# Patient Record
Sex: MALE | Race: WHITE | NOT HISPANIC OR LATINO | Employment: OTHER | URBAN - METROPOLITAN AREA
[De-identification: names, ages, dates, MRNs, and addresses within clinical notes are randomized per-mention and may not be internally consistent; named-entity substitution may affect disease eponyms.]

---

## 2017-04-11 ENCOUNTER — TRANSCRIBE ORDERS (OUTPATIENT)
Dept: GENERAL RADIOLOGY | Facility: HOSPITAL | Age: 82
End: 2017-04-11

## 2017-04-11 ENCOUNTER — HOSPITAL ENCOUNTER (OUTPATIENT)
Dept: GENERAL RADIOLOGY | Facility: HOSPITAL | Age: 82
Discharge: HOME | End: 2017-04-11
Admitting: PHYSICIAN ASSISTANT

## 2017-04-11 DIAGNOSIS — J40 BRONCHITIS, NOT SPECIFIED AS ACUTE OR CHRONIC: Primary | ICD-10-CM

## 2017-04-11 PROCEDURE — 71020 HC CHEST PA AND LATERAL: CPT

## 2017-08-14 ENCOUNTER — OFFICE VISIT (OUTPATIENT)
Dept: CARDIOLOGY | Facility: CLINIC | Age: 82
End: 2017-08-14

## 2017-08-14 VITALS
WEIGHT: 184.2 LBS | DIASTOLIC BLOOD PRESSURE: 64 MMHG | HEART RATE: 76 BPM | HEIGHT: 69 IN | SYSTOLIC BLOOD PRESSURE: 120 MMHG | BODY MASS INDEX: 27.28 KG/M2

## 2017-08-14 DIAGNOSIS — I10 ESSENTIAL HYPERTENSION: ICD-10-CM

## 2017-08-14 DIAGNOSIS — I50.43 ACUTE ON CHRONIC COMBINED SYSTOLIC AND DIASTOLIC CONGESTIVE HEART FAILURE (HCC): ICD-10-CM

## 2017-08-14 DIAGNOSIS — I25.118 CORONARY ARTERY DISEASE INVOLVING NATIVE CORONARY ARTERY OF NATIVE HEART WITH OTHER FORM OF ANGINA PECTORIS (HCC): Primary | ICD-10-CM

## 2017-08-14 DIAGNOSIS — E78.5 HYPERLIPIDEMIA, UNSPECIFIED HYPERLIPIDEMIA TYPE: ICD-10-CM

## 2017-08-14 DIAGNOSIS — I35.0 NONRHEUMATIC AORTIC VALVE STENOSIS: ICD-10-CM

## 2017-08-14 PROCEDURE — 99214 OFFICE O/P EST MOD 30 MIN: CPT | Performed by: INTERNAL MEDICINE

## 2017-08-14 RX ORDER — FUROSEMIDE 20 MG/1
20 TABLET ORAL DAILY
COMMUNITY
End: 2017-08-14 | Stop reason: SDUPTHER

## 2017-08-14 RX ORDER — NITROGLYCERIN 0.4 MG/1
0.4 TABLET SUBLINGUAL
Qty: 25 TABLET | Refills: 6 | Status: SHIPPED | OUTPATIENT
Start: 2017-08-14 | End: 2017-08-14 | Stop reason: SDUPTHER

## 2017-08-14 RX ORDER — FUROSEMIDE 20 MG/1
20 TABLET ORAL DAILY
Qty: 90 TABLET | Refills: 4 | Status: SHIPPED | OUTPATIENT
Start: 2017-08-14 | End: 2017-11-20

## 2017-08-14 RX ORDER — POTASSIUM CHLORIDE 20 MEQ/1
20 TABLET, EXTENDED RELEASE ORAL DAILY
COMMUNITY
End: 2017-08-14 | Stop reason: SDUPTHER

## 2017-08-14 RX ORDER — POTASSIUM CHLORIDE 20 MEQ/1
20 TABLET, EXTENDED RELEASE ORAL DAILY
Qty: 90 TABLET | Refills: 4 | Status: SHIPPED | OUTPATIENT
Start: 2017-08-14 | End: 2017-11-20

## 2017-08-14 RX ORDER — NITROGLYCERIN 0.4 MG/1
0.4 TABLET SUBLINGUAL
Qty: 25 TABLET | Refills: 6 | Status: SHIPPED | OUTPATIENT
Start: 2017-08-14 | End: 2019-11-15 | Stop reason: SDUPTHER

## 2017-08-14 RX ORDER — ALPRAZOLAM 0.5 MG/1
0.5 TABLET ORAL NIGHTLY PRN
Refills: 2 | COMMUNITY
Start: 2017-05-12 | End: 2019-03-05 | Stop reason: SDUPTHER

## 2017-08-14 NOTE — PROGRESS NOTES
Subjective:     Encounter Date:08/14/2017      Patient ID: Tad Moon is a 89 y.o. male.    Chief Complaint: Coronary Artery Disease and Hypertension    PROBLEM LIST:   1. Coronary  artery disease:  a. CABG in 1992, [place], [place]. LIMA  to LAD, SVG to PDA, SVG to distal RCA, SVG to OM1.   b. Non STEMI, 2007 with 3.5 x 16 Taxus to SVG to RCA (Dr. Sheikh).   c. Cardiac catheterization, 2012, medical management, incomplete database.   d. On 08/15/2014, functional class III angina/unstable.  Heart catheterization EF 40% with inferior wall akinesis. Severe distal left main and proximal LAD stenosis with a patent LIMA graft to LAD. An 80% proximal left circumflex heavily calcified stenosis tortuous segment with occluded vein graft. Chronically  occluded RCA and saphenous vein graft to RCA with 3+ collaterals.   e. Chronic functional class II-III angina.    2. Aortic stenosis  a. EF 35% peak aortic valve gradient 41.  3. Heart failure 2017  4. Hypertension.   5. Dyslipidemia.   6. History of tobacco use.   7. GERD.   8. Seizure disorder  9. Chronic lymphocytic leukemia.  10. Surgical history:   a.  Left total knee replacement.   b. Remote cholecystectomy.   c. Appendectomy.   d. Abdominal hernia repair x2    History of Present Illness  Tda Moon returns today for a 12 month follow up with a history of coronary artery disease, hypertension and dyslipidemia. Since last visit he has been doing well from a cardiovascular standpoint. He recently reported to the hospital due to difficulty breathing and shortness of breath. His breathing difficulties have resolved since, and he continues to take potassium and lasix. Mr. Moon adds that he has swelling in his RLE during the day. Denies any exertional chest pain, orthopnea, PND, or palpitations.    Allergies   Allergen Reactions   • Codeine    • Other      Beta Blockers, bradycardia       Current Outpatient Prescriptions:   •  ALPRAZolam (XANAX) 0.5 MG  "tablet, At Night As Needed., Disp: , Rfl: 2  •  aspirin 81 MG tablet, Take 1 tablet by mouth daily., Disp: 90 tablet, Rfl: 3  •  atorvastatin (LIPITOR) 80 MG tablet, Take 1 tablet by mouth daily., Disp: 90 tablet, Rfl: 3  •  cholecalciferol (VITAMIN D3) 1000 UNITS tablet, Take 1,000 Units by mouth Daily., Disp: , Rfl:   •  clopidogrel (PLAVIX) 75 MG tablet, Take 1 tablet by mouth daily., Disp: 90 tablet, Rfl: 3  •  furosemide (LASIX) 20 MG tablet, Take 1 tablet by mouth Daily., Disp: 90 tablet, Rfl: 4  •  hydrochlorothiazide (HYDRODIURIL) 25 MG tablet, Take 1 tablet by mouth daily., Disp: 90 tablet, Rfl: 3  •  isosorbide mononitrate (IMDUR) 60 MG 24 hr tablet, Take 1 tablet by mouth daily., Disp: 90 tablet, Rfl: 3  •  nitroglycerin (NITROSTAT) 0.4 MG SL tablet, Place 1 tablet under the tongue Every 5 (Five) Minutes As Needed for Chest Pain., Disp: 25 tablet, Rfl: 6  •  Omega-3 Fatty Acids (OMEGA-3 PLUS PO), Take 1 tablet by mouth As Needed., Disp: , Rfl:   •  potassium chloride (K-DUR,KLOR-CON) 20 MEQ CR tablet, Take 1 tablet by mouth Daily., Disp: 90 tablet, Rfl: 4    The following portions of the patient's history were reviewed and updated as appropriate: allergies, current medications, past family history, past medical history, past social history, past surgical history and problem list.    Review of Systems   Constitution: Negative.   Cardiovascular: Negative.    Respiratory: Negative.    Hematologic/Lymphatic: Negative for bleeding problem. Does not bruise/bleed easily.   Skin: Negative for rash.   Musculoskeletal: Negative for muscle weakness and myalgias.   Gastrointestinal: Negative for heartburn, nausea and vomiting.   Neurological: Negative.           Objective:     Vitals:    08/14/17 1259   BP: 120/64   BP Location: Right arm   Patient Position: Sitting   Pulse: 76   Weight: 184 lb 3.2 oz (83.6 kg)   Height: 69\" (175.3 cm)         Physical Exam   Constitutional: He is oriented to person, place, and time. " He appears well-developed and well-nourished.   HENT:   Mouth/Throat: Oropharynx is clear and moist.   Neck: No JVD present. Carotid bruit is not present. No thyromegaly present.   Cardiovascular: Regular rhythm, S1 normal, S2 normal and intact distal pulses.  Exam reveals no gallop, no S3 and no S4.    Murmur heard.   Systolic murmur is present with a grade of 3/6  at the upper right sternal border  Pulses:       Carotid pulses are 2+ on the right side, and 2+ on the left side.       Radial pulses are 2+ on the right side, and 2+ on the left side.   Pulmonary/Chest: Breath sounds normal.   Abdominal: Soft. Bowel sounds are normal. He exhibits no mass. There is no tenderness.   Musculoskeletal: He exhibits edema (1+ right greater than left edema).   Neurological: He is alert and oriented to person, place, and time.   Skin: Skin is warm and dry. No rash noted.       Lab Review:    Procedures        Assessment:   Tad was seen today for coronary artery disease and hypertension.    Diagnoses and all orders for this visit:    Coronary artery disease involving native coronary artery of native heart with other form of angina pectoris    Essential hypertension    Hyperlipidemia, unspecified hyperlipidemia type    Nonrheumatic aortic valve stenosis    Acute on chronic combined systolic and diastolic congestive heart failure    Other orders  -     furosemide (LASIX) 20 MG tablet; Take 1 tablet by mouth Daily.  -     nitroglycerin (NITROSTAT) 0.4 MG SL tablet; Place 1 tablet under the tongue Every 5 (Five) Minutes As Needed for Chest Pain.  -     potassium chloride (K-DUR,KLOR-CON) 20 MEQ CR tablet; Take 1 tablet by mouth Daily.      Impression:  1. Coronary artery disease is well-controlled.  2. Hypertension is well-controlled.  3. Hyperlipidemia is well-controlled.  4. Recent diagnosis of heart failure, much improved on diuretics  5. Moderate aortic stenosis    Plan:  1. Continue taking potassium and lasix.  2. Continue  current medications.  3. Revisit in 6 MO echocardiogram to evaluate aortic valve, or sooner as needed.    Scribed for Lv Cobian MD by Isela Shepherd. 8/14/2017  1:21 PM    I, Lv Cobian MD, personally performed the services described in this documentation as scribed by the above individual in my presence, and it is both accurate and complete      Please note that portions of this note may have been completed with a voice recognition program. Efforts were made to edit the dictations, but occasionally words are mistranscribed.

## 2017-09-28 ENCOUNTER — TRANSCRIBE ORDERS (OUTPATIENT)
Dept: ADMINISTRATIVE | Facility: HOSPITAL | Age: 82
End: 2017-09-28

## 2017-09-28 ENCOUNTER — HOSPITAL ENCOUNTER (OUTPATIENT)
Dept: GENERAL RADIOLOGY | Facility: HOSPITAL | Age: 82
Discharge: HOME | End: 2017-09-28
Attending: INTERNAL MEDICINE | Admitting: INTERNAL MEDICINE

## 2017-09-28 DIAGNOSIS — J20.9 ACUTE BRONCHITIS, UNSPECIFIED ORGANISM: Primary | ICD-10-CM

## 2017-09-28 PROCEDURE — 71020 HC CHEST PA AND LATERAL: CPT

## 2017-10-09 ENCOUNTER — APPOINTMENT (OUTPATIENT)
Dept: GENERAL RADIOLOGY | Facility: HOSPITAL | Age: 82
End: 2017-10-09

## 2017-10-09 ENCOUNTER — HOSPITAL ENCOUNTER (INPATIENT)
Facility: HOSPITAL | Age: 82
LOS: 1 days | Discharge: HOME | End: 2017-10-11
Attending: EMERGENCY MEDICINE | Admitting: INTERNAL MEDICINE

## 2017-10-09 DIAGNOSIS — C91.10 CLL (CHRONIC LYMPHOCYTIC LEUKEMIA) (HCC): ICD-10-CM

## 2017-10-09 DIAGNOSIS — I21.4 NSTEMI (NON-ST ELEVATED MYOCARDIAL INFARCTION) (HCC): ICD-10-CM

## 2017-10-09 DIAGNOSIS — I20.8 ANGINAL EQUIVALENT (HCC): ICD-10-CM

## 2017-10-09 DIAGNOSIS — I48.0 PAROXYSMAL ATRIAL FIBRILLATION WITH RAPID VENTRICULAR RESPONSE (HCC): ICD-10-CM

## 2017-10-09 DIAGNOSIS — I50.43 ACUTE ON CHRONIC COMBINED SYSTOLIC AND DIASTOLIC CONGESTIVE HEART FAILURE (HCC): ICD-10-CM

## 2017-10-09 DIAGNOSIS — R77.8 ELEVATED TROPONIN: ICD-10-CM

## 2017-10-09 DIAGNOSIS — M79.602 LEFT ARM PAIN: Primary | ICD-10-CM

## 2017-10-09 DIAGNOSIS — I10 ESSENTIAL HYPERTENSION: ICD-10-CM

## 2017-10-09 LAB
ALBUMIN SERPL-MCNC: 4.3 G/DL (ref 3.2–4.8)
ALBUMIN/GLOB SERPL: 1.7 G/DL (ref 1.5–2.5)
ALP SERPL-CCNC: 117 U/L (ref 25–100)
ALT SERPL W P-5'-P-CCNC: 22 U/L (ref 7–40)
ANION GAP SERPL CALCULATED.3IONS-SCNC: 10 MMOL/L (ref 3–11)
ANION GAP SERPL CALCULATED.3IONS-SCNC: 7 MMOL/L (ref 3–11)
APTT PPP: 26.3 SECONDS (ref 24–31)
APTT PPP: 37.5 SECONDS (ref 45–60)
AST SERPL-CCNC: 25 U/L (ref 0–33)
BACTERIA UR QL AUTO: ABNORMAL /HPF
BASOPHILS # BLD AUTO: 0.03 10*3/MM3 (ref 0–0.2)
BASOPHILS NFR BLD AUTO: 0.2 % (ref 0–1)
BILIRUB SERPL-MCNC: 0.9 MG/DL (ref 0.3–1.2)
BILIRUB UR QL STRIP: NEGATIVE
BNP SERPL-MCNC: 709 PG/ML (ref 0–100)
BUN BLD-MCNC: 20 MG/DL (ref 9–23)
BUN BLD-MCNC: 20 MG/DL (ref 9–23)
BUN/CREAT SERPL: 13.3 (ref 7–25)
BUN/CREAT SERPL: 14.3 (ref 7–25)
CALCIUM SPEC-SCNC: 9.1 MG/DL (ref 8.7–10.4)
CALCIUM SPEC-SCNC: 9.7 MG/DL (ref 8.7–10.4)
CHLORIDE SERPL-SCNC: 91 MMOL/L (ref 99–109)
CHLORIDE SERPL-SCNC: 91 MMOL/L (ref 99–109)
CLARITY UR: CLEAR
CO2 SERPL-SCNC: 26 MMOL/L (ref 20–31)
CO2 SERPL-SCNC: 30 MMOL/L (ref 20–31)
COLOR UR: YELLOW
CREAT BLD-MCNC: 1.4 MG/DL (ref 0.6–1.3)
CREAT BLD-MCNC: 1.5 MG/DL (ref 0.6–1.3)
DEPRECATED RDW RBC AUTO: 44.6 FL (ref 37–54)
EOSINOPHIL # BLD AUTO: 0.03 10*3/MM3 (ref 0–0.3)
EOSINOPHIL NFR BLD AUTO: 0.2 % (ref 0–3)
ERYTHROCYTE [DISTWIDTH] IN BLOOD BY AUTOMATED COUNT: 13.4 % (ref 11.3–14.5)
GFR SERPL CREATININE-BSD FRML MDRD: 44 ML/MIN/1.73
GFR SERPL CREATININE-BSD FRML MDRD: 48 ML/MIN/1.73
GLOBULIN UR ELPH-MCNC: 2.6 GM/DL
GLUCOSE BLD-MCNC: 127 MG/DL (ref 70–100)
GLUCOSE BLD-MCNC: 141 MG/DL (ref 70–100)
GLUCOSE UR STRIP-MCNC: NEGATIVE MG/DL
HCT VFR BLD AUTO: 36.5 % (ref 38.9–50.9)
HGB BLD-MCNC: 12.3 G/DL (ref 13.1–17.5)
HGB UR QL STRIP.AUTO: NEGATIVE
HYALINE CASTS UR QL AUTO: ABNORMAL /LPF
IMM GRANULOCYTES # BLD: 0.07 10*3/MM3 (ref 0–0.03)
IMM GRANULOCYTES NFR BLD: 0.4 % (ref 0–0.6)
INR PPP: 1.09
KETONES UR QL STRIP: NEGATIVE
LEUKOCYTE ESTERASE UR QL STRIP.AUTO: ABNORMAL
LYMPHOCYTES # BLD AUTO: 10.44 10*3/MM3 (ref 0.6–4.8)
LYMPHOCYTES NFR BLD AUTO: 55.3 % (ref 24–44)
MCH RBC QN AUTO: 30.9 PG (ref 27–31)
MCHC RBC AUTO-ENTMCNC: 33.7 G/DL (ref 32–36)
MCV RBC AUTO: 91.7 FL (ref 80–99)
MONOCYTES # BLD AUTO: 1 10*3/MM3 (ref 0–1)
MONOCYTES NFR BLD AUTO: 5.3 % (ref 0–12)
NEUTROPHILS # BLD AUTO: 7.32 10*3/MM3 (ref 1.5–8.3)
NEUTROPHILS NFR BLD AUTO: 38.6 % (ref 41–71)
NITRITE UR QL STRIP: NEGATIVE
PH UR STRIP.AUTO: 7 [PH] (ref 5–8)
PLAT MORPH BLD: NORMAL
PLATELET # BLD AUTO: 207 10*3/MM3 (ref 150–450)
PMV BLD AUTO: 11.1 FL (ref 6–12)
POTASSIUM BLD-SCNC: 3.5 MMOL/L (ref 3.5–5.5)
POTASSIUM BLD-SCNC: 3.7 MMOL/L (ref 3.5–5.5)
PROT SERPL-MCNC: 6.9 G/DL (ref 5.7–8.2)
PROT UR QL STRIP: NEGATIVE
PROTHROMBIN TIME: 11.9 SECONDS (ref 9.6–11.5)
RBC # BLD AUTO: 3.98 10*6/MM3 (ref 4.2–5.76)
RBC # UR: ABNORMAL /HPF
RBC MORPH BLD: NORMAL
REF LAB TEST METHOD: ABNORMAL
SMUDGE CELLS BLD QL SMEAR: NORMAL
SODIUM BLD-SCNC: 127 MMOL/L (ref 132–146)
SODIUM BLD-SCNC: 128 MMOL/L (ref 132–146)
SP GR UR STRIP: 1.01 (ref 1–1.03)
SQUAMOUS #/AREA URNS HPF: ABNORMAL /HPF
TROPONIN I SERPL-MCNC: 0.21 NG/ML (ref 0–0.07)
TROPONIN I SERPL-MCNC: 1.06 NG/ML (ref 0–0.07)
TROPONIN I SERPL-MCNC: 8.46 NG/ML
UROBILINOGEN UR QL STRIP: ABNORMAL
WBC NRBC COR # BLD: 18.89 10*3/MM3 (ref 3.5–10.8)
WBC UR QL AUTO: ABNORMAL /HPF

## 2017-10-09 PROCEDURE — 85025 COMPLETE CBC W/AUTO DIFF WBC: CPT | Performed by: EMERGENCY MEDICINE

## 2017-10-09 PROCEDURE — 80053 COMPREHEN METABOLIC PANEL: CPT | Performed by: EMERGENCY MEDICINE

## 2017-10-09 PROCEDURE — 25010000002 HEPARIN (PORCINE) PER 1000 UNITS

## 2017-10-09 PROCEDURE — 71010 HC CHEST PA OR AP: CPT

## 2017-10-09 PROCEDURE — 84484 ASSAY OF TROPONIN QUANT: CPT

## 2017-10-09 PROCEDURE — 84484 ASSAY OF TROPONIN QUANT: CPT | Performed by: INTERNAL MEDICINE

## 2017-10-09 PROCEDURE — 99223 1ST HOSP IP/OBS HIGH 75: CPT | Performed by: INTERNAL MEDICINE

## 2017-10-09 PROCEDURE — 36415 COLL VENOUS BLD VENIPUNCTURE: CPT

## 2017-10-09 PROCEDURE — 99285 EMERGENCY DEPT VISIT HI MDM: CPT

## 2017-10-09 PROCEDURE — 85730 THROMBOPLASTIN TIME PARTIAL: CPT | Performed by: EMERGENCY MEDICINE

## 2017-10-09 PROCEDURE — 25010000002 HEPARIN (PORCINE) PER 1000 UNITS: Performed by: INTERNAL MEDICINE

## 2017-10-09 PROCEDURE — G0378 HOSPITAL OBSERVATION PER HR: HCPCS

## 2017-10-09 PROCEDURE — 81001 URINALYSIS AUTO W/SCOPE: CPT | Performed by: EMERGENCY MEDICINE

## 2017-10-09 PROCEDURE — 63710000001 PREDNISONE PER 5 MG: Performed by: PHYSICIAN ASSISTANT

## 2017-10-09 PROCEDURE — 83880 ASSAY OF NATRIURETIC PEPTIDE: CPT | Performed by: EMERGENCY MEDICINE

## 2017-10-09 PROCEDURE — 85610 PROTHROMBIN TIME: CPT | Performed by: EMERGENCY MEDICINE

## 2017-10-09 PROCEDURE — 85730 THROMBOPLASTIN TIME PARTIAL: CPT

## 2017-10-09 PROCEDURE — 85007 BL SMEAR W/DIFF WBC COUNT: CPT | Performed by: EMERGENCY MEDICINE

## 2017-10-09 PROCEDURE — 93005 ELECTROCARDIOGRAM TRACING: CPT | Performed by: EMERGENCY MEDICINE

## 2017-10-09 RX ORDER — ATORVASTATIN CALCIUM 40 MG/1
80 TABLET, FILM COATED ORAL DAILY
Status: DISCONTINUED | OUTPATIENT
Start: 2017-10-09 | End: 2017-10-09 | Stop reason: SDUPTHER

## 2017-10-09 RX ORDER — ONDANSETRON 4 MG/1
4 TABLET, FILM COATED ORAL EVERY 6 HOURS PRN
Status: DISCONTINUED | OUTPATIENT
Start: 2017-10-09 | End: 2017-10-11 | Stop reason: HOSPADM

## 2017-10-09 RX ORDER — MELATONIN
1000 DAILY
Status: DISCONTINUED | OUTPATIENT
Start: 2017-10-09 | End: 2017-10-11 | Stop reason: HOSPADM

## 2017-10-09 RX ORDER — PREDNISONE 10 MG/1
10-20 TABLET ORAL DAILY
COMMUNITY
Start: 2017-10-06 | End: 2017-10-19

## 2017-10-09 RX ORDER — NITROGLYCERIN 20 MG/100ML
10-50 INJECTION INTRAVENOUS
Status: DISCONTINUED | OUTPATIENT
Start: 2017-10-09 | End: 2017-10-10

## 2017-10-09 RX ORDER — LEVOFLOXACIN 500 MG/1
500 TABLET, FILM COATED ORAL DAILY
COMMUNITY
Start: 2017-10-06 | End: 2017-10-12

## 2017-10-09 RX ORDER — PREDNISONE 1 MG/1
1 TABLET ORAL DAILY
Status: DISCONTINUED | OUTPATIENT
Start: 2017-10-09 | End: 2017-10-11 | Stop reason: HOSPADM

## 2017-10-09 RX ORDER — ATORVASTATIN CALCIUM 40 MG/1
80 TABLET, FILM COATED ORAL NIGHTLY
Status: DISCONTINUED | OUTPATIENT
Start: 2017-10-09 | End: 2017-10-11 | Stop reason: HOSPADM

## 2017-10-09 RX ORDER — ALUMINA, MAGNESIA, AND SIMETHICONE 2400; 2400; 240 MG/30ML; MG/30ML; MG/30ML
15 SUSPENSION ORAL EVERY 6 HOURS PRN
Status: DISCONTINUED | OUTPATIENT
Start: 2017-10-09 | End: 2017-10-11 | Stop reason: HOSPADM

## 2017-10-09 RX ORDER — ONDANSETRON 2 MG/ML
4 INJECTION INTRAMUSCULAR; INTRAVENOUS EVERY 6 HOURS PRN
Status: DISCONTINUED | OUTPATIENT
Start: 2017-10-09 | End: 2017-10-11 | Stop reason: HOSPADM

## 2017-10-09 RX ORDER — FAMOTIDINE 10 MG/ML
20 INJECTION, SOLUTION INTRAVENOUS ONCE
Status: COMPLETED | OUTPATIENT
Start: 2017-10-09 | End: 2017-10-09

## 2017-10-09 RX ORDER — FAMOTIDINE 20 MG/1
40 TABLET, FILM COATED ORAL DAILY
Status: DISCONTINUED | OUTPATIENT
Start: 2017-10-09 | End: 2017-10-11 | Stop reason: HOSPADM

## 2017-10-09 RX ORDER — HEPARIN SODIUM 1000 [USP'U]/ML
2000 INJECTION, SOLUTION INTRAVENOUS; SUBCUTANEOUS ONCE
Status: COMPLETED | OUTPATIENT
Start: 2017-10-09 | End: 2017-10-09

## 2017-10-09 RX ORDER — ACETAMINOPHEN 325 MG/1
650 TABLET ORAL EVERY 4 HOURS PRN
Status: DISCONTINUED | OUTPATIENT
Start: 2017-10-09 | End: 2017-10-11 | Stop reason: HOSPADM

## 2017-10-09 RX ORDER — SODIUM CHLORIDE 0.9 % (FLUSH) 0.9 %
1-10 SYRINGE (ML) INJECTION AS NEEDED
Status: DISCONTINUED | OUTPATIENT
Start: 2017-10-09 | End: 2017-10-11 | Stop reason: HOSPADM

## 2017-10-09 RX ORDER — ASPIRIN 325 MG
325 TABLET ORAL DAILY
Status: DISCONTINUED | OUTPATIENT
Start: 2017-10-10 | End: 2017-10-11 | Stop reason: HOSPADM

## 2017-10-09 RX ORDER — CLOPIDOGREL BISULFATE 75 MG/1
75 TABLET ORAL DAILY
Status: DISCONTINUED | OUTPATIENT
Start: 2017-10-09 | End: 2017-10-11 | Stop reason: HOSPADM

## 2017-10-09 RX ORDER — POTASSIUM CHLORIDE 1.5 G/1.77G
20 POWDER, FOR SOLUTION ORAL DAILY
Status: DISCONTINUED | OUTPATIENT
Start: 2017-10-09 | End: 2017-10-11 | Stop reason: HOSPADM

## 2017-10-09 RX ORDER — NITROGLYCERIN 0.4 MG/1
0.4 TABLET SUBLINGUAL
Status: DISCONTINUED | OUTPATIENT
Start: 2017-10-09 | End: 2017-10-11 | Stop reason: HOSPADM

## 2017-10-09 RX ORDER — HEPARIN SODIUM 1000 [USP'U]/ML
4000 INJECTION, SOLUTION INTRAVENOUS; SUBCUTANEOUS ONCE
Status: COMPLETED | OUTPATIENT
Start: 2017-10-09 | End: 2017-10-09

## 2017-10-09 RX ORDER — SODIUM CHLORIDE 0.9 % (FLUSH) 0.9 %
10 SYRINGE (ML) INJECTION AS NEEDED
Status: DISCONTINUED | OUTPATIENT
Start: 2017-10-09 | End: 2017-10-11 | Stop reason: HOSPADM

## 2017-10-09 RX ORDER — ALPRAZOLAM 0.5 MG/1
0.5 TABLET ORAL ONCE
Status: COMPLETED | OUTPATIENT
Start: 2017-10-09 | End: 2017-10-09

## 2017-10-09 RX ORDER — ASPIRIN 81 MG/1
81 TABLET, CHEWABLE ORAL DAILY
Status: DISCONTINUED | OUTPATIENT
Start: 2017-10-09 | End: 2017-10-09 | Stop reason: SDUPTHER

## 2017-10-09 RX ORDER — LEVETIRACETAM 500 MG/1
250 TABLET ORAL EVERY 12 HOURS SCHEDULED
Status: DISCONTINUED | OUTPATIENT
Start: 2017-10-09 | End: 2017-10-11 | Stop reason: HOSPADM

## 2017-10-09 RX ORDER — LEVETIRACETAM 250 MG/1
250 TABLET ORAL 2 TIMES DAILY
COMMUNITY
End: 2017-10-09 | Stop reason: SDDI

## 2017-10-09 RX ADMIN — FAMOTIDINE 40 MG: 20 TABLET ORAL at 17:38

## 2017-10-09 RX ADMIN — FAMOTIDINE 20 MG: 10 INJECTION, SOLUTION INTRAVENOUS at 11:15

## 2017-10-09 RX ADMIN — PREDNISONE 1 MG: 1 TABLET ORAL at 17:37

## 2017-10-09 RX ADMIN — CLOPIDOGREL BISULFATE 75 MG: 75 TABLET ORAL at 17:38

## 2017-10-09 RX ADMIN — POTASSIUM CHLORIDE 20 MEQ: 1.5 POWDER, FOR SOLUTION ORAL at 17:37

## 2017-10-09 RX ADMIN — HEPARIN SODIUM 2000 UNITS: 1000 INJECTION, SOLUTION INTRAVENOUS; SUBCUTANEOUS at 22:09

## 2017-10-09 RX ADMIN — ALPRAZOLAM 0.5 MG: 0.5 TABLET ORAL at 22:10

## 2017-10-09 RX ADMIN — NITROGLYCERIN 1 INCH: 20 OINTMENT TOPICAL at 11:16

## 2017-10-09 RX ADMIN — ATORVASTATIN CALCIUM 80 MG: 40 TABLET, FILM COATED ORAL at 20:43

## 2017-10-09 RX ADMIN — HEPARIN SODIUM 12 UNITS/KG/HR: 10000 INJECTION, SOLUTION INTRAVENOUS at 15:54

## 2017-10-09 RX ADMIN — HEPARIN SODIUM 4000 UNITS: 1000 INJECTION, SOLUTION INTRAVENOUS; SUBCUTANEOUS at 15:55

## 2017-10-09 RX ADMIN — METOPROLOL TARTRATE 12.5 MG: 25 TABLET, FILM COATED ORAL at 20:43

## 2017-10-09 RX ADMIN — NITROGLYCERIN 10 MCG/MIN: 20 INJECTION INTRAVENOUS at 12:12

## 2017-10-09 NOTE — PLAN OF CARE
Problem: Patient Care Overview (Adult)  Goal: Plan of Care Review  Outcome: Ongoing (interventions implemented as appropriate)    10/09/17 6671   Coping/Psychosocial Response Interventions   Plan Of Care Reviewed With patient   Outcome Evaluation   Outcome Summary/Follow up Plan NSR on monitor. continues on heparin and NTG drip. VSS.no c/p's of chest pain noted. npo after mn for possible cath in am         Problem: Acute Coronary Syndrome (ACS) (Adult)  Goal: Signs and Symptoms of Listed Potential Problems Will be Absent or Manageable (Acute Coronary Syndrome)  Outcome: Ongoing (interventions implemented as appropriate)

## 2017-10-09 NOTE — H&P
[facility] at [facility]        Date of Hospital Visit: 10/09/17      Place of Service: [facility]    Patient Name: Tad Moon  :1926    Referral Provider: No ref. provider found  Primary Care Provider: Pramod Hyde MD  Primary cardiologist: Lv Cobian M.D.      Chief complaint/Reason for Consultation:  chest pain    Problem List:  Problem   Nonrheumatic Aortic Valve Stenosis    1. Echo 16: Moderate to severe aortic valve stenosis is present. Mean gradient 25 mmHg, MARII 0.89 cm squares.     Acute On Chronic Combined Systolic and Diastolic Congestive Heart Failure     1. Echo 16 Left ventricular function is moderately decreased. Estimated EF = 35%.     Coronary Artery Disease Involving Native Coronary Artery of Native Heart    a. CABG in , [place], [place]. LIMA to LAD, SVG to PDA, SVG to distal RCA, SVG to OM1.   b. Non STEMI,  with 3.5 x 16 Taxus to SVG to RCA (Dr. Sheikh).   c. Cardiac catheterization, , medical management, incomplete database.   d. On 08/15/2014, functional class III angina/unstable. Heart catheterization EF 40% with inferior wall akinesis. Severe distal left main and proximal LAD stenosis with a patent LIMA graft to LAD. An 80% proximal left circumflex heavily calcified stenosis tortuous segment with occluded vein graft. Chronically occluded RCA and saphenous vein graft to RCA with 3+ collaterals.   e. Chronic functional class II-III angina.     Essential Hypertension   Hld (Hyperlipidemia)       History of Present Illness:  89 year old male with known coronary disease, aortic stenosis and chronic systolic heart failure.  Presents to the emergency department this morning with a two-week complaint of cough.  His primary care has been treating him for bronchitis with oral steroids and antibiotics.  He had lower extremity edema which has resolved in the past 24-48 hours.  Upon waking this morning he had  left sided chest pain radiating to the left upper extremity with diaphoresis.  This lasted for approximately 2 hours, was only partially relieved with sublingual nitroglycerin ×2.  He denied rojas midsternal chest pain, nausea.  His initial EKG was nondiagnostic.  His BNP is elevated as initial troponin is elevated.  At present he is comfortable and in no apparent distress.    Past Medical History:   Diagnosis Date   • CLL (chronic lymphocytic leukemia)    • Coronary artery disease    • GERD (gastroesophageal reflux disease)    • History of tobacco abuse    • Hyperlipidemia    • Hypertension        Past Surgical History:   Procedure Laterality Date   • ABDOMINAL HERNIA REPAIR      x 2   • APPENDECTOMY     • CARDIAC SURGERY     • CHOLECYSTECTOMY     • CORONARY ARTERY BYPASS GRAFT     • REPLACEMENT TOTAL KNEE Left 2014   • TOTAL KNEE ARTHROPLASTY Left        Allergies   Allergen Reactions   • Codeine    • Other      Beta Blockers, bradycardia       (Not in a hospital admission)    Current Facility-Administered Medications:   •  nitroglycerin 50 mg/250 mL (0.2 mg/mL) infusion, 10-50 mcg/min, Intravenous, Titrated, Olvin Ahumada MD, Last Rate: 3 mL/hr at 10/09/17 1212, 10 mcg/min at 10/09/17 1212  •  Insert peripheral IV, , , Once **AND** sodium chloride 0.9 % flush 10 mL, 10 mL, Intravenous, PRN, Olvin Ahumada MD    Current Outpatient Prescriptions:   •  atorvastatin (LIPITOR) 80 MG tablet, Take 1 tablet by mouth daily., Disp: 90 tablet, Rfl: 3  •  cholecalciferol (VITAMIN D3) 1000 UNITS tablet, Take 1,000 Units by mouth Daily., Disp: , Rfl:   •  clopidogrel (PLAVIX) 75 MG tablet, Take 1 tablet by mouth daily., Disp: 90 tablet, Rfl: 3  •  furosemide (LASIX) 20 MG tablet, Take 1 tablet by mouth Daily., Disp: 90 tablet, Rfl: 4  •  hydrochlorothiazide (HYDRODIURIL) 25 MG tablet, Take 1 tablet by mouth daily., Disp: 90 tablet, Rfl: 3  •  isosorbide mononitrate (IMDUR) 60 MG 24 hr tablet, Take 1 tablet by  mouth daily., Disp: 90 tablet, Rfl: 3  •  levETIRAcetam (KEPPRA) 250 MG tablet, Take 250 mg by mouth 2 (Two) Times a Day., Disp: , Rfl:   •  nitroglycerin (NITROSTAT) 0.4 MG SL tablet, Place 1 tablet under the tongue Every 5 (Five) Minutes As Needed for Chest Pain., Disp: 25 tablet, Rfl: 6  •  Omega-3 Fatty Acids (OMEGA-3 PLUS PO), Take 1 tablet by mouth As Needed., Disp: , Rfl:   •  potassium chloride (K-DUR,KLOR-CON) 20 MEQ CR tablet, Take 1 tablet by mouth Daily., Disp: 90 tablet, Rfl: 4  •  PredniSONE 5 MG tablet therapy pack dosepak, Take 1 each by mouth. Take as directed on package instructions., Disp: , Rfl:   •  ALPRAZolam (XANAX) 0.5 MG tablet, At Night As Needed., Disp: , Rfl: 2  •  aspirin 81 MG tablet, Take 1 tablet by mouth daily., Disp: 90 tablet, Rfl: 3      Social History     Social History   • Marital status:      Spouse name: N/A   • Number of children: N/A   • Years of education: N/A     Occupational History   • Not on file.     Social History Main Topics   • Smoking status: Former Smoker     Types: Cigarettes   • Smokeless tobacco: Never Used      Comment: quit 45 years ago   • Alcohol use 0.6 - 1.2 oz/week     1 - 2 Glasses of wine per week      Comment: occas   • Drug use: No   • Sexual activity: No     Other Topics Concern   • Not on file     Social History Narrative       Family History   Problem Relation Age of Onset   • Stroke Mother    • Heart disease Father        REVIEW OF SYSTEMS:   Review of Systems   Constitution: Negative.   HENT: Negative.    Eyes: Negative.    Cardiovascular: Positive for chest pain, dyspnea on exertion, leg swelling and orthopnea. Negative for palpitations, paroxysmal nocturnal dyspnea and syncope.   Respiratory: Positive for cough and shortness of breath.    Endocrine: Negative.    Hematologic/Lymphatic: Negative.    Skin: Negative.    Musculoskeletal: Negative.    Gastrointestinal: Negative.    Genitourinary: Negative.    Neurological: Negative.   "  Psychiatric/Behavioral: Negative.    Allergic/Immunologic: Negative.    All other systems reviewed and are negative.        Objective:     Vitals:    10/09/17 1048 10/09/17 1116 10/09/17 1140 10/09/17 1240   BP: 147/87 141/85 126/91 133/92   Pulse: 97 87 96 95   Resp: 20      Temp: 98.6 °F (37 °C)      TempSrc: Oral      SpO2: 96%  98% 100%   Weight: 185 lb (83.9 kg)      Height: 69\" (175.3 cm)        Body mass index is 27.32 kg/(m^2).  Flowsheet Rows         First Filed Value    Admission Height  69\" (175.3 cm) Documented at 10/09/2017 1048    Admission Weight  185 lb (83.9 kg) Documented at 10/09/2017 1048        No intake or output data in the 24 hours ending 10/09/17 1249    Physical Exam   General: No acute distress, well-developed and well-nourished.    Skin: Skin is warm and dry. No obvious cyanosis, erythema or pallor.   HEENT: Atraumatic, normocephalic, no conjunctival pallor, no scleral icterus.   Neck: Supple, no JVD. Normal carotid upstrokes, no bruits.    Chest:No respiratory distres No chest wall tenderness. fine crackles in bases bilaterally. No wheezes,    Cardiovascular: Normal S1 and S2, 2/6 murmer, no gallop or rub. PMI is not displaced.    Pulses:Radial and pedal pulses are 2+ and symmetric.    Abdomen: Soft, non tender, normal bowel sounds.   Musculoskeletal/Extremities:  No clubbing, cyanosis or edema. No gross deformity.   Neurological: Alert and oriented to person, place, and time, no gross focal deficits.   Psychiatric: Normal mood and affect.Speech and behavior is normal.    Barbaeu Test:  Left: Normal  (oxymetric Allens) Right: Not Assessed     Lab Review:                  Results from last 7 days  Lab Units 10/09/17  1111   SODIUM mmol/L 128*   POTASSIUM mmol/L 3.5   CHLORIDE mmol/L 91*   CO2 mmol/L 30.0   BUN mg/dL 20   CREATININE mg/dL 1.40*   GLUCOSE mg/dL 127*   CALCIUM mg/dL 9.7           Results from last 7 days  Lab Units 10/09/17  1111   WBC 10*3/mm3 18.89*   HEMOGLOBIN g/dL " 12.3*   HEMATOCRIT % 36.5*   PLATELETS 10*3/mm3 207       Results from last 7 days  Lab Units 10/09/17  1111   INR  1.09   APTT seconds 26.3               Results from last 7 days  Lab Units 10/09/17  1111   BNP pg/mL 709.0*       EKG: CARD EKG FINDINGS: Nonspecific ST segment changes, sinus rhythm              Assessment:   · ACS/non-STEMI.  · CAD with known occlusion of RCA and RCA graft and known severe calcified disease of the circumflex with occlusion of circumflex graft, patent LIMA graft to the LAD based on catheterization study 2014.  · Acute on chronic systolic heart failure.  · Ischemic cardiomyopathy, ejection fraction 35%.  · Severe aortic stenosis  · Hypertension  · Dyslipidemia  · Acute versus chronic kidney disease.  Plan:   · Admit overnight for observation, check serial cardiac markers.  · Continue aspirin/Plavix, add intravenous heparin.  Add low-dose beta blockers.    · Hold hydrochlorothiazide due to significant hyponatremia.  · Continue rest of the home medications.  · Due to known severe non-revascularizable disease we will plan to optimize medical management, if he remains stable with no significant angina we will consider discharging to home tomorrow with adjusted medical therapy.  · Further management to be based on clinical course.    Scribed for Juan M Sood MD by Elver Weinstein PA-C. 10/9/2017  12:49 PM    IJuan M MD, personally performed the services described in this documentation as scribed by the above named individual in my presence, and it is both accurate and complete.  10/9/2017  1:56 PM

## 2017-10-09 NOTE — PLAN OF CARE
Problem: Patient Care Overview (Adult)  Goal: Plan of Care Review  Outcome: Ongoing (interventions implemented as appropriate)    10/09/17 4681   Coping/Psychosocial Response Interventions   Plan Of Care Reviewed With patient   Outcome Evaluation   Outcome Summary/Follow up Plan nsr o n monitor. VSS.. continues on hepaRIN AND NTG DRIP. NO C/P'S OF CHEST PAIN NOTED.   Patient Care Overview   Progress no change

## 2017-10-09 NOTE — ED PROVIDER NOTES
"Subjective   HPI Comments: Tad Moon is a 89 y.o.male with a history of atrial fibrillation. He had a CABG over 20 years ago and stent placement in 2007. He regularly sees Aranza Schneider, cardiology. He presents to the ED today with c/o left arm pain. The patient states that he was feeling anxious and \"on edge\" after waking up this morning. He also reports feeling generally worse than baseline. He came to the emergency department for evaluation after developing a gradually worsening, aching pain in the left upper arm that occasional radiates to the left chest region. His discomfort is not significantly worse with movement or palpation. He recently started Prednisone on Friday and has tried taking two NTG prior to arrival without relief. He does mention feeling somewhat short of breath and diaphoretic but denies any chest pain at this time. He has no other acute complaints.     Patient is a 89 y.o. male presenting with upper extremity pain.   History provided by:  Patient  Upper Extremity Issue   Location:  Arm  Arm location:  L upper arm  Pain details:     Quality:  Aching    Radiates to:  Chest    Severity:  Moderate    Onset quality:  Gradual    Duration:  1 day    Timing:  Constant    Progression:  Worsening  Relieved by:  Nothing  Worsened by:  Nothing  Ineffective treatments: Prednisone, Nitro.  Associated symptoms: no back pain, no fever and no neck pain        Review of Systems   Constitutional: Positive for diaphoresis. Negative for chills and fever.   HENT: Negative for congestion, rhinorrhea, sore throat and trouble swallowing.    Respiratory: Positive for shortness of breath. Negative for cough.    Cardiovascular: Negative for chest pain and leg swelling.   Gastrointestinal: Negative for abdominal pain, diarrhea, nausea and vomiting.   Musculoskeletal: Positive for myalgias (left upper arm, occasional radiation to left chest region). Negative for back pain and neck pain.   Neurological: Negative for " dizziness, weakness, numbness and headaches.   Psychiatric/Behavioral: Negative for confusion.   All other systems reviewed and are negative.      Past Medical History:   Diagnosis Date   • CLL (chronic lymphocytic leukemia)    • Coronary artery disease    • GERD (gastroesophageal reflux disease)    • History of tobacco abuse    • Hyperlipidemia    • Hypertension        Allergies   Allergen Reactions   • Other      Beta Blockers, bradycardia       Past Surgical History:   Procedure Laterality Date   • ABDOMINAL HERNIA REPAIR      x 2   • APPENDECTOMY     • CARDIAC SURGERY     • CHOLECYSTECTOMY     • CORONARY ARTERY BYPASS GRAFT     • REPLACEMENT TOTAL KNEE Left 2014   • TOTAL KNEE ARTHROPLASTY Left        Family History   Problem Relation Age of Onset   • Stroke Mother    • Heart disease Father        Social History     Social History   • Marital status:      Spouse name: N/A   • Number of children: N/A   • Years of education: N/A     Social History Main Topics   • Smoking status: Former Smoker     Types: Cigarettes   • Smokeless tobacco: Never Used      Comment: quit 45 years ago   • Alcohol use 0.6 - 1.2 oz/week     1 - 2 Glasses of wine per week      Comment: occas   • Drug use: No   • Sexual activity: No     Other Topics Concern   • None     Social History Narrative         Objective   Physical Exam   Constitutional: He is oriented to person, place, and time. He appears well-developed and well-nourished. No distress.   HENT:   Head: Normocephalic and atraumatic.   Nose: Nose normal.   Mouth/Throat: Oropharynx is clear and moist.   Eyes: Conjunctivae are normal. No scleral icterus.   Neck: Normal range of motion. Neck supple.   Cardiovascular: Normal rate, regular rhythm and normal heart sounds.  Exam reveals no gallop and no friction rub.    No murmur heard.  Regular rate and rhythm. No murmur.   Pulmonary/Chest: Effort normal and breath sounds normal. No respiratory distress. He has no wheezes. He has no  rales.   Median sternotomy scar. Lungs are clear to auscultation.   Abdominal: Soft. Bowel sounds are normal. He exhibits no distension and no mass. There is no tenderness. There is no rebound and no guarding.   Abdomen is soft and non-tender diffusely. Bowel sounds are normal in all four quadrants. No guarding or rebound.   Musculoskeletal: Normal range of motion. He exhibits tenderness. He exhibits no edema.   Possible mild tenderness of the left shoulder that is difficult to assess. The patient is unable to discern whether palpation reproduces the chief complaint or if this is just where the pain is located.   Neurological: He is alert and oriented to person, place, and time.   Skin: Skin is warm and dry. No erythema.   Psychiatric: He has a normal mood and affect. His behavior is normal.   Nursing note and vitals reviewed.      Procedures         ED Course  ED Course   Value Comment By Time   WBC: (!) 18.89 Patient is noted to be on steroids likely accounting for the patient's leukocytosis. Olvin Ahumada MD 10/09 1130   Troponin I: (!) 0.21 (Reviewed) Olvin Ahumada MD 10/09 1136   BNP: (!) 709.0 (Reviewed) Olvin Ahumada MD 10/09 1153   Creatinine: (!) 1.40 (Reviewed) Olvin Ahumada MD 10/09 1153    Dr. Ahumada discussed the case in detail with the hospitalist who will admit. Anastasia Rutledge 10/09 1154    Case discussed with Hope with [facility] and will arrange consult. Olvin Ahumada MD 10/09 1155    Patient evaluated by cardiology and they will admit the patient to the hospital. Olvin Ahumada MD 10/09 1302     Recent Results (from the past 24 hour(s))   Comprehensive Metabolic Panel    Collection Time: 10/09/17 11:11 AM   Result Value Ref Range    Glucose 127 (H) 70 - 100 mg/dL    BUN 20 9 - 23 mg/dL    Creatinine 1.40 (H) 0.60 - 1.30 mg/dL    Sodium 128 (L) 132 - 146 mmol/L    Potassium 3.5 3.5 - 5.5 mmol/L    Chloride 91 (L) 99 - 109 mmol/L    CO2 30.0 20.0  - 31.0 mmol/L    Calcium 9.7 8.7 - 10.4 mg/dL    Total Protein 6.9 5.7 - 8.2 g/dL    Albumin 4.30 3.20 - 4.80 g/dL    ALT (SGPT) 22 7 - 40 U/L    AST (SGOT) 25 0 - 33 U/L    Alkaline Phosphatase 117 (H) 25 - 100 U/L    Total Bilirubin 0.9 0.3 - 1.2 mg/dL    eGFR Non African Amer 48 (L) >60 mL/min/1.73    Globulin 2.6 gm/dL    A/G Ratio 1.7 1.5 - 2.5 g/dL    BUN/Creatinine Ratio 14.3 7.0 - 25.0    Anion Gap 7.0 3.0 - 11.0 mmol/L   Protime-INR    Collection Time: 10/09/17 11:11 AM   Result Value Ref Range    Protime 11.9 (H) 9.6 - 11.5 Seconds    INR 1.09    aPTT    Collection Time: 10/09/17 11:11 AM   Result Value Ref Range    PTT 26.3 24.0 - 31.0 seconds   BNP    Collection Time: 10/09/17 11:11 AM   Result Value Ref Range    .0 (H) 0.0 - 100.0 pg/mL   CBC Auto Differential    Collection Time: 10/09/17 11:11 AM   Result Value Ref Range    WBC 18.89 (H) 3.50 - 10.80 10*3/mm3    RBC 3.98 (L) 4.20 - 5.76 10*6/mm3    Hemoglobin 12.3 (L) 13.1 - 17.5 g/dL    Hematocrit 36.5 (L) 38.9 - 50.9 %    MCV 91.7 80.0 - 99.0 fL    MCH 30.9 27.0 - 31.0 pg    MCHC 33.7 32.0 - 36.0 g/dL    RDW 13.4 11.3 - 14.5 %    RDW-SD 44.6 37.0 - 54.0 fl    MPV 11.1 6.0 - 12.0 fL    Platelets 207 150 - 450 10*3/mm3    Neutrophil % 38.6 (L) 41.0 - 71.0 %    Lymphocyte % 55.3 (H) 24.0 - 44.0 %    Monocyte % 5.3 0.0 - 12.0 %    Eosinophil % 0.2 0.0 - 3.0 %    Basophil % 0.2 0.0 - 1.0 %    Immature Grans % 0.4 0.0 - 0.6 %    Neutrophils, Absolute 7.32 1.50 - 8.30 10*3/mm3    Lymphocytes, Absolute 10.44 (H) 0.60 - 4.80 10*3/mm3    Monocytes, Absolute 1.00 0.00 - 1.00 10*3/mm3    Eosinophils, Absolute 0.03 0.00 - 0.30 10*3/mm3    Basophils, Absolute 0.03 0.00 - 0.20 10*3/mm3    Immature Grans, Absolute 0.07 (H) 0.00 - 0.03 10*3/mm3   Scan Slide    Collection Time: 10/09/17 11:11 AM   Result Value Ref Range    RBC Morphology Normal Normal    Smudge Cells Slight/1+ None Seen    Platelet Morphology Normal Normal   POC Troponin, Rapid    Collection  Time: 10/09/17 11:12 AM   Result Value Ref Range    Troponin I 0.21 (H) 0.00 - 0.07 ng/mL   Urinalysis With / Culture If Indicated - Urine, Clean Catch    Collection Time: 10/09/17  1:16 PM   Result Value Ref Range    Color, UA Yellow Yellow, Straw    Appearance, UA Clear Clear    pH, UA 7.0 5.0 - 8.0    Specific Gravity, UA 1.010 1.001 - 1.030    Glucose, UA Negative Negative    Ketones, UA Negative Negative    Bilirubin, UA Negative Negative    Blood, UA Negative Negative    Protein, UA Negative Negative    Leuk Esterase, UA Trace (A) Negative    Nitrite, UA Negative Negative    Urobilinogen, UA 0.2 E.U./dL 0.2 - 1.0 E.U./dL   Urinalysis, Microscopic Only - Urine, Clean Catch    Collection Time: 10/09/17  1:16 PM   Result Value Ref Range    RBC, UA 0-2 None Seen, 0-2 /HPF    WBC, UA 3-5 (A) None Seen /HPF    Bacteria, UA None Seen None Seen, Trace /HPF    Squamous Epithelial Cells, UA None Seen None Seen, 0-2 /HPF    Hyaline Casts, UA None Seen 0 - 6 /LPF    Methodology Automated Microscopy    POC Troponin, Rapid    Collection Time: 10/09/17  1:30 PM   Result Value Ref Range    Troponin I 1.06 (C) 0.00 - 0.07 ng/mL   Troponin    Collection Time: 10/09/17  4:34 PM   Result Value Ref Range    Troponin I 8.457 (C) <=0.039 ng/mL   Basic Metabolic Panel    Collection Time: 10/09/17  4:34 PM   Result Value Ref Range    Glucose 141 (H) 70 - 100 mg/dL    BUN 20 9 - 23 mg/dL    Creatinine 1.50 (H) 0.60 - 1.30 mg/dL    Sodium 127 (L) 132 - 146 mmol/L    Potassium 3.7 3.5 - 5.5 mmol/L    Chloride 91 (L) 99 - 109 mmol/L    CO2 26.0 20.0 - 31.0 mmol/L    Calcium 9.1 8.7 - 10.4 mg/dL    eGFR Non African Amer 44 (L) >60 mL/min/1.73    BUN/Creatinine Ratio 13.3 7.0 - 25.0    Anion Gap 10.0 3.0 - 11.0 mmol/L     Note: In addition to lab results from this visit, the labs listed above may include labs taken at another facility or during a different encounter within the last 24 hours. Please correlate lab times with ED admission and  discharge times for further clarification of the services performed during this visit.    XR Chest 1 View   Final Result   Chronic changes seen within the lung fields with no evidence   of acute parenchymal disease.       D:  10/09/2017   E:  10/09/2017       This report was finalized on 10/9/2017 3:02 PM by Dr. Gretchen Matias MD.            Vitals:    10/09/17 1630 10/09/17 1700 10/09/17 1900 10/09/17 1942   BP: 102/59 115/72 126/79 122/75   BP Location:    Right arm   Patient Position:    Lying   Pulse: 91 74 89 81   Resp:    16   Temp:    97.6 °F (36.4 °C)   TempSrc:    Oral   SpO2:  98% 99% 99%   Weight:       Height:         Medications   sodium chloride 0.9 % flush 10 mL (not administered)   nitroglycerin 50 mg/250 mL (0.2 mg/mL) infusion (3 mcg/min Intravenous Currently Infusing 10/9/17 1717)   cholecalciferol (VITAMIN D3) tablet 1,000 Units (not administered)   clopidogrel (PLAVIX) tablet 75 mg (75 mg Oral Given 10/9/17 1738)   levETIRAcetam (KEPPRA) tablet 250 mg (not administered)   nitroglycerin (NITROSTAT) SL tablet 0.4 mg (not administered)   potassium chloride (KLOR-CON) packet 20 mEq (20 mEq Oral Given 10/9/17 1737)   predniSONE (DELTASONE) tablet 1 mg (1 mg Oral Given 10/9/17 1737)   sodium chloride 0.9 % flush 1-10 mL (not administered)   aspirin tablet 325 mg (not administered)   metoprolol tartrate (LOPRESSOR) half tablet 12.5 mg (not administered)   atorvastatin (LIPITOR) tablet 80 mg (not administered)   acetaminophen (TYLENOL) tablet 650 mg (not administered)   magnesium hydroxide (MILK OF MAGNESIA) suspension 2400 mg/10mL 10 mL (not administered)   ondansetron (ZOFRAN) tablet 4 mg (not administered)     Or   ondansetron (ZOFRAN) injection 4 mg (not administered)   aluminum-magnesium hydroxide-simethicone (MAALOX MAX) 400-400-40 MG/5ML suspension 15 mL (not administered)   famotidine (PEPCID) tablet 40 mg (40 mg Oral Given 10/9/17 6550)   heparin 93831 units/250 mL (100 units/mL) in 0.45  % NaCl infusion ( Intravenous Not Given 10/9/17 1717)   Pharmacy to Dose Heparin (not administered)   famotidine (PEPCID) injection 20 mg (20 mg Intravenous Given 10/9/17 1115)   nitroglycerin (NITROSTAT) ointment 1 inch (1 inch Topical Given 10/9/17 1116)   heparin (porcine) injection 4,000 Units (4,000 Units Intravenous Given 10/9/17 1555)     ECG/EMG Results (last 24 hours)     Procedure Component Value Units Date/Time    ECG 12 Lead [[id number]] Collected:  10/09/17 1050     Updated:  10/09/17 1051                       MDM  Number of Diagnoses or Management Options  Diagnosis management comments: ECG/EMG Results (last 24 hours)     Procedure Component Value Units Date/Time    ECG 12 Lead ([id number]) Collected:  10/09/17 1050     Updated:  10/09/17 1214    Narrative:       Test Reason : tecyh. left arm pain  Blood Pressure : **/** mmHG  Vent. Rate : 095 BPM     Atrial Rate : 095 BPM     P-R Int : 080 ms          QRS Dur : 136 ms      QT Int : 366 ms       P-R-T Axes : 045 -62 022 degrees     QTc Int : 459 ms    Sinus rhythm with short OK  Left axis deviation  Right bundle branch block  Inferior infarct (cited on or before 09-OCT-2017)  Abnormal ECG  When compared with ECG of 05-NOV-2016 13:12,  OK interval has decreased  Serial changes of Inferior infarct present  Confirmed by YAHIR RICKS MD ([id number]) on 10/9/2017 12:13:52 PM    Referred By:  MILLICENT           Confirmed By:YAHIR RICKS MD    ECG 12 Lead ([id number]) Collected:  10/09/17 1320     Updated:  10/09/17 1323    Narrative:       Test Reason : 2ND SET  Blood Pressure : **/** mmHG  Vent. Rate : 089 BPM     Atrial Rate : 089 BPM     P-R Int : 090 ms          QRS Dur : 134 ms      QT Int : 386 ms       P-R-T Axes : 042 -53 017 degrees     QTc Int : 469 ms    Sinus rhythm with short OK  Left axis deviation  Nonspecific intraventricular block  Inferior infarct (cited on or before 09-OCT-2017)  Abnormal ECG  When compared with ECG of 09-OCT-2017 10:50,  Serial  changes of Inferior infarct present  Confirmed by YAHIR AHUMADA MD ([id number]) on 10/9/2017 1:23:02 PM    Referred By:  MILLICENT           Confirmed By:YAHIR AHUMADA MD             Amount and/or Complexity of Data Reviewed  Clinical lab tests: reviewed  Tests in the radiology section of CPT®: reviewed  Decide to obtain previous medical records or to obtain history from someone other than the patient: yes  Obtain history from someone other than the patient: yes  Review and summarize past medical records: yes  Discuss the patient with other providers: yes  Independent visualization of images, tracings, or specimens: yes        Final diagnoses:   Left arm pain   NSTEMI (non-ST elevated myocardial infarction)   Acute on chronic combined systolic and diastolic congestive heart failure   Anginal equivalent   Elevated troponin   Paroxysmal atrial fibrillation with rapid ventricular response   Essential hypertension   CLL (chronic lymphocytic leukemia)       Documentation assistance provided by dia Rutledge.  Information recorded by the scribe was done at my direction and has been verified and validated by me.     Anastasia Rutledge  10/09/17 1112       Yahir Ahumada MD  10/09/17 0487

## 2017-10-10 PROBLEM — I21.4 NSTEMI (NON-ST ELEVATED MYOCARDIAL INFARCTION) (HCC): Status: ACTIVE | Noted: 2017-10-09

## 2017-10-10 LAB
ACT BLD: 241 SECONDS (ref 82–152)
ANION GAP SERPL CALCULATED.3IONS-SCNC: 12 MMOL/L (ref 3–11)
APTT PPP: 44.4 SECONDS (ref 45–60)
ARTERIAL PATENCY WRIST A: ABNORMAL
ARTICHOKE IGE QN: 105 MG/DL (ref 0–130)
ATMOSPHERIC PRESS: ABNORMAL MMHG
BASE EXCESS BLDA CALC-SCNC: 0.9 MMOL/L (ref 0–2)
BDY SITE: ABNORMAL
BNP SERPL-MCNC: 1490 PG/ML (ref 0–100)
BUN BLD-MCNC: 22 MG/DL (ref 9–23)
BUN/CREAT SERPL: 16.9 (ref 7–25)
CALCIUM SPEC-SCNC: 8.8 MG/DL (ref 8.7–10.4)
CHLORIDE SERPL-SCNC: 91 MMOL/L (ref 99–109)
CHOLEST SERPL-MCNC: 154 MG/DL (ref 0–200)
CO2 BLDA-SCNC: 24.7 MMOL/L (ref 22–33)
CO2 SERPL-SCNC: 25 MMOL/L (ref 20–31)
COHGB MFR BLD: 1.5 % (ref 0–2)
CREAT BLD-MCNC: 1.3 MG/DL (ref 0.6–1.3)
GFR SERPL CREATININE-BSD FRML MDRD: 52 ML/MIN/1.73
GLUCOSE BLD-MCNC: 94 MG/DL (ref 70–100)
HCO3 BLDA-SCNC: 23.7 MMOL/L (ref 20–26)
HCT VFR BLD CALC: 40 %
HDLC SERPL-MCNC: 39 MG/DL (ref 40–60)
HGB BLDA-MCNC: 13 G/DL (ref 13.5–17.5)
HOROWITZ INDEX BLD+IHG-RTO: 21 %
METHGB BLD QL: 0.7 % (ref 0–1.5)
MODALITY: ABNORMAL
OXYHGB MFR BLDV: 97.5 % (ref 94–99)
PCO2 BLDA: 31.7 MM HG (ref 35–48)
PH BLDA: 7.48 PH UNITS (ref 7.35–7.45)
PO2 BLDA: 131 MM HG (ref 83–108)
POTASSIUM BLD-SCNC: 3.7 MMOL/L (ref 3.5–5.5)
SODIUM BLD-SCNC: 128 MMOL/L (ref 132–146)
TRIGL SERPL-MCNC: 79 MG/DL (ref 0–150)
TROPONIN I SERPL-MCNC: 11.99 NG/ML
TROPONIN I SERPL-MCNC: 17.49 NG/ML

## 2017-10-10 PROCEDURE — B2111ZZ FLUOROSCOPY OF MULTIPLE CORONARY ARTERIES USING LOW OSMOLAR CONTRAST: ICD-10-PCS | Performed by: INTERNAL MEDICINE

## 2017-10-10 PROCEDURE — 80048 BASIC METABOLIC PNL TOTAL CA: CPT | Performed by: PHYSICIAN ASSISTANT

## 2017-10-10 PROCEDURE — 85347 COAGULATION TIME ACTIVATED: CPT

## 2017-10-10 PROCEDURE — 4A023N7 MEASUREMENT OF CARDIAC SAMPLING AND PRESSURE, LEFT HEART, PERCUTANEOUS APPROACH: ICD-10-PCS | Performed by: INTERNAL MEDICINE

## 2017-10-10 PROCEDURE — 63710000001 PREDNISONE PER 5 MG: Performed by: PHYSICIAN ASSISTANT

## 2017-10-10 PROCEDURE — C1887 CATHETER, GUIDING: HCPCS | Performed by: INTERNAL MEDICINE

## 2017-10-10 PROCEDURE — C1769 GUIDE WIRE: HCPCS | Performed by: INTERNAL MEDICINE

## 2017-10-10 PROCEDURE — 83880 ASSAY OF NATRIURETIC PEPTIDE: CPT | Performed by: PHYSICIAN ASSISTANT

## 2017-10-10 PROCEDURE — 80061 LIPID PANEL: CPT | Performed by: PHYSICIAN ASSISTANT

## 2017-10-10 PROCEDURE — 84484 ASSAY OF TROPONIN QUANT: CPT | Performed by: INTERNAL MEDICINE

## 2017-10-10 PROCEDURE — C1725 CATH, TRANSLUMIN NON-LASER: HCPCS | Performed by: INTERNAL MEDICINE

## 2017-10-10 PROCEDURE — 25010000002 FUROSEMIDE PER 20 MG: Performed by: INTERNAL MEDICINE

## 2017-10-10 PROCEDURE — C9600 PERC DRUG-EL COR STENT SING: HCPCS | Performed by: INTERNAL MEDICINE

## 2017-10-10 PROCEDURE — C1874 STENT, COATED/COV W/DEL SYS: HCPCS | Performed by: INTERNAL MEDICINE

## 2017-10-10 PROCEDURE — 0 IOPAMIDOL PER 1 ML: Performed by: INTERNAL MEDICINE

## 2017-10-10 PROCEDURE — 93459 L HRT ART/GRFT ANGIO: CPT | Performed by: INTERNAL MEDICINE

## 2017-10-10 PROCEDURE — 25010000002 HEPARIN (PORCINE) PER 1000 UNITS: Performed by: INTERNAL MEDICINE

## 2017-10-10 PROCEDURE — C1760 CLOSURE DEV, VASC: HCPCS | Performed by: INTERNAL MEDICINE

## 2017-10-10 PROCEDURE — 85730 THROMBOPLASTIN TIME PARTIAL: CPT

## 2017-10-10 PROCEDURE — C1894 INTRO/SHEATH, NON-LASER: HCPCS | Performed by: INTERNAL MEDICINE

## 2017-10-10 PROCEDURE — 82805 BLOOD GASES W/O2 SATURATION: CPT | Performed by: INTERNAL MEDICINE

## 2017-10-10 PROCEDURE — 99232 SBSQ HOSP IP/OBS MODERATE 35: CPT | Performed by: INTERNAL MEDICINE

## 2017-10-10 PROCEDURE — 027135Z DILATION OF CORONARY ARTERY, TWO ARTERIES WITH TWO DRUG-ELUTING INTRALUMINAL DEVICES, PERCUTANEOUS APPROACH: ICD-10-PCS | Performed by: INTERNAL MEDICINE

## 2017-10-10 PROCEDURE — B2151ZZ FLUOROSCOPY OF LEFT HEART USING LOW OSMOLAR CONTRAST: ICD-10-PCS | Performed by: INTERNAL MEDICINE

## 2017-10-10 PROCEDURE — 92928 PRQ TCAT PLMT NTRAC ST 1 LES: CPT | Performed by: INTERNAL MEDICINE

## 2017-10-10 DEVICE — XIENCE ALPINE EVEROLIMUS ELUTING CORONARY STENT SYSTEM 2.50 MM X 38 MM / RAPID-EXCHANGE
Type: IMPLANTABLE DEVICE | Status: FUNCTIONAL
Brand: XIENCE ALPINE

## 2017-10-10 DEVICE — XIENCE ALPINE EVEROLIMUS ELUTING CORONARY STENT SYSTEM 3.00 MM X 23 MM / RAPID-EXCHANGE
Type: IMPLANTABLE DEVICE | Status: FUNCTIONAL
Brand: XIENCE ALPINE

## 2017-10-10 RX ORDER — FUROSEMIDE 10 MG/ML
20 INJECTION INTRAMUSCULAR; INTRAVENOUS ONCE
Status: COMPLETED | OUTPATIENT
Start: 2017-10-10 | End: 2017-10-10

## 2017-10-10 RX ORDER — ALPRAZOLAM 0.5 MG/1
0.5 TABLET ORAL NIGHTLY PRN
Status: DISCONTINUED | OUTPATIENT
Start: 2017-10-10 | End: 2017-10-11 | Stop reason: HOSPADM

## 2017-10-10 RX ORDER — HEPARIN SODIUM 1000 [USP'U]/ML
INJECTION, SOLUTION INTRAVENOUS; SUBCUTANEOUS AS NEEDED
Status: DISCONTINUED | OUTPATIENT
Start: 2017-10-10 | End: 2017-10-10 | Stop reason: HOSPADM

## 2017-10-10 RX ORDER — LIDOCAINE HYDROCHLORIDE 10 MG/ML
INJECTION, SOLUTION INFILTRATION; PERINEURAL AS NEEDED
Status: DISCONTINUED | OUTPATIENT
Start: 2017-10-10 | End: 2017-10-10 | Stop reason: HOSPADM

## 2017-10-10 RX ADMIN — CLOPIDOGREL BISULFATE 75 MG: 75 TABLET ORAL at 08:07

## 2017-10-10 RX ADMIN — FUROSEMIDE 20 MG: 10 INJECTION, SOLUTION INTRAMUSCULAR; INTRAVENOUS at 13:06

## 2017-10-10 RX ADMIN — VITAMIN D, TAB 1000IU (100/BT) 1000 UNITS: 25 TAB at 08:07

## 2017-10-10 RX ADMIN — ATORVASTATIN CALCIUM 80 MG: 40 TABLET, FILM COATED ORAL at 21:18

## 2017-10-10 RX ADMIN — FAMOTIDINE 40 MG: 20 TABLET ORAL at 08:07

## 2017-10-10 RX ADMIN — METOPROLOL TARTRATE 12.5 MG: 25 TABLET, FILM COATED ORAL at 08:07

## 2017-10-10 RX ADMIN — POTASSIUM CHLORIDE 20 MEQ: 1.5 POWDER, FOR SOLUTION ORAL at 08:07

## 2017-10-10 RX ADMIN — PREDNISONE 1 MG: 1 TABLET ORAL at 08:08

## 2017-10-10 RX ADMIN — ALPRAZOLAM 0.5 MG: 0.5 TABLET ORAL at 22:08

## 2017-10-10 RX ADMIN — METOPROLOL TARTRATE 12.5 MG: 25 TABLET, FILM COATED ORAL at 21:17

## 2017-10-10 RX ADMIN — ASPIRIN 325 MG ORAL TABLET 325 MG: 325 PILL ORAL at 08:07

## 2017-10-10 NOTE — PROGRESS NOTES
"[facility]  IP Progress Note   LOS: 0 days   Patient Care Team:  Pramod Hyde MD as PCP - General  Lv Cobian MD as Consulting Physician (Cardiology)    Chief Complaint: Follow-up of non-STEMI/CAD.      Subjective:  Felt well overnight, a little chest congestion, no chest pain or shortness of breath.    Objective     Tele: Sinus Rythym    Vitals:  Blood pressure 114/59, pulse 77, temperature 97.6 °F (36.4 °C), temperature source Oral, resp. rate 16, height 69\" (175.3 cm), weight 185 lb (83.9 kg), SpO2 94 %.     Intake/Output Summary (Last 24 hours) at 10/10/17 0846  Last data filed at 10/10/17 0831   Gross per 24 hour   Intake            134.7 ml   Output              850 ml   Net           -715.3 ml       Physical Exam:  General: No apparent   Neck: no JVD.  Chest:No respiratory distress,  few crackles at bases and scattered rhonchi.  Cardiovascular: Normal S1 and S2, 3/6 murmur..    Extremities: No edema.          Results Review:     I reviewed the patient's new clinical results.      Results from last 7 days  Lab Units 10/09/17  1111   WBC 10*3/mm3 18.89*   HEMOGLOBIN g/dL 12.3*   HEMATOCRIT % 36.5*   PLATELETS 10*3/mm3 207       Results from last 7 days  Lab Units 10/10/17  0401  10/09/17  1111   SODIUM mmol/L 128*  < > 128*   POTASSIUM mmol/L 3.7  < > 3.5   CHLORIDE mmol/L 91*  < > 91*   CO2 mmol/L 25.0  < > 30.0   BUN mg/dL 22  < > 20   CREATININE mg/dL 1.30  < > 1.40*   CALCIUM mg/dL 8.8  < > 9.7   BILIRUBIN mg/dL  --   --  0.9   ALK PHOS U/L  --   --  117*   ALT (SGPT) U/L  --   --  22   AST (SGOT) U/L  --   --  25   GLUCOSE mg/dL 94  < > 127*   < > = values in this interval not displayed.      Component      Latest Ref Rng & Units 10/9/2017 10/9/2017 10/9/2017 10/9/2017          11:12 AM  1:30 PM  4:34 PM 11:42 PM   Troponin I      <=0.039 ng/mL 0.21 (H) 1.06 () 8.457 () 17.487 ()     Component      Latest Ref Rng & Units 10/10/2017           "   Troponin I      <=0.039 ng/mL 11.987 ()         Scheduled Meds:  aspirin 325 mg Oral Daily   atorvastatin 80 mg Oral Nightly   cholecalciferol 1,000 Units Oral Daily   clopidogrel 75 mg Oral Daily   famotidine 40 mg Oral Daily   levETIRAcetam 250 mg Oral Q12H   metoprolol tartrate 12.5 mg Oral Q12H   pharmacy consult - MTM  Does not apply Daily   potassium chloride 20 mEq Oral Daily   predniSONE 1 mg Oral Daily       Continuous Infusions:  heparin (porcine) 15 Units/kg/hr Last Rate: 15 Units/kg/hr (10/10/17 0557)   nitroglycerin 10-50 mcg/min Last Rate: Stopped (10/09/17 3228)   Pharmacy to Dose Heparin         Assessment:   1. ACS/non-STEMI.  2. CAD with known occlusion of RCA and RCA graft and known severe calcified disease of the circumflex with occlusion of circumflex graft, patent LIMA graft to the LAD based on catheterization study 2014.  3. Acute on chronic systolic heart failure.  4. Ischemic cardiomyopathy, ejection fraction 35%.  5. Severe aortic stenosis  6. Hypertension  7. Dyslipidemia  8. Acute versus chronic kidney disease.  Plan:   1. Left heart catheterization and catheter-based intervention today.  2. The procedure was explained to the patient/family extensively. Indications, benefits, risks and alternatives were discussed. The patient understands well, and wishes to proceed.   3. Further recommendations and management to be based on findings of the procedure.  Continue current medications for now.    SOPHY Gallardo  10/10/17  8:46 AM    I have seen and examined the patient, case was discussed with the physician extender, reviewed the above note, necessary changes were made and I agree with the final note.   Juan M Sood MD, FACC, [facility]

## 2017-10-10 NOTE — PROGRESS NOTES
Discharge Planning Assessment  [facility]     Patient Name: Tad Moon  MRN: [id number]  Today's Date: 10/10/2017    Admit Date: 10/9/2017          Discharge Needs Assessment       10/10/17 1248    Living Environment    Living Arrangements condominium      10/10/17 1245    Living Environment    Lives With spouse    Living Arrangements condomini    Home Accessibility no concerns    Stair Railings at Home inside, present on right side    Type of Financial/Environmental Concern none    Transportation Available family or friend will provide    Living Environment    Provides Primary Care For no one    Discharge Needs Assessment    Concerns To Be Addressed denies needs/concerns at this time    Readmission Within The Last 30 Days no previous admission in last 30 days    Anticipated Changes Related to Illness none    Equipment Currently Used at Home grab bar    Equipment Needed After Discharge none    Discharge Disposition still a patient            Discharge Plan       10/10/17 1245    Case Management/Social Work Plan    Plan Home with Wife    Additional Comments Met with Mr. Moon at the bedside, the only DME he reports is grab bars in his shower/tub and he reports he has been to [facility] in the past and when dc from there he had home health but he doesn't remember who it was with.  He is independant with ADL's.  He reports his medications and copays are affordable. His plan is to return home with his wife, family will transport. No needs identified at this time. Cm will cont to follow.         Discharge Placement     No information found        Expected Discharge Date and Time     Expected Discharge Date Expected Discharge Time    Oct 12, 2017               Demographic Summary       10/10/17 1244    Referral Information    Admission Type observation    Referral Source admission list    Reason For Consult discharge planning    Record Reviewed medical record    Primary Care Physician Information    Name  Pramod Barrett            Functional Status       10/10/17 1244    Functional Status Current    Current Functional Level Comment See Nursing Notes    Functional Status Prior    Ambulation 0-->independent    Transferring 0-->independent    Toileting 0-->independent    Bathing 0-->independent    Dressing 0-->independent    Eating 0-->independent    Communication 0-->understands/communicates without difficulty    Swallowing 0-->swallows foods/liquids without difficulty    IADL    Medications independent    Meal Preparation independent    Housekeeping independent    Laundry independent    Shopping independent    Oral Care independent    Activity Tolerance    Current Activity Limitations none    Usual Activity Tolerance good    Current Activity Tolerance good            Psychosocial     None            Abuse/Neglect     None            Legal     None            Substance Abuse     None            Patient Forms     None          Rachel Covington, RN

## 2017-10-10 NOTE — PLAN OF CARE
Problem: Patient Care Overview (Adult)  Goal: Plan of Care Review  Outcome: Ongoing (interventions implemented as appropriate)    10/10/17 3132   Coping/Psychosocial Response Interventions   Plan Of Care Reviewed With patient   Outcome Evaluation   Outcome Summary/Follow up Plan Pt rested well through the night. Troponin 17.487 last drawn. No c/o CP or discomfort. Heparin gtt maintained. Nitro gtt stopped due to systolic 's and below. Cardiology to re-evaluate this am. Possible heart cath vs med management. VSS. RA. Will continue to monitor.    Patient Care Overview   Progress no change         Problem: Acute Coronary Syndrome (ACS) (Adult)  Goal: Signs and Symptoms of Listed Potential Problems Will be Absent or Manageable (Acute Coronary Syndrome)  Outcome: Ongoing (interventions implemented as appropriate)

## 2017-10-11 ENCOUNTER — DOCUMENTATION (OUTPATIENT)
Dept: CARDIAC REHAB | Facility: HOSPITAL | Age: 82
End: 2017-10-11

## 2017-10-11 VITALS
BODY MASS INDEX: 27.4 KG/M2 | OXYGEN SATURATION: 99 % | HEIGHT: 69 IN | TEMPERATURE: 97.7 F | WEIGHT: 185 LBS | SYSTOLIC BLOOD PRESSURE: 115 MMHG | RESPIRATION RATE: 18 BRPM | HEART RATE: 68 BPM | DIASTOLIC BLOOD PRESSURE: 61 MMHG

## 2017-10-11 LAB
ANION GAP SERPL CALCULATED.3IONS-SCNC: 10 MMOL/L (ref 3–11)
BUN BLD-MCNC: 26 MG/DL (ref 9–23)
BUN/CREAT SERPL: 17.3 (ref 7–25)
CALCIUM SPEC-SCNC: 8.9 MG/DL (ref 8.7–10.4)
CHLORIDE SERPL-SCNC: 91 MMOL/L (ref 99–109)
CO2 SERPL-SCNC: 29 MMOL/L (ref 20–31)
CREAT BLD-MCNC: 1.5 MG/DL (ref 0.6–1.3)
DEPRECATED RDW RBC AUTO: 44.7 FL (ref 37–54)
ERYTHROCYTE [DISTWIDTH] IN BLOOD BY AUTOMATED COUNT: 13.3 % (ref 11.3–14.5)
GFR SERPL CREATININE-BSD FRML MDRD: 44 ML/MIN/1.73
GLUCOSE BLD-MCNC: 96 MG/DL (ref 70–100)
HCT VFR BLD AUTO: 37.5 % (ref 38.9–50.9)
HGB BLD-MCNC: 12.6 G/DL (ref 13.1–17.5)
MCH RBC QN AUTO: 30.7 PG (ref 27–31)
MCHC RBC AUTO-ENTMCNC: 33.6 G/DL (ref 32–36)
MCV RBC AUTO: 91.5 FL (ref 80–99)
PLATELET # BLD AUTO: 211 10*3/MM3 (ref 150–450)
PMV BLD AUTO: 11.6 FL (ref 6–12)
POTASSIUM BLD-SCNC: 3.9 MMOL/L (ref 3.5–5.5)
RBC # BLD AUTO: 4.1 10*6/MM3 (ref 4.2–5.76)
SODIUM BLD-SCNC: 130 MMOL/L (ref 132–146)
WBC NRBC COR # BLD: 19.13 10*3/MM3 (ref 3.5–10.8)

## 2017-10-11 PROCEDURE — 99238 HOSP IP/OBS DSCHRG MGMT 30/<: CPT | Performed by: INTERNAL MEDICINE

## 2017-10-11 PROCEDURE — 80048 BASIC METABOLIC PNL TOTAL CA: CPT | Performed by: PHYSICIAN ASSISTANT

## 2017-10-11 PROCEDURE — 93010 ELECTROCARDIOGRAM REPORT: CPT | Performed by: INTERNAL MEDICINE

## 2017-10-11 PROCEDURE — 93005 ELECTROCARDIOGRAM TRACING: CPT | Performed by: INTERNAL MEDICINE

## 2017-10-11 PROCEDURE — 85027 COMPLETE CBC AUTOMATED: CPT | Performed by: INTERNAL MEDICINE

## 2017-10-11 PROCEDURE — 63710000001 PREDNISONE PER 5 MG: Performed by: PHYSICIAN ASSISTANT

## 2017-10-11 RX ADMIN — ASPIRIN 325 MG ORAL TABLET 325 MG: 325 PILL ORAL at 08:37

## 2017-10-11 RX ADMIN — PREDNISONE 1 MG: 1 TABLET ORAL at 08:41

## 2017-10-11 RX ADMIN — VITAMIN D, TAB 1000IU (100/BT) 1000 UNITS: 25 TAB at 08:37

## 2017-10-11 RX ADMIN — FAMOTIDINE 40 MG: 20 TABLET ORAL at 08:37

## 2017-10-11 RX ADMIN — CLOPIDOGREL BISULFATE 75 MG: 75 TABLET ORAL at 08:37

## 2017-10-11 RX ADMIN — POTASSIUM CHLORIDE 20 MEQ: 1.5 POWDER, FOR SOLUTION ORAL at 08:37

## 2017-10-11 RX ADMIN — METOPROLOL TARTRATE 12.5 MG: 25 TABLET, FILM COATED ORAL at 08:39

## 2017-10-11 NOTE — PROGRESS NOTES
"[facility]  IP Progress Note   LOS: 1 day   Patient Care Team:  Pramod Hyde MD as PCP - General  Lv Cobian MD as Consulting Physician (Cardiology)    Chief Complaint: Follow-up of non-STEMI/CAD.      Subjective:  No cardiovascular complaints.  Wishes to go home.    Objective     Tele: Sinus Rythym    Vitals:  Blood pressure 114/59, pulse 77, temperature 97.6 °F (36.4 °C), temperature source Oral, resp. rate 16, height 69\" (175.3 cm), weight 185 lb (83.9 kg), SpO2 94 %.     Intake/Output Summary (Last 24 hours) at 10/11/17 0845  Last data filed at 10/11/17 0843   Gross per 24 hour   Intake              960 ml   Output              700 ml   Net              260 ml       Physical Exam:  General: No apparent   Neck: no JVD.  Chest:No respiratory distress,  few crackles at bases and scattered rhonchi.  Cardiovascular: Normal S1 and S2, 3/6 murmur..    Extremities: No edema.Artery access site benign          Results Review:     I reviewed the patient's new clinical results.      Results from last 7 days  Lab Units 10/11/17  0424   WBC 10*3/mm3 19.13*   HEMOGLOBIN g/dL 12.6*   HEMATOCRIT % 37.5*   PLATELETS 10*3/mm3 211       Results from last 7 days  Lab Units 10/11/17  0424  10/09/17  1111   SODIUM mmol/L 130*  < > 128*   POTASSIUM mmol/L 3.9  < > 3.5   CHLORIDE mmol/L 91*  < > 91*   CO2 mmol/L 29.0  < > 30.0   BUN mg/dL 26*  < > 20   CREATININE mg/dL 1.50*  < > 1.40*   CALCIUM mg/dL 8.9  < > 9.7   BILIRUBIN mg/dL  --   --  0.9   ALK PHOS U/L  --   --  117*   ALT (SGPT) U/L  --   --  22   AST (SGOT) U/L  --   --  25   GLUCOSE mg/dL 96  < > 127*   < > = values in this interval not displayed.      Component      Latest Ref Rng & Units 10/9/2017 10/9/2017 10/9/2017 10/9/2017          11:12 AM  1:30 PM  4:34 PM 11:42 PM   Troponin I      <=0.039 ng/mL 0.21 (H) 1.06 (HH) 8.457 () 17.487 ()     Component      Latest Ref Rng & Units 10/10/2017             Troponin I      " <=0.039 ng/mL 11.987 ()         Scheduled Meds:  aspirin 325 mg Oral Daily   atorvastatin 80 mg Oral Nightly   cholecalciferol 1,000 Units Oral Daily   clopidogrel 75 mg Oral Daily   famotidine 40 mg Oral Daily   levETIRAcetam 250 mg Oral Q12H   metoprolol tartrate 12.5 mg Oral Q12H   pharmacy consult - MTM  Does not apply Daily   potassium chloride 20 mEq Oral Daily   predniSONE 1 mg Oral Daily       Continuous Infusions:     Assessment:   1. ACS/non-STEMI.  2. CAD with occluded vein grafts to LCx and RCA.  Status post left main and left circumflex stenting.  3. Acute on chronic systolic heart failure.  4. Ischemic cardiomyopathy, ejection fraction 35%.  5. Severe aortic stenosis  6. Hypertension  7. Dyslipidemia  8. Acute versus chronic kidney disease.  Plan:   1. Discharge home today.  2. Follow-up with me in our [facility].      Lv Cobian MD  10/11/17  8:45 AM

## 2017-10-11 NOTE — PROGRESS NOTES
Pt. Referred for Phase II Cardiac Rehab. Staff discussed benefits of exercise, program protocol, and educational material provided. Teach back verified.  Patient refused program at this time. Staff available if needed. Thank you.

## 2017-10-11 NOTE — PLAN OF CARE
Problem: Patient Care Overview (Adult)  Goal: Plan of Care Review  Outcome: Ongoing (interventions implemented as appropriate)    10/11/17 5616   Coping/Psychosocial Response Interventions   Plan Of Care Reviewed With patient;family   Outcome Evaluation   Outcome Summary/Follow up Plan Pt rested well through the night. Pt had a heart cath yesterday with 2 stents placed. R groin site with gauze and tegaderm; C/D/I. Family at bedside all night. VSS. Will continue to monitor.    Patient Care Overview   Progress progress toward functional goals as expected         Problem: Acute Coronary Syndrome (ACS) (Adult)  Goal: Signs and Symptoms of Listed Potential Problems Will be Absent or Manageable (Acute Coronary Syndrome)  Outcome: Ongoing (interventions implemented as appropriate)

## 2017-10-11 NOTE — PLAN OF CARE
Problem: Patient Care Overview (Adult)  Goal: Plan of Care Review  Outcome: Outcome(s) achieved Date Met:  10/11/17    10/11/17 5828   Outcome Evaluation   Outcome Summary/Follow up Plan pt.being discharge home.       Goal: Adult Individualization and Mutuality  Outcome: Outcome(s) achieved Date Met:  10/11/17  Goal: Discharge Needs Assessment  Outcome: Outcome(s) achieved Date Met:  10/11/17    Problem: Acute Coronary Syndrome (ACS) (Adult)  Goal: Signs and Symptoms of Listed Potential Problems Will be Absent or Manageable (Acute Coronary Syndrome)  Outcome: Outcome(s) achieved Date Met:  10/11/17

## 2017-10-17 NOTE — DISCHARGE SUMMARY
"Date of Discharge:  10/17/2017  Date of Admit: 10/9/2017    Pramod Hyde MD      Discharge Diagnosis:  1. ACS/non-STEMI.  2. CAD with occluded vein grafts to LCx and RCA.  Status post left main and left circumflex stenting.  3. Acute on chronic systolic heart failure.  4. Ischemic cardiomyopathy, ejection fraction 35%.  5. Severe aortic stenosis  6. Hypertension  7. Dyslipidemia  8. Acute versus chronic kidney disease.    Hospital Course:   Patient is a 89-year-old  male who presented to the hospital on 10/9/17 with complaints of chest pain and cough.  He underwent cardiac catheterization on 10/10/17 with findings as below.  He remained in stable condition throughout his stay.  On 10/11/17 he was deemed to be in stable condition and ready for discharge home.  Patient was also diuresed for heart failure.    Procedures Performed  Procedure(s):  Left Heart Cath  FINAL IMPRESSION:   · Triple-vessel CAD of the native arteries.  · Patent LIMA graft to the LAD.  · Occluded vein grafts to the circumflex and the right coronary artery.  · Successful PTCA/stenting of the distal left main, proximal circumflex and mid circumflex with placement of overlapping 2.5 x 38 and 3.0 x 23 mm drug-eluting stents reducing severe multiple 80% stenosis to no significant residual disease.  · Severe left ventricular systolic dysfunction, ejection fraction 25-30%.  · 21 mm gradient across the aortic valve consistent with known aortic stenosis.         Consults     Date and Time Order Name Status Description    10/9/2017 1156 IP Consult to Cardiology Completed           Pertinent Test Results:   Creatinine on date of discharge of 1.5.  .      Discharge Physical Exam:  /61 (BP Location: Right arm)  Pulse 68  Temp 97.7 °F (36.5 °C) (Oral)   Resp 18  Ht 69\" (175.3 cm)  Wt 185 lb (83.9 kg)  SpO2 99%  BMI 27.32 kg/m2    Physical Exam  Please see physical exam from progress note from date of discharge.    Discharge " Medications   Tad Moon   Home Medication Instructions NEIDA:[id number]    Printed on:10/17/17 7670   Medication Information                      ALPRAZolam (XANAX) 0.5 MG tablet  Take 1 mg by mouth 3 (Three) Times a Day As Needed.             aspirin 81 MG tablet  Take 1 tablet by mouth daily.             atorvastatin (LIPITOR) 80 MG tablet  Take 1 tablet by mouth daily.             cholecalciferol (VITAMIN D3) 1000 UNITS tablet  Take 1,000 Units by mouth Daily.             clopidogrel (PLAVIX) 75 MG tablet  Take 1 tablet by mouth daily.             Fluticasone Furoate-Vilanterol 100-25 MCG/INH aerosol powder   Inhale 1 puff Daily.             furosemide (LASIX) 20 MG tablet  Take 1 tablet by mouth Daily.             hydrochlorothiazide (HYDRODIURIL) 25 MG tablet  Take 1 tablet by mouth daily.             isosorbide mononitrate (IMDUR) 60 MG 24 hr tablet  Take 1 tablet by mouth daily.             metoprolol tartrate (LOPRESSOR) 25 MG tablet  Take 0.5 tablets by mouth Every 12 (Twelve) Hours.             nitroglycerin (NITROSTAT) 0.4 MG SL tablet  Place 1 tablet under the tongue Every 5 (Five) Minutes As Needed for Chest Pain.             potassium chloride (K-DUR,KLOR-CON) 20 MEQ CR tablet  Take 1 tablet by mouth Daily.             predniSONE (DELTASONE) 10 MG tablet  Take 10-20 mg by mouth Daily. Patient is to take 20mg by mouth daily for the first seven days, and then to take 10mg by mouth the last seven days.                  Discharge Diet:  cardiac low-sodium    Activity at Discharge:  per postcardiac catheterization instructions    Discharge disposition: home    Condition on Discharge: stable    Follow-up Appointments  Future Appointments  Date Time Provider Department Center   11/20/2017 1:15 PM MD CANDACE Turner [facility] None   2/19/2018 1:45 PM MD CANDACE Turner [facility] None       JUAN Pate  10/17/17  10:58 AM      EMR Dragon/Transcription disclaimer:  Much of this encounter  note is an electronic transcription/translation of spoken language to printed text. Electronic translation of spoken language may permit erroneous, or at times, nonsensical words or phrases to be inadvertently transcribed. Although I have reviewed the note for such errors, some may still exist.

## 2017-11-20 ENCOUNTER — OFFICE VISIT (OUTPATIENT)
Dept: CARDIOLOGY | Facility: CLINIC | Age: 82
End: 2017-11-20

## 2017-11-20 VITALS
OXYGEN SATURATION: 97 % | BODY MASS INDEX: 26.36 KG/M2 | WEIGHT: 178 LBS | DIASTOLIC BLOOD PRESSURE: 64 MMHG | SYSTOLIC BLOOD PRESSURE: 128 MMHG | HEIGHT: 69 IN | HEART RATE: 86 BPM

## 2017-11-20 DIAGNOSIS — I50.22 CHRONIC SYSTOLIC CONGESTIVE HEART FAILURE (HCC): ICD-10-CM

## 2017-11-20 DIAGNOSIS — I25.118 CORONARY ARTERY DISEASE INVOLVING NATIVE CORONARY ARTERY OF NATIVE HEART WITH OTHER FORM OF ANGINA PECTORIS (HCC): Primary | ICD-10-CM

## 2017-11-20 DIAGNOSIS — I10 ESSENTIAL HYPERTENSION: ICD-10-CM

## 2017-11-20 DIAGNOSIS — E78.5 HYPERLIPIDEMIA, UNSPECIFIED HYPERLIPIDEMIA TYPE: ICD-10-CM

## 2017-11-20 DIAGNOSIS — I35.0 NONRHEUMATIC AORTIC VALVE STENOSIS: ICD-10-CM

## 2017-11-20 DIAGNOSIS — E87.1 HYPONATREMIA: ICD-10-CM

## 2017-11-20 PROCEDURE — 99214 OFFICE O/P EST MOD 30 MIN: CPT | Performed by: NURSE PRACTITIONER

## 2017-11-20 RX ORDER — FUROSEMIDE 40 MG/1
40 TABLET ORAL DAILY
Qty: 30 TABLET | Refills: 11
Start: 2017-11-20 | End: 2018-03-31 | Stop reason: HOSPADM

## 2017-11-20 RX ORDER — POTASSIUM CHLORIDE 750 MG/1
10 TABLET, FILM COATED, EXTENDED RELEASE ORAL DAILY
Qty: 30 TABLET | Refills: 11
Start: 2017-11-20 | End: 2018-03-21

## 2017-11-20 NOTE — PROGRESS NOTES
Subjective:     Encounter Date:11/20/2017      Patient ID: Tad Moon is a 89 y.o. male.    Chief Complaint: Coronary Artery Disease; Hypertension; and Dyslipidemia    PROBLEM LIST:   1. Coronary artery disease:  a. CABG in 1992, [place], [place]. LIMA  to LAD, SVG to PDA, SVG to distal RCA, SVG to OM1.   b. Non STEMI, 2007 with 3.5 x 16 Taxus to SVG to RCA (Dr. Sheikh).   c. Cardiac catheterization, 2012, medical management, incomplete database.   d. On 08/15/2014, functional class III angina/unstable.  Heart catheterization EF 40% with inferior wall akinesis. Severe distal left main and proximal LAD stenosis with a patent LIMA graft to LAD. An 80% proximal left circumflex heavily calcified stenosis tortuous segment with occluded vein graft. Chronically  occluded RCA and saphenous vein graft to RCA with 3+ collaterals.   e. Chronic functional class II-III angina.    f. Echo, 11/6/2016: LVEF 35%.  g. Cardiac catheterization, 10/10/2017: triple-vessel CAD of the native arteries; patent LIMA graft to the LAD; occluded vein grafts to the circumflex and the right coronary artery; successful PTCA/stenting of the distal left main, proximal circumflex and mid circumflex with placement of overlapping 2.5 x 38 and 3.0 x 23 mm drug-eluting stents reducing severe multiple 80% stenosis to no significant residual disease; severe left ventricular systolic dysfunction, ejection fraction 25-30%; 21 mm gradient across the aortic valve consistent with known aortic stenosis.  2. Aortic stenosis  a. EF 35% peak aortic valve gradient 41.  3. Heart failure 2017  4. Hypertension.   5. Dyslipidemia.   6. History of tobacco use.   7. GERD.   8. Seizure disorder  9. Chronic lymphocytic leukemia.  10. Surgical history:   a.  Left total knee replacement.   b. Remote cholecystectomy.   c. Appendectomy.   d. Abdominal hernia repair x2    History of Present Illness  Tad Moon returns today for a 1 month follow up s/p  catheterization with a history of coronary artery disease, aortic stenosis and heart failure. Since last visit he has been doing well from a cardiovascular standpoint.  Currently denies any chest pain, pressure, tightness.  Denies any increasing shortness of breath.  No syncope, near-syncope.  Denies any orthopnea.  States that he has some occasional right lower extremity edema which improves with elevation.  During his hospitalization he was noted to be hyponatremic.  He is still currently taking HCTZ.    Allergies   Allergen Reactions   • Other      Beta Blockers, bradycardia       Current Outpatient Prescriptions:   •  ALPRAZolam (XANAX) 0.5 MG tablet, Take 1 mg by mouth 3 (Three) Times a Day As Needed., Disp: , Rfl: 2  •  aspirin 81 MG tablet, Take 1 tablet by mouth daily., Disp: 90 tablet, Rfl: 3  •  atorvastatin (LIPITOR) 80 MG tablet, Take 1 tablet by mouth daily., Disp: 90 tablet, Rfl: 3  •  cholecalciferol (VITAMIN D3) 1000 UNITS tablet, Take 1,000 Units by mouth Daily., Disp: , Rfl:   •  clopidogrel (PLAVIX) 75 MG tablet, Take 1 tablet by mouth daily., Disp: 90 tablet, Rfl: 3  •  hydrochlorothiazide (HYDRODIURIL) 25 MG tablet, Take 1 tablet by mouth daily., Disp: 90 tablet, Rfl: 3  •  isosorbide mononitrate (IMDUR) 60 MG 24 hr tablet, Take 1 tablet by mouth daily., Disp: 90 tablet, Rfl: 3  •  metoprolol tartrate (LOPRESSOR) 25 MG tablet, Take 0.5 tablets by mouth Every 12 (Twelve) Hours., Disp: 30 tablet, Rfl: 11  •  nitroglycerin (NITROSTAT) 0.4 MG SL tablet, Place 1 tablet under the tongue Every 5 (Five) Minutes As Needed for Chest Pain., Disp: 25 tablet, Rfl: 6    The following portions of the patient's history were reviewed and updated as appropriate: allergies, current medications, past family history, past medical history, past social history, past surgical history and problem list.    Review of Systems   Constitution: Negative.   Cardiovascular: Negative.    Respiratory: Negative.   "  Hematologic/Lymphatic: Negative for bleeding problem. Does not bruise/bleed easily.   Skin: Negative for rash.   Musculoskeletal: Negative for muscle weakness and myalgias.   Gastrointestinal: Negative for heartburn, nausea and vomiting.   Neurological: Negative.         Objective:     Vitals:    11/20/17 1308   BP: 128/64   BP Location: Left arm   Patient Position: Sitting   Pulse: 86   SpO2: 97%   Weight: 178 lb (80.7 kg)   Height: 68.5\" (174 cm)         Physical Exam   Constitutional: He is oriented to person, place, and time. He appears well-developed and well-nourished.   Neck: No JVD present. No tracheal deviation present.   Cardiovascular: Normal rate and regular rhythm.  Exam reveals no friction rub.    Murmur (3/6 systolic ejection murmur) heard.  Pulmonary/Chest: Effort normal and breath sounds normal.   Abdominal: Soft. Bowel sounds are normal. There is no tenderness.   Musculoskeletal: He exhibits no edema or deformity.   Neurological: He is alert and oriented to person, place, and time.   Skin: Skin is warm and dry.       Lab Review:    Procedures        Assessment:   Tad was seen today for coronary artery disease, hypertension and dyslipidemia.    Diagnoses and all orders for this visit:    Coronary artery disease involving native coronary artery of native heart with other form of angina pectoris    Essential hypertension, Controlled    Hyperlipidemia, unspecified hyperlipidemia type.  On statin therapy.    Chronic systolic congestive heart failure, bibasilar crackles on exam.  No current orthopnea.  -     Basic Metabolic Panel; Future    Nonrheumatic aortic valve stenosis     Hyponatremia, on HCTZ therapy    Plan:  1. Discontinue HCTZ at this time secondary to hyponatremia  2. Start furosemide 40 mg daily and Klor-Con 10 mEq daily  3. Check BMP in one to 2 weeks.  4. Revisit in 6 MO with an echo, or sooner as needed.    JUAN Pate     Please note that portions of this note may have " been completed with a voice recognition program. Efforts were made to edit the dictations, but occasionally words are mistranscribed.

## 2017-12-04 ENCOUNTER — TELEPHONE (OUTPATIENT)
Dept: CARDIOLOGY | Facility: CLINIC | Age: 82
End: 2017-12-04

## 2017-12-04 ENCOUNTER — LAB (OUTPATIENT)
Dept: LAB | Facility: HOSPITAL | Age: 82
End: 2017-12-04

## 2017-12-04 DIAGNOSIS — I50.22 CHRONIC SYSTOLIC CONGESTIVE HEART FAILURE (HCC): ICD-10-CM

## 2017-12-04 LAB
ANION GAP SERPL CALCULATED.3IONS-SCNC: 6 MMOL/L (ref 3–11)
BUN BLD-MCNC: 18 MG/DL (ref 9–23)
BUN/CREAT SERPL: 12.9 (ref 7–25)
CALCIUM SPEC-SCNC: 9.3 MG/DL (ref 8.7–10.4)
CHLORIDE SERPL-SCNC: 101 MMOL/L (ref 99–109)
CO2 SERPL-SCNC: 32 MMOL/L (ref 20–31)
CREAT BLD-MCNC: 1.4 MG/DL (ref 0.6–1.3)
GFR SERPL CREATININE-BSD FRML MDRD: 48 ML/MIN/1.73
GLUCOSE BLD-MCNC: 104 MG/DL (ref 70–100)
POTASSIUM BLD-SCNC: 3.8 MMOL/L (ref 3.5–5.5)
SODIUM BLD-SCNC: 139 MMOL/L (ref 132–146)

## 2017-12-04 PROCEDURE — 80048 BASIC METABOLIC PNL TOTAL CA: CPT

## 2017-12-04 NOTE — TELEPHONE ENCOUNTER
----- Message from JUAN Pate sent at 12/4/2017 12:50 PM EST -----  Please let patient know that his sodium levels look better off of HCTZ. Would stay off of this. His renal function is stable.    Called patient and advised lab results have been reviewed.  Advised to stay off of the HCTZ but continue lasix and potassium.  He verbalized understanding.

## 2018-03-21 ENCOUNTER — HOSPITAL ENCOUNTER (OUTPATIENT)
Dept: CARDIOLOGY | Facility: HOSPITAL | Age: 83
Discharge: HOME | End: 2018-03-21
Admitting: NURSE PRACTITIONER

## 2018-03-21 ENCOUNTER — OFFICE VISIT (OUTPATIENT)
Dept: CARDIOLOGY | Facility: HOSPITAL | Age: 83
End: 2018-03-21

## 2018-03-21 VITALS
OXYGEN SATURATION: 98 % | RESPIRATION RATE: 18 BRPM | DIASTOLIC BLOOD PRESSURE: 71 MMHG | WEIGHT: 182.4 LBS | HEART RATE: 86 BPM | TEMPERATURE: 97.9 F | HEIGHT: 69 IN | BODY MASS INDEX: 27.02 KG/M2 | SYSTOLIC BLOOD PRESSURE: 130 MMHG

## 2018-03-21 DIAGNOSIS — I35.0 NONRHEUMATIC AORTIC VALVE STENOSIS: ICD-10-CM

## 2018-03-21 DIAGNOSIS — I10 ESSENTIAL HYPERTENSION: ICD-10-CM

## 2018-03-21 DIAGNOSIS — I49.3 PVC (PREMATURE VENTRICULAR CONTRACTION): ICD-10-CM

## 2018-03-21 DIAGNOSIS — I25.118 CORONARY ARTERY DISEASE INVOLVING NATIVE CORONARY ARTERY OF NATIVE HEART WITH OTHER FORM OF ANGINA PECTORIS (HCC): ICD-10-CM

## 2018-03-21 DIAGNOSIS — I50.23 ACUTE ON CHRONIC SYSTOLIC HEART FAILURE (HCC): Primary | ICD-10-CM

## 2018-03-21 LAB
ANION GAP SERPL CALCULATED.3IONS-SCNC: 7 MMOL/L (ref 3–11)
BNP SERPL-MCNC: 1734 PG/ML (ref 0–100)
BUN BLD-MCNC: 19 MG/DL (ref 9–23)
BUN/CREAT SERPL: 11.9 (ref 7–25)
CALCIUM SPEC-SCNC: 8.9 MG/DL (ref 8.7–10.4)
CHLORIDE SERPL-SCNC: 100 MMOL/L (ref 99–109)
CO2 SERPL-SCNC: 28 MMOL/L (ref 20–31)
CREAT BLD-MCNC: 1.6 MG/DL (ref 0.6–1.3)
GFR SERPL CREATININE-BSD FRML MDRD: 41 ML/MIN/1.73
GLUCOSE BLD-MCNC: 104 MG/DL (ref 70–100)
MAGNESIUM SERPL-MCNC: 1.5 MG/DL (ref 1.3–2.7)
POTASSIUM BLD-SCNC: 3.8 MMOL/L (ref 3.5–5.5)
SODIUM BLD-SCNC: 135 MMOL/L (ref 132–146)

## 2018-03-21 PROCEDURE — 93005 ELECTROCARDIOGRAM TRACING: CPT | Performed by: NURSE PRACTITIONER

## 2018-03-21 PROCEDURE — 93010 ELECTROCARDIOGRAM REPORT: CPT | Performed by: INTERNAL MEDICINE

## 2018-03-21 PROCEDURE — 83735 ASSAY OF MAGNESIUM: CPT | Performed by: NURSE PRACTITIONER

## 2018-03-21 PROCEDURE — 80048 BASIC METABOLIC PNL TOTAL CA: CPT | Performed by: NURSE PRACTITIONER

## 2018-03-21 PROCEDURE — 99215 OFFICE O/P EST HI 40 MIN: CPT | Performed by: NURSE PRACTITIONER

## 2018-03-21 PROCEDURE — 83880 ASSAY OF NATRIURETIC PEPTIDE: CPT | Performed by: NURSE PRACTITIONER

## 2018-03-21 RX ORDER — FUROSEMIDE 10 MG/ML
40 INJECTION INTRAMUSCULAR; INTRAVENOUS ONCE
Status: DISCONTINUED | OUTPATIENT
Start: 2018-03-21 | End: 2018-03-26

## 2018-03-21 RX ORDER — SPIRONOLACTONE 25 MG/1
25 TABLET ORAL DAILY
Qty: 30 TABLET | Refills: 3 | Status: SHIPPED | OUTPATIENT
Start: 2018-03-21 | End: 2019-11-20 | Stop reason: DRUGHIGH

## 2018-03-21 NOTE — PROGRESS NOTES
[facility]  Heart [facility] Valve [facility]      Encounter Date:03/21/2018     Tad Moon  [address] [place] [address]  [phone]    11/30/1926    Pramod Hyde MD    Tad Moon is a 89 y.o. male.      Subjective:     Chief Complaint:  Shortness of Breath (heart failure, Aortic stenosis, edema)       HPI     89 yr old male with a history of CAD, status post STEMI, status post stenting, 10/2017.  Chronic systolic heart failure with ejection fraction 35%, moderate-severe aortic stenosis, hypertension, hyperlipidemia.  Patient currently on aspirin, Plavix, statin.  Imdur.   History of hyponatremia on HCTZ which was recently discontinued and transitioned to Lasix 40 mg daily and potassium supplement by cardiology.    Pt reports increased SOB over the last 2 weeks.  Needing to sleep up in chair due to dyspnea.  Lasix was increased 40 mg BID for 1 week by PCP.  Mild improvement of swelling, able to lay down to sleep last night without dyspnea, Only 1 lb weight loss. Continued dyspnea, moderate with minimal exertion.   Denies CP, pressure, palpitations, dizziness, syncope.      Patient Active Problem List    Diagnosis   • Left arm pain [M79.602]   • NSTEMI (non-ST elevated myocardial infarction) [I21.4]     Overview Note:     Added automatically from request for surgery [id number]     • Nonrheumatic aortic valve stenosis [I35.0]     Overview Note:     1. Echo 11-6-16: Moderate to severe aortic valve stenosis is present. Mean gradient 25 mmHg, MARII 0.89 cm squares.     • Acute on chronic combined systolic and diastolic congestive heart failure [I50.43]     Overview Note:      1. Echo 11-6-16 Left ventricular function is moderately decreased. Estimated EF = 35%.     • GERD (gastroesophageal reflux disease) [K21.9]   • SIRS (systemic inflammatory response syndrome) [R65.10]   • Coronary artery disease involving native coronary artery of native heart [I25.10]     Overview Note:     a. CABG in  1992, [place], [place]. LIMA to LAD, SVG to PDA, SVG to distal RCA, SVG to OM1.   b. Non STEMI, 2007 with 3.5 x 16 Taxus to SVG to RCA (Dr. Sheikh).   c. Cardiac catheterization, 2012, medical management, incomplete database.   d. On 08/15/2014, functional class III angina/unstable. Heart catheterization EF 40% with inferior wall akinesis. Severe distal left main and proximal LAD stenosis with a patent LIMA graft to LAD. An 80% proximal left circumflex heavily calcified stenosis tortuous segment with occluded vein graft. Chronically occluded RCA and saphenous vein graft to RCA with 3+ collaterals.   e. Chronic functional class II-III angina.     • Essential hypertension [I10]   • HLD (hyperlipidemia) [E78.5]   • History of tobacco abuse [Z87.891]   • CLL (chronic lymphocytic leukemia) [C91.10]         Past Surgical History:   Procedure Laterality Date   • ABDOMINAL HERNIA REPAIR      x 2   • APPENDECTOMY     • CARDIAC CATHETERIZATION N/A 10/10/2017    Procedure: Left Heart Cath;  Surgeon: Juan M Sood MD;  Location: [facility] CATH INVASIVE LOCATION;  Service:    • CARDIAC SURGERY     • CHOLECYSTECTOMY     • CORONARY ARTERY BYPASS GRAFT     • REPLACEMENT TOTAL KNEE Left 2014   • TOTAL KNEE ARTHROPLASTY Left        Allergies   Allergen Reactions   • Hctz [Hydrochlorothiazide]      hyponatremia   • Other      Beta Blockers, bradycardia         Current Outpatient Prescriptions:   •  ALPRAZolam (XANAX) 0.5 MG tablet, Take 1 mg by mouth 3 (Three) Times a Day As Needed., Disp: , Rfl: 2  •  aspirin 81 MG tablet, Take 1 tablet by mouth daily., Disp: 90 tablet, Rfl: 3  •  atorvastatin (LIPITOR) 80 MG tablet, Take 1 tablet by mouth daily., Disp: 90 tablet, Rfl: 3  •  cholecalciferol (VITAMIN D3) 1000 UNITS tablet, Take 1,000 Units by mouth Daily., Disp: , Rfl:   •  clopidogrel (PLAVIX) 75 MG tablet, Take 1 tablet by mouth daily., Disp: 90 tablet, Rfl: 3  •  furosemide (LASIX) 40 MG tablet, Take 1 tablet by mouth Daily.,  Disp: 30 tablet, Rfl: 11  •  isosorbide mononitrate (IMDUR) 60 MG 24 hr tablet, Take 1 tablet by mouth daily., Disp: 90 tablet, Rfl: 3  •  metoprolol tartrate (LOPRESSOR) 25 MG tablet, Take 0.5 tablets by mouth Every 12 (Twelve) Hours., Disp: 30 tablet, Rfl: 11  •  nitroglycerin (NITROSTAT) 0.4 MG SL tablet, Place 1 tablet under the tongue Every 5 (Five) Minutes As Needed for Chest Pain., Disp: 25 tablet, Rfl: 6  •  sertraline (ZOLOFT) 50 MG tablet, Take 50 mg by mouth Daily., Disp: , Rfl: 5      Current Facility-Administered Medications:   •  furosemide (LASIX) injection 40 mg, 40 mg, Intravenous, Once, JUAN Hickman    The following portions of the patient's history were reviewed and updated as appropriate: allergies, current medications, past family history, past medical history, past social history, past surgical history and problem list.    Review of Systems   Constitution: Positive for malaise/fatigue and weight gain. Negative for chills, decreased appetite, diaphoresis, fever, weakness, night sweats and weight loss.   HENT: Negative for congestion and nosebleeds.    Eyes: Negative for blurred vision, visual disturbance and visual halos.   Cardiovascular: Positive for dyspnea on exertion, leg swelling and orthopnea. Negative for chest pain, claudication, cyanosis, irregular heartbeat, near-syncope, palpitations, paroxysmal nocturnal dyspnea and syncope.   Respiratory: Positive for shortness of breath. Negative for cough, hemoptysis, sleep disturbances due to breathing, snoring, sputum production and wheezing.    Endocrine: Negative for cold intolerance, heat intolerance, polydipsia, polyphagia and polyuria.   Hematologic/Lymphatic: Does not bruise/bleed easily.   Skin: Negative for dry skin, itching and rash.   Musculoskeletal: Negative for falls, joint pain, joint swelling, muscle weakness and myalgias.   Gastrointestinal: Positive for diarrhea and nausea. Negative for bloating, abdominal pain,  "constipation, dysphagia, heartburn, melena and vomiting.   Genitourinary: Negative for dysuria, flank pain, hematuria and nocturia.   Neurological: Negative for difficulty with concentration, excessive daytime sleepiness, dizziness, headaches and loss of balance.   Psychiatric/Behavioral: Negative for altered mental status and depression. The patient is not nervous/anxious.    Allergic/Immunologic: Negative for environmental allergies.       Objective:     Vitals:    03/21/18 1049 03/21/18 1052 03/21/18 1053   BP: 122/69 125/69 130/71   BP Location: Right arm Left arm Left arm   Patient Position: Sitting Sitting Standing   Pulse: 85 87 86   Resp: 18     Temp: 97.9 °F (36.6 °C)     TempSrc: Temporal Artery      SpO2: 98%     Weight: 82.7 kg (182 lb 6.4 oz)     Height: 175.3 cm (69\")           Physical Exam   Constitutional: He is oriented to person, place, and time. He appears well-developed and well-nourished. No distress.   HENT:   Head: Normocephalic and atraumatic.   Mouth/Throat: Oropharynx is clear and moist.   Eyes: Conjunctivae are normal. Pupils are equal, round, and reactive to light. No scleral icterus.   Neck: No hepatojugular reflux and no JVD present. Carotid bruit is not present. No tracheal deviation present. No thyromegaly present.   Cardiovascular: Normal rate, regular rhythm and intact distal pulses.  Exam reveals no friction rub.    Murmur heard.  Pulmonary/Chest: Effort normal. He has rales (bilateral basis).   Abdominal: Soft. Bowel sounds are normal. He exhibits no distension. There is no tenderness.   Musculoskeletal: He exhibits edema (1+ bilateral edema).   Lymphadenopathy:     He has no cervical adenopathy.   Neurological: He is alert and oriented to person, place, and time.   Skin: Skin is warm, dry and intact. No rash noted. No cyanosis or erythema. No pallor.   Psychiatric: He has a normal mood and affect. His behavior is normal. Thought content normal.   Vitals reviewed.      Lab and " Diagnostic Review:  IV diuresis today in office. Patient received Lasix 40 mg IV today through a butterfly in the right AC over slow IV push. During IV diuresis, vitals were monitored and stable. Please see IV diuresis record for those vitals. Patient voided 300 ml in the office prior to discharge from the office.  Area was free of erythema, ecchymosis, or drainage.      Assessment and Plan:         1. Acute on chronic systolic heart failure  EF 35%  NYHA III    Pt on BB, but not approved HF BB.  Consider changing at f/u  No ace/arb/arni ? Hypotension.   Continue Lasix 40 mg daily.  Add aldactone 25 mg daily.  No KCL  Repeat BMP 1 week    -Basic Metabolic Panel  - BNP  - Magnesium  - spironolactone (ALDACTONE) 25 MG tablet; Take 1 tablet by mouth Daily.  Dispense: 30 tablet; Refill: 3    - furosemide (LASIX) injection 40 mg; Infuse 4 mL into a venous catheter 1 (One) Time.    Heart failure education discussed: What is heart failure, causes, signs and symptoms, medication management, daily weight monitoring, low-sodium diet of less than 2 g per day, daily exercise, role the heart failure center.   Face to face time 40 minutes, 50% of time spend providing education, discussing diagnosis, and POC.    2. Coronary artery disease involving native coronary artery of native heart with other form of angina pectoris  S/p MI, stent    Asa, plavix, statin  Indur, no CP    - ECG 12 Lead; Sinus rhythm, first-degree AV block, PVCs    3. Nonrheumatic aortic valve stenosis  Moderate-severe  ? Evaluation for TAVR    4. Essential hypertension  Stable.    Pt requesting to be evaluated by Dr. Cobian.  Called to be if earlier appointment can be  Arranged.  F/u [facility] pending results and scheduling with Aranza.        *Please note that portions of this note were completed with a voice recognition program. Efforts were made to edit the dictations, but occasionally words are mistranscribed.

## 2018-03-21 NOTE — PATIENT INSTRUCTIONS
Lasix 40 mg, 1 tablet daily    Stop the potassium supplement    Start Aldactone (spironolactone) 25 mg, 1 tablet daily    I will call you with follow up appointment and lab results.

## 2018-03-22 DIAGNOSIS — N18.30 STAGE 3 CHRONIC KIDNEY DISEASE (HCC): ICD-10-CM

## 2018-03-22 DIAGNOSIS — I50.23 ACUTE ON CHRONIC SYSTOLIC HEART FAILURE (HCC): Primary | ICD-10-CM

## 2018-03-22 NOTE — PROGRESS NOTES
Reviewed labs.    Pt will continue decrease dose of Lasix 40 mg daily with aldactone 25 mg daily.  NO potassium supplement.    Repeat BMP, BNP 1 week ([facility]).  Order will be mailed to patient and faxed to lab in [place].    Discussed f/u appointment with Dr. Cobian's office  (Aletha Mckinley).  She plans to move his appointment to 4/19/18, and have echo completed the same day.  She states she will call pt with scheduling change and time.    Pt states he sleep well last night (no PND/orthopnea),  Mild improvement of dyspnea.  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

## 2018-03-26 ENCOUNTER — APPOINTMENT (OUTPATIENT)
Dept: CARDIOLOGY | Facility: HOSPITAL | Age: 83
End: 2018-03-26
Attending: INTERNAL MEDICINE

## 2018-03-26 ENCOUNTER — APPOINTMENT (OUTPATIENT)
Dept: GENERAL RADIOLOGY | Facility: HOSPITAL | Age: 83
End: 2018-03-26

## 2018-03-26 ENCOUNTER — HOSPITAL ENCOUNTER (INPATIENT)
Facility: HOSPITAL | Age: 83
LOS: 5 days | Discharge: HOME | End: 2018-03-31
Attending: EMERGENCY MEDICINE | Admitting: INTERNAL MEDICINE

## 2018-03-26 DIAGNOSIS — I50.23 ACUTE ON CHRONIC SYSTOLIC CONGESTIVE HEART FAILURE (HCC): Primary | ICD-10-CM

## 2018-03-26 DIAGNOSIS — I25.10 CORONARY ARTERY DISEASE INVOLVING NATIVE CORONARY ARTERY OF NATIVE HEART WITHOUT ANGINA PECTORIS: ICD-10-CM

## 2018-03-26 DIAGNOSIS — N18.30 STAGE 3 CHRONIC KIDNEY DISEASE (HCC): ICD-10-CM

## 2018-03-26 DIAGNOSIS — I50.23 ACUTE ON CHRONIC SYSTOLIC HEART FAILURE (HCC): ICD-10-CM

## 2018-03-26 DIAGNOSIS — I50.43 ACUTE ON CHRONIC COMBINED SYSTOLIC AND DIASTOLIC CONGESTIVE HEART FAILURE (HCC): ICD-10-CM

## 2018-03-26 DIAGNOSIS — E78.2 MIXED HYPERLIPIDEMIA: ICD-10-CM

## 2018-03-26 DIAGNOSIS — I35.0 AORTIC VALVE STENOSIS, ETIOLOGY OF CARDIAC VALVE DISEASE UNSPECIFIED: ICD-10-CM

## 2018-03-26 DIAGNOSIS — N28.9 RENAL INSUFFICIENCY: ICD-10-CM

## 2018-03-26 DIAGNOSIS — F41.9 ANXIETY: ICD-10-CM

## 2018-03-26 PROBLEM — I21.4 NSTEMI (NON-ST ELEVATED MYOCARDIAL INFARCTION) (HCC): Status: RESOLVED | Noted: 2017-10-09 | Resolved: 2018-03-26

## 2018-03-26 PROBLEM — M79.602 LEFT ARM PAIN: Status: RESOLVED | Noted: 2017-10-09 | Resolved: 2018-03-26

## 2018-03-26 LAB
ALBUMIN SERPL-MCNC: 4.2 G/DL (ref 3.2–4.8)
ALBUMIN/GLOB SERPL: 1.6 G/DL (ref 1.5–2.5)
ALP SERPL-CCNC: 96 U/L (ref 25–100)
ALT SERPL W P-5'-P-CCNC: 26 U/L (ref 7–40)
ANION GAP SERPL CALCULATED.3IONS-SCNC: 11 MMOL/L (ref 3–11)
ANION GAP SERPL CALCULATED.3IONS-SCNC: 6 MMOL/L (ref 3–11)
AST SERPL-CCNC: 19 U/L (ref 0–33)
BASOPHILS # BLD AUTO: 0.07 10*3/MM3 (ref 0–0.2)
BASOPHILS NFR BLD AUTO: 0.6 % (ref 0–1)
BILIRUB SERPL-MCNC: 1 MG/DL (ref 0.3–1.2)
BNP SERPL-MCNC: 1780 PG/ML (ref 0–100)
BUN BLD-MCNC: 18 MG/DL (ref 9–23)
BUN BLD-MCNC: 18 MG/DL (ref 9–23)
BUN/CREAT SERPL: 10.6 (ref 7–25)
BUN/CREAT SERPL: 11.3 (ref 7–25)
CALCIUM SPEC-SCNC: 9.2 MG/DL (ref 8.7–10.4)
CALCIUM SPEC-SCNC: 9.4 MG/DL (ref 8.7–10.4)
CHLORIDE SERPL-SCNC: 100 MMOL/L (ref 99–109)
CHLORIDE SERPL-SCNC: 102 MMOL/L (ref 99–109)
CO2 SERPL-SCNC: 27 MMOL/L (ref 20–31)
CO2 SERPL-SCNC: 31 MMOL/L (ref 20–31)
CREAT BLD-MCNC: 1.6 MG/DL (ref 0.6–1.3)
CREAT BLD-MCNC: 1.7 MG/DL (ref 0.6–1.3)
DEPRECATED RDW RBC AUTO: 50.6 FL (ref 37–54)
EOSINOPHIL # BLD AUTO: 0.46 10*3/MM3 (ref 0–0.3)
EOSINOPHIL NFR BLD AUTO: 3.9 % (ref 0–3)
ERYTHROCYTE [DISTWIDTH] IN BLOOD BY AUTOMATED COUNT: 14.6 % (ref 11.3–14.5)
GFR SERPL CREATININE-BSD FRML MDRD: 38 ML/MIN/1.73
GFR SERPL CREATININE-BSD FRML MDRD: 41 ML/MIN/1.73
GLOBULIN UR ELPH-MCNC: 2.7 GM/DL
GLUCOSE BLD-MCNC: 105 MG/DL (ref 70–100)
GLUCOSE BLD-MCNC: 98 MG/DL (ref 70–100)
HCT VFR BLD AUTO: 37.4 % (ref 38.9–50.9)
HGB BLD-MCNC: 11.9 G/DL (ref 13.1–17.5)
HOLD SPECIMEN: NORMAL
HOLD SPECIMEN: NORMAL
IMM GRANULOCYTES # BLD: 0.03 10*3/MM3 (ref 0–0.03)
IMM GRANULOCYTES NFR BLD: 0.3 % (ref 0–0.6)
LYMPHOCYTES # BLD AUTO: 6.29 10*3/MM3 (ref 0.6–4.8)
LYMPHOCYTES NFR BLD AUTO: 53.5 % (ref 24–44)
MAGNESIUM SERPL-MCNC: 1.5 MG/DL (ref 1.3–2.7)
MAGNESIUM SERPL-MCNC: 1.7 MG/DL (ref 1.3–2.7)
MCH RBC QN AUTO: 30.1 PG (ref 27–31)
MCHC RBC AUTO-ENTMCNC: 31.8 G/DL (ref 32–36)
MCV RBC AUTO: 94.7 FL (ref 80–99)
MONOCYTES # BLD AUTO: 0.58 10*3/MM3 (ref 0–1)
MONOCYTES NFR BLD AUTO: 4.9 % (ref 0–12)
NEUTROPHILS # BLD AUTO: 4.32 10*3/MM3 (ref 1.5–8.3)
NEUTROPHILS NFR BLD AUTO: 36.8 % (ref 41–71)
PLAT MORPH BLD: NORMAL
PLATELET # BLD AUTO: 186 10*3/MM3 (ref 150–450)
PMV BLD AUTO: 12.9 FL (ref 6–12)
POTASSIUM BLD-SCNC: 3.5 MMOL/L (ref 3.5–5.5)
POTASSIUM BLD-SCNC: 3.5 MMOL/L (ref 3.5–5.5)
PROT SERPL-MCNC: 6.9 G/DL (ref 5.7–8.2)
RBC # BLD AUTO: 3.95 10*6/MM3 (ref 4.2–5.76)
RBC MORPH BLD: NORMAL
SODIUM BLD-SCNC: 138 MMOL/L (ref 132–146)
SODIUM BLD-SCNC: 139 MMOL/L (ref 132–146)
TROPONIN I SERPL-MCNC: 0.03 NG/ML (ref 0–0.07)
WBC MORPH BLD: NORMAL
WBC NRBC COR # BLD: 11.75 10*3/MM3 (ref 3.5–10.8)
WHOLE BLOOD HOLD SPECIMEN: NORMAL
WHOLE BLOOD HOLD SPECIMEN: NORMAL

## 2018-03-26 PROCEDURE — 99222 1ST HOSP IP/OBS MODERATE 55: CPT | Performed by: INTERNAL MEDICINE

## 2018-03-26 PROCEDURE — 25010000002 MAGNESIUM SULFATE 2 GM/50ML SOLUTION: Performed by: INTERNAL MEDICINE

## 2018-03-26 PROCEDURE — 99284 EMERGENCY DEPT VISIT MOD MDM: CPT

## 2018-03-26 PROCEDURE — G0378 HOSPITAL OBSERVATION PER HR: HCPCS

## 2018-03-26 PROCEDURE — 25010000002 SULFUR HEXAFLUORIDE MICROSPH 60.7-25 MG RECONSTITUTED SUSPENSION: Performed by: INTERNAL MEDICINE

## 2018-03-26 PROCEDURE — 93306 TTE W/DOPPLER COMPLETE: CPT | Performed by: INTERNAL MEDICINE

## 2018-03-26 PROCEDURE — 93005 ELECTROCARDIOGRAM TRACING: CPT | Performed by: EMERGENCY MEDICINE

## 2018-03-26 PROCEDURE — 25010000002 FUROSEMIDE PER 20 MG: Performed by: EMERGENCY MEDICINE

## 2018-03-26 PROCEDURE — 83735 ASSAY OF MAGNESIUM: CPT | Performed by: INTERNAL MEDICINE

## 2018-03-26 PROCEDURE — 80053 COMPREHEN METABOLIC PANEL: CPT | Performed by: EMERGENCY MEDICINE

## 2018-03-26 PROCEDURE — 83880 ASSAY OF NATRIURETIC PEPTIDE: CPT | Performed by: EMERGENCY MEDICINE

## 2018-03-26 PROCEDURE — 25010000002 FUROSEMIDE PER 20 MG: Performed by: INTERNAL MEDICINE

## 2018-03-26 PROCEDURE — 85007 BL SMEAR W/DIFF WBC COUNT: CPT | Performed by: EMERGENCY MEDICINE

## 2018-03-26 PROCEDURE — 84484 ASSAY OF TROPONIN QUANT: CPT

## 2018-03-26 PROCEDURE — 71045 X-RAY EXAM CHEST 1 VIEW: CPT

## 2018-03-26 PROCEDURE — 85025 COMPLETE CBC W/AUTO DIFF WBC: CPT | Performed by: EMERGENCY MEDICINE

## 2018-03-26 PROCEDURE — 93306 TTE W/DOPPLER COMPLETE: CPT

## 2018-03-26 RX ORDER — SPIRONOLACTONE 25 MG/1
25 TABLET ORAL DAILY
Status: DISCONTINUED | OUTPATIENT
Start: 2018-03-26 | End: 2018-03-31 | Stop reason: HOSPADM

## 2018-03-26 RX ORDER — FUROSEMIDE 10 MG/ML
80 INJECTION INTRAMUSCULAR; INTRAVENOUS ONCE
Status: COMPLETED | OUTPATIENT
Start: 2018-03-26 | End: 2018-03-26

## 2018-03-26 RX ORDER — NITROGLYCERIN 0.4 MG/1
0.4 TABLET SUBLINGUAL
Status: DISCONTINUED | OUTPATIENT
Start: 2018-03-26 | End: 2018-03-31 | Stop reason: HOSPADM

## 2018-03-26 RX ORDER — ALPRAZOLAM 1 MG/1
1 TABLET ORAL 3 TIMES DAILY PRN
Status: DISCONTINUED | OUTPATIENT
Start: 2018-03-26 | End: 2018-03-31 | Stop reason: HOSPADM

## 2018-03-26 RX ORDER — ATORVASTATIN CALCIUM 40 MG/1
40 TABLET, FILM COATED ORAL NIGHTLY
Status: DISCONTINUED | OUTPATIENT
Start: 2018-03-26 | End: 2018-03-31 | Stop reason: HOSPADM

## 2018-03-26 RX ORDER — SODIUM CHLORIDE 0.9 % (FLUSH) 0.9 %
1-10 SYRINGE (ML) INJECTION AS NEEDED
Status: DISCONTINUED | OUTPATIENT
Start: 2018-03-26 | End: 2018-03-31 | Stop reason: HOSPADM

## 2018-03-26 RX ORDER — CLOPIDOGREL BISULFATE 75 MG/1
75 TABLET ORAL DAILY
Status: DISCONTINUED | OUTPATIENT
Start: 2018-03-26 | End: 2018-03-31 | Stop reason: HOSPADM

## 2018-03-26 RX ORDER — FUROSEMIDE 10 MG/ML
40 INJECTION INTRAMUSCULAR; INTRAVENOUS EVERY 12 HOURS
Status: DISCONTINUED | OUTPATIENT
Start: 2018-03-26 | End: 2018-03-29

## 2018-03-26 RX ORDER — MAGNESIUM SULFATE HEPTAHYDRATE 40 MG/ML
2 INJECTION, SOLUTION INTRAVENOUS AS NEEDED
Status: DISCONTINUED | OUTPATIENT
Start: 2018-03-26 | End: 2018-03-31 | Stop reason: HOSPADM

## 2018-03-26 RX ORDER — ASPIRIN 81 MG/1
81 TABLET, CHEWABLE ORAL DAILY
Status: DISCONTINUED | OUTPATIENT
Start: 2018-03-26 | End: 2018-03-31 | Stop reason: HOSPADM

## 2018-03-26 RX ORDER — MAGNESIUM SULFATE HEPTAHYDRATE 40 MG/ML
4 INJECTION, SOLUTION INTRAVENOUS AS NEEDED
Status: DISCONTINUED | OUTPATIENT
Start: 2018-03-26 | End: 2018-03-31 | Stop reason: HOSPADM

## 2018-03-26 RX ORDER — POTASSIUM CHLORIDE 750 MG/1
40 CAPSULE, EXTENDED RELEASE ORAL AS NEEDED
Status: DISCONTINUED | OUTPATIENT
Start: 2018-03-26 | End: 2018-03-31 | Stop reason: HOSPADM

## 2018-03-26 RX ORDER — SODIUM CHLORIDE 0.9 % (FLUSH) 0.9 %
10 SYRINGE (ML) INJECTION AS NEEDED
Status: DISCONTINUED | OUTPATIENT
Start: 2018-03-26 | End: 2018-03-31 | Stop reason: HOSPADM

## 2018-03-26 RX ORDER — POTASSIUM CHLORIDE 1.5 G/1.77G
40 POWDER, FOR SOLUTION ORAL AS NEEDED
Status: DISCONTINUED | OUTPATIENT
Start: 2018-03-26 | End: 2018-03-31 | Stop reason: HOSPADM

## 2018-03-26 RX ORDER — ISOSORBIDE MONONITRATE 60 MG/1
60 TABLET, EXTENDED RELEASE ORAL DAILY
Status: DISCONTINUED | OUTPATIENT
Start: 2018-03-26 | End: 2018-03-30

## 2018-03-26 RX ADMIN — CLOPIDOGREL BISULFATE 75 MG: 75 TABLET ORAL at 14:13

## 2018-03-26 RX ADMIN — MAGNESIUM SULFATE HEPTAHYDRATE 2 G: 40 INJECTION, SOLUTION INTRAVENOUS at 20:36

## 2018-03-26 RX ADMIN — ASPIRIN 81 MG 81 MG: 81 TABLET ORAL at 14:13

## 2018-03-26 RX ADMIN — SULFUR HEXAFLUORIDE 2 ML: KIT at 16:51

## 2018-03-26 RX ADMIN — POTASSIUM CHLORIDE 40 MEQ: 750 CAPSULE, EXTENDED RELEASE ORAL at 20:35

## 2018-03-26 RX ADMIN — ATORVASTATIN CALCIUM 40 MG: 40 TABLET, FILM COATED ORAL at 20:35

## 2018-03-26 RX ADMIN — FUROSEMIDE 80 MG: 10 INJECTION, SOLUTION INTRAMUSCULAR; INTRAVENOUS at 09:57

## 2018-03-26 RX ADMIN — SERTRALINE HYDROCHLORIDE 50 MG: 50 TABLET ORAL at 14:13

## 2018-03-26 RX ADMIN — SPIRONOLACTONE 25 MG: 25 TABLET, FILM COATED ORAL at 14:13

## 2018-03-26 RX ADMIN — ISOSORBIDE MONONITRATE 60 MG: 60 TABLET, EXTENDED RELEASE ORAL at 14:13

## 2018-03-26 RX ADMIN — METOPROLOL TARTRATE 12.5 MG: 25 TABLET, FILM COATED ORAL at 20:32

## 2018-03-26 RX ADMIN — METOPROLOL TARTRATE 12.5 MG: 25 TABLET, FILM COATED ORAL at 14:13

## 2018-03-26 RX ADMIN — ALPRAZOLAM 1 MG: 1 TABLET ORAL at 22:19

## 2018-03-26 RX ADMIN — FUROSEMIDE 40 MG: 10 INJECTION, SOLUTION INTRAMUSCULAR; INTRAVENOUS at 14:13

## 2018-03-26 RX ADMIN — MAGNESIUM SULFATE HEPTAHYDRATE 2 G: 40 INJECTION, SOLUTION INTRAVENOUS at 22:15

## 2018-03-27 LAB
ANION GAP SERPL CALCULATED.3IONS-SCNC: 9 MMOL/L (ref 3–11)
BH CV ECHO MEAS - AI DEC SLOPE: 96.6 CM/SEC^2
BH CV ECHO MEAS - AI MAX PG: 16.1 MMHG
BH CV ECHO MEAS - AI MAX VEL: 200.5 CM/SEC
BH CV ECHO MEAS - AI P1/2T: 608.1 MSEC
BH CV ECHO MEAS - AO MAX PG (FULL): 44.8 MMHG
BH CV ECHO MEAS - AO MAX PG: 47 MMHG
BH CV ECHO MEAS - AO MEAN PG (FULL): 27.2 MMHG
BH CV ECHO MEAS - AO MEAN PG: 29 MMHG
BH CV ECHO MEAS - AO ROOT AREA (BSA CORRECTED): 1.7
BH CV ECHO MEAS - AO ROOT AREA: 8.4 CM^2
BH CV ECHO MEAS - AO ROOT DIAM: 3.3 CM
BH CV ECHO MEAS - AO V2 MAX: 340.9 CM/SEC
BH CV ECHO MEAS - AO V2 MEAN: 257.2 CM/SEC
BH CV ECHO MEAS - AO V2 VTI: 75.1 CM
BH CV ECHO MEAS - AVA(I,A): 0.91 CM^2
BH CV ECHO MEAS - AVA(I,D): 0.91 CM^2
BH CV ECHO MEAS - AVA(V,A): 0.9 CM^2
BH CV ECHO MEAS - AVA(V,D): 0.9 CM^2
BH CV ECHO MEAS - BSA(HAYCOCK): 2 M^2
BH CV ECHO MEAS - BSA: 1.9 M^2
BH CV ECHO MEAS - BZI_BMI: 25.7 KILOGRAMS/M^2
BH CV ECHO MEAS - BZI_METRIC_HEIGHT: 175.3 CM
BH CV ECHO MEAS - BZI_METRIC_WEIGHT: 78.9 KG
BH CV ECHO MEAS - EDV(CUBED): 149.3 ML
BH CV ECHO MEAS - EDV(TEICH): 135.6 ML
BH CV ECHO MEAS - EF(CUBED): 14.1 %
BH CV ECHO MEAS - EF(TEICH): 11.1 %
BH CV ECHO MEAS - ESV(CUBED): 128.3 ML
BH CV ECHO MEAS - ESV(TEICH): 120.6 ML
BH CV ECHO MEAS - FS: 4.9 %
BH CV ECHO MEAS - IVS/LVPW: 1.4
BH CV ECHO MEAS - IVSD: 1.1 CM
BH CV ECHO MEAS - LA DIMENSION: 5.9 CM
BH CV ECHO MEAS - LA/AO: 1.8
BH CV ECHO MEAS - LV MASS(C)D: 184.8 GRAMS
BH CV ECHO MEAS - LV MASS(C)DI: 94.9 GRAMS/M^2
BH CV ECHO MEAS - LV MAX PG: 1.7 MMHG
BH CV ECHO MEAS - LV MEAN PG: 0.92 MMHG
BH CV ECHO MEAS - LV V1 MAX: 64.9 CM/SEC
BH CV ECHO MEAS - LV V1 MEAN: 44.4 CM/SEC
BH CV ECHO MEAS - LV V1 VTI: 14.4 CM
BH CV ECHO MEAS - LVIDD: 5.3 CM
BH CV ECHO MEAS - LVIDS: 5 CM
BH CV ECHO MEAS - LVOT AREA (M): 4.9 CM^2
BH CV ECHO MEAS - LVOT AREA: 4.7 CM^2
BH CV ECHO MEAS - LVOT DIAM: 2.5 CM
BH CV ECHO MEAS - LVPWD: 0.78 CM
BH CV ECHO MEAS - MR MAX PG: 112.7 MMHG
BH CV ECHO MEAS - MR MAX VEL: 530.7 CM/SEC
BH CV ECHO MEAS - MR MEAN PG: 68.3 MMHG
BH CV ECHO MEAS - MR MEAN VEL: 383.6 CM/SEC
BH CV ECHO MEAS - MR VTI: 167.5 CM
BH CV ECHO MEAS - MV A MAX VEL: 76.5 CM/SEC
BH CV ECHO MEAS - MV DEC TIME: 0.1 SEC
BH CV ECHO MEAS - MV E MAX VEL: 112.3 CM/SEC
BH CV ECHO MEAS - MV E/A: 1.5
BH CV ECHO MEAS - PA ACC SLOPE: 605.7 CM/SEC^2
BH CV ECHO MEAS - PA ACC TIME: 0.1 SEC
BH CV ECHO MEAS - PA MAX PG: 2.2 MMHG
BH CV ECHO MEAS - PA PR(ACCEL): 34.6 MMHG
BH CV ECHO MEAS - PA V2 MAX: 74.3 CM/SEC
BH CV ECHO MEAS - RVDD: 2.5 CM
BH CV ECHO MEAS - SI(AO): 323.6 ML/M^2
BH CV ECHO MEAS - SI(CUBED): 10.8 ML/M^2
BH CV ECHO MEAS - SI(LVOT): 35.1 ML/M^2
BH CV ECHO MEAS - SI(TEICH): 7.7 ML/M^2
BH CV ECHO MEAS - SV(AO): 630.1 ML
BH CV ECHO MEAS - SV(CUBED): 21 ML
BH CV ECHO MEAS - SV(LVOT): 68.4 ML
BH CV ECHO MEAS - SV(TEICH): 15 ML
BH CV ECHO MEAS - TV MAX PG: 1.2 MMHG
BH CV ECHO MEAS - TV V2 MAX: 54.8 CM/SEC
BH CV XLRA - RV BASE: 3.4 CM
BH CV XLRA - RV LENGTH: 7.6 CM
BH CV XLRA - RV MID: 3.3 CM
BUN BLD-MCNC: 19 MG/DL (ref 9–23)
BUN/CREAT SERPL: 11.9 (ref 7–25)
CALCIUM SPEC-SCNC: 9.4 MG/DL (ref 8.7–10.4)
CHLORIDE SERPL-SCNC: 101 MMOL/L (ref 99–109)
CO2 SERPL-SCNC: 30 MMOL/L (ref 20–31)
CREAT BLD-MCNC: 1.6 MG/DL (ref 0.6–1.3)
GFR SERPL CREATININE-BSD FRML MDRD: 41 ML/MIN/1.73
GLUCOSE BLD-MCNC: 100 MG/DL (ref 70–100)
LV EF 2D ECHO EST: 20 %
MAGNESIUM SERPL-MCNC: 3.2 MG/DL (ref 1.3–2.7)
POTASSIUM BLD-SCNC: 4 MMOL/L (ref 3.5–5.5)
SODIUM BLD-SCNC: 140 MMOL/L (ref 132–146)

## 2018-03-27 PROCEDURE — G0378 HOSPITAL OBSERVATION PER HR: HCPCS

## 2018-03-27 PROCEDURE — 99232 SBSQ HOSP IP/OBS MODERATE 35: CPT | Performed by: INTERNAL MEDICINE

## 2018-03-27 PROCEDURE — 83735 ASSAY OF MAGNESIUM: CPT | Performed by: INTERNAL MEDICINE

## 2018-03-27 PROCEDURE — 25010000002 FUROSEMIDE PER 20 MG: Performed by: INTERNAL MEDICINE

## 2018-03-27 PROCEDURE — 80048 BASIC METABOLIC PNL TOTAL CA: CPT | Performed by: INTERNAL MEDICINE

## 2018-03-27 RX ORDER — CARVEDILOL 6.25 MG/1
6.25 TABLET ORAL EVERY 12 HOURS SCHEDULED
Status: DISCONTINUED | OUTPATIENT
Start: 2018-03-27 | End: 2018-03-30

## 2018-03-27 RX ORDER — LISINOPRIL 5 MG/1
5 TABLET ORAL
Status: DISCONTINUED | OUTPATIENT
Start: 2018-03-27 | End: 2018-03-30

## 2018-03-27 RX ADMIN — POTASSIUM CHLORIDE 40 MEQ: 1.5 POWDER, FOR SOLUTION ORAL at 01:51

## 2018-03-27 RX ADMIN — SPIRONOLACTONE 25 MG: 25 TABLET, FILM COATED ORAL at 09:02

## 2018-03-27 RX ADMIN — SERTRALINE HYDROCHLORIDE 50 MG: 50 TABLET ORAL at 09:01

## 2018-03-27 RX ADMIN — ATORVASTATIN CALCIUM 40 MG: 40 TABLET, FILM COATED ORAL at 20:24

## 2018-03-27 RX ADMIN — LISINOPRIL 5 MG: 5 TABLET ORAL at 20:27

## 2018-03-27 RX ADMIN — FUROSEMIDE 40 MG: 10 INJECTION, SOLUTION INTRAMUSCULAR; INTRAVENOUS at 05:34

## 2018-03-27 RX ADMIN — ALPRAZOLAM 1 MG: 1 TABLET ORAL at 21:55

## 2018-03-27 RX ADMIN — CARVEDILOL 6.25 MG: 6.25 TABLET, FILM COATED ORAL at 20:24

## 2018-03-27 RX ADMIN — ASPIRIN 81 MG 81 MG: 81 TABLET ORAL at 09:02

## 2018-03-27 RX ADMIN — CLOPIDOGREL BISULFATE 75 MG: 75 TABLET ORAL at 09:01

## 2018-03-27 RX ADMIN — ISOSORBIDE MONONITRATE 60 MG: 60 TABLET, EXTENDED RELEASE ORAL at 09:01

## 2018-03-27 RX ADMIN — FUROSEMIDE 40 MG: 10 INJECTION, SOLUTION INTRAMUSCULAR; INTRAVENOUS at 17:17

## 2018-03-27 RX ADMIN — METOPROLOL TARTRATE 12.5 MG: 25 TABLET, FILM COATED ORAL at 09:02

## 2018-03-28 ENCOUNTER — APPOINTMENT (OUTPATIENT)
Dept: CARDIOLOGY | Facility: HOSPITAL | Age: 83
End: 2018-03-28
Attending: INTERNAL MEDICINE

## 2018-03-28 LAB
ANION GAP SERPL CALCULATED.3IONS-SCNC: 8 MMOL/L (ref 3–11)
AV MEAN GRADIENT POST STRESS: 40 MMHG
AV PEAK GRADIENT POST STRESS: 68 MMHG
AV PEAK VELOCITY POST STRESS: 419 M/S
BH CV ECHO MEAS - AO MAX PG (FULL): 61.5 MMHG
BH CV ECHO MEAS - AO MAX PG: 70 MMHG
BH CV ECHO MEAS - AO MEAN PG (FULL): 36.2 MMHG
BH CV ECHO MEAS - AO MEAN PG: 38 MMHG
BH CV ECHO MEAS - AO ROOT AREA (BSA CORRECTED): 1.3
BH CV ECHO MEAS - AO ROOT AREA: 5.3 CM^2
BH CV ECHO MEAS - AO ROOT DIAM: 2.6 CM
BH CV ECHO MEAS - AO V2 MAX: 419 CM/SEC
BH CV ECHO MEAS - AO V2 MEAN: 289.9 CM/SEC
BH CV ECHO MEAS - AO V2 VTI: 79.8 CM
BH CV ECHO MEAS - AVA(I,A): 0.79 CM^2
BH CV ECHO MEAS - AVA(I,D): 0.79 CM^2
BH CV ECHO MEAS - AVA(V,A): 0.76 CM^2
BH CV ECHO MEAS - AVA(V,D): 0.76 CM^2
BH CV ECHO MEAS - BSA(HAYCOCK): 2 M^2
BH CV ECHO MEAS - BSA: 2 M^2
BH CV ECHO MEAS - BZI_BMI: 25.8 KILOGRAMS/M^2
BH CV ECHO MEAS - BZI_METRIC_HEIGHT: 175.3 CM
BH CV ECHO MEAS - BZI_METRIC_WEIGHT: 79.4 KG
BH CV ECHO MEAS - LV MAX PG: 3.5 MMHG
BH CV ECHO MEAS - LV MEAN PG: 1.9 MMHG
BH CV ECHO MEAS - LV V1 MAX: 92.8 CM/SEC
BH CV ECHO MEAS - LV V1 MEAN: 63.1 CM/SEC
BH CV ECHO MEAS - LV V1 VTI: 19.1 CM
BH CV ECHO MEAS - LVOT AREA (M): 3.5 CM^2
BH CV ECHO MEAS - LVOT AREA: 3.3 CM^2
BH CV ECHO MEAS - LVOT DIAM: 2.1 CM
BH CV ECHO MEAS - SI(AO): 217.3 ML/M^2
BH CV ECHO MEAS - SI(LVOT): 32.3 ML/M^2
BH CV ECHO MEAS - SV(AO): 424 ML
BH CV ECHO MEAS - SV(LVOT): 63 ML
BH CV STRESS BP STAGE 1: NORMAL
BH CV STRESS BP STAGE 2: NORMAL
BH CV STRESS BP STAGE 3: NORMAL
BH CV STRESS BP STAGE 4: NORMAL
BH CV STRESS DOSE DOBUTAMINE STAGE 1: 5
BH CV STRESS DOSE DOBUTAMINE STAGE 2: 10
BH CV STRESS DOSE DOBUTAMINE STAGE 3: 20
BH CV STRESS DOSE DOBUTAMINE STAGE 4: 30
BH CV STRESS DURATION MIN STAGE 1: 2
BH CV STRESS DURATION MIN STAGE 2: 2
BH CV STRESS DURATION MIN STAGE 3: 3
BH CV STRESS DURATION MIN STAGE 4: 1
BH CV STRESS DURATION SEC STAGE 1: 0
BH CV STRESS DURATION SEC STAGE 2: 0
BH CV STRESS DURATION SEC STAGE 3: 40
BH CV STRESS DURATION SEC STAGE 4: 48
BH CV STRESS HR STAGE 1: 57
BH CV STRESS HR STAGE 2: 60
BH CV STRESS HR STAGE 3: 71
BH CV STRESS HR STAGE 4: 94
BH CV STRESS PROTOCOL 1: NORMAL
BH CV STRESS RATE STAGE 1: 24
BH CV STRESS RATE STAGE 2: 48
BH CV STRESS RATE STAGE 3: 96
BH CV STRESS RATE STAGE 4: 144
BH CV STRESS RECOVERY BP: NORMAL MMHG
BH CV STRESS RECOVERY HR: 80 BPM
BH CV STRESS STAGE 1: 1
BH CV STRESS STAGE 2: 2
BH CV STRESS STAGE 3: 3
BH CV STRESS STAGE 4: 4
BUN BLD-MCNC: 17 MG/DL (ref 9–23)
BUN/CREAT SERPL: 10.6 (ref 7–25)
CALCIUM SPEC-SCNC: 8.8 MG/DL (ref 8.7–10.4)
CHLORIDE SERPL-SCNC: 100 MMOL/L (ref 99–109)
CO2 SERPL-SCNC: 31 MMOL/L (ref 20–31)
CREAT BLD-MCNC: 1.6 MG/DL (ref 0.6–1.3)
GFR SERPL CREATININE-BSD FRML MDRD: 41 ML/MIN/1.73
GLUCOSE BLD-MCNC: 89 MG/DL (ref 70–100)
Lab: 0.86 CM2
MAGNESIUM SERPL-MCNC: 2 MG/DL (ref 1.3–2.7)
MAXIMAL PREDICTED HEART RATE: 129 BPM
PERCENT MAX PREDICTED HR: 72.87 %
POTASSIUM BLD-SCNC: 3.3 MMOL/L (ref 3.5–5.5)
POTASSIUM BLD-SCNC: 4.7 MMOL/L (ref 3.5–5.5)
SODIUM BLD-SCNC: 139 MMOL/L (ref 132–146)
STRESS BASELINE BP: NORMAL MMHG
STRESS BASELINE HR: 60 BPM
STRESS PERCENT HR: 86 %
STRESS POST EXERCISE DUR MIN: 10 MIN
STRESS POST EXERCISE DUR SEC: 28 SEC
STRESS POST PEAK BP: NORMAL MMHG
STRESS POST PEAK HR: 94 BPM
STRESS TARGET HR: 110 BPM

## 2018-03-28 PROCEDURE — 80048 BASIC METABOLIC PNL TOTAL CA: CPT | Performed by: INTERNAL MEDICINE

## 2018-03-28 PROCEDURE — 93320 DOPPLER ECHO COMPLETE: CPT

## 2018-03-28 PROCEDURE — 84132 ASSAY OF SERUM POTASSIUM: CPT | Performed by: NURSE PRACTITIONER

## 2018-03-28 PROCEDURE — 93018 CV STRESS TEST I&R ONLY: CPT | Performed by: INTERNAL MEDICINE

## 2018-03-28 PROCEDURE — 93017 CV STRESS TEST TRACING ONLY: CPT

## 2018-03-28 PROCEDURE — 93350 STRESS TTE ONLY: CPT

## 2018-03-28 PROCEDURE — 83735 ASSAY OF MAGNESIUM: CPT | Performed by: INTERNAL MEDICINE

## 2018-03-28 PROCEDURE — 93350 STRESS TTE ONLY: CPT | Performed by: INTERNAL MEDICINE

## 2018-03-28 PROCEDURE — 93325 DOPPLER ECHO COLOR FLOW MAPG: CPT

## 2018-03-28 PROCEDURE — 25010000002 FUROSEMIDE PER 20 MG: Performed by: INTERNAL MEDICINE

## 2018-03-28 PROCEDURE — 25010000002 DOBUTAMINE PER 250 MG: Performed by: INTERNAL MEDICINE

## 2018-03-28 RX ORDER — DOBUTAMINE HYDROCHLORIDE 100 MG/100ML
30 INJECTION INTRAVENOUS
Status: DISCONTINUED | OUTPATIENT
Start: 2018-03-28 | End: 2018-03-29

## 2018-03-28 RX ADMIN — POTASSIUM CHLORIDE 40 MEQ: 1.5 POWDER, FOR SOLUTION ORAL at 13:33

## 2018-03-28 RX ADMIN — ALPRAZOLAM 1 MG: 1 TABLET ORAL at 21:47

## 2018-03-28 RX ADMIN — FUROSEMIDE 40 MG: 10 INJECTION, SOLUTION INTRAMUSCULAR; INTRAVENOUS at 17:04

## 2018-03-28 RX ADMIN — SERTRALINE HYDROCHLORIDE 50 MG: 50 TABLET ORAL at 09:00

## 2018-03-28 RX ADMIN — POTASSIUM CHLORIDE 40 MEQ: 1.5 POWDER, FOR SOLUTION ORAL at 09:00

## 2018-03-28 RX ADMIN — CARVEDILOL 6.25 MG: 6.25 TABLET, FILM COATED ORAL at 20:10

## 2018-03-28 RX ADMIN — FUROSEMIDE 40 MG: 10 INJECTION, SOLUTION INTRAMUSCULAR; INTRAVENOUS at 05:16

## 2018-03-28 RX ADMIN — DOBUTAMINE HYDROCHLORIDE 30 MCG/KG/MIN: 100 INJECTION INTRAVENOUS at 11:53

## 2018-03-28 RX ADMIN — ATORVASTATIN CALCIUM 40 MG: 40 TABLET, FILM COATED ORAL at 20:10

## 2018-03-28 RX ADMIN — ASPIRIN 81 MG 81 MG: 81 TABLET ORAL at 09:00

## 2018-03-28 RX ADMIN — CLOPIDOGREL BISULFATE 75 MG: 75 TABLET ORAL at 09:00

## 2018-03-29 ENCOUNTER — APPOINTMENT (OUTPATIENT)
Dept: CARDIOLOGY | Facility: HOSPITAL | Age: 83
End: 2018-03-29

## 2018-03-29 ENCOUNTER — APPOINTMENT (OUTPATIENT)
Dept: CT IMAGING | Facility: HOSPITAL | Age: 83
End: 2018-03-29
Attending: INTERNAL MEDICINE

## 2018-03-29 LAB
ANION GAP SERPL CALCULATED.3IONS-SCNC: 8 MMOL/L (ref 3–11)
BUN BLD-MCNC: 19 MG/DL (ref 9–23)
BUN/CREAT SERPL: 11.9 (ref 7–25)
CALCIUM SPEC-SCNC: 9.1 MG/DL (ref 8.7–10.4)
CHLORIDE SERPL-SCNC: 101 MMOL/L (ref 99–109)
CO2 SERPL-SCNC: 30 MMOL/L (ref 20–31)
CREAT BLD-MCNC: 1.6 MG/DL (ref 0.6–1.3)
DEPRECATED RDW RBC AUTO: 50.4 FL (ref 37–54)
ERYTHROCYTE [DISTWIDTH] IN BLOOD BY AUTOMATED COUNT: 14.4 % (ref 11.3–14.5)
GFR SERPL CREATININE-BSD FRML MDRD: 41 ML/MIN/1.73
GLUCOSE BLD-MCNC: 97 MG/DL (ref 70–100)
HCT VFR BLD AUTO: 38.3 % (ref 38.9–50.9)
HGB BLD-MCNC: 12 G/DL (ref 13.1–17.5)
MAGNESIUM SERPL-MCNC: 1.9 MG/DL (ref 1.3–2.7)
MCH RBC QN AUTO: 29.9 PG (ref 27–31)
MCHC RBC AUTO-ENTMCNC: 31.3 G/DL (ref 32–36)
MCV RBC AUTO: 95.3 FL (ref 80–99)
PLATELET # BLD AUTO: 191 10*3/MM3 (ref 150–450)
PMV BLD AUTO: 12.4 FL (ref 6–12)
POTASSIUM BLD-SCNC: 3.7 MMOL/L (ref 3.5–5.5)
RBC # BLD AUTO: 4.02 10*6/MM3 (ref 4.2–5.76)
SODIUM BLD-SCNC: 139 MMOL/L (ref 132–146)
WBC NRBC COR # BLD: 12.6 10*3/MM3 (ref 3.5–10.8)

## 2018-03-29 PROCEDURE — 99232 SBSQ HOSP IP/OBS MODERATE 35: CPT | Performed by: INTERNAL MEDICINE

## 2018-03-29 PROCEDURE — 80048 BASIC METABOLIC PNL TOTAL CA: CPT | Performed by: INTERNAL MEDICINE

## 2018-03-29 PROCEDURE — 71275 CT ANGIOGRAPHY CHEST: CPT

## 2018-03-29 PROCEDURE — 83735 ASSAY OF MAGNESIUM: CPT | Performed by: INTERNAL MEDICINE

## 2018-03-29 PROCEDURE — 93880 EXTRACRANIAL BILAT STUDY: CPT | Performed by: INTERNAL MEDICINE

## 2018-03-29 PROCEDURE — 74174 CTA ABD&PLVS W/CONTRAST: CPT

## 2018-03-29 PROCEDURE — 0 IOPAMIDOL PER 1 ML: Performed by: INTERNAL MEDICINE

## 2018-03-29 PROCEDURE — 85027 COMPLETE CBC AUTOMATED: CPT | Performed by: NURSE PRACTITIONER

## 2018-03-29 PROCEDURE — 93880 EXTRACRANIAL BILAT STUDY: CPT

## 2018-03-29 PROCEDURE — 25010000002 FUROSEMIDE PER 20 MG: Performed by: INTERNAL MEDICINE

## 2018-03-29 RX ORDER — TORSEMIDE 20 MG/1
40 TABLET ORAL DAILY
Status: DISCONTINUED | OUTPATIENT
Start: 2018-03-29 | End: 2018-03-30

## 2018-03-29 RX ADMIN — CLOPIDOGREL BISULFATE 75 MG: 75 TABLET ORAL at 10:23

## 2018-03-29 RX ADMIN — ATORVASTATIN CALCIUM 40 MG: 40 TABLET, FILM COATED ORAL at 20:56

## 2018-03-29 RX ADMIN — TORSEMIDE 40 MG: 20 TABLET ORAL at 13:55

## 2018-03-29 RX ADMIN — ALPRAZOLAM 1 MG: 1 TABLET ORAL at 23:57

## 2018-03-29 RX ADMIN — ASPIRIN 81 MG 81 MG: 81 TABLET ORAL at 10:24

## 2018-03-29 RX ADMIN — IOPAMIDOL 80 ML: 755 INJECTION, SOLUTION INTRAVENOUS at 14:46

## 2018-03-29 RX ADMIN — CARVEDILOL 6.25 MG: 6.25 TABLET, FILM COATED ORAL at 10:24

## 2018-03-29 RX ADMIN — SERTRALINE HYDROCHLORIDE 50 MG: 50 TABLET ORAL at 10:24

## 2018-03-29 RX ADMIN — FUROSEMIDE 40 MG: 10 INJECTION, SOLUTION INTRAMUSCULAR; INTRAVENOUS at 06:00

## 2018-03-29 RX ADMIN — CARVEDILOL 6.25 MG: 6.25 TABLET, FILM COATED ORAL at 20:56

## 2018-03-30 LAB
ANION GAP SERPL CALCULATED.3IONS-SCNC: 10 MMOL/L (ref 3–11)
BH CV ECHO MEAS - BSA(HAYCOCK): 2 M^2
BH CV ECHO MEAS - BSA: 1.9 M^2
BH CV ECHO MEAS - BZI_BMI: 25.3 KILOGRAMS/M^2
BH CV ECHO MEAS - BZI_METRIC_HEIGHT: 175.3 CM
BH CV ECHO MEAS - BZI_METRIC_WEIGHT: 77.6 KG
BH CV XLRA MEAS LEFT CCA RATIO VEL: 69.9 CM/SEC
BH CV XLRA MEAS LEFT DIST CCA EDV: 12.6 CM/SEC
BH CV XLRA MEAS LEFT DIST CCA PSV: 77 CM/SEC
BH CV XLRA MEAS LEFT DIST ICA EDV: 35.4 CM/SEC
BH CV XLRA MEAS LEFT DIST ICA PSV: 110 CM/SEC
BH CV XLRA MEAS LEFT ICA RATIO VEL: 78.6 CM/SEC
BH CV XLRA MEAS LEFT ICA/CCA RATIO: 1.1
BH CV XLRA MEAS LEFT MID CCA EDV: 14.1 CM/SEC
BH CV XLRA MEAS LEFT MID CCA PSV: 70.7 CM/SEC
BH CV XLRA MEAS LEFT MID ICA EDV: 18.1 CM/SEC
BH CV XLRA MEAS LEFT MID ICA PSV: 79.4 CM/SEC
BH CV XLRA MEAS LEFT PROX CCA EDV: 15.7 CM/SEC
BH CV XLRA MEAS LEFT PROX CCA PSV: 79.4 CM/SEC
BH CV XLRA MEAS LEFT PROX ECA PSV: 102.9 CM/SEC
BH CV XLRA MEAS LEFT PROX ICA EDV: 14.1 CM/SEC
BH CV XLRA MEAS LEFT PROX ICA PSV: 82.5 CM/SEC
BH CV XLRA MEAS LEFT PROX SCLA PSV: 147.3 CM/SEC
BH CV XLRA MEAS LEFT VERTEBRAL A PSV: 51.1 CM/SEC
BH CV XLRA MEAS RIGHT CCA RATIO VEL: 92.7 CM/SEC
BH CV XLRA MEAS RIGHT DIST CCA EDV: 14.1 CM/SEC
BH CV XLRA MEAS RIGHT DIST CCA PSV: 66 CM/SEC
BH CV XLRA MEAS RIGHT DIST ICA EDV: 25.9 CM/SEC
BH CV XLRA MEAS RIGHT DIST ICA PSV: 100.6 CM/SEC
BH CV XLRA MEAS RIGHT ICA RATIO VEL: 107 CM/SEC
BH CV XLRA MEAS RIGHT ICA/CCA RATIO: 1.2
BH CV XLRA MEAS RIGHT MID CCA EDV: 15.7 CM/SEC
BH CV XLRA MEAS RIGHT MID CCA PSV: 93.5 CM/SEC
BH CV XLRA MEAS RIGHT MID ICA EDV: 22 CM/SEC
BH CV XLRA MEAS RIGHT MID ICA PSV: 107.6 CM/SEC
BH CV XLRA MEAS RIGHT PROX CCA EDV: 18.1 CM/SEC
BH CV XLRA MEAS RIGHT PROX CCA PSV: 96.6 CM/SEC
BH CV XLRA MEAS RIGHT PROX ECA PSV: 76.7 CM/SEC
BH CV XLRA MEAS RIGHT PROX ICA EDV: 22.3 CM/SEC
BH CV XLRA MEAS RIGHT PROX ICA PSV: 95 CM/SEC
BH CV XLRA MEAS RIGHT PROX SCLA PSV: 94.3 CM/SEC
BH CV XLRA MEAS RIGHT VERTEBRAL A PSV: 71.5 CM/SEC
BUN BLD-MCNC: 21 MG/DL (ref 9–23)
BUN/CREAT SERPL: 13.1 (ref 7–25)
CALCIUM SPEC-SCNC: 9 MG/DL (ref 8.7–10.4)
CHLORIDE SERPL-SCNC: 99 MMOL/L (ref 99–109)
CO2 SERPL-SCNC: 30 MMOL/L (ref 20–31)
CREAT BLD-MCNC: 1.6 MG/DL (ref 0.6–1.3)
GFR SERPL CREATININE-BSD FRML MDRD: 41 ML/MIN/1.73
GLUCOSE BLD-MCNC: 84 MG/DL (ref 70–100)
MAGNESIUM SERPL-MCNC: 1.7 MG/DL (ref 1.3–2.7)
POTASSIUM BLD-SCNC: 3.6 MMOL/L (ref 3.5–5.5)
SODIUM BLD-SCNC: 139 MMOL/L (ref 132–146)

## 2018-03-30 PROCEDURE — 99223 1ST HOSP IP/OBS HIGH 75: CPT | Performed by: THORACIC SURGERY (CARDIOTHORACIC VASCULAR SURGERY)

## 2018-03-30 PROCEDURE — 99232 SBSQ HOSP IP/OBS MODERATE 35: CPT | Performed by: INTERNAL MEDICINE

## 2018-03-30 PROCEDURE — 80048 BASIC METABOLIC PNL TOTAL CA: CPT | Performed by: INTERNAL MEDICINE

## 2018-03-30 PROCEDURE — 83735 ASSAY OF MAGNESIUM: CPT | Performed by: INTERNAL MEDICINE

## 2018-03-30 RX ORDER — LISINOPRIL 2.5 MG/1
2.5 TABLET ORAL
Status: DISCONTINUED | OUTPATIENT
Start: 2018-03-31 | End: 2018-03-31 | Stop reason: HOSPADM

## 2018-03-30 RX ORDER — ISOSORBIDE MONONITRATE 30 MG/1
30 TABLET, EXTENDED RELEASE ORAL DAILY
Status: DISCONTINUED | OUTPATIENT
Start: 2018-03-31 | End: 2018-03-30

## 2018-03-30 RX ORDER — CARVEDILOL 3.12 MG/1
3.12 TABLET ORAL EVERY 12 HOURS SCHEDULED
Status: DISCONTINUED | OUTPATIENT
Start: 2018-03-30 | End: 2018-03-31 | Stop reason: HOSPADM

## 2018-03-30 RX ORDER — TORSEMIDE 20 MG/1
20 TABLET ORAL DAILY
Status: DISCONTINUED | OUTPATIENT
Start: 2018-03-31 | End: 2018-03-31 | Stop reason: HOSPADM

## 2018-03-30 RX ADMIN — ALPRAZOLAM 0.5 MG: 1 TABLET ORAL at 20:34

## 2018-03-30 RX ADMIN — ATORVASTATIN CALCIUM 40 MG: 40 TABLET, FILM COATED ORAL at 20:34

## 2018-03-30 RX ADMIN — ASPIRIN 81 MG 81 MG: 81 TABLET ORAL at 08:29

## 2018-03-30 RX ADMIN — SPIRONOLACTONE 25 MG: 25 TABLET, FILM COATED ORAL at 08:28

## 2018-03-30 RX ADMIN — LISINOPRIL 5 MG: 5 TABLET ORAL at 08:28

## 2018-03-30 RX ADMIN — CLOPIDOGREL BISULFATE 75 MG: 75 TABLET ORAL at 08:28

## 2018-03-30 RX ADMIN — SERTRALINE HYDROCHLORIDE 50 MG: 50 TABLET ORAL at 08:28

## 2018-03-30 RX ADMIN — ISOSORBIDE MONONITRATE 60 MG: 60 TABLET, EXTENDED RELEASE ORAL at 08:29

## 2018-03-30 RX ADMIN — CARVEDILOL 6.25 MG: 6.25 TABLET, FILM COATED ORAL at 08:29

## 2018-03-30 RX ADMIN — TORSEMIDE 40 MG: 20 TABLET ORAL at 08:29

## 2018-03-31 VITALS
BODY MASS INDEX: 24.09 KG/M2 | SYSTOLIC BLOOD PRESSURE: 116 MMHG | HEIGHT: 70 IN | WEIGHT: 168.3 LBS | DIASTOLIC BLOOD PRESSURE: 57 MMHG | TEMPERATURE: 97.9 F | OXYGEN SATURATION: 95 % | HEART RATE: 76 BPM | RESPIRATION RATE: 18 BRPM

## 2018-03-31 LAB
ANION GAP SERPL CALCULATED.3IONS-SCNC: 8 MMOL/L (ref 3–11)
BUN BLD-MCNC: 24 MG/DL (ref 9–23)
BUN/CREAT SERPL: 13.3 (ref 7–25)
CALCIUM SPEC-SCNC: 8.9 MG/DL (ref 8.7–10.4)
CHLORIDE SERPL-SCNC: 101 MMOL/L (ref 99–109)
CO2 SERPL-SCNC: 28 MMOL/L (ref 20–31)
CREAT BLD-MCNC: 1.8 MG/DL (ref 0.6–1.3)
GFR SERPL CREATININE-BSD FRML MDRD: 36 ML/MIN/1.73
GLUCOSE BLD-MCNC: 85 MG/DL (ref 70–100)
MAGNESIUM SERPL-MCNC: 1.7 MG/DL (ref 1.3–2.7)
POTASSIUM BLD-SCNC: 3.5 MMOL/L (ref 3.5–5.5)
SODIUM BLD-SCNC: 137 MMOL/L (ref 132–146)

## 2018-03-31 PROCEDURE — 80048 BASIC METABOLIC PNL TOTAL CA: CPT | Performed by: INTERNAL MEDICINE

## 2018-03-31 PROCEDURE — 83735 ASSAY OF MAGNESIUM: CPT | Performed by: INTERNAL MEDICINE

## 2018-03-31 PROCEDURE — 99238 HOSP IP/OBS DSCHRG MGMT 30/<: CPT | Performed by: INTERNAL MEDICINE

## 2018-03-31 RX ORDER — LISINOPRIL 2.5 MG/1
2.5 TABLET ORAL
Qty: 60 TABLET | Refills: 11 | Status: SHIPPED | OUTPATIENT
Start: 2018-04-01 | End: 2019-06-03 | Stop reason: ALTCHOICE

## 2018-03-31 RX ORDER — TORSEMIDE 20 MG/1
20 TABLET ORAL DAILY
Qty: 30 TABLET | Refills: 11 | Status: SHIPPED | OUTPATIENT
Start: 2018-04-01 | End: 2019-06-03 | Stop reason: ALTCHOICE

## 2018-03-31 RX ORDER — CARVEDILOL 3.12 MG/1
3.12 TABLET ORAL EVERY 12 HOURS SCHEDULED
Qty: 60 TABLET | Refills: 11 | Status: SHIPPED | OUTPATIENT
Start: 2018-03-31 | End: 2020-05-23 | Stop reason: HOSPADM

## 2018-03-31 RX ADMIN — MAGNESIUM SULFATE IN WATER 4 G: 40 INJECTION, SOLUTION INTRAVENOUS at 09:09

## 2018-03-31 RX ADMIN — POTASSIUM CHLORIDE 40 MEQ: 1.5 POWDER, FOR SOLUTION ORAL at 09:08

## 2018-03-31 RX ADMIN — CARVEDILOL 3.12 MG: 3.12 TABLET, FILM COATED ORAL at 09:08

## 2018-03-31 RX ADMIN — SERTRALINE HYDROCHLORIDE 50 MG: 50 TABLET ORAL at 09:08

## 2018-03-31 RX ADMIN — CLOPIDOGREL BISULFATE 75 MG: 75 TABLET ORAL at 09:08

## 2018-03-31 RX ADMIN — ASPIRIN 81 MG 81 MG: 81 TABLET ORAL at 09:08

## 2018-04-03 ENCOUNTER — PREP FOR SURGERY (OUTPATIENT)
Dept: OTHER | Facility: HOSPITAL | Age: 83
End: 2018-04-03

## 2018-04-03 DIAGNOSIS — I35.0 NONRHEUMATIC AORTIC VALVE STENOSIS: Primary | ICD-10-CM

## 2018-04-03 RX ORDER — ONDANSETRON 2 MG/ML
4 INJECTION INTRAMUSCULAR; INTRAVENOUS EVERY 6 HOURS PRN
Status: CANCELLED | OUTPATIENT
Start: 2018-04-03

## 2018-04-03 RX ORDER — SODIUM CHLORIDE 0.9 % (FLUSH) 0.9 %
1-10 SYRINGE (ML) INJECTION AS NEEDED
Status: CANCELLED | OUTPATIENT
Start: 2018-04-03

## 2018-04-03 RX ORDER — ASPIRIN 325 MG
325 TABLET ORAL ONCE
Status: CANCELLED | OUTPATIENT
Start: 2018-04-03 | End: 2018-04-03

## 2018-04-03 RX ORDER — ACETAMINOPHEN 325 MG/1
650 TABLET ORAL EVERY 4 HOURS PRN
Status: CANCELLED | OUTPATIENT
Start: 2018-04-03

## 2018-04-03 RX ORDER — ASPIRIN 325 MG
325 TABLET, DELAYED RELEASE (ENTERIC COATED) ORAL DAILY
Status: CANCELLED | OUTPATIENT
Start: 2018-04-04

## 2018-04-03 RX ORDER — NITROGLYCERIN 0.4 MG/1
0.4 TABLET SUBLINGUAL
Status: CANCELLED | OUTPATIENT
Start: 2018-04-03

## 2018-04-05 ENCOUNTER — PREP FOR SURGERY (OUTPATIENT)
Dept: OTHER | Facility: HOSPITAL | Age: 83
End: 2018-04-05

## 2018-04-06 ENCOUNTER — HOSPITAL ENCOUNTER (OUTPATIENT)
Facility: HOSPITAL | Age: 83
Setting detail: HOSPITAL OUTPATIENT SURGERY
Discharge: HOME | End: 2018-04-06
Attending: INTERNAL MEDICINE | Admitting: INTERNAL MEDICINE

## 2018-04-06 ENCOUNTER — APPOINTMENT (OUTPATIENT)
Dept: CARDIOLOGY | Facility: HOSPITAL | Age: 83
End: 2018-04-06
Attending: INTERNAL MEDICINE

## 2018-04-06 VITALS
HEIGHT: 69 IN | OXYGEN SATURATION: 91 % | BODY MASS INDEX: 24.78 KG/M2 | HEART RATE: 90 BPM | WEIGHT: 167.33 LBS | DIASTOLIC BLOOD PRESSURE: 83 MMHG | RESPIRATION RATE: 16 BRPM | TEMPERATURE: 98 F | SYSTOLIC BLOOD PRESSURE: 124 MMHG

## 2018-04-06 DIAGNOSIS — I35.0 NONRHEUMATIC AORTIC VALVE STENOSIS: ICD-10-CM

## 2018-04-06 LAB
ALBUMIN SERPL-MCNC: 4.3 G/DL (ref 3.2–4.8)
ALBUMIN/GLOB SERPL: 1.7 G/DL (ref 1.5–2.5)
ALP SERPL-CCNC: 92 U/L (ref 25–100)
ALT SERPL W P-5'-P-CCNC: 21 U/L (ref 7–40)
ANION GAP SERPL CALCULATED.3IONS-SCNC: 9 MMOL/L (ref 3–11)
AST SERPL-CCNC: 20 U/L (ref 0–33)
BH CV ECHO MEAS - AO MAX PG (FULL): 55.9 MMHG
BH CV ECHO MEAS - AO MAX PG: 60 MMHG
BH CV ECHO MEAS - AO MEAN PG (FULL): 24.1 MMHG
BH CV ECHO MEAS - AO MEAN PG: 26.1 MMHG
BH CV ECHO MEAS - AO V2 MAX: 385.3 CM/SEC
BH CV ECHO MEAS - AO V2 MEAN: 221.6 CM/SEC
BH CV ECHO MEAS - AO V2 VTI: 77 CM
BH CV ECHO MEAS - AVA(I,A): 0.97 CM^2
BH CV ECHO MEAS - AVA(I,D): 0.97 CM^2
BH CV ECHO MEAS - AVA(V,A): 1.1 CM^2
BH CV ECHO MEAS - AVA(V,D): 1.1 CM^2
BH CV ECHO MEAS - BSA(HAYCOCK): 1.9 M^2
BH CV ECHO MEAS - BSA: 1.9 M^2
BH CV ECHO MEAS - BZI_BMI: 24.8 KILOGRAMS/M^2
BH CV ECHO MEAS - BZI_METRIC_HEIGHT: 175.3 CM
BH CV ECHO MEAS - BZI_METRIC_WEIGHT: 76.2 KG
BH CV ECHO MEAS - LV MAX PG: 4.1 MMHG
BH CV ECHO MEAS - LV MEAN PG: 2 MMHG
BH CV ECHO MEAS - LV V1 MAX: 101 CM/SEC
BH CV ECHO MEAS - LV V1 MEAN: 59.2 CM/SEC
BH CV ECHO MEAS - LV V1 VTI: 18.6 CM
BH CV ECHO MEAS - LVOT AREA (M): 4.2 CM^2
BH CV ECHO MEAS - LVOT AREA: 4 CM^2
BH CV ECHO MEAS - LVOT DIAM: 2.3 CM
BH CV ECHO MEAS - MR ALIAS VEL: 8.7 CM/SEC
BH CV ECHO MEAS - MR ERO: 0.01 CM^2
BH CV ECHO MEAS - MR FLOW RATE: 6.5 CM^3/SEC
BH CV ECHO MEAS - MR MAX PG: 187.7 MMHG
BH CV ECHO MEAS - MR MAX VEL: 685.1 CM/SEC
BH CV ECHO MEAS - MR MEAN PG: 87.5 MMHG
BH CV ECHO MEAS - MR MEAN VEL: 400.9 CM/SEC
BH CV ECHO MEAS - MR PISA RADIUS: 0.34 CM
BH CV ECHO MEAS - MR PISA: 0.74 CM^2
BH CV ECHO MEAS - MR VOLUME: 2.2 ML
BH CV ECHO MEAS - MR VTI: 231.5 CM
BH CV ECHO MEAS - MV AREA (1 DIAM): 10.9 CM^2
BH CV ECHO MEAS - MV DIAM: 3.7 CM
BH CV ECHO MEAS - MV FLOW AREA(1DIAM): 10.9 CM^2
BH CV ECHO MEAS - SI(LVOT): 39 ML/M^2
BH CV ECHO MEAS - SV(LVOT): 74.7 ML
BILIRUB SERPL-MCNC: 0.9 MG/DL (ref 0.3–1.2)
BUN BLD-MCNC: 26 MG/DL (ref 9–23)
BUN/CREAT SERPL: 13.7 (ref 7–25)
CALCIUM SPEC-SCNC: 9.4 MG/DL (ref 8.7–10.4)
CHLORIDE SERPL-SCNC: 102 MMOL/L (ref 99–109)
CO2 SERPL-SCNC: 29 MMOL/L (ref 20–31)
CREAT BLD-MCNC: 1.9 MG/DL (ref 0.6–1.3)
GFR SERPL CREATININE-BSD FRML MDRD: 33 ML/MIN/1.73
GLOBULIN UR ELPH-MCNC: 2.6 GM/DL
GLUCOSE BLD-MCNC: 101 MG/DL (ref 70–100)
HBA1C MFR BLD: 6.1 % (ref 4.8–5.6)
LV EF 2D ECHO EST: 20 %
POTASSIUM BLD-SCNC: 3.9 MMOL/L (ref 3.5–5.5)
PROT SERPL-MCNC: 6.9 G/DL (ref 5.7–8.2)
SODIUM BLD-SCNC: 140 MMOL/L (ref 132–146)

## 2018-04-06 PROCEDURE — C1887 CATHETER, GUIDING: HCPCS | Performed by: INTERNAL MEDICINE

## 2018-04-06 PROCEDURE — 0 IOPAMIDOL PER 1 ML: Performed by: INTERNAL MEDICINE

## 2018-04-06 PROCEDURE — 93312 ECHO TRANSESOPHAGEAL: CPT | Performed by: INTERNAL MEDICINE

## 2018-04-06 PROCEDURE — 36415 COLL VENOUS BLD VENIPUNCTURE: CPT

## 2018-04-06 PROCEDURE — 25010000002 BIVALIRUDIN PER 1 MG: Performed by: INTERNAL MEDICINE

## 2018-04-06 PROCEDURE — 80053 COMPREHEN METABOLIC PANEL: CPT | Performed by: NURSE PRACTITIONER

## 2018-04-06 PROCEDURE — 25010000002 MIDAZOLAM PER 1 MG: Performed by: INTERNAL MEDICINE

## 2018-04-06 PROCEDURE — 25010000002 FENTANYL CITRATE (PF) 100 MCG/2ML SOLUTION: Performed by: INTERNAL MEDICINE

## 2018-04-06 PROCEDURE — 93325 DOPPLER ECHO COLOR FLOW MAPG: CPT

## 2018-04-06 PROCEDURE — 93455 CORONARY ART/GRFT ANGIO S&I: CPT | Performed by: INTERNAL MEDICINE

## 2018-04-06 PROCEDURE — 99153 MOD SED SAME PHYS/QHP EA: CPT

## 2018-04-06 PROCEDURE — C1769 GUIDE WIRE: HCPCS | Performed by: INTERNAL MEDICINE

## 2018-04-06 PROCEDURE — 93312 ECHO TRANSESOPHAGEAL: CPT

## 2018-04-06 PROCEDURE — 93325 DOPPLER ECHO COLOR FLOW MAPG: CPT | Performed by: INTERNAL MEDICINE

## 2018-04-06 PROCEDURE — 93571 IV DOP VEL&/PRESS C FLO 1ST: CPT | Performed by: INTERNAL MEDICINE

## 2018-04-06 PROCEDURE — C1894 INTRO/SHEATH, NON-LASER: HCPCS | Performed by: INTERNAL MEDICINE

## 2018-04-06 PROCEDURE — 99152 MOD SED SAME PHYS/QHP 5/>YRS: CPT

## 2018-04-06 PROCEDURE — 93320 DOPPLER ECHO COMPLETE: CPT | Performed by: INTERNAL MEDICINE

## 2018-04-06 PROCEDURE — 83036 HEMOGLOBIN GLYCOSYLATED A1C: CPT | Performed by: NURSE PRACTITIONER

## 2018-04-06 PROCEDURE — 93320 DOPPLER ECHO COMPLETE: CPT

## 2018-04-06 RX ORDER — MIDAZOLAM HYDROCHLORIDE 1 MG/ML
INJECTION INTRAMUSCULAR; INTRAVENOUS
Status: COMPLETED | OUTPATIENT
Start: 2018-04-06 | End: 2018-04-06

## 2018-04-06 RX ORDER — SODIUM CHLORIDE 0.9 % (FLUSH) 0.9 %
1-10 SYRINGE (ML) INJECTION AS NEEDED
Status: DISCONTINUED | OUTPATIENT
Start: 2018-04-06 | End: 2018-04-06 | Stop reason: HOSPADM

## 2018-04-06 RX ORDER — ONDANSETRON 2 MG/ML
4 INJECTION INTRAMUSCULAR; INTRAVENOUS EVERY 6 HOURS PRN
Status: DISCONTINUED | OUTPATIENT
Start: 2018-04-06 | End: 2018-04-06 | Stop reason: HOSPADM

## 2018-04-06 RX ORDER — ASPIRIN 325 MG
325 TABLET ORAL ONCE
Status: COMPLETED | OUTPATIENT
Start: 2018-04-06 | End: 2018-04-06

## 2018-04-06 RX ORDER — ASPIRIN 325 MG
325 TABLET, DELAYED RELEASE (ENTERIC COATED) ORAL DAILY
Status: DISCONTINUED | OUTPATIENT
Start: 2018-04-07 | End: 2018-04-06 | Stop reason: HOSPADM

## 2018-04-06 RX ORDER — ACETAMINOPHEN 325 MG/1
650 TABLET ORAL EVERY 4 HOURS PRN
Status: DISCONTINUED | OUTPATIENT
Start: 2018-04-06 | End: 2018-04-06 | Stop reason: HOSPADM

## 2018-04-06 RX ORDER — NITROGLYCERIN 0.4 MG/1
0.4 TABLET SUBLINGUAL
Status: DISCONTINUED | OUTPATIENT
Start: 2018-04-06 | End: 2018-04-06 | Stop reason: HOSPADM

## 2018-04-06 RX ORDER — FENTANYL CITRATE 50 UG/ML
INJECTION, SOLUTION INTRAMUSCULAR; INTRAVENOUS
Status: COMPLETED | OUTPATIENT
Start: 2018-04-06 | End: 2018-04-06

## 2018-04-06 RX ORDER — SODIUM CHLORIDE 9 MG/ML
250 INJECTION, SOLUTION INTRAVENOUS ONCE AS NEEDED
Status: DISCONTINUED | OUTPATIENT
Start: 2018-04-06 | End: 2018-04-06 | Stop reason: HOSPADM

## 2018-04-06 RX ORDER — HYDROCODONE BITARTRATE AND ACETAMINOPHEN 5; 325 MG/1; MG/1
1 TABLET ORAL EVERY 4 HOURS PRN
Status: DISCONTINUED | OUTPATIENT
Start: 2018-04-06 | End: 2018-04-06 | Stop reason: HOSPADM

## 2018-04-06 RX ORDER — FENTANYL CITRATE 50 UG/ML
INJECTION, SOLUTION INTRAMUSCULAR; INTRAVENOUS
Status: DISCONTINUED
Start: 2018-04-06 | End: 2018-04-06 | Stop reason: HOSPADM

## 2018-04-06 RX ORDER — LIDOCAINE HYDROCHLORIDE 10 MG/ML
INJECTION, SOLUTION EPIDURAL; INFILTRATION; INTRACAUDAL; PERINEURAL AS NEEDED
Status: DISCONTINUED | OUTPATIENT
Start: 2018-04-06 | End: 2018-04-06 | Stop reason: HOSPADM

## 2018-04-06 RX ORDER — NALOXONE HYDROCHLORIDE 0.4 MG/ML
INJECTION, SOLUTION INTRAMUSCULAR; INTRAVENOUS; SUBCUTANEOUS
Status: DISCONTINUED
Start: 2018-04-06 | End: 2018-04-06 | Stop reason: WASHOUT

## 2018-04-06 RX ORDER — FLUMAZENIL 0.1 MG/ML
INJECTION INTRAVENOUS
Status: DISCONTINUED
Start: 2018-04-06 | End: 2018-04-06 | Stop reason: WASHOUT

## 2018-04-06 RX ORDER — MIDAZOLAM HYDROCHLORIDE 1 MG/ML
INJECTION INTRAMUSCULAR; INTRAVENOUS
Status: DISCONTINUED
Start: 2018-04-06 | End: 2018-04-06 | Stop reason: HOSPADM

## 2018-04-06 RX ADMIN — METHOHEXITAL SODIUM 20 MG: 500 INJECTION, POWDER, LYOPHILIZED, FOR SOLUTION INTRAMUSCULAR; INTRAVENOUS; RECTAL at 12:14

## 2018-04-06 RX ADMIN — ASPIRIN 325 MG ORAL TABLET 325 MG: 325 PILL ORAL at 10:18

## 2018-04-06 RX ADMIN — FENTANYL CITRATE 50 MCG: 50 INJECTION, SOLUTION INTRAMUSCULAR; INTRAVENOUS at 12:41

## 2018-04-06 RX ADMIN — FENTANYL CITRATE 50 MCG: 50 INJECTION, SOLUTION INTRAMUSCULAR; INTRAVENOUS at 12:10

## 2018-04-06 RX ADMIN — METHOHEXITAL SODIUM 20 MG: 500 INJECTION, POWDER, LYOPHILIZED, FOR SOLUTION INTRAMUSCULAR; INTRAVENOUS; RECTAL at 12:41

## 2018-04-06 RX ADMIN — METHOHEXITAL SODIUM 20 MG: 500 INJECTION, POWDER, LYOPHILIZED, FOR SOLUTION INTRAMUSCULAR; INTRAVENOUS; RECTAL at 12:36

## 2018-04-06 RX ADMIN — MIDAZOLAM HYDROCHLORIDE 2 MG: 1 INJECTION, SOLUTION INTRAMUSCULAR; INTRAVENOUS at 12:10

## 2018-04-06 RX ADMIN — METHOHEXITAL SODIUM 20 MG: 500 INJECTION, POWDER, LYOPHILIZED, FOR SOLUTION INTRAMUSCULAR; INTRAVENOUS; RECTAL at 12:24

## 2018-04-06 RX ADMIN — MIDAZOLAM HYDROCHLORIDE 2 MG: 1 INJECTION, SOLUTION INTRAMUSCULAR; INTRAVENOUS at 12:41

## 2018-04-06 NOTE — H&P (VIEW-ONLY)
[facility]  Cardiovascular History & Physical Note           89-year-old gentleman with a history of coronary artery disease, systolic heart failure, and aortic stenosis who is followed by Jose Cobian as well as a CHF clinic. The patient presents to the ER this morning after becoming severely short of breath at home. The patient was seen 4 days ago at the CHF clinic. At that time, his Lasix dose was reduced from 40 mg twice a day to 40 mg daily with the addition of spironolactone. The patient denies having any recent chest pain symptoms. In the emergency room, he was given IV Lasix with modest urine output.    Past medical and surgical history, social and family history reviewed in EMR.    REVIEW OF SYSTEMS:   H&P ROS reviewed and pertinent CV ROS as noted in HPI.         Vital Sign Min/Max for last 24 hours  Temp  Min: 97.9 °F (36.6 °C)  Max: 97.9 °F (36.6 °C)   BP  Min: 115/71  Max: 135/77   Pulse  Min: 81  Max: 109   Resp  Min: 20  Max: 20   SpO2  Min: 93 %  Max: 100 %   No Data Recorded    No intake or output data in the 24 hours ending 03/26/18 1230        Physical Exam   Constitutional: He is oriented to person, place, and time. He appears well-developed and well-nourished.   HENT:   Head: Normocephalic and atraumatic.   Eyes: Pupils are equal, round, and reactive to light. No scleral icterus.   Neck: No JVD present. Carotid bruit is not present. No thyromegaly present.   Cardiovascular: Normal rate, regular rhythm, S1 normal and S2 normal.  Exam reveals no gallop.    Murmur heard.   Harsh midsystolic murmur is present with a grade of 3/6  at the upper right sternal border radiating to the neck  Pulmonary/Chest: Effort normal and breath sounds normal.   Abdominal: Soft. There is no hepatosplenomegaly. There is no tenderness.   Musculoskeletal: He exhibits edema.   Neurological: He is alert and oriented to person, place, and time.   Skin: Skin is warm and dry. No cyanosis.  Nails show no clubbing.   Psychiatric: He has a normal mood and affect. His behavior is normal.       EKG: Sinus rhythm 1 st degree AV block RBBB and frequent PVC's    Lab Review:   Labs reviewed in the electronic medical record.  Pertinent findings include:  Lab Results   Component Value Date    GLUCOSE 98 03/26/2018    BUN 18 03/26/2018    CREATININE 1.60 (H) 03/26/2018    EGFRIFNONA 41 (L) 03/26/2018    BCR 11.3 03/26/2018    K 3.5 03/26/2018    CO2 27.0 03/26/2018    CALCIUM 9.2 03/26/2018    ALBUMIN 4.20 03/26/2018    LABIL2 1.6 03/26/2018    AST 19 03/26/2018    ALT 26 03/26/2018     Lab Results   Component Value Date    WBC 11.75 (H) 03/26/2018    HGB 11.9 (L) 03/26/2018    HCT 37.4 (L) 03/26/2018    MCV 94.7 03/26/2018     03/26/2018     Lab Results   Component Value Date    CHOL 154 10/10/2017    TRIG 79 10/10/2017    HDL 39 (L) 10/10/2017     10/10/2017                Hospital Problem List     * (Principal)Acute on chronic combined systolic and diastolic congestive heart failure    Overview Signed 10/9/2017 12:47 PM by SOPHY Vincent      1. Echo 11-6-16 Left ventricular function is moderately decreased. Estimated EF = 35%.         Nonrheumatic aortic valve stenosis    Overview Signed 10/9/2017 12:45 PM by SOPHY Vincent     1. Echo 11-6-16: Moderate to severe aortic valve stenosis is present. Mean gradient 25 mmHg, MARII 0.89 cm squares.         Coronary artery disease involving native coronary artery of native heart    Overview Addendum 3/26/2018 12:00 PM by Cleve Ventura IV, MD     · CABG in [place] (1992): LIMA to LAD, SVG to PDA, SVG to distal RCA, SVG to OM1.   · Cardiac catheterization for NSTEMI (2007):  3.5 x 16 Taxus to SVG to RCA (Dr. Sheikh).   · Cardiac catheterization (2012):  medical management, incomplete database.   · Cardiac catheterization (8/15/2014):  LVEF 40% with inferior wall akinesis. Severe distal left main and proximal LAD stenosis with  a patent LIMA graft to LAD. An 80% proximal left circumflex heavily calcified stenosis tortuous segment with occluded vein graft. Chronically occluded RCA and saphenous vein graft to RCA with 3+ collaterals.   · Cardiac catheterization (10/10/17): Severe native three-vessel CAD. 1/3 graft patent (LIMA to LAD). SVG to circumflex and right coronary arteries occluded successful PCI of the left main/proximal circumflex using ESTER. LVEF 25-30 percent. At least moderate aortic stenosis.           Essential hypertension    HLD (hyperlipidemia)    CLL (chronic lymphocytic leukemia)    GERD (gastroesophageal reflux disease)    Acute on chronic systolic congestive heart failure        89-year-old gentleman with severe coronary artery disease, severe LV systolic dysfunction, possibly severe aortic stenosis who presents with class IV heart failure. He had recent adjustment of his home diuretic therapy which I think may be partially responsible for his recent decompensation. I am concerned the patient may have severe aortic stenosis with low gradient due to his LV dysfunction. If so, he may be a candidate for       · Admit to telemetry under observation status  · IV Lasix 40 mg IV twice a day  · Close monitoring of renal function  · Echocardiogram  · CHF nurse navigator to see  · ? Low-dose dobutamine echo to evaluate low gradient AS  · Dr. Cobian to assume care in the morning        Cleve Ventura IV, MD  3/26/2018

## 2018-04-06 NOTE — INTERVAL H&P NOTE
Patient was admitted there and the above-noted admission and diagnosed with severe aortic stenosis.  He was felt not to be an open candidate. TAVR evaluation was initiated during admission.  He returns today for LANDY and cardiac catheterization for further evaluation of his candidacy for TAVR.  Procedures were discussed with the patient.  He verbalizes understanding and wishes to proceed.  Further recommendations to follow.    JUAN Pate

## 2018-04-09 ENCOUNTER — OFFICE VISIT (OUTPATIENT)
Dept: CARDIAC SURGERY | Facility: CLINIC | Age: 83
End: 2018-04-09

## 2018-04-09 VITALS
DIASTOLIC BLOOD PRESSURE: 78 MMHG | HEIGHT: 69 IN | TEMPERATURE: 97.8 F | WEIGHT: 169 LBS | BODY MASS INDEX: 25.03 KG/M2 | SYSTOLIC BLOOD PRESSURE: 130 MMHG | OXYGEN SATURATION: 84 % | HEART RATE: 60 BPM

## 2018-04-09 DIAGNOSIS — I35.9 AORTIC VALVE DISORDER: Primary | ICD-10-CM

## 2018-04-09 PROCEDURE — 99212 OFFICE O/P EST SF 10 MIN: CPT | Performed by: THORACIC SURGERY (CARDIOTHORACIC VASCULAR SURGERY)

## 2018-04-09 NOTE — PROGRESS NOTES
04/09/2018  Patient Information  Tad Moon                                                                                          [address]  [place] [address]   11/30/1926  'PCP/Referring Physician'  Pramod Hyde MD  [phone]  No ref. provider found    Chief Complaint   Patient presents with   • Follow-up     First opinion TAVR. Patient has no complaints today        History of Present Illness:  This patient returns today for follow-up for an opinion on a TAVR procedure.  I saw the patient in the hospital for his aortic stenosis.  He is in congestive heart failure and his symptoms have improved dramatically .  His feet swelling has improved.  He is very comfortable sitting up and walking.  He will be seeing Dr. Starks in the near future.      Patient Active Problem List   Diagnosis   • Coronary artery disease involving native coronary artery of native heart   • Hyperlipidemia LDL goal <100   • History of tobacco abuse   • CLL (chronic lymphocytic leukemia)   • GERD (gastroesophageal reflux disease)   • Nonrheumatic aortic valve stenosis   • Acute on chronic systolic congestive heart failure   • CKD (chronic kidney disease) stage 3, GFR 30-59 ml/min   • Aortic valve disorder     Past Medical History:   Diagnosis Date   • CHF (congestive heart failure)    • CLL (chronic lymphocytic leukemia)    • Coronary artery disease    • GERD (gastroesophageal reflux disease)    • History of tobacco abuse    • Hyperlipidemia    • Hypertension      Past Surgical History:   Procedure Laterality Date   • ABDOMINAL HERNIA REPAIR      x 2   • APPENDECTOMY     • CARDIAC CATHETERIZATION N/A 10/10/2017    Procedure: Left Heart Cath;  Surgeon: Juan M Sood MD;  Location:  [facility] CATH INVASIVE LOCATION;  Service:    • CARDIAC CATHETERIZATION N/A 4/6/2018    Procedure: Left Heart Cath;  Surgeon: Lv Cobian MD;  Location:  [facility] CATH INVASIVE LOCATION;  Service: Cardiovascular   • CARDIAC SURGERY     •  CHOLECYSTECTOMY     • CORONARY ARTERY BYPASS GRAFT     • REPLACEMENT TOTAL KNEE Left 2014   • TOTAL KNEE ARTHROPLASTY Left        Current Outpatient Prescriptions:   •  ALPRAZolam (XANAX) 0.5 MG tablet, Take 0.5 mg by mouth At Night As Needed., Disp: , Rfl: 2  •  aspirin 81 MG tablet, Take 1 tablet by mouth daily., Disp: 90 tablet, Rfl: 3  •  atorvastatin (LIPITOR) 80 MG tablet, Take 1 tablet by mouth daily., Disp: 90 tablet, Rfl: 3  •  carvedilol (COREG) 3.125 MG tablet, Take 1 tablet by mouth Every 12 (Twelve) Hours., Disp: 60 tablet, Rfl: 11  •  cholecalciferol (VITAMIN D3) 1000 UNITS tablet, Take 1,000 Units by mouth Daily., Disp: , Rfl:   •  clopidogrel (PLAVIX) 75 MG tablet, Take 1 tablet by mouth daily., Disp: 90 tablet, Rfl: 3  •  lisinopril (PRINIVIL,ZESTRIL) 2.5 MG tablet, Take 1 tablet by mouth Daily., Disp: 60 tablet, Rfl: 11  •  nitroglycerin (NITROSTAT) 0.4 MG SL tablet, Place 1 tablet under the tongue Every 5 (Five) Minutes As Needed for Chest Pain., Disp: 25 tablet, Rfl: 6  •  PHARMACY MEDS TO BED CONSULT, Daily., Disp: 14 each, Rfl: 0  •  sertraline (ZOLOFT) 50 MG tablet, Take 50 mg by mouth Daily., Disp: , Rfl: 5  •  spironolactone (ALDACTONE) 25 MG tablet, Take 1 tablet by mouth Daily., Disp: 30 tablet, Rfl: 3  •  torsemide (DEMADEX) 20 MG tablet, Take 1 tablet by mouth Daily., Disp: 30 tablet, Rfl: 11  Allergies   Allergen Reactions   • Hctz [Hydrochlorothiazide]      hyponatremia   • Other      Beta Blockers, bradycardia     Social History     Social History   • Marital status:      Spouse name: N/A   • Number of children: N/A   • Years of education: N/A     Occupational History   • Not on file.     Social History Main Topics   • Smoking status: Former Smoker     Types: Cigarettes   • Smokeless tobacco: Never Used      Comment: quit 45 years ago   • Alcohol use 0.6 - 1.2 oz/week     1 - 2 Glasses of wine per week      Comment: occas   • Drug use: No   • Sexual activity: No     Other  "Topics Concern   • Not on file     Social History Narrative   • No narrative on file     Family History   Problem Relation Age of Onset   • Stroke Mother    • Heart disease Father      Review of Systems   Constitution: Negative for chills, fever, malaise/fatigue, night sweats and weight loss.   HENT: Negative for congestion, hearing loss, odynophagia and sore throat.    Eyes: Negative for blurred vision and double vision.   Cardiovascular: Positive for leg swelling and palpitations. Negative for chest pain, dyspnea on exertion and orthopnea.   Respiratory: Negative for cough, hemoptysis and shortness of breath.    Endocrine: Negative for cold intolerance, heat intolerance, polydipsia, polyphagia and polyuria.   Hematologic/Lymphatic: Does not bruise/bleed easily.   Skin: Negative for itching and rash.   Musculoskeletal: Negative for back pain, joint pain, joint swelling, muscle cramps, muscle weakness, myalgias and neck pain.   Gastrointestinal: Positive for diarrhea. Negative for abdominal pain, constipation, hematemesis, hematochezia, melena, nausea and vomiting.   Genitourinary: Negative for dysuria, frequency and hematuria.   Neurological: Negative for dizziness, focal weakness, headaches, light-headedness, loss of balance, numbness, paresthesias and seizures.   Psychiatric/Behavioral: Negative for depression and suicidal ideas. The patient is not nervous/anxious.    All other systems reviewed and are negative.    Vitals:    04/09/18 1458   BP: 130/78   BP Location: Right arm   Patient Position: Sitting   Pulse: 60   Temp: 97.8 °F (36.6 °C)   SpO2: (!) 84%   Weight: 76.7 kg (169 lb)   Height: 175.3 cm (69\")      Physical Exam   Neck: Normal range of motion. Neck supple. No JVD present. No tracheal deviation present. No thyromegaly present.   Cardiovascular: Normal rate and regular rhythm.  Exam reveals no gallop and no friction rub.    No murmur heard.  Pulmonary/Chest: No stridor. No respiratory distress. He " has no wheezes. He has no rales. He exhibits no tenderness.   Abdominal: Soft. Bowel sounds are normal. There is no tenderness.   Lymphadenopathy:     He has no cervical adenopathy.       Assessment/Plan:   The patient looks much better today. He is breathing comfortably and his ankle swelling has much resolved.  He has no problem breathing and is walking without a wheelchair.  His daughter is with him.  I had a long discussion about our decision-making process in regards to the TAVR valve.  He appears to understand this.  We discussed his case with Dr. Starks, Dr. Sood and Dr. Mendez.  We still have not made a final decision.  I tried to explain this to both he and his daughter and answered all of their questions.        Patient Active Problem List   Diagnosis   • Coronary artery disease involving native coronary artery of native heart   • Hyperlipidemia LDL goal <100   • History of tobacco abuse   • CLL (chronic lymphocytic leukemia)   • GERD (gastroesophageal reflux disease)   • Nonrheumatic aortic valve stenosis   • Acute on chronic systolic congestive heart failure   • CKD (chronic kidney disease) stage 3, GFR 30-59 ml/min   • Aortic valve disorder     Signed by: Lv Purdy M.D.    04/09/2018    CC:   Pramod Hyde M.D.        Braydon Mtz, Medical Records, editing for Lv Purdy M.D.    I, Lv Purdy MD, have read and agree with the editing done by Braydon Mtz.

## 2018-04-10 ENCOUNTER — DOCUMENTATION (OUTPATIENT)
Dept: CARDIAC REHAB | Facility: HOSPITAL | Age: 83
End: 2018-04-10

## 2018-04-10 NOTE — PROGRESS NOTES
Order received for Phase II Cardiac Rehab. Staff will contact patient regarding program information and scheduling.

## 2018-04-11 ENCOUNTER — DOCUMENTATION (OUTPATIENT)
Dept: CARDIOLOGY | Facility: HOSPITAL | Age: 83
End: 2018-04-11

## 2018-04-11 NOTE — PROGRESS NOTES
"Outpatient TAVR Evaluation    Tad Moon 11/30/1926 4/9/2018    PCP: Pramod Hyde MD  Primary Cardiologist: Lv Cobian MD    TAVR Team:  1.  Lv Purdy MD  2.  Fransico Starks MD  3.  Carola Mendez MD  4.  Juan M Sood MD    Chief Complaint: Aortic stenosis/ systolic heart failure/ CAD    Identification: This is a 89 y.o. year old male from [place], [place].    History of Present Illness: Mr. Moon was referred for consideration of TAVR due to more frequent exacerbations of systolic heart failure.  He was admitted to [facility] 3/26/18 - 3/31/18.  Acute heart failure symptoms were treated and he underwent dobutamine stress echocardiogram to assess for low gradient/ low flow aortic stenosis.  His aortic valve indices (Dobutamine GXT) are as follows:  MARII  0.79 cm2, Aortic valve Vmax 4.19 cm2, and AV mean gradient 38 mm Hg.    Mr. Moon is seen today in conjunction with his consult per Dr. Lv Purdy.      Mr. Moon endorses a long history of cardiac disease, dating back to initial CABG 1992.  In October 2017, he was admitted to [facility] with chest pain and cough.  He underwent PTCA/stent to the distal left main, proximal circumflex, and mid circumflex.  His LVEF has been in the range of 25-35% since at least 2016.  He had felt better after the revascularization up until early March.  At that time, he began to require frequent rest periods to walk to the mailbox and to do other household activities.  He began to experience frequent orthopnea.  He had acute symptoms of unrelieved dyspnea on 3/26/18 and came to ED via EMS.      Last summer, patient relates he was able to play some tennis and golf with friends.  His goal is to regain as much of his ability to participate in these kinds of activities as possible and spend time with his spouse, children and grandchildren.  He adds, \"I don't want to be in a nursing home\".      PROBLEM LIST:   1. Coronary artery disease:  a. CABG in 1992, [place], " [place]. LIMA  to LAD, SVG to PDA, SVG to distal RCA, SVG to OM1.   b. Non STEMI, 2007 with 3.5 x 16 Taxus to SVG to RCA (Dr. Sheikh).   c. Cardiac catheterization, 2012, medical management, incomplete database.   d. On 08/15/2014, functional class III angina/unstable.  Heart catheterization EF 40% with inferior wall akinesis. Severe distal left main and proximal LAD stenosis with a patent LIMA graft to LAD. An 80% proximal left circumflex heavily calcified stenosis tortuous segment with occluded vein graft. Chronically  occluded RCA and saphenous vein graft to RCA with 3+ collaterals.   e. Chronic functional class II-III angina.    f. Echo, 11/6/2016: LVEF 35%.  g. Cardiac catheterization, 10/10/2017: triple-vessel CAD of the native arteries; patent LIMA graft to the LAD; occluded vein grafts to the circumflex and the right coronary artery; successful PTCA/stenting of the distal left main, proximal circumflex and mid circumflex with placement of overlapping 2.5 x 38 and 3.0 x 23 mm drug-eluting stents reducing severe multiple 80% stenosis to no significant residual disease; severe left ventricular systolic dysfunction, ejection fraction 25-30%; 21 mm gradient across the aortic valve consistent with known aortic stenosis.  h. Cardiac cath 4/6/18.  Severe proximal LAD stenosis with patent LIMA.  50% in stent re-stenosis of distal elft main/ ostial left circumflex stent with normal IFR, occluded RCA and vein graft.  2. Aortic stenosis  a. EF 35% peak aortic valve gradient 41.  b. Dobutamine stress echo 3/28/18.   MARII 0.79 cm2, Vmax 4.19 m/sec, AV mean 38 mm Hg.  c. Transesophageal echocardiogram 4/6/18.  LVEF 20%.  Sever AS, moderate to severe MR.  3. Mixed Systolic/ Diastolic heart failure NYHA class III  (admission 3/26/18 with class IV).  4. Hypertension.   5. Dyslipidemia.   6. History of tobacco use (remote).   7. GERD.   8. Seizure disorder  9. Chronic lymphocytic leukemia.  10. CKD stage 3, GFR 30-59  ml/min  11. Surgical history:   a.  Left total knee replacement.   b. Remote cholecystectomy.   c. Appendectomy.   d. Abdominal hernia repair x2  Past Surgical History:  Procedure Laterality Date   • ABDOMINAL HERNIA REPAIR      x 2   • APPENDECTOMY     • CARDIAC CATHETERIZATION N/A 10/10/2017    Procedure: Left Heart Cath;  Surgeon: Juan M Sood MD;  Location:  [facility];  Service:    • CARDIAC CATHETERIZATION N/A 4/6/2018    Procedure: Left Heart Cath;  Surgeon: Lv Cobian MD;  Location:  [facility];  Service: Cardiovascular   • CARDIAC SURGERY     • CHOLECYSTECTOMY     • CORONARY ARTERY BYPASS GRAFT     • REPLACEMENT TOTAL KNEE Left 2014   • TOTAL KNEE ARTHROPLASTY Left        Allergies:  Hctz [hydrochlorothiazide] and Intolerant to beta blockers (bradycardia)    Social History:  • Marital status:      Spouse name: Ramona   • Number of children: 3     Occupational History   • Retired insurance sales     Social History Main Topics   • Smoking status: Former Smoker     Types: Cigarettes   • Smokeless tobacco: Never Used      Comment: quit 45 years ago   • Alcohol use 0.6 - 1.2 oz/week     1 - 2 Glasses of wine per week      Comment: occas       Social History Narrative   • Mr. Moon lived for many years in the [place], [place].  He now resides in [place], [place].  He enjoys tennis and golf.         Current Medications:  •  ALPRAZolam (XANAX) 0.5 MG tablet, Take 0.5 mg by mouth At Night As Needed., Disp: , Rfl: 2  •  aspirin 81 MG tablet, Take 1 tablet by mouth daily., Disp: 90 tablet, Rfl: 3  •  atorvastatin (LIPITOR) 80 MG tablet, Take 1 tablet by mouth daily., Disp: 90 tablet, Rfl: 3  •  carvedilol (COREG) 3.125 MG tablet, Take 1 tablet by mouth Every 12 (Twelve) Hours., Disp: 60 tablet, Rfl: 11  •  cholecalciferol (VITAMIN D3) 1000 UNITS tablet, Take 1,000 Units by mouth Daily., Disp: , Rfl:   •  clopidogrel (PLAVIX) 75 MG tablet, Take 1 tablet by mouth daily., Disp: 90  tablet, Rfl: 3  •  lisinopril (PRINIVIL,ZESTRIL) 2.5 MG tablet, Take 1 tablet by mouth Daily., Disp: 60 tablet, Rfl: 11  •  nitroglycerin (NITROSTAT) 0.4 MG SL tablet, Place 1 tablet under the tongue Every 5 (Five) Minutes As Needed for Chest Pain., Disp: 25 tablet, Rfl: 6  •  PHARMACY MEDS TO BED CONSULT, Daily., Disp: 14 each, Rfl: 0  •  sertraline (ZOLOFT) 50 MG tablet, Take 50 mg by mouth Daily., Disp: , Rfl: 5  •  spironolactone (ALDACTONE) 25 MG tablet, Take 1 tablet by mouth Daily., Disp: 30 tablet, Rfl: 3  •  torsemide (DEMADEX) 20 MG tablet, Take 1 tablet by mouth Daily., Disp: 30 tablet, Rfl: 11    Review of Systems:  Review of Systems   Constitution: Positive for malaise/fatigue and weight loss.   HENT: Positive for hearing loss.    Eyes: Negative.    Cardiovascular: Positive for dyspnea on exertion.   Respiratory: Positive for shortness of breath.    Endocrine: Negative.    Hematologic/Lymphatic: Bruises/bleeds easily.   Skin: Negative.    Musculoskeletal: Positive for arthritis.   Gastrointestinal: Negative.    Genitourinary: Negative.    Neurological: Negative.    Psychiatric/Behavioral: Negative.    Allergic/Immunologic: Negative.      Vitals:  Temp  Min: 97.6 °F (36.4 °C)  Max: 98.7 °F (37.1 °C)   BP  Min: 100/53  Max: 113/63   Pulse  Min: 64  Max: 74   Resp  Min: 16  Max: 18   Height:  69 inches (175.3 cm  Weight 170 lbs (77.1 kg)         Physical Exam:  Physical Exam   Constitutional: He is oriented to person, place, and time. He appears well-developed and well-nourished. No distress.   Accompanied by daughter.  Appears somewhat younger than stated age.   HENT:   Head: Normocephalic and atraumatic.   Eyes: Conjunctivae are normal. Pupils are equal, round, and reactive to light. No scleral icterus.   Neck: Neck supple. No JVD present.   Cardiovascular: Normal rate, regular rhythm and intact distal pulses.    Murmur heard.  Harsh 3/6 systolic murmur   Pulmonary/Chest: Effort normal and breath sounds  normal. No respiratory distress.   Musculoskeletal: Normal range of motion. He exhibits edema.   Trace ankle edema   Neurological: He is alert and oriented to person, place, and time. No cranial nerve deficit.   Speech clear, fluent, conversant   Skin: Skin is warm and dry.   Psychiatric: He has a normal mood and affect. His behavior is normal.       Diagnostic Data:  Dobutamine echo: as noted above in HPI    Transesophageal echo:4/6/18     · Left ventricular systolic function is severely decreased. Estimated EF = 20%.  · Severe aortic valve stenosis is present.  · Mild to moderate aortic valve regurgitation is present.  · Moderate-to-severe mitral valve regurgitation is present       Cardiac Cath:  1.  Severe proximal LAD stenosis with widely patent LIMA  2.  50% hazy in-stent restenosis of distal left main/ostial left circumflex with normal IFR.  3.  Occluded RCA and vein graft.     RECOMMENDATIONS:  Patient is a maximally  revascularized for TAVR.   Angiographic Findings:  right coronary dominance  · LM:  Distal stent noted.  Stenting into the proximal left circumflex.  50% hazy distal left circumflex in-stent restenosis.  · LAD: Ostial 90% stenosis.  There is competitive flow seen in the mid LAD.  The LAD gives rise to a large first diagonal moderate size second diagonal.  There is no disease beyond the ostial stenosis.  · LCX: Large nondominant vessel.  There is ostial left circumflex stent that has a hazy 50% in-stent restenosis.  His OM1 which is free of disease.  Distally there is a large OM 2 and has mild plaque only.  · LIMA to LAD: Widely patent conduit.  No distal disease  · RCA: Occluded  · SVG ×2 occluded  · IFR of distal left main/proximal left circumflex: 0.91    CTA Chest, Abdomen, and Pelvis: 3/29/18  1. TAVR protocol CTA with no evidence of aneurysm. The appearance of a  very short segment dissection in the distal abdominal aorta is probably  due to recanalized mural thrombus. High-grade right  common femoral  artery stenosis noted.  2. Incidentally noted moderate bilateral pleural effusions, and chronic  appearing lung changes, present since 2014.    Carotid Doppler:  3/29/18    · Right internal carotid artery stenosis of 0-49%.  · Left internal carotid artery stenosis of 0-49%.     KCCQ12 Questionnaire: 29/70    VERDE Index of Printer with ADL's: 6/6    Verona- Lazaro Instrumental ADL index:  8/8    Frailty assessment per Hand Dynamometer:  Mean  strength is 18.6 kg (mean is 14-45 kg)    5 Meter Walk Test:  Walk #1 was 6.9 seconds  Walk #2 was 6.4 seconds  Walk #3 was 5.8 seconds     Average Gait Speed of 6.4 seconds/ 5 meters which is slow gait speed and therefore indicates frailty.      Assessment:   1. Severe symptomatic aortic stenosis  2. Acute on chronic mixed heart failure with recent hospital admission. NYHA class III-IV  3.  CAD.  Adequately re-vascularized.   4.  Hypertension with recent hypotension/ recent medication de-escalation.  Currently normotensive.  5.  CLL  6.  CKD stage 3  7.  Moderate to severe mitral valve regurgitation    Plan: TAVR education reviewed with patient and daughter.  Reviewed TAVR brochure.  Further discussion with Dr. Purdy as well regarding risk/ benefit of TAVR.  Discussed risks a/w the procedure including worsened kidney disease, repeated heart failure decompensation, bleeding, risk of CVA, risk of MI, vascular injury, bleeding, infection.  Patient will return for second opinion with Dr. Starks later this month.  Anticipated TAVR procedure date 4/26/18.

## 2018-04-17 ENCOUNTER — OFFICE VISIT (OUTPATIENT)
Dept: CARDIAC SURGERY | Facility: CLINIC | Age: 83
End: 2018-04-17

## 2018-04-17 VITALS
BODY MASS INDEX: 24.62 KG/M2 | HEIGHT: 69 IN | TEMPERATURE: 97.3 F | SYSTOLIC BLOOD PRESSURE: 101 MMHG | DIASTOLIC BLOOD PRESSURE: 61 MMHG | WEIGHT: 166.2 LBS | HEART RATE: 75 BPM | OXYGEN SATURATION: 98 %

## 2018-04-17 DIAGNOSIS — I35.9 AORTIC VALVE DISORDER: Primary | ICD-10-CM

## 2018-04-17 PROCEDURE — 99215 OFFICE O/P EST HI 40 MIN: CPT | Performed by: THORACIC SURGERY (CARDIOTHORACIC VASCULAR SURGERY)

## 2018-04-17 NOTE — PROGRESS NOTES
04/17/2018  Patient Information  Tad Moon                                                                                          [address]  [facility] [place] [address]   11/30/1926  'PCP/Referring Physician'  Pramod Hyde MD  [phone]  No ref. provider found    Chief Complaint   Patient presents with   • Consult     2nd oinion TAVR Eval        History of Present Illness:  89-year-old  male with a history of hypertension, hyperlipidemia, remote tobacco abuse, stage III kidney disease, chronic lymphocytic leukemia and coronary artery disease status post CABG (1992) who presents with shortness of breath.  For months, the patient notes worsening shortness of breath with exertion.  This has improved with medical optimization.  He denies chest pain or shortness of breath, but does note a syncopal episode ×1 three years ago.    Patient Active Problem List   Diagnosis   • Coronary artery disease involving native coronary artery of native heart   • Hyperlipidemia LDL goal <100   • History of tobacco abuse   • CLL (chronic lymphocytic leukemia)   • GERD (gastroesophageal reflux disease)   • Nonrheumatic aortic valve stenosis   • Acute on chronic systolic congestive heart failure   • CKD (chronic kidney disease) stage 3, GFR 30-59 ml/min   • Aortic valve disorder     Past Medical History:   Diagnosis Date   • CHF (congestive heart failure)    • CLL (chronic lymphocytic leukemia)    • Coronary artery disease    • GERD (gastroesophageal reflux disease)    • History of tobacco abuse    • Hyperlipidemia    • Hypertension      Past Surgical History:   Procedure Laterality Date   • ABDOMINAL HERNIA REPAIR      x 2   • APPENDECTOMY     • CARDIAC CATHETERIZATION N/A 10/10/2017    Procedure: Left Heart Cath;  Surgeon: Juan M Sood MD;  Location:  [facility];  Service:    • CARDIAC CATHETERIZATION N/A 4/6/2018    Procedure: Left Heart Cath;  Surgeon: Lv Cobian MD;  Location:  [facility] INVASIVE LOCATION;  Service: Cardiovascular   • CARDIAC SURGERY     • CHOLECYSTECTOMY     • CORONARY ARTERY BYPASS GRAFT     • REPLACEMENT TOTAL KNEE Left 2014   • TOTAL KNEE ARTHROPLASTY Left        Current Outpatient Prescriptions:   •  ALPRAZolam (XANAX) 0.5 MG tablet, Take 0.5 mg by mouth At Night As Needed., Disp: , Rfl: 2  •  aspirin 81 MG tablet, Take 1 tablet by mouth daily., Disp: 90 tablet, Rfl: 3  •  atorvastatin (LIPITOR) 80 MG tablet, Take 1 tablet by mouth daily., Disp: 90 tablet, Rfl: 3  •  carvedilol (COREG) 3.125 MG tablet, Take 1 tablet by mouth Every 12 (Twelve) Hours., Disp: 60 tablet, Rfl: 11  •  cholecalciferol (VITAMIN D3) 1000 UNITS tablet, Take 1,000 Units by mouth Daily., Disp: , Rfl:   •  clopidogrel (PLAVIX) 75 MG tablet, Take 1 tablet by mouth daily., Disp: 90 tablet, Rfl: 3  •  lisinopril (PRINIVIL,ZESTRIL) 2.5 MG tablet, Take 1 tablet by mouth Daily., Disp: 60 tablet, Rfl: 11  •  nitroglycerin (NITROSTAT) 0.4 MG SL tablet, Place 1 tablet under the tongue Every 5 (Five) Minutes As Needed for Chest Pain., Disp: 25 tablet, Rfl: 6  •  PHARMACY MEDS TO BED CONSULT, Daily., Disp: 14 each, Rfl: 0  •  sertraline (ZOLOFT) 50 MG tablet, Take 50 mg by mouth Daily., Disp: , Rfl: 5  •  spironolactone (ALDACTONE) 25 MG tablet, Take 1 tablet by mouth Daily., Disp: 30 tablet, Rfl: 3  •  torsemide (DEMADEX) 20 MG tablet, Take 1 tablet by mouth Daily., Disp: 30 tablet, Rfl: 11  Allergies   Allergen Reactions   • Hctz [Hydrochlorothiazide]      hyponatremia   • Other      Beta Blockers, bradycardia     Social History     Social History   • Marital status:      Spouse name: N/A   • Number of children: 3   • Years of education: N/A     Occupational History   • Retired       insurance agency      Social History Main Topics   • Smoking status: Former Smoker     Packs/day: 0.25     Types: Cigarettes     Quit date: 1970   • Smokeless tobacco: Never Used      Comment: quit 45 years ago   • Alcohol  "use 0.6 - 1.2 oz/week     1 - 2 Glasses of wine per week      Comment: occas   • Drug use: No   • Sexual activity: No     Other Topics Concern   • Not on file     Social History Narrative    Lives with Wife Julia in [place], [place]      Family History   Problem Relation Age of Onset   • Stroke Mother    • Heart disease Father      Review of Systems   Constitution: Positive for malaise/fatigue and weight loss. Negative for chills, fever and night sweats.   HENT: Negative for hearing loss, odynophagia and sore throat.    Cardiovascular: Positive for chest pain, dyspnea on exertion and leg swelling. Negative for orthopnea and palpitations.   Respiratory: Negative for cough and hemoptysis.    Endocrine: Positive for polyuria. Negative for cold intolerance, heat intolerance, polydipsia and polyphagia.   Hematologic/Lymphatic: Bruises/bleeds easily.   Skin: Negative for itching and rash.   Musculoskeletal: Negative for joint pain, joint swelling and myalgias.   Gastrointestinal: Positive for diarrhea. Negative for abdominal pain, constipation, hematemesis, hematochezia, melena, nausea and vomiting.   Genitourinary: Positive for frequency, nocturia and urgency. Negative for dysuria and hematuria.   Neurological: Negative for focal weakness, headaches, numbness and seizures.   Psychiatric/Behavioral: Negative for suicidal ideas. The patient is nervous/anxious.    All other systems reviewed and are negative.    Vitals:    04/17/18 1217   BP: 101/61   BP Location: Right arm   Patient Position: Sitting   Pulse: 75   Temp: 97.3 °F (36.3 °C)   TempSrc: Temporal Artery    SpO2: 98%   Weight: 75.4 kg (166 lb 3.2 oz)   Height: 175.3 cm (69\")      Physical Exam   Constitutional: He is oriented to person, place, and time. He appears well-developed and well-nourished. No distress.   HENT:   Head: Normocephalic and atraumatic.   Eyes: Conjunctivae are normal. No scleral icterus.   Neck: Normal range of motion. No JVD present. Carotid " bruit is not present. No tracheal deviation present.   Cardiovascular: Normal rate and regular rhythm.  Exam reveals no gallop and no friction rub.    Murmur heard.  II/VI systolic ejection murmur at the left sternal border   Pulmonary/Chest: Effort normal and breath sounds normal. No stridor. No respiratory distress. He has no wheezes. He has no rales.   Abdominal: Soft. He exhibits no distension and no mass. There is no tenderness. There is no rebound and no guarding.   Musculoskeletal: Normal range of motion. He exhibits no edema.   Neurological: He is alert and oriented to person, place, and time.   Skin: Skin is warm and dry. No rash noted. He is not diaphoretic. No erythema.   Psychiatric: He has a normal mood and affect. His behavior is normal. Judgment and thought content normal.       Labs/Imaging:  -CT TAVR protocol performed 3/29/18, personally reviewed, demonstrates focal ectasia of the abdominal aorta with likely old dissection.  There is scattered arterial calcifications in the common and external iliac arteries bilaterally.  There is right common femoral artery stenosis.  Bilateral moderate pleural effusions.  Mediastinal lymphadenopathy with calcifications.    -Carotid doppler performed 3/29/18, demonstrates 0-49% stenosis bilaterally.  There is antegrade vertebral flow bilaterally  -Cardiac catheterization performed 10/10/17, personally reviewed, demonstrates a patent LIMA to LAD graft and occluded vein grafts to the circumflex and right coronary artery.  80% distal left main stenosis.  The mid LAD was occluded.  90% proximal circumflex stenosis, and 80% mid vessel stenosis and RCA occlusion.  EF 25-30%.  The distal left main, proximal and mid circumflex were stented.    -Transesophageal echocardiogram performed 4/6/18, personally reviewed, demonstrates EF 20%, severe aortic stenosis, mild to moderate aortic regurgitation, moderate to severe mitral regurgitation and trace tricuspid regurgitation.   Diffuse aortic plaques seen.      Assessment/Plan:  89-year-old  male with a history of hypertension, hyperlipidemia, remote tobacco abuse, stage III kidney disease, chronic lymphocytic leukemia and coronary artery disease status post CABG (1992) who presents with exertional dyspnea in the setting of severe aortic stenosis.  He has a calculated STS risk of mortality with open heart surgery of 14.881% placing him in the high risk category.  He is a high risk candidate for TAVR given his peripheral vascular disease, extensive coronary artery disease, low ejection fraction and age.  The risks and benefits of surgery were discussed with the patient including pain, bleeding, infection, renal failure requiring dialysis, stroke, heart block requiring a pacemaker and death.  I also discussed the patient's postoperative expectations and answered all of his questions to his satisfaction.      I, Dr. Fransico Starks, personally performed the services described in the documentation as scribed in my presence and the documentation is both accurate and complete.        Patient Active Problem List   Diagnosis   • Coronary artery disease involving native coronary artery of native heart   • Hyperlipidemia LDL goal <100   • History of tobacco abuse   • CLL (chronic lymphocytic leukemia)   • GERD (gastroesophageal reflux disease)   • Nonrheumatic aortic valve stenosis   • Acute on chronic systolic congestive heart failure   • CKD (chronic kidney disease) stage 3, GFR 30-59 ml/min   • Aortic valve disorder

## 2018-04-18 DIAGNOSIS — I35.9 AORTIC VALVE DISORDER: Primary | ICD-10-CM

## 2018-04-19 ENCOUNTER — APPOINTMENT (OUTPATIENT)
Dept: CARDIOLOGY | Facility: HOSPITAL | Age: 83
End: 2018-04-19

## 2018-04-20 ENCOUNTER — PREP FOR SURGERY (OUTPATIENT)
Dept: OTHER | Facility: HOSPITAL | Age: 83
End: 2018-04-20

## 2018-04-20 DIAGNOSIS — I35.0 AORTIC VALVE STENOSIS, ETIOLOGY OF CARDIAC VALVE DISEASE UNSPECIFIED: Primary | ICD-10-CM

## 2018-04-20 RX ORDER — CHLORHEXIDINE GLUCONATE 0.12 MG/ML
15 RINSE ORAL ONCE
Status: CANCELLED | OUTPATIENT
Start: 2018-04-20 | End: 2018-04-20

## 2018-04-20 RX ORDER — NITROGLYCERIN 0.4 MG/1
0.4 TABLET SUBLINGUAL
Status: CANCELLED | OUTPATIENT
Start: 2018-04-20

## 2018-04-20 RX ORDER — CHLORHEXIDINE GLUCONATE 500 MG/1
1 CLOTH TOPICAL EVERY 12 HOURS PRN
Status: CANCELLED | OUTPATIENT
Start: 2018-04-20

## 2018-04-20 RX ORDER — ASPIRIN 325 MG
325 TABLET ORAL NIGHTLY
Status: CANCELLED | OUTPATIENT
Start: 2018-04-20 | End: 2018-04-21

## 2018-04-20 RX ORDER — CEFAZOLIN SODIUM 2 G/100ML
2 INJECTION, SOLUTION INTRAVENOUS ONCE
Status: CANCELLED | OUTPATIENT
Start: 2018-04-26 | End: 2018-04-26

## 2018-04-20 RX ORDER — ACETAMINOPHEN 325 MG/1
650 TABLET ORAL EVERY 4 HOURS PRN
Status: CANCELLED | OUTPATIENT
Start: 2018-04-20

## 2018-04-24 DIAGNOSIS — I50.23 ACUTE ON CHRONIC SYSTOLIC CONGESTIVE HEART FAILURE (HCC): Primary | ICD-10-CM

## 2018-04-25 ENCOUNTER — HOSPITAL ENCOUNTER (OUTPATIENT)
Dept: GENERAL RADIOLOGY | Facility: HOSPITAL | Age: 83
Discharge: HOME | End: 2018-04-25

## 2018-04-25 ENCOUNTER — APPOINTMENT (OUTPATIENT)
Dept: PREADMISSION TESTING | Facility: HOSPITAL | Age: 83
End: 2018-04-25

## 2018-04-25 ENCOUNTER — HOSPITAL ENCOUNTER (OUTPATIENT)
Dept: PULMONOLOGY | Facility: HOSPITAL | Age: 83
Discharge: HOME | End: 2018-04-25
Admitting: PHYSICIAN ASSISTANT

## 2018-04-25 VITALS — OXYGEN SATURATION: 100 % | HEIGHT: 68 IN | HEART RATE: 79 BPM | WEIGHT: 162.92 LBS | BODY MASS INDEX: 24.69 KG/M2

## 2018-04-25 DIAGNOSIS — I50.23 ACUTE ON CHRONIC SYSTOLIC CONGESTIVE HEART FAILURE (HCC): ICD-10-CM

## 2018-04-25 DIAGNOSIS — I35.0 AORTIC VALVE STENOSIS, ETIOLOGY OF CARDIAC VALVE DISEASE UNSPECIFIED: ICD-10-CM

## 2018-04-25 LAB
ABO GROUP BLD: NORMAL
ALBUMIN SERPL-MCNC: 4.8 G/DL (ref 3.2–4.8)
ALBUMIN/GLOB SERPL: 2 G/DL (ref 1.5–2.5)
ALP SERPL-CCNC: 104 U/L (ref 25–100)
ALT SERPL W P-5'-P-CCNC: 16 U/L (ref 7–40)
AMPHET+METHAMPHET UR QL: NEGATIVE
AMPHETAMINES UR QL: NEGATIVE
ANION GAP SERPL CALCULATED.3IONS-SCNC: 9 MMOL/L (ref 3–11)
APTT PPP: 26.8 SECONDS (ref 24–31)
AST SERPL-CCNC: 16 U/L (ref 0–33)
BARBITURATES UR QL SCN: NEGATIVE
BASOPHILS # BLD AUTO: 0.08 10*3/MM3 (ref 0–0.2)
BASOPHILS NFR BLD AUTO: 0.8 % (ref 0–1)
BENZODIAZ UR QL SCN: POSITIVE
BILIRUB SERPL-MCNC: 0.8 MG/DL (ref 0.3–1.2)
BLD GP AB SCN SERPL QL: NEGATIVE
BNP SERPL-MCNC: 1154 PG/ML (ref 0–100)
BUN BLD-MCNC: 29 MG/DL (ref 9–23)
BUN/CREAT SERPL: 17.1 (ref 7–25)
BUPRENORPHINE SERPL-MCNC: NEGATIVE NG/ML
CALCIUM SPEC-SCNC: 10 MG/DL (ref 8.7–10.4)
CANNABINOIDS SERPL QL: NEGATIVE
CHLORIDE SERPL-SCNC: 100 MMOL/L (ref 99–109)
CO2 SERPL-SCNC: 33 MMOL/L (ref 20–31)
COCAINE UR QL: NEGATIVE
CREAT BLD-MCNC: 1.7 MG/DL (ref 0.6–1.3)
DEPRECATED RDW RBC AUTO: 52.8 FL (ref 37–54)
EOSINOPHIL # BLD AUTO: 0.49 10*3/MM3 (ref 0–0.3)
EOSINOPHIL NFR BLD AUTO: 4.7 % (ref 0–3)
ERYTHROCYTE [DISTWIDTH] IN BLOOD BY AUTOMATED COUNT: 15.1 % (ref 11.3–14.5)
GFR SERPL CREATININE-BSD FRML MDRD: 38 ML/MIN/1.73
GLOBULIN UR ELPH-MCNC: 2.4 GM/DL
GLUCOSE BLD-MCNC: 125 MG/DL (ref 70–100)
HBA1C MFR BLD: 6.2 % (ref 4.8–5.6)
HCT VFR BLD AUTO: 38.2 % (ref 38.9–50.9)
HGB BLD-MCNC: 12.2 G/DL (ref 13.1–17.5)
IMM GRANULOCYTES # BLD: 0.02 10*3/MM3 (ref 0–0.03)
IMM GRANULOCYTES NFR BLD: 0.2 % (ref 0–0.6)
INR PPP: 1.08 (ref 0.91–1.09)
LYMPHOCYTES # BLD AUTO: 6.18 10*3/MM3 (ref 0.6–4.8)
LYMPHOCYTES NFR BLD AUTO: 59.1 % (ref 24–44)
MAGNESIUM SERPL-MCNC: 1.8 MG/DL (ref 1.3–2.7)
MCH RBC QN AUTO: 30.2 PG (ref 27–31)
MCHC RBC AUTO-ENTMCNC: 31.9 G/DL (ref 32–36)
MCV RBC AUTO: 94.6 FL (ref 80–99)
METHADONE UR QL SCN: NEGATIVE
MONOCYTES # BLD AUTO: 0.4 10*3/MM3 (ref 0–1)
MONOCYTES NFR BLD AUTO: 3.8 % (ref 0–12)
NEUTROPHILS # BLD AUTO: 3.31 10*3/MM3 (ref 1.5–8.3)
NEUTROPHILS NFR BLD AUTO: 31.6 % (ref 41–71)
OPIATES UR QL: NEGATIVE
OXYCODONE UR QL SCN: NEGATIVE
PA ADP PRP-ACNC: 177 PRU
PCP UR QL SCN: NEGATIVE
PLATELET # BLD AUTO: 161 10*3/MM3 (ref 150–450)
PMV BLD AUTO: 12.6 FL (ref 6–12)
POTASSIUM BLD-SCNC: 3.7 MMOL/L (ref 3.5–5.5)
PROPOXYPH UR QL: NEGATIVE
PROT SERPL-MCNC: 7.2 G/DL (ref 5.7–8.2)
PROTHROMBIN TIME: 11.3 SECONDS (ref 9.6–11.5)
RBC # BLD AUTO: 4.04 10*6/MM3 (ref 4.2–5.76)
RH BLD: POSITIVE
SODIUM BLD-SCNC: 142 MMOL/L (ref 132–146)
T&S EXPIRATION DATE: NORMAL
TRICYCLICS UR QL SCN: NEGATIVE
WBC NRBC COR # BLD: 10.46 10*3/MM3 (ref 3.5–10.8)

## 2018-04-25 PROCEDURE — 83880 ASSAY OF NATRIURETIC PEPTIDE: CPT | Performed by: NURSE PRACTITIONER

## 2018-04-25 PROCEDURE — 80306 DRUG TEST PRSMV INSTRMNT: CPT | Performed by: PHYSICIAN ASSISTANT

## 2018-04-25 PROCEDURE — 86920 COMPATIBILITY TEST SPIN: CPT

## 2018-04-25 PROCEDURE — 71046 X-RAY EXAM CHEST 2 VIEWS: CPT

## 2018-04-25 PROCEDURE — 85610 PROTHROMBIN TIME: CPT | Performed by: PHYSICIAN ASSISTANT

## 2018-04-25 PROCEDURE — 85025 COMPLETE CBC W/AUTO DIFF WBC: CPT | Performed by: PHYSICIAN ASSISTANT

## 2018-04-25 PROCEDURE — 86901 BLOOD TYPING SEROLOGIC RH(D): CPT | Performed by: PHYSICIAN ASSISTANT

## 2018-04-25 PROCEDURE — 85576 BLOOD PLATELET AGGREGATION: CPT | Performed by: PHYSICIAN ASSISTANT

## 2018-04-25 PROCEDURE — 83036 HEMOGLOBIN GLYCOSYLATED A1C: CPT | Performed by: PHYSICIAN ASSISTANT

## 2018-04-25 PROCEDURE — 83735 ASSAY OF MAGNESIUM: CPT | Performed by: PHYSICIAN ASSISTANT

## 2018-04-25 PROCEDURE — 86900 BLOOD TYPING SEROLOGIC ABO: CPT | Performed by: PHYSICIAN ASSISTANT

## 2018-04-25 PROCEDURE — 85730 THROMBOPLASTIN TIME PARTIAL: CPT | Performed by: PHYSICIAN ASSISTANT

## 2018-04-25 PROCEDURE — 94010 BREATHING CAPACITY TEST: CPT

## 2018-04-25 PROCEDURE — 86850 RBC ANTIBODY SCREEN: CPT | Performed by: PHYSICIAN ASSISTANT

## 2018-04-25 PROCEDURE — 36415 COLL VENOUS BLD VENIPUNCTURE: CPT

## 2018-04-25 PROCEDURE — 80053 COMPREHEN METABOLIC PANEL: CPT | Performed by: PHYSICIAN ASSISTANT

## 2018-04-25 PROCEDURE — 94010 BREATHING CAPACITY TEST: CPT | Performed by: INTERNAL MEDICINE

## 2018-04-26 ENCOUNTER — APPOINTMENT (OUTPATIENT)
Dept: GENERAL RADIOLOGY | Facility: HOSPITAL | Age: 83
End: 2018-04-26

## 2018-04-26 ENCOUNTER — ANESTHESIA EVENT (OUTPATIENT)
Dept: PERIOP | Facility: HOSPITAL | Age: 83
End: 2018-04-26

## 2018-04-26 ENCOUNTER — ANESTHESIA (OUTPATIENT)
Dept: PERIOP | Facility: HOSPITAL | Age: 83
End: 2018-04-26

## 2018-04-26 ENCOUNTER — HOSPITAL ENCOUNTER (INPATIENT)
Facility: HOSPITAL | Age: 83
LOS: 4 days | Discharge: HOME | End: 2018-04-30
Attending: THORACIC SURGERY (CARDIOTHORACIC VASCULAR SURGERY) | Admitting: THORACIC SURGERY (CARDIOTHORACIC VASCULAR SURGERY)

## 2018-04-26 DIAGNOSIS — I35.9 AORTIC VALVE DISORDER: ICD-10-CM

## 2018-04-26 DIAGNOSIS — Z74.09 IMPAIRED FUNCTIONAL MOBILITY, BALANCE, GAIT, AND ENDURANCE: Primary | ICD-10-CM

## 2018-04-26 DIAGNOSIS — I35.0 AORTIC VALVE STENOSIS, ETIOLOGY OF CARDIAC VALVE DISEASE UNSPECIFIED: ICD-10-CM

## 2018-04-26 LAB
ACT BLD: 109 SECONDS (ref 82–152)
ACT BLD: 125 SECONDS (ref 82–152)
ACT BLD: 301 SECONDS (ref 82–152)
ALBUMIN SERPL-MCNC: 3.6 G/DL (ref 3.2–4.8)
ALBUMIN SERPL-MCNC: 4 G/DL (ref 3.2–4.8)
ANION GAP SERPL CALCULATED.3IONS-SCNC: 6 MMOL/L (ref 3–11)
ANION GAP SERPL CALCULATED.3IONS-SCNC: 9 MMOL/L (ref 3–11)
APTT PPP: 38.2 SECONDS (ref 24–31)
ARTERIAL PATENCY WRIST A: ABNORMAL
ATMOSPHERIC PRESS: ABNORMAL MMHG
BASE EXCESS BLDA CALC-SCNC: -10 MMOL/L (ref -5–5)
BASE EXCESS BLDA CALC-SCNC: -2.2 MMOL/L (ref 0–2)
BASE EXCESS BLDA CALC-SCNC: 1 MMOL/L (ref -5–5)
BASE EXCESS BLDA CALC-SCNC: 3 MMOL/L (ref -5–5)
BDY SITE: ABNORMAL
BUN BLD-MCNC: 26 MG/DL (ref 9–23)
BUN BLD-MCNC: 27 MG/DL (ref 9–23)
BUN/CREAT SERPL: 16.3 (ref 7–25)
BUN/CREAT SERPL: 16.9 (ref 7–25)
CA-I BLDA-SCNC: 1.08 MMOL/L (ref 1.2–1.32)
CA-I BLDA-SCNC: 1.11 MMOL/L (ref 1.2–1.32)
CA-I BLDA-SCNC: 1.17 MMOL/L (ref 1.2–1.32)
CA-I SERPL ISE-MCNC: 1.29 MMOL/L (ref 1.12–1.32)
CALCIUM SPEC-SCNC: 8.6 MG/DL (ref 8.7–10.4)
CALCIUM SPEC-SCNC: 8.9 MG/DL (ref 8.7–10.4)
CHLORIDE SERPL-SCNC: 103 MMOL/L (ref 99–109)
CHLORIDE SERPL-SCNC: 103 MMOL/L (ref 99–109)
CO2 BLDA-SCNC: 20 MMOL/L (ref 24–29)
CO2 BLDA-SCNC: 25.6 MMOL/L (ref 22–33)
CO2 BLDA-SCNC: 27 MMOL/L (ref 24–29)
CO2 BLDA-SCNC: 28 MMOL/L (ref 24–29)
CO2 SERPL-SCNC: 25 MMOL/L (ref 20–31)
CO2 SERPL-SCNC: 26 MMOL/L (ref 20–31)
COHGB MFR BLD: 0.5 % (ref 0–2)
CREAT BLD-MCNC: 1.6 MG/DL (ref 0.6–1.3)
CREAT BLD-MCNC: 1.6 MG/DL (ref 0.6–1.3)
DEPRECATED RDW RBC AUTO: 51.5 FL (ref 37–54)
DEPRECATED RDW RBC AUTO: 52.7 FL (ref 37–54)
ERYTHROCYTE [DISTWIDTH] IN BLOOD BY AUTOMATED COUNT: 15.1 % (ref 11.3–14.5)
ERYTHROCYTE [DISTWIDTH] IN BLOOD BY AUTOMATED COUNT: 15.2 % (ref 11.3–14.5)
GFR SERPL CREATININE-BSD FRML MDRD: 41 ML/MIN/1.73
GFR SERPL CREATININE-BSD FRML MDRD: 41 ML/MIN/1.73
GLUCOSE BLD-MCNC: 117 MG/DL (ref 70–100)
GLUCOSE BLD-MCNC: 156 MG/DL (ref 70–100)
GLUCOSE BLDC GLUCOMTR-MCNC: 102 MG/DL (ref 70–130)
GLUCOSE BLDC GLUCOMTR-MCNC: 117 MG/DL (ref 70–130)
GLUCOSE BLDC GLUCOMTR-MCNC: 132 MG/DL (ref 70–130)
GLUCOSE BLDC GLUCOMTR-MCNC: 163 MG/DL (ref 70–130)
GLUCOSE BLDC GLUCOMTR-MCNC: 90 MG/DL (ref 70–130)
HCO3 BLDA-SCNC: 18.8 MMOL/L (ref 22–26)
HCO3 BLDA-SCNC: 24.1 MMOL/L (ref 20–26)
HCO3 BLDA-SCNC: 25.8 MMOL/L (ref 22–26)
HCO3 BLDA-SCNC: 27 MMOL/L (ref 22–26)
HCT VFR BLD AUTO: 32.4 % (ref 38.9–50.9)
HCT VFR BLD AUTO: 32.6 % (ref 38.9–50.9)
HCT VFR BLD CALC: 33.7 %
HCT VFR BLDA CALC: 23 % (ref 38–51)
HCT VFR BLDA CALC: 29 % (ref 38–51)
HCT VFR BLDA CALC: 30 % (ref 38–51)
HGB BLD-MCNC: 10.3 G/DL (ref 13.1–17.5)
HGB BLD-MCNC: 10.3 G/DL (ref 13.1–17.5)
HGB BLDA-MCNC: 10.2 G/DL (ref 12–17)
HGB BLDA-MCNC: 11 G/DL (ref 13.5–17.5)
HGB BLDA-MCNC: 7.8 G/DL (ref 12–17)
HGB BLDA-MCNC: 9.9 G/DL (ref 12–17)
HOROWITZ INDEX BLD+IHG-RTO: 60 %
INR PPP: 1.1 (ref 0.91–1.09)
MAGNESIUM SERPL-MCNC: 1.4 MG/DL (ref 1.3–2.7)
MAGNESIUM SERPL-MCNC: 1.6 MG/DL (ref 1.3–2.7)
MCH RBC QN AUTO: 29.7 PG (ref 27–31)
MCH RBC QN AUTO: 29.8 PG (ref 27–31)
MCHC RBC AUTO-ENTMCNC: 31.6 G/DL (ref 32–36)
MCHC RBC AUTO-ENTMCNC: 31.8 G/DL (ref 32–36)
MCV RBC AUTO: 93.6 FL (ref 80–99)
MCV RBC AUTO: 93.9 FL (ref 80–99)
METHGB BLD QL: 0.9 % (ref 0–1.5)
MODALITY: ABNORMAL
OXYHGB MFR BLDV: 98.5 % (ref 94–99)
PCO2 BLDA: 36.7 MM HG (ref 35–45)
PCO2 BLDA: 42.2 MM HG (ref 35–45)
PCO2 BLDA: 47.3 MM HG (ref 35–48)
PCO2 BLDA: 51.1 MM HG (ref 35–45)
PH BLDA: 7.17 PH UNITS (ref 7.35–7.6)
PH BLDA: 7.32 PH UNITS (ref 7.35–7.45)
PH BLDA: 7.39 PH UNITS (ref 7.35–7.6)
PH BLDA: 7.47 PH UNITS (ref 7.35–7.6)
PHOSPHATE SERPL-MCNC: 3.9 MG/DL (ref 2.4–5.1)
PHOSPHATE SERPL-MCNC: 4.6 MG/DL (ref 2.4–5.1)
PLATELET # BLD AUTO: 123 10*3/MM3 (ref 150–450)
PLATELET # BLD AUTO: 133 10*3/MM3 (ref 150–450)
PMV BLD AUTO: 12.2 FL (ref 6–12)
PMV BLD AUTO: 12.2 FL (ref 6–12)
PO2 BLDA: 338 MM HG (ref 83–108)
PO2 BLDA: 39 MMHG (ref 80–105)
PO2 BLDA: 470 MMHG (ref 80–105)
PO2 BLDA: 62 MMHG (ref 80–105)
POTASSIUM BLD-SCNC: 3.3 MMOL/L (ref 3.5–5.5)
POTASSIUM BLD-SCNC: 4.2 MMOL/L (ref 3.5–5.5)
POTASSIUM BLDA-SCNC: 2.2 MMOL/L (ref 3.5–4.9)
POTASSIUM BLDA-SCNC: 3.3 MMOL/L (ref 3.5–4.9)
POTASSIUM BLDA-SCNC: 3.3 MMOL/L (ref 3.5–4.9)
PROTHROMBIN TIME: 11.6 SECONDS (ref 9.6–11.5)
RBC # BLD AUTO: 3.46 10*6/MM3 (ref 4.2–5.76)
RBC # BLD AUTO: 3.47 10*6/MM3 (ref 4.2–5.76)
SAO2 % BLDA: 100 % (ref 95–98)
SAO2 % BLDA: 73 % (ref 95–98)
SAO2 % BLDA: 84 % (ref 95–98)
SAO2 % BLDCOA: 98.5 %
SODIUM BLD-SCNC: 135 MMOL/L (ref 132–146)
SODIUM BLD-SCNC: 137 MMOL/L (ref 132–146)
SODIUM BLDA-SCNC: 138 MMOL/L (ref 138–146)
SODIUM BLDA-SCNC: 141 MMOL/L (ref 138–146)
SODIUM BLDA-SCNC: 145 MMOL/L (ref 138–146)
WBC NRBC COR # BLD: 10.73 10*3/MM3 (ref 3.5–10.8)
WBC NRBC COR # BLD: 12.33 10*3/MM3 (ref 3.5–10.8)

## 2018-04-26 PROCEDURE — 94799 UNLISTED PULMONARY SVC/PX: CPT

## 2018-04-26 PROCEDURE — 82803 BLOOD GASES ANY COMBINATION: CPT

## 2018-04-26 PROCEDURE — 85347 COAGULATION TIME ACTIVATED: CPT

## 2018-04-26 PROCEDURE — 99231 SBSQ HOSP IP/OBS SF/LOW 25: CPT | Performed by: NURSE PRACTITIONER

## 2018-04-26 PROCEDURE — 82947 ASSAY GLUCOSE BLOOD QUANT: CPT

## 2018-04-26 PROCEDURE — 33362 REPLACE AORTIC VALVE OPEN: CPT | Performed by: THORACIC SURGERY (CARDIOTHORACIC VASCULAR SURGERY)

## 2018-04-26 PROCEDURE — 33362 REPLACE AORTIC VALVE OPEN: CPT | Performed by: INTERNAL MEDICINE

## 2018-04-26 PROCEDURE — 71045 X-RAY EXAM CHEST 1 VIEW: CPT

## 2018-04-26 PROCEDURE — 93005 ELECTROCARDIOGRAM TRACING: CPT | Performed by: PHYSICIAN ASSISTANT

## 2018-04-26 PROCEDURE — 93005 ELECTROCARDIOGRAM TRACING: CPT | Performed by: INTERNAL MEDICINE

## 2018-04-26 PROCEDURE — 99291 CRITICAL CARE FIRST HOUR: CPT | Performed by: INTERNAL MEDICINE

## 2018-04-26 PROCEDURE — 25010000003 POTASSIUM CHLORIDE PER 2 MEQ: Performed by: THORACIC SURGERY (CARDIOTHORACIC VASCULAR SURGERY)

## 2018-04-26 PROCEDURE — C1769 GUIDE WIRE: HCPCS | Performed by: THORACIC SURGERY (CARDIOTHORACIC VASCULAR SURGERY)

## 2018-04-26 PROCEDURE — 25010000002 ONDANSETRON PER 1 MG: Performed by: PHYSICIAN ASSISTANT

## 2018-04-26 PROCEDURE — 25810000003 DEXTROSE 5 % WITH KCL 20 MEQ 20-5 MEQ/L-% SOLUTION: Performed by: INTERNAL MEDICINE

## 2018-04-26 PROCEDURE — 93010 ELECTROCARDIOGRAM REPORT: CPT | Performed by: INTERNAL MEDICINE

## 2018-04-26 PROCEDURE — 25010000002 NEOSTIGMINE PER 0.5 MG: Performed by: ANESTHESIOLOGY

## 2018-04-26 PROCEDURE — 82330 ASSAY OF CALCIUM: CPT | Performed by: PHYSICIAN ASSISTANT

## 2018-04-26 PROCEDURE — 83735 ASSAY OF MAGNESIUM: CPT | Performed by: PHYSICIAN ASSISTANT

## 2018-04-26 PROCEDURE — 0 IOPAMIDOL PER 1 ML: Performed by: THORACIC SURGERY (CARDIOTHORACIC VASCULAR SURGERY)

## 2018-04-26 PROCEDURE — 85730 THROMBOPLASTIN TIME PARTIAL: CPT | Performed by: PHYSICIAN ASSISTANT

## 2018-04-26 PROCEDURE — 82330 ASSAY OF CALCIUM: CPT

## 2018-04-26 PROCEDURE — 85610 PROTHROMBIN TIME: CPT | Performed by: PHYSICIAN ASSISTANT

## 2018-04-26 PROCEDURE — 25010000002 HEPARIN (PORCINE) PER 1000 UNITS

## 2018-04-26 PROCEDURE — 25010000002 HEPARIN (PORCINE) PER 1000 UNITS: Performed by: ANESTHESIOLOGY

## 2018-04-26 PROCEDURE — 84132 ASSAY OF SERUM POTASSIUM: CPT

## 2018-04-26 PROCEDURE — 25010000002 PROTAMINE SULFATE PER 10 MG: Performed by: ANESTHESIOLOGY

## 2018-04-26 PROCEDURE — 85014 HEMATOCRIT: CPT

## 2018-04-26 PROCEDURE — 25010000002 HEPARIN (PORCINE) PER 1000 UNITS: Performed by: THORACIC SURGERY (CARDIOTHORACIC VASCULAR SURGERY)

## 2018-04-26 PROCEDURE — 02RF38Z REPLACEMENT OF AORTIC VALVE WITH ZOOPLASTIC TISSUE, PERCUTANEOUS APPROACH: ICD-10-PCS | Performed by: THORACIC SURGERY (CARDIOTHORACIC VASCULAR SURGERY)

## 2018-04-26 PROCEDURE — C1760 CLOSURE DEV, VASC: HCPCS | Performed by: THORACIC SURGERY (CARDIOTHORACIC VASCULAR SURGERY)

## 2018-04-26 PROCEDURE — 80069 RENAL FUNCTION PANEL: CPT | Performed by: PHYSICIAN ASSISTANT

## 2018-04-26 PROCEDURE — 82805 BLOOD GASES W/O2 SATURATION: CPT | Performed by: PHYSICIAN ASSISTANT

## 2018-04-26 PROCEDURE — 85027 COMPLETE CBC AUTOMATED: CPT | Performed by: PHYSICIAN ASSISTANT

## 2018-04-26 PROCEDURE — 5A1223Z PERFORMANCE OF CARDIAC PACING, CONTINUOUS: ICD-10-PCS | Performed by: INTERNAL MEDICINE

## 2018-04-26 PROCEDURE — 25010000002 PHENYLEPHRINE PER 1 ML

## 2018-04-26 PROCEDURE — 84295 ASSAY OF SERUM SODIUM: CPT

## 2018-04-26 PROCEDURE — C1894 INTRO/SHEATH, NON-LASER: HCPCS | Performed by: THORACIC SURGERY (CARDIOTHORACIC VASCULAR SURGERY)

## 2018-04-26 PROCEDURE — 25010000003 CEFAZOLIN IN DEXTROSE 2-4 GM/100ML-% SOLUTION: Performed by: PHYSICIAN ASSISTANT

## 2018-04-26 DEVICE — VLV HEART TRNSCATH SAPIEN3 26MM: Type: IMPLANTABLE DEVICE | Site: HEART | Status: FUNCTIONAL

## 2018-04-26 RX ORDER — NITROGLYCERIN 0.4 MG/1
0.4 TABLET SUBLINGUAL
Status: DISCONTINUED | OUTPATIENT
Start: 2018-04-26 | End: 2018-04-28

## 2018-04-26 RX ORDER — DEXMEDETOMIDINE HYDROCHLORIDE 4 UG/ML
.2-1.5 INJECTION, SOLUTION INTRAVENOUS CONTINUOUS PRN
Status: DISCONTINUED | OUTPATIENT
Start: 2018-04-26 | End: 2018-04-26

## 2018-04-26 RX ORDER — ATORVASTATIN CALCIUM 40 MG/1
80 TABLET, FILM COATED ORAL NIGHTLY
Status: DISCONTINUED | OUTPATIENT
Start: 2018-04-26 | End: 2018-04-30 | Stop reason: HOSPADM

## 2018-04-26 RX ORDER — ATORVASTATIN CALCIUM 40 MG/1
40 TABLET, FILM COATED ORAL NIGHTLY
Status: DISCONTINUED | OUTPATIENT
Start: 2018-04-26 | End: 2018-04-26

## 2018-04-26 RX ORDER — DOBUTAMINE HYDROCHLORIDE 100 MG/100ML
2-20 INJECTION INTRAVENOUS CONTINUOUS PRN
Status: DISCONTINUED | OUTPATIENT
Start: 2018-04-26 | End: 2018-04-26

## 2018-04-26 RX ORDER — FUROSEMIDE 10 MG/ML
20 INJECTION INTRAMUSCULAR; INTRAVENOUS ONCE
Status: COMPLETED | OUTPATIENT
Start: 2018-04-27 | End: 2018-04-27

## 2018-04-26 RX ORDER — METOPROLOL TARTRATE 5 MG/5ML
2.5 INJECTION INTRAVENOUS EVERY 6 HOURS SCHEDULED
Status: DISCONTINUED | OUTPATIENT
Start: 2018-04-26 | End: 2018-04-26

## 2018-04-26 RX ORDER — SODIUM CHLORIDE 9 MG/ML
9 INJECTION, SOLUTION INTRAVENOUS CONTINUOUS
Status: DISCONTINUED | OUTPATIENT
Start: 2018-04-26 | End: 2018-04-26

## 2018-04-26 RX ORDER — GLYCOPYRROLATE 0.2 MG/ML
INJECTION INTRAMUSCULAR; INTRAVENOUS AS NEEDED
Status: DISCONTINUED | OUTPATIENT
Start: 2018-04-26 | End: 2018-04-26 | Stop reason: SURG

## 2018-04-26 RX ORDER — PROTAMINE SULFATE 10 MG/ML
50 INJECTION, SOLUTION INTRAVENOUS ONCE
Status: DISCONTINUED | OUTPATIENT
Start: 2018-04-26 | End: 2018-04-26

## 2018-04-26 RX ORDER — SODIUM CHLORIDE, SODIUM LACTATE, POTASSIUM CHLORIDE, CALCIUM CHLORIDE 600; 310; 30; 20 MG/100ML; MG/100ML; MG/100ML; MG/100ML
9 INJECTION, SOLUTION INTRAVENOUS CONTINUOUS
Status: DISCONTINUED | OUTPATIENT
Start: 2018-04-26 | End: 2018-04-26

## 2018-04-26 RX ORDER — NITROGLYCERIN 20 MG/100ML
5-200 INJECTION INTRAVENOUS CONTINUOUS PRN
Status: DISCONTINUED | OUTPATIENT
Start: 2018-04-26 | End: 2018-04-27

## 2018-04-26 RX ORDER — MAGNESIUM SULFATE HEPTAHYDRATE 40 MG/ML
2 INJECTION, SOLUTION INTRAVENOUS AS NEEDED
Status: DISCONTINUED | OUTPATIENT
Start: 2018-04-26 | End: 2018-04-28

## 2018-04-26 RX ORDER — BISACODYL 5 MG/1
10 TABLET, DELAYED RELEASE ORAL DAILY PRN
Status: DISCONTINUED | OUTPATIENT
Start: 2018-04-26 | End: 2018-04-30 | Stop reason: HOSPADM

## 2018-04-26 RX ORDER — CHLORHEXIDINE GLUCONATE 500 MG/1
1 CLOTH TOPICAL EVERY 12 HOURS PRN
Status: DISCONTINUED | OUTPATIENT
Start: 2018-04-26 | End: 2018-04-26

## 2018-04-26 RX ORDER — ACETAMINOPHEN 325 MG/1
650 TABLET ORAL EVERY 4 HOURS PRN
Status: DISCONTINUED | OUTPATIENT
Start: 2018-04-26 | End: 2018-04-26 | Stop reason: HOSPADM

## 2018-04-26 RX ORDER — ETOMIDATE 2 MG/ML
INJECTION INTRAVENOUS AS NEEDED
Status: DISCONTINUED | OUTPATIENT
Start: 2018-04-26 | End: 2018-04-26 | Stop reason: SURG

## 2018-04-26 RX ORDER — CARVEDILOL 3.12 MG/1
3.12 TABLET ORAL EVERY 12 HOURS SCHEDULED
Status: DISCONTINUED | OUTPATIENT
Start: 2018-04-26 | End: 2018-04-26

## 2018-04-26 RX ORDER — ASPIRIN 325 MG
325 TABLET ORAL ONCE
Status: DISCONTINUED | OUTPATIENT
Start: 2018-04-26 | End: 2018-04-26

## 2018-04-26 RX ORDER — CHLORHEXIDINE GLUCONATE 0.12 MG/ML
15 RINSE ORAL ONCE
Status: COMPLETED | OUTPATIENT
Start: 2018-04-26 | End: 2018-04-26

## 2018-04-26 RX ORDER — ASPIRIN 325 MG
325 TABLET ORAL NIGHTLY
Status: DISCONTINUED | OUTPATIENT
Start: 2018-04-26 | End: 2018-04-26

## 2018-04-26 RX ORDER — DOPAMINE HYDROCHLORIDE 160 MG/100ML
2-20 INJECTION, SOLUTION INTRAVENOUS CONTINUOUS PRN
Status: DISCONTINUED | OUTPATIENT
Start: 2018-04-26 | End: 2018-04-26

## 2018-04-26 RX ORDER — SODIUM CHLORIDE 0.9 % (FLUSH) 0.9 %
30 SYRINGE (ML) INJECTION ONCE AS NEEDED
Status: DISCONTINUED | OUTPATIENT
Start: 2018-04-26 | End: 2018-04-26

## 2018-04-26 RX ORDER — ALBUMIN, HUMAN INJ 5% 5 %
500 SOLUTION INTRAVENOUS AS NEEDED
Status: DISCONTINUED | OUTPATIENT
Start: 2018-04-26 | End: 2018-04-26

## 2018-04-26 RX ORDER — POTASSIUM CHLORIDE 29.8 MG/ML
20 INJECTION INTRAVENOUS
Status: DISCONTINUED | OUTPATIENT
Start: 2018-04-26 | End: 2018-04-27

## 2018-04-26 RX ORDER — SODIUM CHLORIDE 0.9 % (FLUSH) 0.9 %
1-10 SYRINGE (ML) INJECTION AS NEEDED
Status: DISCONTINUED | OUTPATIENT
Start: 2018-04-26 | End: 2018-04-26 | Stop reason: HOSPADM

## 2018-04-26 RX ORDER — ALBUTEROL SULFATE 2.5 MG/3ML
2.5 SOLUTION RESPIRATORY (INHALATION) EVERY 4 HOURS PRN
Status: DISCONTINUED | OUTPATIENT
Start: 2018-04-26 | End: 2018-04-27

## 2018-04-26 RX ORDER — LIDOCAINE HYDROCHLORIDE 10 MG/ML
0.5 INJECTION, SOLUTION EPIDURAL; INFILTRATION; INTRACAUDAL; PERINEURAL ONCE AS NEEDED
Status: COMPLETED | OUTPATIENT
Start: 2018-04-26 | End: 2018-04-26

## 2018-04-26 RX ORDER — ALBUMIN, HUMAN INJ 5% 5 %
SOLUTION INTRAVENOUS
Status: DISPENSED
Start: 2018-04-26 | End: 2018-04-26

## 2018-04-26 RX ORDER — HEPARIN SODIUM 1000 [USP'U]/ML
INJECTION, SOLUTION INTRAVENOUS; SUBCUTANEOUS AS NEEDED
Status: DISCONTINUED | OUTPATIENT
Start: 2018-04-26 | End: 2018-04-26 | Stop reason: SURG

## 2018-04-26 RX ORDER — PHENYLEPHRINE HCL IN 0.9% NACL 0.5 MG/5ML
.5-3 SYRINGE (ML) INTRAVENOUS CONTINUOUS PRN
Status: DISCONTINUED | OUTPATIENT
Start: 2018-04-26 | End: 2018-04-27

## 2018-04-26 RX ORDER — CLOPIDOGREL BISULFATE 75 MG/1
75 TABLET ORAL DAILY
Status: DISCONTINUED | OUTPATIENT
Start: 2018-04-27 | End: 2018-04-30 | Stop reason: HOSPADM

## 2018-04-26 RX ORDER — TORSEMIDE 20 MG/1
20 TABLET ORAL DAILY
Status: DISCONTINUED | OUTPATIENT
Start: 2018-04-26 | End: 2018-04-26

## 2018-04-26 RX ORDER — SODIUM CHLORIDE 9 MG/ML
30 INJECTION, SOLUTION INTRAVENOUS CONTINUOUS PRN
Status: DISCONTINUED | OUTPATIENT
Start: 2018-04-26 | End: 2018-04-26

## 2018-04-26 RX ORDER — CHLORHEXIDINE GLUCONATE 0.12 MG/ML
15 RINSE ORAL EVERY 12 HOURS SCHEDULED
Status: DISCONTINUED | OUTPATIENT
Start: 2018-04-26 | End: 2018-04-26

## 2018-04-26 RX ORDER — PROTAMINE SULFATE 10 MG/ML
INJECTION, SOLUTION INTRAVENOUS AS NEEDED
Status: DISCONTINUED | OUTPATIENT
Start: 2018-04-26 | End: 2018-04-26 | Stop reason: SURG

## 2018-04-26 RX ORDER — MAGNESIUM HYDROXIDE 1200 MG/15ML
LIQUID ORAL AS NEEDED
Status: DISCONTINUED | OUTPATIENT
Start: 2018-04-26 | End: 2018-04-26 | Stop reason: HOSPADM

## 2018-04-26 RX ORDER — NITROGLYCERIN 0.4 MG/1
0.4 TABLET SUBLINGUAL
Status: DISCONTINUED | OUTPATIENT
Start: 2018-04-26 | End: 2018-04-26 | Stop reason: HOSPADM

## 2018-04-26 RX ORDER — CEFAZOLIN SODIUM 2 G/100ML
2 INJECTION, SOLUTION INTRAVENOUS ONCE
Status: COMPLETED | OUTPATIENT
Start: 2018-04-26 | End: 2018-04-26

## 2018-04-26 RX ORDER — SENNA AND DOCUSATE SODIUM 50; 8.6 MG/1; MG/1
2 TABLET, FILM COATED ORAL 2 TIMES DAILY PRN
Status: DISCONTINUED | OUTPATIENT
Start: 2018-04-26 | End: 2018-04-26

## 2018-04-26 RX ORDER — LISINOPRIL 5 MG/1
2.5 TABLET ORAL
Status: DISCONTINUED | OUTPATIENT
Start: 2018-04-26 | End: 2018-04-30 | Stop reason: HOSPADM

## 2018-04-26 RX ORDER — SPIRONOLACTONE 25 MG/1
25 TABLET ORAL DAILY
Status: DISCONTINUED | OUTPATIENT
Start: 2018-04-26 | End: 2018-04-26

## 2018-04-26 RX ORDER — BISACODYL 10 MG
10 SUPPOSITORY, RECTAL RECTAL DAILY PRN
Status: DISCONTINUED | OUTPATIENT
Start: 2018-04-27 | End: 2018-04-28

## 2018-04-26 RX ORDER — ALPRAZOLAM 0.5 MG/1
0.5 TABLET ORAL NIGHTLY PRN
Status: DISCONTINUED | OUTPATIENT
Start: 2018-04-26 | End: 2018-04-30 | Stop reason: HOSPADM

## 2018-04-26 RX ORDER — SENNA AND DOCUSATE SODIUM 50; 8.6 MG/1; MG/1
2 TABLET, FILM COATED ORAL NIGHTLY
Status: DISCONTINUED | OUTPATIENT
Start: 2018-04-26 | End: 2018-04-30 | Stop reason: HOSPADM

## 2018-04-26 RX ORDER — MAGNESIUM SULFATE HEPTAHYDRATE 40 MG/ML
4 INJECTION, SOLUTION INTRAVENOUS AS NEEDED
Status: DISCONTINUED | OUTPATIENT
Start: 2018-04-26 | End: 2018-04-28

## 2018-04-26 RX ORDER — ONDANSETRON 2 MG/ML
4 INJECTION INTRAMUSCULAR; INTRAVENOUS EVERY 6 HOURS PRN
Status: DISCONTINUED | OUTPATIENT
Start: 2018-04-26 | End: 2018-04-30 | Stop reason: HOSPADM

## 2018-04-26 RX ORDER — DOCUSATE SODIUM 100 MG/1
100 CAPSULE, LIQUID FILLED ORAL 2 TIMES DAILY PRN
Status: DISCONTINUED | OUTPATIENT
Start: 2018-04-26 | End: 2018-04-30 | Stop reason: HOSPADM

## 2018-04-26 RX ORDER — ATRACURIUM BESYLATE 10 MG/ML
INJECTION, SOLUTION INTRAVENOUS AS NEEDED
Status: DISCONTINUED | OUTPATIENT
Start: 2018-04-26 | End: 2018-04-26 | Stop reason: SURG

## 2018-04-26 RX ORDER — ASPIRIN 81 MG/1
81 TABLET ORAL DAILY
Status: DISCONTINUED | OUTPATIENT
Start: 2018-04-27 | End: 2018-04-30 | Stop reason: HOSPADM

## 2018-04-26 RX ORDER — POTASSIUM CHLORIDE, DEXTROSE MONOHYDRATE 150; 5 MG/100ML; G/100ML
30 INJECTION, SOLUTION INTRAVENOUS CONTINUOUS
Status: DISCONTINUED | OUTPATIENT
Start: 2018-04-26 | End: 2018-04-27

## 2018-04-26 RX ORDER — ASPIRIN 325 MG
325 TABLET, DELAYED RELEASE (ENTERIC COATED) ORAL DAILY
Status: DISCONTINUED | OUTPATIENT
Start: 2018-04-27 | End: 2018-04-26

## 2018-04-26 RX ORDER — FAMOTIDINE 20 MG/1
20 TABLET, FILM COATED ORAL ONCE
Status: COMPLETED | OUTPATIENT
Start: 2018-04-26 | End: 2018-04-26

## 2018-04-26 RX ADMIN — LIDOCAINE HYDROCHLORIDE 0.5 ML: 10 INJECTION, SOLUTION EPIDURAL; INFILTRATION; INTRACAUDAL; PERINEURAL at 06:26

## 2018-04-26 RX ADMIN — SODIUM CHLORIDE 5 MG/HR: 9 INJECTION, SOLUTION INTRAVENOUS at 08:53

## 2018-04-26 RX ADMIN — GLYCOPYRROLATE 0.4 MG: 0.2 INJECTION, SOLUTION INTRAMUSCULAR; INTRAVENOUS at 08:45

## 2018-04-26 RX ADMIN — POTASSIUM CHLORIDE AND DEXTROSE MONOHYDRATE 30 ML/HR: 150; 5 INJECTION, SOLUTION INTRAVENOUS at 09:30

## 2018-04-26 RX ADMIN — MUPIROCIN 1 APPLICATION: 20 OINTMENT TOPICAL at 06:25

## 2018-04-26 RX ADMIN — ATRACURIUM BESYLATE 50 MG: 10 INJECTION, SOLUTION INTRAVENOUS at 07:25

## 2018-04-26 RX ADMIN — CEFAZOLIN SODIUM 2 G: 2 INJECTION, SOLUTION INTRAVENOUS at 07:49

## 2018-04-26 RX ADMIN — POTASSIUM CHLORIDE 20 MEQ: 400 INJECTION, SOLUTION INTRAVENOUS at 12:39

## 2018-04-26 RX ADMIN — CHLORHEXIDINE GLUCONATE 15 ML: 1.2 RINSE ORAL at 06:25

## 2018-04-26 RX ADMIN — ATORVASTATIN CALCIUM 80 MG: 40 TABLET, FILM COATED ORAL at 20:00

## 2018-04-26 RX ADMIN — FAMOTIDINE 20 MG: 20 TABLET ORAL at 06:25

## 2018-04-26 RX ADMIN — POTASSIUM CHLORIDE 20 MEQ: 400 INJECTION, SOLUTION INTRAVENOUS at 11:36

## 2018-04-26 RX ADMIN — ONDANSETRON 4 MG: 2 INJECTION INTRAMUSCULAR; INTRAVENOUS at 13:22

## 2018-04-26 RX ADMIN — Medication 2 MG: at 08:44

## 2018-04-26 RX ADMIN — PROTAMINE SULFATE 50 MG: 10 INJECTION, SOLUTION INTRAVENOUS at 08:43

## 2018-04-26 RX ADMIN — NITROGLYCERIN 5 MCG/MIN: 5 INJECTION, SOLUTION INTRAVENOUS at 08:30

## 2018-04-26 RX ADMIN — SODIUM CHLORIDE: 9 INJECTION, SOLUTION INTRAVENOUS at 07:09

## 2018-04-26 RX ADMIN — HEPARIN SODIUM 13000 UNITS: 1000 INJECTION, SOLUTION INTRAVENOUS; SUBCUTANEOUS at 08:07

## 2018-04-26 RX ADMIN — ETOMIDATE 10 MG: 2 INJECTION, SOLUTION INTRAVENOUS at 07:25

## 2018-04-26 NOTE — ANESTHESIA POSTPROCEDURE EVALUATION
Patient: Tad Moon    Procedure Summary     Date:  04/26/18 Room / Location:   [facility] /  [facility]    Anesthesia Start:  0709 Anesthesia Stop:      Procedures:       Transcatheter Aortic Valve Replacement, LANDY (N/A Chest)      Transcatheter Aortic Valve Replacement (Chest) Diagnosis:       Aortic valve disorder      (Aortic valve disorder [I35.9])    Provider:  Lv Purdy MD; Juan M Sood MD Provider:  Thad Hitchcock MD    Anesthesia Type:  general ASA Status:  4          Anesthesia Type: general  Last vitals  BP   119/68 (04/26/18 0557)   Temp   98.8 °F (37.1 °C) (04/26/18 0557)   Pulse   77 (04/26/18 0557)   Resp   16 (04/26/18 0557)     SpO2   97 % (04/26/18 0557)     Post Anesthesia Care and Evaluation    Patient location during evaluation: PACU  Level of consciousness: awake  Airway patency: patent  Anesthetic complications: No anesthetic complications    Cardiovascular status: acceptable  Respiratory status: acceptable  Hydration status: acceptable

## 2018-04-26 NOTE — ANESTHESIA PREPROCEDURE EVALUATION
Anesthesia Evaluation     Patient summary reviewed and Nursing notes reviewed   NPO Solid Status: > 8 hours  NPO Liquid Status: > 8 hours           Airway   Mallampati: I  TM distance: >3 FB  Neck ROM: full  No difficulty expected  Dental - normal exam     Pulmonary    (+) shortness of breath,   Cardiovascular     ECG reviewed    (+) hypertension, valvular problems/murmurs AS, CABG > 6 Months, CHF,     ROS comment: Echocardiogram Findings     Left Ventricle Left ventricular systolic function is severely decreased. Estimated EF = 20%. Normal left ventricular wall thickness noted. Inferoposterior akinesis    Right Ventricle Normal right ventricular wall thickness and systolic function noted. Right ventricular cavity is mildly dilated.    Left Atrium Left atrial cavity size is mildly dilated. The left atrial appendage was visualized through multiple planes. Doppler interrogation shows normal flow within the left atrial appendage. No evidence of a left atrial appendage thrombus was present. Patent foramen ovale present. Tiny. All pulmonary veins appear normal with no flow abnormalities.    Right Atrium Right atrial cavity size is mildly dilated.    Aortic Valve Mild to moderate aortic valve regurgitation is present. Severe aortic valve stenosis is present. The annulus measures 2.58 cm, the sinuses of Valsalva measure 2.93 cm, sinotubular junction measures 2.26 cm.    Mitral Valve Scattered calcifications are present on the anterior and posterior mitral leaflets.. Moderate-to-severe mitral valve regurgitation is present. No significant mitral valve stenosis is present.    Tricuspid Valve The tricuspid valve is normal. No tricuspid valve stenosis is present. Trace tricuspid valve regurgitation is present.    Pulmonic Valve The pulmonic valve is structurally normal. There is no significant pulmonic valve stenosis present. There is no significant pulmonic valve regurgitation present.    Greater Vessels There is (grade 2)  plaque in the ascending aorta present. There is evidence of ascending aorta sclerosis/calcification present. There is (grade 3) plaque in the aortic arch present. There is evidence of aortic arch sclerosis/calcification present. There is (grade 3) plaque in the descending aorta present. There is evidence of descending aorta sclerosis/calcification present.    Pericardium Physiologic effusion.          Neuro/Psych  GI/Hepatic/Renal/Endo    (+)  GERD,      ROS Comment: Denies esophageal problems    Musculoskeletal     Abdominal    Substance History      OB/GYN          Other                      Anesthesia Plan    ASA 4     general   ([place],[facility],Bld, vent , ICU-risks discussed)  intravenous induction   Anesthetic plan and risks discussed with patient.

## 2018-04-27 ENCOUNTER — APPOINTMENT (OUTPATIENT)
Dept: GENERAL RADIOLOGY | Facility: HOSPITAL | Age: 83
End: 2018-04-27

## 2018-04-27 LAB
ALBUMIN SERPL-MCNC: 3.7 G/DL (ref 3.2–4.8)
ANION GAP SERPL CALCULATED.3IONS-SCNC: 7 MMOL/L (ref 3–11)
BASOPHILS # BLD AUTO: 0.05 10*3/MM3 (ref 0–0.2)
BASOPHILS NFR BLD AUTO: 0.5 % (ref 0–1)
BUN BLD-MCNC: 20 MG/DL (ref 9–23)
BUN/CREAT SERPL: 14.3 (ref 7–25)
CALCIUM SPEC-SCNC: 8.6 MG/DL (ref 8.7–10.4)
CHLORIDE SERPL-SCNC: 106 MMOL/L (ref 99–109)
CO2 SERPL-SCNC: 24 MMOL/L (ref 20–31)
CREAT BLD-MCNC: 1.4 MG/DL (ref 0.6–1.3)
DEPRECATED RDW RBC AUTO: 52 FL (ref 37–54)
EOSINOPHIL # BLD AUTO: 0.3 10*3/MM3 (ref 0–0.3)
EOSINOPHIL NFR BLD AUTO: 3.2 % (ref 0–3)
ERYTHROCYTE [DISTWIDTH] IN BLOOD BY AUTOMATED COUNT: 15.2 % (ref 11.3–14.5)
GFR SERPL CREATININE-BSD FRML MDRD: 48 ML/MIN/1.73
GLUCOSE BLD-MCNC: 111 MG/DL (ref 70–100)
GLUCOSE BLDC GLUCOMTR-MCNC: 115 MG/DL (ref 70–130)
GLUCOSE BLDC GLUCOMTR-MCNC: 119 MG/DL (ref 70–130)
GLUCOSE BLDC GLUCOMTR-MCNC: 134 MG/DL (ref 70–130)
GLUCOSE BLDC GLUCOMTR-MCNC: 91 MG/DL (ref 70–130)
HCT VFR BLD AUTO: 30.4 % (ref 38.9–50.9)
HGB BLD-MCNC: 9.6 G/DL (ref 13.1–17.5)
IMM GRANULOCYTES # BLD: 0.01 10*3/MM3 (ref 0–0.03)
IMM GRANULOCYTES NFR BLD: 0.1 % (ref 0–0.6)
INR PPP: 1.1 (ref 0.91–1.09)
LYMPHOCYTES # BLD AUTO: 3.9 10*3/MM3 (ref 0.6–4.8)
LYMPHOCYTES NFR BLD AUTO: 41.8 % (ref 24–44)
MAGNESIUM SERPL-MCNC: 1.6 MG/DL (ref 1.3–2.7)
MCH RBC QN AUTO: 30 PG (ref 27–31)
MCHC RBC AUTO-ENTMCNC: 31.6 G/DL (ref 32–36)
MCV RBC AUTO: 95 FL (ref 80–99)
MONOCYTES # BLD AUTO: 0.63 10*3/MM3 (ref 0–1)
MONOCYTES NFR BLD AUTO: 6.7 % (ref 0–12)
NEUTROPHILS # BLD AUTO: 4.46 10*3/MM3 (ref 1.5–8.3)
NEUTROPHILS NFR BLD AUTO: 47.8 % (ref 41–71)
PHOSPHATE SERPL-MCNC: 3.4 MG/DL (ref 2.4–5.1)
PLATELET # BLD AUTO: 114 10*3/MM3 (ref 150–450)
PMV BLD AUTO: 12.4 FL (ref 6–12)
POTASSIUM BLD-SCNC: 4.2 MMOL/L (ref 3.5–5.5)
PROTHROMBIN TIME: 11.6 SECONDS (ref 9.6–11.5)
RBC # BLD AUTO: 3.2 10*6/MM3 (ref 4.2–5.76)
SODIUM BLD-SCNC: 137 MMOL/L (ref 132–146)
WBC NRBC COR # BLD: 9.34 10*3/MM3 (ref 3.5–10.8)

## 2018-04-27 PROCEDURE — 97110 THERAPEUTIC EXERCISES: CPT

## 2018-04-27 PROCEDURE — 25010000002 FUROSEMIDE PER 20 MG: Performed by: NURSE PRACTITIONER

## 2018-04-27 PROCEDURE — 80069 RENAL FUNCTION PANEL: CPT | Performed by: PHYSICIAN ASSISTANT

## 2018-04-27 PROCEDURE — 87086 URINE CULTURE/COLONY COUNT: CPT | Performed by: THORACIC SURGERY (CARDIOTHORACIC VASCULAR SURGERY)

## 2018-04-27 PROCEDURE — 93005 ELECTROCARDIOGRAM TRACING: CPT | Performed by: PHYSICIAN ASSISTANT

## 2018-04-27 PROCEDURE — 99232 SBSQ HOSP IP/OBS MODERATE 35: CPT | Performed by: INTERNAL MEDICINE

## 2018-04-27 PROCEDURE — 99233 SBSQ HOSP IP/OBS HIGH 50: CPT | Performed by: INTERNAL MEDICINE

## 2018-04-27 PROCEDURE — 85025 COMPLETE CBC W/AUTO DIFF WBC: CPT | Performed by: PHYSICIAN ASSISTANT

## 2018-04-27 PROCEDURE — 83735 ASSAY OF MAGNESIUM: CPT | Performed by: PHYSICIAN ASSISTANT

## 2018-04-27 PROCEDURE — 82962 GLUCOSE BLOOD TEST: CPT

## 2018-04-27 PROCEDURE — 71045 X-RAY EXAM CHEST 1 VIEW: CPT

## 2018-04-27 PROCEDURE — 85610 PROTHROMBIN TIME: CPT | Performed by: PHYSICIAN ASSISTANT

## 2018-04-27 PROCEDURE — 93010 ELECTROCARDIOGRAM REPORT: CPT | Performed by: INTERNAL MEDICINE

## 2018-04-27 PROCEDURE — 99231 SBSQ HOSP IP/OBS SF/LOW 25: CPT | Performed by: THORACIC SURGERY (CARDIOTHORACIC VASCULAR SURGERY)

## 2018-04-27 PROCEDURE — 97161 PT EVAL LOW COMPLEX 20 MIN: CPT

## 2018-04-27 PROCEDURE — 81001 URINALYSIS AUTO W/SCOPE: CPT | Performed by: THORACIC SURGERY (CARDIOTHORACIC VASCULAR SURGERY)

## 2018-04-27 RX ORDER — TAMSULOSIN HYDROCHLORIDE 0.4 MG/1
0.4 CAPSULE ORAL DAILY
Status: DISCONTINUED | OUTPATIENT
Start: 2018-04-27 | End: 2018-04-30 | Stop reason: HOSPADM

## 2018-04-27 RX ORDER — SPIRONOLACTONE 25 MG/1
25 TABLET ORAL DAILY
Status: DISCONTINUED | OUTPATIENT
Start: 2018-04-28 | End: 2018-04-30 | Stop reason: HOSPADM

## 2018-04-27 RX ORDER — TORSEMIDE 20 MG/1
20 TABLET ORAL DAILY
Status: DISCONTINUED | OUTPATIENT
Start: 2018-04-28 | End: 2018-04-30 | Stop reason: HOSPADM

## 2018-04-27 RX ORDER — CARVEDILOL 3.12 MG/1
3.12 TABLET ORAL EVERY 12 HOURS SCHEDULED
Status: DISCONTINUED | OUTPATIENT
Start: 2018-04-27 | End: 2018-04-30 | Stop reason: HOSPADM

## 2018-04-27 RX ORDER — ACETAMINOPHEN 325 MG/1
650 TABLET ORAL EVERY 6 HOURS PRN
Status: DISCONTINUED | OUTPATIENT
Start: 2018-04-27 | End: 2018-04-30 | Stop reason: HOSPADM

## 2018-04-27 RX ADMIN — LISINOPRIL 2.5 MG: 5 TABLET ORAL at 08:21

## 2018-04-27 RX ADMIN — CLOPIDOGREL BISULFATE 75 MG: 75 TABLET ORAL at 08:23

## 2018-04-27 RX ADMIN — SERTRALINE HYDROCHLORIDE 50 MG: 50 TABLET ORAL at 08:23

## 2018-04-27 RX ADMIN — ALPRAZOLAM 0.5 MG: 0.5 TABLET ORAL at 20:35

## 2018-04-27 RX ADMIN — ASPIRIN 81 MG: 81 TABLET, COATED ORAL at 08:23

## 2018-04-27 RX ADMIN — ATORVASTATIN CALCIUM 80 MG: 40 TABLET, FILM COATED ORAL at 20:35

## 2018-04-27 RX ADMIN — ACETAMINOPHEN 650 MG: 325 TABLET ORAL at 05:24

## 2018-04-27 RX ADMIN — FUROSEMIDE 20 MG: 10 INJECTION, SOLUTION INTRAMUSCULAR; INTRAVENOUS at 06:06

## 2018-04-27 RX ADMIN — TAMSULOSIN HYDROCHLORIDE 0.4 MG: 0.4 CAPSULE ORAL at 13:30

## 2018-04-28 ENCOUNTER — APPOINTMENT (OUTPATIENT)
Dept: GENERAL RADIOLOGY | Facility: HOSPITAL | Age: 83
End: 2018-04-28

## 2018-04-28 LAB
ALBUMIN SERPL-MCNC: 3.7 G/DL (ref 3.2–4.8)
ALBUMIN/GLOB SERPL: 1.6 G/DL (ref 1.5–2.5)
ALP SERPL-CCNC: 73 U/L (ref 25–100)
ALT SERPL W P-5'-P-CCNC: 14 U/L (ref 7–40)
ANION GAP SERPL CALCULATED.3IONS-SCNC: 5 MMOL/L (ref 3–11)
APTT PPP: 29.5 SECONDS (ref 24–31)
AST SERPL-CCNC: 18 U/L (ref 0–33)
BACTERIA UR QL AUTO: ABNORMAL /HPF
BILIRUB SERPL-MCNC: 1.3 MG/DL (ref 0.3–1.2)
BILIRUB UR QL STRIP: NEGATIVE
BUN BLD-MCNC: 18 MG/DL (ref 9–23)
BUN/CREAT SERPL: 12.9 (ref 7–25)
CALCIUM SPEC-SCNC: 8.7 MG/DL (ref 8.7–10.4)
CHLORIDE SERPL-SCNC: 102 MMOL/L (ref 99–109)
CLARITY UR: ABNORMAL
CO2 SERPL-SCNC: 26 MMOL/L (ref 20–31)
COLOR UR: YELLOW
CREAT BLD-MCNC: 1.4 MG/DL (ref 0.6–1.3)
DEPRECATED RDW RBC AUTO: 52.9 FL (ref 37–54)
ERYTHROCYTE [DISTWIDTH] IN BLOOD BY AUTOMATED COUNT: 15.3 % (ref 11.3–14.5)
GFR SERPL CREATININE-BSD FRML MDRD: 48 ML/MIN/1.73
GLOBULIN UR ELPH-MCNC: 2.3 GM/DL
GLUCOSE BLD-MCNC: 96 MG/DL (ref 70–100)
GLUCOSE BLDC GLUCOMTR-MCNC: 104 MG/DL (ref 70–130)
GLUCOSE BLDC GLUCOMTR-MCNC: 68 MG/DL (ref 70–130)
GLUCOSE BLDC GLUCOMTR-MCNC: 75 MG/DL (ref 70–130)
GLUCOSE BLDC GLUCOMTR-MCNC: 95 MG/DL (ref 70–130)
GLUCOSE UR STRIP-MCNC: NEGATIVE MG/DL
HCT VFR BLD AUTO: 29.9 % (ref 38.9–50.9)
HGB BLD-MCNC: 9.5 G/DL (ref 13.1–17.5)
HGB UR QL STRIP.AUTO: ABNORMAL
HYALINE CASTS UR QL AUTO: ABNORMAL /LPF
INR PPP: 1.09 (ref 0.91–1.09)
KETONES UR QL STRIP: NEGATIVE
LEUKOCYTE ESTERASE UR QL STRIP.AUTO: ABNORMAL
MCH RBC QN AUTO: 29.9 PG (ref 27–31)
MCHC RBC AUTO-ENTMCNC: 31.8 G/DL (ref 32–36)
MCV RBC AUTO: 94 FL (ref 80–99)
NITRITE UR QL STRIP: NEGATIVE
PH UR STRIP.AUTO: 5.5 [PH] (ref 5–8)
PLATELET # BLD AUTO: 111 10*3/MM3 (ref 150–450)
PMV BLD AUTO: 12.4 FL (ref 6–12)
POTASSIUM BLD-SCNC: 4.3 MMOL/L (ref 3.5–5.5)
PROT SERPL-MCNC: 6 G/DL (ref 5.7–8.2)
PROT UR QL STRIP: ABNORMAL
PROTHROMBIN TIME: 11.4 SECONDS (ref 9.6–11.5)
RBC # BLD AUTO: 3.18 10*6/MM3 (ref 4.2–5.76)
RBC # UR: ABNORMAL /HPF
REF LAB TEST METHOD: ABNORMAL
SODIUM BLD-SCNC: 133 MMOL/L (ref 132–146)
SP GR UR STRIP: 1.02 (ref 1–1.03)
SQUAMOUS #/AREA URNS HPF: ABNORMAL /HPF
UROBILINOGEN UR QL STRIP: ABNORMAL
WBC NRBC COR # BLD: 9.9 10*3/MM3 (ref 3.5–10.8)
WBC UR QL AUTO: ABNORMAL /HPF

## 2018-04-28 PROCEDURE — 99233 SBSQ HOSP IP/OBS HIGH 50: CPT | Performed by: INTERNAL MEDICINE

## 2018-04-28 PROCEDURE — 82962 GLUCOSE BLOOD TEST: CPT

## 2018-04-28 PROCEDURE — 85027 COMPLETE CBC AUTOMATED: CPT | Performed by: PHYSICIAN ASSISTANT

## 2018-04-28 PROCEDURE — 99232 SBSQ HOSP IP/OBS MODERATE 35: CPT | Performed by: INTERNAL MEDICINE

## 2018-04-28 PROCEDURE — 63710000001 INSULIN LISPRO (HUMAN) PER 5 UNITS: Performed by: INTERNAL MEDICINE

## 2018-04-28 PROCEDURE — 99231 SBSQ HOSP IP/OBS SF/LOW 25: CPT | Performed by: THORACIC SURGERY (CARDIOTHORACIC VASCULAR SURGERY)

## 2018-04-28 PROCEDURE — 93010 ELECTROCARDIOGRAM REPORT: CPT | Performed by: INTERNAL MEDICINE

## 2018-04-28 PROCEDURE — 80053 COMPREHEN METABOLIC PANEL: CPT | Performed by: THORACIC SURGERY (CARDIOTHORACIC VASCULAR SURGERY)

## 2018-04-28 PROCEDURE — 85610 PROTHROMBIN TIME: CPT | Performed by: NURSE PRACTITIONER

## 2018-04-28 PROCEDURE — 85730 THROMBOPLASTIN TIME PARTIAL: CPT | Performed by: NURSE PRACTITIONER

## 2018-04-28 PROCEDURE — 93005 ELECTROCARDIOGRAM TRACING: CPT | Performed by: PHYSICIAN ASSISTANT

## 2018-04-28 PROCEDURE — 71045 X-RAY EXAM CHEST 1 VIEW: CPT

## 2018-04-28 RX ORDER — FERROUS SULFATE 325(65) MG
325 TABLET ORAL 2 TIMES DAILY WITH MEALS
Status: DISCONTINUED | OUTPATIENT
Start: 2018-04-28 | End: 2018-04-30 | Stop reason: HOSPADM

## 2018-04-28 RX ORDER — SODIUM CHLORIDE 0.9 % (FLUSH) 0.9 %
1-10 SYRINGE (ML) INJECTION AS NEEDED
Status: DISCONTINUED | OUTPATIENT
Start: 2018-04-28 | End: 2018-04-30 | Stop reason: HOSPADM

## 2018-04-28 RX ORDER — SODIUM CHLORIDE 0.9 % (FLUSH) 0.9 %
1-10 SYRINGE (ML) INJECTION EVERY 8 HOURS
Status: DISCONTINUED | OUTPATIENT
Start: 2018-04-28 | End: 2018-04-28

## 2018-04-28 RX ADMIN — TORSEMIDE 20 MG: 20 TABLET ORAL at 08:15

## 2018-04-28 RX ADMIN — SERTRALINE HYDROCHLORIDE 50 MG: 50 TABLET ORAL at 08:16

## 2018-04-28 RX ADMIN — Medication 2 TABLET: at 20:49

## 2018-04-28 RX ADMIN — FERROUS SULFATE TAB 325 MG (65 MG ELEMENTAL FE) 325 MG: 325 (65 FE) TAB at 14:09

## 2018-04-28 RX ADMIN — FERROUS SULFATE TAB 325 MG (65 MG ELEMENTAL FE) 325 MG: 325 (65 FE) TAB at 18:15

## 2018-04-28 RX ADMIN — ALPRAZOLAM 0.5 MG: 0.5 TABLET ORAL at 20:48

## 2018-04-28 RX ADMIN — ASPIRIN 81 MG: 81 TABLET, COATED ORAL at 08:16

## 2018-04-28 RX ADMIN — SPIRONOLACTONE 25 MG: 25 TABLET, FILM COATED ORAL at 08:16

## 2018-04-28 RX ADMIN — TAMSULOSIN HYDROCHLORIDE 0.4 MG: 0.4 CAPSULE ORAL at 08:15

## 2018-04-28 RX ADMIN — ATORVASTATIN CALCIUM 80 MG: 40 TABLET, FILM COATED ORAL at 20:48

## 2018-04-28 RX ADMIN — LISINOPRIL 2.5 MG: 5 TABLET ORAL at 08:16

## 2018-04-28 RX ADMIN — DOCUSATE SODIUM 100 MG: 100 CAPSULE, LIQUID FILLED ORAL at 18:15

## 2018-04-28 RX ADMIN — CARVEDILOL 3.12 MG: 3.12 TABLET, FILM COATED ORAL at 20:48

## 2018-04-28 RX ADMIN — CLOPIDOGREL BISULFATE 75 MG: 75 TABLET ORAL at 08:16

## 2018-04-29 LAB
ABO + RH BLD: NORMAL
ABO + RH BLD: NORMAL
ANION GAP SERPL CALCULATED.3IONS-SCNC: 7 MMOL/L (ref 3–11)
BH BB BLOOD EXPIRATION DATE: NORMAL
BH BB BLOOD EXPIRATION DATE: NORMAL
BH BB BLOOD TYPE BARCODE: 7300
BH BB BLOOD TYPE BARCODE: 7300
BH BB DISPENSE STATUS: NORMAL
BH BB DISPENSE STATUS: NORMAL
BH BB PRODUCT CODE: NORMAL
BH BB PRODUCT CODE: NORMAL
BH BB UNIT NUMBER: NORMAL
BH BB UNIT NUMBER: NORMAL
BUN BLD-MCNC: 22 MG/DL (ref 9–23)
BUN/CREAT SERPL: 15.7 (ref 7–25)
CALCIUM SPEC-SCNC: 8.8 MG/DL (ref 8.7–10.4)
CHLORIDE SERPL-SCNC: 102 MMOL/L (ref 99–109)
CO2 SERPL-SCNC: 28 MMOL/L (ref 20–31)
CREAT BLD-MCNC: 1.4 MG/DL (ref 0.6–1.3)
CROSSMATCH INTERPRETATION: NORMAL
CROSSMATCH INTERPRETATION: NORMAL
DEPRECATED RDW RBC AUTO: 51 FL (ref 37–54)
ERYTHROCYTE [DISTWIDTH] IN BLOOD BY AUTOMATED COUNT: 15.1 % (ref 11.3–14.5)
GFR SERPL CREATININE-BSD FRML MDRD: 48 ML/MIN/1.73
GLUCOSE BLD-MCNC: 94 MG/DL (ref 70–100)
GLUCOSE BLDC GLUCOMTR-MCNC: 100 MG/DL (ref 70–130)
GLUCOSE BLDC GLUCOMTR-MCNC: 115 MG/DL (ref 70–130)
GLUCOSE BLDC GLUCOMTR-MCNC: 95 MG/DL (ref 70–130)
HCT VFR BLD AUTO: 29 % (ref 38.9–50.9)
HGB BLD-MCNC: 9.6 G/DL (ref 13.1–17.5)
MAGNESIUM SERPL-MCNC: 1.6 MG/DL (ref 1.3–2.7)
MCH RBC QN AUTO: 30.4 PG (ref 27–31)
MCHC RBC AUTO-ENTMCNC: 33.1 G/DL (ref 32–36)
MCV RBC AUTO: 91.8 FL (ref 80–99)
PHOSPHATE SERPL-MCNC: 2.9 MG/DL (ref 2.4–5.1)
PLATELET # BLD AUTO: 114 10*3/MM3 (ref 150–450)
PMV BLD AUTO: 11.8 FL (ref 6–12)
POTASSIUM BLD-SCNC: 3.9 MMOL/L (ref 3.5–5.5)
RBC # BLD AUTO: 3.16 10*6/MM3 (ref 4.2–5.76)
SODIUM BLD-SCNC: 137 MMOL/L (ref 132–146)
UNIT  ABO: NORMAL
UNIT  ABO: NORMAL
UNIT  RH: NORMAL
UNIT  RH: NORMAL
WBC NRBC COR # BLD: 10.95 10*3/MM3 (ref 3.5–10.8)

## 2018-04-29 PROCEDURE — 83735 ASSAY OF MAGNESIUM: CPT | Performed by: INTERNAL MEDICINE

## 2018-04-29 PROCEDURE — 80048 BASIC METABOLIC PNL TOTAL CA: CPT | Performed by: PHYSICIAN ASSISTANT

## 2018-04-29 PROCEDURE — 99221 1ST HOSP IP/OBS SF/LOW 40: CPT | Performed by: PHYSICIAN ASSISTANT

## 2018-04-29 PROCEDURE — 84100 ASSAY OF PHOSPHORUS: CPT | Performed by: INTERNAL MEDICINE

## 2018-04-29 PROCEDURE — 99232 SBSQ HOSP IP/OBS MODERATE 35: CPT | Performed by: INTERNAL MEDICINE

## 2018-04-29 PROCEDURE — 99024 POSTOP FOLLOW-UP VISIT: CPT | Performed by: PHYSICIAN ASSISTANT

## 2018-04-29 PROCEDURE — 97530 THERAPEUTIC ACTIVITIES: CPT

## 2018-04-29 PROCEDURE — 85027 COMPLETE CBC AUTOMATED: CPT | Performed by: PHYSICIAN ASSISTANT

## 2018-04-29 PROCEDURE — 82962 GLUCOSE BLOOD TEST: CPT

## 2018-04-29 RX ORDER — FAMOTIDINE 20 MG/1
20 TABLET, FILM COATED ORAL DAILY
Status: DISCONTINUED | OUTPATIENT
Start: 2018-04-29 | End: 2018-04-30 | Stop reason: HOSPADM

## 2018-04-29 RX ADMIN — CLOPIDOGREL BISULFATE 75 MG: 75 TABLET ORAL at 08:33

## 2018-04-29 RX ADMIN — ATORVASTATIN CALCIUM 80 MG: 40 TABLET, FILM COATED ORAL at 21:05

## 2018-04-29 RX ADMIN — SERTRALINE HYDROCHLORIDE 50 MG: 50 TABLET ORAL at 08:33

## 2018-04-29 RX ADMIN — ASPIRIN 81 MG: 81 TABLET, COATED ORAL at 08:32

## 2018-04-29 RX ADMIN — FERROUS SULFATE TAB 325 MG (65 MG ELEMENTAL FE) 325 MG: 325 (65 FE) TAB at 08:33

## 2018-04-29 RX ADMIN — SPIRONOLACTONE 25 MG: 25 TABLET, FILM COATED ORAL at 08:33

## 2018-04-29 RX ADMIN — TAMSULOSIN HYDROCHLORIDE 0.4 MG: 0.4 CAPSULE ORAL at 08:33

## 2018-04-29 RX ADMIN — TORSEMIDE 20 MG: 20 TABLET ORAL at 08:33

## 2018-04-29 RX ADMIN — FAMOTIDINE 20 MG: 20 TABLET, FILM COATED ORAL at 11:34

## 2018-04-29 RX ADMIN — LISINOPRIL 2.5 MG: 5 TABLET ORAL at 08:33

## 2018-04-29 RX ADMIN — ALPRAZOLAM 0.5 MG: 0.5 TABLET ORAL at 21:05

## 2018-04-29 RX ADMIN — CARVEDILOL 3.12 MG: 3.12 TABLET, FILM COATED ORAL at 08:33

## 2018-04-29 RX ADMIN — FERROUS SULFATE TAB 325 MG (65 MG ELEMENTAL FE) 325 MG: 325 (65 FE) TAB at 17:09

## 2018-04-30 VITALS
BODY MASS INDEX: 23.46 KG/M2 | HEART RATE: 65 BPM | HEIGHT: 68 IN | SYSTOLIC BLOOD PRESSURE: 103 MMHG | RESPIRATION RATE: 16 BRPM | WEIGHT: 154.8 LBS | TEMPERATURE: 97.4 F | OXYGEN SATURATION: 96 % | DIASTOLIC BLOOD PRESSURE: 52 MMHG

## 2018-04-30 DIAGNOSIS — I50.23 ACUTE ON CHRONIC SYSTOLIC CONGESTIVE HEART FAILURE (HCC): ICD-10-CM

## 2018-04-30 DIAGNOSIS — I35.0 NONRHEUMATIC AORTIC VALVE STENOSIS: Primary | ICD-10-CM

## 2018-04-30 DIAGNOSIS — I25.10 CORONARY ARTERY DISEASE INVOLVING NATIVE CORONARY ARTERY OF NATIVE HEART WITHOUT ANGINA PECTORIS: ICD-10-CM

## 2018-04-30 LAB
ANION GAP SERPL CALCULATED.3IONS-SCNC: 6 MMOL/L (ref 3–11)
BACTERIA SPEC AEROBE CULT: NORMAL
BUN BLD-MCNC: 24 MG/DL (ref 9–23)
BUN/CREAT SERPL: 17.1 (ref 7–25)
CALCIUM SPEC-SCNC: 8.9 MG/DL (ref 8.7–10.4)
CHLORIDE SERPL-SCNC: 103 MMOL/L (ref 99–109)
CO2 SERPL-SCNC: 28 MMOL/L (ref 20–31)
CREAT BLD-MCNC: 1.4 MG/DL (ref 0.6–1.3)
GFR SERPL CREATININE-BSD FRML MDRD: 48 ML/MIN/1.73
GLUCOSE BLD-MCNC: 90 MG/DL (ref 70–100)
HCT VFR BLD AUTO: 29.8 % (ref 38.9–50.9)
HGB BLD-MCNC: 9.6 G/DL (ref 13.1–17.5)
POTASSIUM BLD-SCNC: 3.7 MMOL/L (ref 3.5–5.5)
SODIUM BLD-SCNC: 137 MMOL/L (ref 132–146)

## 2018-04-30 PROCEDURE — 93005 ELECTROCARDIOGRAM TRACING: CPT | Performed by: INTERNAL MEDICINE

## 2018-04-30 PROCEDURE — 99232 SBSQ HOSP IP/OBS MODERATE 35: CPT | Performed by: PHYSICIAN ASSISTANT

## 2018-04-30 PROCEDURE — 85018 HEMOGLOBIN: CPT | Performed by: THORACIC SURGERY (CARDIOTHORACIC VASCULAR SURGERY)

## 2018-04-30 PROCEDURE — 93010 ELECTROCARDIOGRAM REPORT: CPT | Performed by: INTERNAL MEDICINE

## 2018-04-30 PROCEDURE — 99232 SBSQ HOSP IP/OBS MODERATE 35: CPT | Performed by: INTERNAL MEDICINE

## 2018-04-30 PROCEDURE — 99238 HOSP IP/OBS DSCHRG MGMT 30/<: CPT | Performed by: NURSE PRACTITIONER

## 2018-04-30 PROCEDURE — 99231 SBSQ HOSP IP/OBS SF/LOW 25: CPT | Performed by: THORACIC SURGERY (CARDIOTHORACIC VASCULAR SURGERY)

## 2018-04-30 PROCEDURE — 85014 HEMATOCRIT: CPT | Performed by: THORACIC SURGERY (CARDIOTHORACIC VASCULAR SURGERY)

## 2018-04-30 PROCEDURE — 80048 BASIC METABOLIC PNL TOTAL CA: CPT | Performed by: PHYSICIAN ASSISTANT

## 2018-04-30 RX ORDER — ASCORBIC ACID 500 MG
500 TABLET ORAL 2 TIMES DAILY WITH MEALS
Status: DISCONTINUED | OUTPATIENT
Start: 2018-04-30 | End: 2018-04-30 | Stop reason: HOSPADM

## 2018-04-30 RX ORDER — FERROUS SULFATE 325(65) MG
325 TABLET ORAL 2 TIMES DAILY WITH MEALS
Qty: 30 TABLET | Refills: 1 | Status: SHIPPED | OUTPATIENT
Start: 2018-04-30 | End: 2018-07-09

## 2018-04-30 RX ADMIN — TAMSULOSIN HYDROCHLORIDE 0.4 MG: 0.4 CAPSULE ORAL at 08:46

## 2018-04-30 RX ADMIN — SPIRONOLACTONE 25 MG: 25 TABLET, FILM COATED ORAL at 08:46

## 2018-04-30 RX ADMIN — ASPIRIN 81 MG: 81 TABLET, COATED ORAL at 08:46

## 2018-04-30 RX ADMIN — FERROUS SULFATE TAB 325 MG (65 MG ELEMENTAL FE) 325 MG: 325 (65 FE) TAB at 08:46

## 2018-04-30 RX ADMIN — OXYCODONE HYDROCHLORIDE AND ACETAMINOPHEN 500 MG: 500 TABLET ORAL at 12:17

## 2018-04-30 RX ADMIN — CLOPIDOGREL BISULFATE 75 MG: 75 TABLET ORAL at 08:46

## 2018-04-30 RX ADMIN — FAMOTIDINE 20 MG: 20 TABLET, FILM COATED ORAL at 08:45

## 2018-04-30 RX ADMIN — TORSEMIDE 20 MG: 20 TABLET ORAL at 08:46

## 2018-04-30 RX ADMIN — SERTRALINE HYDROCHLORIDE 50 MG: 50 TABLET ORAL at 08:46

## 2018-04-30 RX ADMIN — LISINOPRIL 2.5 MG: 5 TABLET ORAL at 08:45

## 2018-04-30 RX ADMIN — CARVEDILOL 3.12 MG: 3.12 TABLET, FILM COATED ORAL at 08:46

## 2018-05-21 ENCOUNTER — OFFICE VISIT (OUTPATIENT)
Dept: CARDIAC SURGERY | Facility: CLINIC | Age: 83
End: 2018-05-21

## 2018-05-21 VITALS
BODY MASS INDEX: 23.85 KG/M2 | HEIGHT: 69 IN | OXYGEN SATURATION: 98 % | HEART RATE: 91 BPM | DIASTOLIC BLOOD PRESSURE: 50 MMHG | SYSTOLIC BLOOD PRESSURE: 120 MMHG | TEMPERATURE: 97.8 F | WEIGHT: 161 LBS

## 2018-05-21 DIAGNOSIS — Q23.0 CONGENITAL STENOSIS OF AORTIC VALVE: Primary | ICD-10-CM

## 2018-05-21 DIAGNOSIS — I35.0 NONRHEUMATIC AORTIC VALVE STENOSIS: ICD-10-CM

## 2018-05-21 PROCEDURE — 99213 OFFICE O/P EST LOW 20 MIN: CPT | Performed by: PHYSICIAN ASSISTANT

## 2018-05-21 PROCEDURE — 71045 X-RAY EXAM CHEST 1 VIEW: CPT | Performed by: THORACIC SURGERY (CARDIOTHORACIC VASCULAR SURGERY)

## 2018-05-21 NOTE — PROGRESS NOTES
05/21/2018  Patient Information  Tad Moon                                                                                          [address]  [facility] [place] [address]   11/30/1926  'PCP/Referring Physician'  Pramod Hyde MD  [phone]  No ref. provider found    Chief Complaint   Patient presents with   • Follow-up     follow up TAVR 4/26/18       History of Present Illness:  Patient presents to office today for postoperative follow-up of TAVR procedure performed 4/26/18 secondary to aortic valve stenosis.  Patient denies any pain, shortness of breath or difficulty with ambulation.  Mr. Moon states that he has experienced some weakness postoperatively, but is resting well and eating well.  The patient will follow with his cardiologist, Dr. Cobian, on 6/4/18 for an echo.  Patient has a question about a decaying tooth he has, and whether or not he will need antibiotics for removal.  Patient otherwise doing well.    Patient Active Problem List   Diagnosis   • Coronary artery disease involving native coronary artery of native heart   • Hyperlipidemia LDL goal <100   • History of tobacco abuse   • CLL (chronic lymphocytic leukemia)   • GERD (gastroesophageal reflux disease)   • Nonrheumatic aortic valve stenosis   • Acute on chronic systolic congestive heart failure   • CKD (chronic kidney disease) stage 3, GFR 30-59 ml/min   • Aortic valve stenosis     Past Medical History:   Diagnosis Date   • Aortic stenosis    • Arthritis    • CHF (congestive heart failure)    • CLL (chronic lymphocytic leukemia)     15 years    • Coronary artery disease    • GERD (gastroesophageal reflux disease)    • History of tobacco abuse    • Hyperlipidemia    • Hypertension    • Low kidney function     very recently and did kidney function test and said decreased function so being watched to get all fluid off    • MI (myocardial infarction)     mid 90s very mild - few years after bypass surgery    • Skin cancer     basal  and squamous from various location    • SOBOE (shortness of breath on exertion)    • Uses hearing aid     bilat ears      Past Surgical History:   Procedure Laterality Date   • ABDOMINAL HERNIA REPAIR      x 2   • AORTIC VALVE REPAIR/REPLACEMENT N/A 4/26/2018    Procedure: Transcatheter Aortic Valve Replacement, LANDY;  Surgeon: Lv Purdy MD;  Location:  [facility];  Service: Cardiothoracic   • AORTIC VALVE REPAIR/REPLACEMENT N/A 4/26/2018    Procedure: Transcatheter Aortic Valve Replacement;  Surgeon: Juan M Sood MD;  Location:  [facility];  Service: Cardiology   • APPENDECTOMY     • CARDIAC CATHETERIZATION N/A 10/10/2017    Procedure: Left Heart Cath;  Surgeon: Juan M Sood MD;  Location:  [facility];  Service:    • CARDIAC CATHETERIZATION N/A 4/6/2018    Procedure: Left Heart Cath;  Surgeon: Lv Cobian MD;  Location:  [facility];  Service: Cardiovascular   • CHOLECYSTECTOMY     • COLONOSCOPY     • CORONARY ANGIOPLASTY WITH STENT PLACEMENT      07 one placed,  and in 2017 had another stent placed and one repaired -   so pt thinks 3 stents in place    • CORONARY ARTERY BYPASS GRAFT      4 bypass in 92    • REPLACEMENT TOTAL KNEE Left 2014   • WISDOM TOOTH EXTRACTION         Current Outpatient Prescriptions:   •  ALPRAZolam (XANAX) 0.5 MG tablet, Take 0.5 mg by mouth At Night As Needed for Sleep., Disp: , Rfl: 2  •  aspirin 81 MG tablet, Take 1 tablet by mouth daily., Disp: 90 tablet, Rfl: 3  •  atorvastatin (LIPITOR) 80 MG tablet, Take 1 tablet by mouth daily., Disp: 90 tablet, Rfl: 3  •  carvedilol (COREG) 3.125 MG tablet, Take 1 tablet by mouth Every 12 (Twelve) Hours. (Patient taking differently: Take 3.125 mg by mouth Every 12 (Twelve) Hours. Hold for SBP < 110, HR < 60), Disp: 60 tablet, Rfl: 11  •  cholecalciferol (VITAMIN D3) 1000 UNITS tablet, Take 1,000 Units by mouth Daily., Disp: , Rfl:   •  clopidogrel (PLAVIX) 75 MG tablet, Take 1 tablet  "by mouth daily., Disp: 90 tablet, Rfl: 3  •  ferrous sulfate 325 (65 FE) MG tablet, Take 1 tablet by mouth 2 (Two) Times a Day With Meals., Disp: 30 tablet, Rfl: 1  •  lisinopril (PRINIVIL,ZESTRIL) 2.5 MG tablet, Take 1 tablet by mouth Daily., Disp: 60 tablet, Rfl: 11  •  nitroglycerin (NITROSTAT) 0.4 MG SL tablet, Place 1 tablet under the tongue Every 5 (Five) Minutes As Needed for Chest Pain., Disp: 25 tablet, Rfl: 6  •  sertraline (ZOLOFT) 50 MG tablet, Take 50 mg by mouth Daily., Disp: , Rfl: 5  •  spironolactone (ALDACTONE) 25 MG tablet, Take 1 tablet by mouth Daily., Disp: 30 tablet, Rfl: 3  •  tamsulosin (FLOMAX) 0.4 MG capsule 24 hr capsule, Take 1 capsule by mouth Daily., Disp: 30 capsule, Rfl: 11  •  torsemide (DEMADEX) 20 MG tablet, Take 1 tablet by mouth Daily., Disp: 30 tablet, Rfl: 11  Allergies   Allergen Reactions   • Hctz [Hydrochlorothiazide] Other (See Comments)     hyponatremia     Social History     Social History   • Marital status:      Spouse name: N/A   • Number of children: 3   • Years of education: N/A     Occupational History   • Retired       insurance agency      Social History Main Topics   • Smoking status: Former Smoker     Packs/day: 0.25     Types: Cigarettes     Quit date: 1975   • Smokeless tobacco: Never Used      Comment: quit 45 years ago- smoked since 19 yo    • Alcohol use 0.6 - 1.2 oz/week     1 - 2 Glasses of wine per week      Comment: occas   • Drug use: No   • Sexual activity: Defer     Other Topics Concern   • Not on file     Social History Narrative    Lives with Wife Julia in [place], [place]      Family History   Problem Relation Age of Onset   • Stroke Mother    • Heart disease Father      ROS: As per HPI, otherwise negative.   Vitals:    05/21/18 1340   BP: 120/50   BP Location: Right arm   Patient Position: Sitting   Pulse: 91   Temp: 97.8 °F (36.6 °C)   SpO2: 98%   Weight: 73 kg (161 lb)   Height: 175.3 cm (69\")      Physical Exam:  Gen - NAD, pleasant, " cooperative  CV - Regular rate and rhythm, no murmur gallop or rub  Pulm - Lungs clear to auscultation without wheeze or rhonchi   GI - Soft, normoactive bowel sounds, non-tender  Ext - Without edema   Incision - Bilateral percutaneous needle sites healing well, without issue    Labs/Imagin18 CXR  Good quality chest radiograph. Bony structures are within normal limits. Trachea is midline. Left and right lung fields clear. Sternal wires are manifestation of former CABG procedure. No evidence of pneumothorax.  Radiopaque transcatheter aortic valve noted.  Compared to most recent chest xray (18), there is no acute cardiopulmonary pathology present.    Assessment/Plan:  Patient is a 89-year-old  male with history of chronic lymphocytic leukemia, gastroesophageal reflux disease, chronic kidney disease stage III, hyperlipidemia, coronary artery disease status post CABG and aortic valve stenosis status post TAVR procedure performed 18.  Patient is having a steady postoperative course.  His percutaneous sites are healing well and his chest x-ray is within normal limits.  Patient will follow up with his cardiologist, Dr. Cobian, on 18 for echocardiogram.  I encouraged the patient to have his decaying tooth removed by his dentist in the near future, and to inform his dentist of his recent aortic valve replacement.  I informed the patient that he'll require antibiotics whenever he has his teeth cleaned/or tooth removed, or undergoes a colonoscopy to prevent endocarditis formation.  Patient had family present with him today, who are informed and aware of this issue.  Patient may follow-up with our office as needed, but should otherwise call with any concerns.      Patient Active Problem List   Diagnosis   • Coronary artery disease involving native coronary artery of native heart   • Hyperlipidemia LDL goal <100   • History of tobacco abuse   • CLL (chronic lymphocytic leukemia)   • GERD  (gastroesophageal reflux disease)   • Nonrheumatic aortic valve stenosis   • Acute on chronic systolic congestive heart failure   • CKD (chronic kidney disease) stage 3, GFR 30-59 ml/min   • Aortic valve stenosis     Signed by:

## 2018-05-30 ENCOUNTER — APPOINTMENT (OUTPATIENT)
Dept: CARDIOLOGY | Facility: HOSPITAL | Age: 83
End: 2018-05-30

## 2018-06-04 ENCOUNTER — HOSPITAL ENCOUNTER (OUTPATIENT)
Dept: CARDIOLOGY | Facility: HOSPITAL | Age: 83
Discharge: HOME | End: 2018-06-04
Admitting: NURSE PRACTITIONER

## 2018-06-04 ENCOUNTER — APPOINTMENT (OUTPATIENT)
Dept: LAB | Facility: HOSPITAL | Age: 83
End: 2018-06-04

## 2018-06-04 ENCOUNTER — OFFICE VISIT (OUTPATIENT)
Dept: CARDIOLOGY | Facility: CLINIC | Age: 83
End: 2018-06-04

## 2018-06-04 VITALS
SYSTOLIC BLOOD PRESSURE: 141 MMHG | DIASTOLIC BLOOD PRESSURE: 56 MMHG | BODY MASS INDEX: 23.51 KG/M2 | HEIGHT: 69 IN | WEIGHT: 158.73 LBS

## 2018-06-04 VITALS
SYSTOLIC BLOOD PRESSURE: 128 MMHG | BODY MASS INDEX: 23.49 KG/M2 | DIASTOLIC BLOOD PRESSURE: 70 MMHG | HEART RATE: 78 BPM | WEIGHT: 158.6 LBS | HEIGHT: 69 IN

## 2018-06-04 DIAGNOSIS — D50.8 OTHER IRON DEFICIENCY ANEMIA: ICD-10-CM

## 2018-06-04 DIAGNOSIS — I50.23 ACUTE ON CHRONIC SYSTOLIC CONGESTIVE HEART FAILURE (HCC): ICD-10-CM

## 2018-06-04 DIAGNOSIS — I25.10 CORONARY ARTERY DISEASE INVOLVING NATIVE CORONARY ARTERY OF NATIVE HEART WITHOUT ANGINA PECTORIS: ICD-10-CM

## 2018-06-04 DIAGNOSIS — I35.0 NONRHEUMATIC AORTIC VALVE STENOSIS: ICD-10-CM

## 2018-06-04 DIAGNOSIS — E78.5 DYSLIPIDEMIA: ICD-10-CM

## 2018-06-04 DIAGNOSIS — I25.5 ISCHEMIC CARDIOMYOPATHY: Primary | ICD-10-CM

## 2018-06-04 LAB
ANION GAP SERPL CALCULATED.3IONS-SCNC: 9 MMOL/L (ref 3–11)
BH CV ECHO MEAS - AO MAX PG (FULL): 14.7 MMHG
BH CV ECHO MEAS - AO MAX PG: 19.9 MMHG
BH CV ECHO MEAS - AO MEAN PG (FULL): 5.9 MMHG
BH CV ECHO MEAS - AO MEAN PG: 8.4 MMHG
BH CV ECHO MEAS - AO ROOT AREA (BSA CORRECTED): 1.8
BH CV ECHO MEAS - AO ROOT AREA: 8.6 CM^2
BH CV ECHO MEAS - AO ROOT DIAM: 3.3 CM
BH CV ECHO MEAS - AO V2 MAX: 223.2 CM/SEC
BH CV ECHO MEAS - AO V2 MEAN: 133 CM/SEC
BH CV ECHO MEAS - AO V2 VTI: 36.5 CM
BH CV ECHO MEAS - AVA(I,A): 2.1 CM^2
BH CV ECHO MEAS - AVA(I,D): 2.1 CM^2
BH CV ECHO MEAS - AVA(V,A): 1.7 CM^2
BH CV ECHO MEAS - AVA(V,D): 1.7 CM^2
BH CV ECHO MEAS - BSA(HAYCOCK): 1.9 M^2
BH CV ECHO MEAS - BSA: 1.9 M^2
BH CV ECHO MEAS - BZI_BMI: 23.6 KILOGRAMS/M^2
BH CV ECHO MEAS - BZI_METRIC_HEIGHT: 175.3 CM
BH CV ECHO MEAS - BZI_METRIC_WEIGHT: 72.6 KG
BH CV ECHO MEAS - CONTRAST EF (2CH): 15.3 ML/M^2
BH CV ECHO MEAS - CONTRAST EF 4CH: 10.6 ML/M^2
BH CV ECHO MEAS - EDV(CUBED): 173.2 ML
BH CV ECHO MEAS - EDV(MOD-SP2): 190 ML
BH CV ECHO MEAS - EDV(MOD-SP4): 189 ML
BH CV ECHO MEAS - EDV(TEICH): 152.1 ML
BH CV ECHO MEAS - EF(CUBED): 6.1 %
BH CV ECHO MEAS - EF(MOD-SP2): 15.3 %
BH CV ECHO MEAS - EF(MOD-SP4): 10.6 %
BH CV ECHO MEAS - EF(TEICH): 4.7 %
BH CV ECHO MEAS - ESV(CUBED): 162.7 ML
BH CV ECHO MEAS - ESV(MOD-SP2): 161 ML
BH CV ECHO MEAS - ESV(MOD-SP4): 169 ML
BH CV ECHO MEAS - ESV(TEICH): 144.9 ML
BH CV ECHO MEAS - FS: 2.1 %
BH CV ECHO MEAS - IVS/LVPW: 1.4
BH CV ECHO MEAS - IVSD: 1.2 CM
BH CV ECHO MEAS - LA DIMENSION: 5.8 CM
BH CV ECHO MEAS - LA/AO: 1.8
BH CV ECHO MEAS - LV DIASTOLIC VOL/BSA (35-75): 100.6 ML/M^2
BH CV ECHO MEAS - LV MASS(C)D: 228.4 GRAMS
BH CV ECHO MEAS - LV MASS(C)DI: 121.6 GRAMS/M^2
BH CV ECHO MEAS - LV MAX PG: 5.2 MMHG
BH CV ECHO MEAS - LV MEAN PG: 2.5 MMHG
BH CV ECHO MEAS - LV SYSTOLIC VOL/BSA (12-30): 90 ML/M^2
BH CV ECHO MEAS - LV V1 MAX: 114.5 CM/SEC
BH CV ECHO MEAS - LV V1 MEAN: 70.6 CM/SEC
BH CV ECHO MEAS - LV V1 VTI: 23.3 CM
BH CV ECHO MEAS - LVIDD: 5.6 CM
BH CV ECHO MEAS - LVIDS: 5.5 CM
BH CV ECHO MEAS - LVLD AP2: 9.2 CM
BH CV ECHO MEAS - LVLD AP4: 8.7 CM
BH CV ECHO MEAS - LVLS AP2: 8.8 CM
BH CV ECHO MEAS - LVLS AP4: 8.6 CM
BH CV ECHO MEAS - LVOT AREA (M): 3.1 CM^2
BH CV ECHO MEAS - LVOT AREA: 3.3 CM^2
BH CV ECHO MEAS - LVOT DIAM: 2 CM
BH CV ECHO MEAS - LVPWD: 0.85 CM
BH CV ECHO MEAS - MV A MAX VEL: 123.4 CM/SEC
BH CV ECHO MEAS - MV DEC SLOPE: 334.4 CM/SEC^2
BH CV ECHO MEAS - MV E MAX VEL: 80 CM/SEC
BH CV ECHO MEAS - MV E/A: 0.65
BH CV ECHO MEAS - MV P1/2T MAX VEL: 80.9 CM/SEC
BH CV ECHO MEAS - MV P1/2T: 70.9 MSEC
BH CV ECHO MEAS - MVA P1/2T LCG: 2.7 CM^2
BH CV ECHO MEAS - MVA(P1/2T): 3.1 CM^2
BH CV ECHO MEAS - PA ACC SLOPE: 671.2 CM/SEC^2
BH CV ECHO MEAS - PA ACC TIME: 0.12 SEC
BH CV ECHO MEAS - PA PR(ACCEL): 26.7 MMHG
BH CV ECHO MEAS - RAP SYSTOLE: 8 MMHG
BH CV ECHO MEAS - RVDD: 3.6 CM
BH CV ECHO MEAS - RVSP: 15 MMHG
BH CV ECHO MEAS - SI(AO): 167.6 ML/M^2
BH CV ECHO MEAS - SI(CUBED): 5.6 ML/M^2
BH CV ECHO MEAS - SI(LVOT): 40.7 ML/M^2
BH CV ECHO MEAS - SI(MOD-SP2): 15.4 ML/M^2
BH CV ECHO MEAS - SI(MOD-SP4): 10.6 ML/M^2
BH CV ECHO MEAS - SI(TEICH): 3.8 ML/M^2
BH CV ECHO MEAS - SV(AO): 315 ML
BH CV ECHO MEAS - SV(CUBED): 10.5 ML
BH CV ECHO MEAS - SV(LVOT): 76.5 ML
BH CV ECHO MEAS - SV(MOD-SP2): 29 ML
BH CV ECHO MEAS - SV(MOD-SP4): 20 ML
BH CV ECHO MEAS - SV(TEICH): 7.1 ML
BH CV ECHO MEAS - TAPSE (>1.6): 1.4 CM2
BH CV ECHO MEAS - TR MAX VEL: 133.9 CM/SEC
BH CV VAS BP LEFT ARM: NORMAL MMHG
BH CV XLRA - RV BASE: 3.8 CM
BH CV XLRA - RV LENGTH: 6.4 CM
BH CV XLRA - RV MID: 3.4 CM
BUN BLD-MCNC: 36 MG/DL (ref 9–23)
BUN/CREAT SERPL: 22.5 (ref 7–25)
CALCIUM SPEC-SCNC: 9.6 MG/DL (ref 8.7–10.4)
CHLORIDE SERPL-SCNC: 101 MMOL/L (ref 99–109)
CO2 SERPL-SCNC: 28 MMOL/L (ref 20–31)
CREAT BLD-MCNC: 1.6 MG/DL (ref 0.6–1.3)
DEPRECATED RDW RBC AUTO: 51.6 FL (ref 37–54)
ERYTHROCYTE [DISTWIDTH] IN BLOOD BY AUTOMATED COUNT: 15.2 % (ref 11.3–14.5)
GFR SERPL CREATININE-BSD FRML MDRD: 41 ML/MIN/1.73
GLUCOSE BLD-MCNC: 97 MG/DL (ref 70–100)
HCT VFR BLD AUTO: 35.8 % (ref 38.9–50.9)
HGB BLD-MCNC: 11.7 G/DL (ref 13.1–17.5)
LEFT ATRIUM VOLUME INDEX: 45 ML/M2
LV EF 2D ECHO EST: 20 %
MAXIMAL PREDICTED HEART RATE: 129 BPM
MCH RBC QN AUTO: 30.5 PG (ref 27–31)
MCHC RBC AUTO-ENTMCNC: 32.7 G/DL (ref 32–36)
MCV RBC AUTO: 93.2 FL (ref 80–99)
PLATELET # BLD AUTO: 204 10*3/MM3 (ref 150–450)
PMV BLD AUTO: 11.9 FL (ref 6–12)
POTASSIUM BLD-SCNC: 5.4 MMOL/L (ref 3.5–5.5)
RBC # BLD AUTO: 3.84 10*6/MM3 (ref 4.2–5.76)
SODIUM BLD-SCNC: 138 MMOL/L (ref 132–146)
STRESS TARGET HR: 110 BPM
WBC NRBC COR # BLD: 16.09 10*3/MM3 (ref 3.5–10.8)

## 2018-06-04 PROCEDURE — 80048 BASIC METABOLIC PNL TOTAL CA: CPT | Performed by: NURSE PRACTITIONER

## 2018-06-04 PROCEDURE — 36415 COLL VENOUS BLD VENIPUNCTURE: CPT | Performed by: NURSE PRACTITIONER

## 2018-06-04 PROCEDURE — 25010000002 SULFUR HEXAFLUORIDE MICROSPH 60.7-25 MG RECONSTITUTED SUSPENSION: Performed by: INTERNAL MEDICINE

## 2018-06-04 PROCEDURE — 99214 OFFICE O/P EST MOD 30 MIN: CPT | Performed by: NURSE PRACTITIONER

## 2018-06-04 PROCEDURE — 93306 TTE W/DOPPLER COMPLETE: CPT

## 2018-06-04 PROCEDURE — 93306 TTE W/DOPPLER COMPLETE: CPT | Performed by: INTERNAL MEDICINE

## 2018-06-04 PROCEDURE — 85027 COMPLETE CBC AUTOMATED: CPT | Performed by: NURSE PRACTITIONER

## 2018-06-04 RX ADMIN — SULFUR HEXAFLUORIDE 3 ML: KIT at 09:28

## 2018-06-04 NOTE — PROGRESS NOTES
Subjective:     Encounter Date:06/04/2018      Patient ID: Tad Moon is a 89 y.o. male.    PCP: Pramod Hyde MD    Chief Complaint: Coronary Artery Disease       PROBLEM LIST:  1. Coronary artery disease:  a. CABG in 1992, [place], [place]. LIMA  to LAD, SVG to PDA, SVG to distal RCA, SVG to OM1.   b. Non STEMI, 2007 with 3.5 x 16 Taxus to SVG to RCA (Dr. Sheikh).   c. Cardiac catheterization, 2012, medical management, incomplete database.   d. On 08/15/2014, functional class III angina/unstable.  Heart catheterization EF 40% with inferior wall akinesis. Severe distal left main and proximal LAD stenosis with a patent LIMA graft to LAD. An 80% proximal left circumflex heavily calcified stenosis tortuous segment with occluded vein graft. Chronically  occluded RCA and saphenous vein graft to RCA with 3+ collaterals.   e. Chronic functional class II-III angina.    f. Echo, 11/6/2016: LVEF 35%.  g. Cardiac catheterization, 10/10/2017: triple-vessel CAD of the native arteries; patent LIMA graft to the LAD; occluded vein grafts to the circumflex and the right coronary artery; successful PTCA/stenting of the distal left main, proximal circumflex and mid circumflex with placement of overlapping 2.5 x 38 and 3.0 x 23 mm drug-eluting stents reducing severe multiple 80% stenosis to no significant residual disease; severe left ventricular systolic dysfunction, ejection fraction 25-30%; 21 mm gradient across the aortic valve consistent with known aortic stenosis.  2. Aortic stenosis  a. EF 35% peak aortic valve gradient 41.  b. 4/26/18 TAVR  c. Echocardiogram (06/04/2018): EF 20%.  Mild mitral regurgitation.TAVR valve present with normal function.  3. Heart failure (2017).  4. Hypertension.   5. Dyslipidemia.   6. History of tobacco use.   7. GERD.   8. Seizure disorder  9. Chronic lymphocytic leukemia.  10. Surgical history:   a. Left total knee replacement.   b. Remote cholecystectomy.   c. Appendectomy.    d. Abdominal hernia repair x2    History of Present Illness  Patient returns today post TAVR at the end of April of this year.  Since returning home notes to be doing fairly well.  Denies any chest pain, pressure, tightness.  No increasing shortness of breath.  No syncope, near-syncope, or edema.  Does note some mild weight loss.  Has not noted any hypotension.  (During hospitalization earlier this year for CHF had issues with hypotensive related to titration of heart failure medications).  Systolic blood pressures at home are typically in the 120s.  He has questions regarding his medical therapy.  Patient also notes that he fell last evening in the middle of the night at his home while attempting to get to the bathroom into a sharp counter and created a significant skin tear which he and his wife initially bandaged at their house.  Denies any near syncope.    Allergies   Allergen Reactions   • Hctz [Hydrochlorothiazide] Other (See Comments)     hyponatremia         Current Outpatient Prescriptions:   •  ALPRAZolam (XANAX) 0.5 MG tablet, Take 0.5 mg by mouth At Night As Needed for Sleep., Disp: , Rfl: 2  •  aspirin 81 MG tablet, Take 1 tablet by mouth daily., Disp: 90 tablet, Rfl: 3  •  atorvastatin (LIPITOR) 80 MG tablet, Take 1 tablet by mouth daily., Disp: 90 tablet, Rfl: 3  •  carvedilol (COREG) 3.125 MG tablet, Take 1 tablet by mouth Every 12 (Twelve) Hours. (Patient taking differently: Take 3.125 mg by mouth Every 12 (Twelve) Hours. Hold for SBP < 110, HR < 60), Disp: 60 tablet, Rfl: 11  •  cholecalciferol (VITAMIN D3) 1000 UNITS tablet, Take 1,000 Units by mouth Daily., Disp: , Rfl:   •  clopidogrel (PLAVIX) 75 MG tablet, Take 1 tablet by mouth daily., Disp: 90 tablet, Rfl: 3  •  ferrous sulfate 325 (65 FE) MG tablet, Take 1 tablet by mouth 2 (Two) Times a Day With Meals., Disp: 30 tablet, Rfl: 1  •  lisinopril (PRINIVIL,ZESTRIL) 2.5 MG tablet, Take 1 tablet by mouth Daily., Disp: 60 tablet, Rfl: 11  •   "nitroglycerin (NITROSTAT) 0.4 MG SL tablet, Place 1 tablet under the tongue Every 5 (Five) Minutes As Needed for Chest Pain., Disp: 25 tablet, Rfl: 6  •  sertraline (ZOLOFT) 50 MG tablet, Take 50 mg by mouth Daily., Disp: , Rfl: 5  •  spironolactone (ALDACTONE) 25 MG tablet, Take 1 tablet by mouth Daily., Disp: 30 tablet, Rfl: 3  •  torsemide (DEMADEX) 20 MG tablet, Take 1 tablet by mouth Daily., Disp: 30 tablet, Rfl: 11  No current facility-administered medications for this visit.     The following portions of the patient's history were reviewed and updated as appropriate: allergies, current medications, past family history, past medical history, past social history, past surgical history and problem list.    Review of Systems   Constitution: Positive for weight loss.   Cardiovascular: Negative.    Respiratory: Negative.    Hematologic/Lymphatic: Negative for bleeding problem. Bruises/bleeds easily.   Skin: Negative for rash.   Musculoskeletal: Negative for muscle weakness and myalgias.   Gastrointestinal: Negative for heartburn, nausea and vomiting.   Neurological: Negative.           Objective:     /70 (BP Location: Right arm, Patient Position: Sitting)   Pulse 78   Ht 175.3 cm (69\")   Wt 71.9 kg (158 lb 9.6 oz)   BMI 23.42 kg/m²        Physical Exam   Constitutional: He is oriented to person, place, and time. He appears well-developed and well-nourished. No distress.   Neck: No JVD present. No tracheal deviation present.   Cardiovascular: Normal rate, regular rhythm and normal heart sounds.  Exam reveals no friction rub.    No murmur heard.  Pulmonary/Chest: Effort normal and breath sounds normal. No respiratory distress.   Abdominal: Bowel sounds are normal. There is no tenderness.   Musculoskeletal: He exhibits no edema or deformity.   Neurological: He is alert and oriented to person, place, and time.   Skin:   Left forearm with large sized hematoma and noted skin tear.  Mild bleeding from site. "       Lab Review:    Lab Results   Component Value Date    GLUCOSE 90 04/30/2018    CALCIUM 8.9 04/30/2018     04/30/2018    K 3.7 04/30/2018    CO2 28.0 04/30/2018     04/30/2018    BUN 24 (H) 04/30/2018    CREATININE 1.40 (H) 04/30/2018    EGFRIFNONA 48 (L) 04/30/2018    BCR 17.1 04/30/2018    ANIONGAP 6.0 04/30/2018     Lab Results   Component Value Date    CHOL 154 10/10/2017    TRIG 79 10/10/2017    HDL 39 (L) 10/10/2017    AST 18 04/28/2018    ALT 14 04/28/2018     Lab Results   Component Value Date    INR 1.09 04/28/2018    INR 1.10 (H) 04/27/2018    INR 1.10 (H) 04/26/2018    PROTIME 11.4 04/28/2018    PROTIME 11.6 (H) 04/27/2018    PROTIME 11.6 (H) 04/26/2018       Procedures  Left arm dressing from home removed.  Area assessed.  Redressed with nonadhesive gauze and Tegaderm.      Assessment:   Tad was seen today for coronary artery disease.    Diagnoses and all orders for this visit:    Ischemic cardiomyopathy, currently euvolemic.  Ejection fraction 20% by echocardiogram today.  Echocardiogram results reviewed with patient today in the office.  -     CBC (No Diff)  -     Basic Metabolic Panel    Coronary artery disease involving native coronary artery of native heart without angina pectoris    Nonrheumatic aortic valve stenosis, status post TAVR    Other iron deficiency anemia, noted post surgery.  Currently on iron replacement.    Dyslipidemia, on statin therapy.      Plan:  1. Discussed results of echocardiogram with patient.  Given that his ejection fraction is still decreased will need to maintain medications for heart failure.  He verbalized understanding.  2. Check CBC and BMP today.  3. If hemoglobin and hematocrit have returned back to normal or near-normal will discontinue supplemental iron.  4. We'll follow-up on renal function and if this is within normal limits we will increase his lisinopril to 5 mg daily for his cardiomyopathy.  5. Asked patient to continue to monitor his  blood pressure.  6. Redressed patient's wound in the office today.  We have asked him to follow-up with his primary care in one week regarding this and maintain his current dressing.  Discussed with patient if he starts to develop any fever or chills to present to his primary care sooner.  7. Continue current medications, as listed above.  8. Follow-up with [facility] in 4 weeks.  9. Revisit in 2 months, or sooner as needed.    JUAN Pate     Dictated with Viet.

## 2018-06-05 ENCOUNTER — TELEPHONE (OUTPATIENT)
Dept: CARDIOLOGY | Facility: CLINIC | Age: 83
End: 2018-06-05

## 2018-06-05 NOTE — TELEPHONE ENCOUNTER
----- Message from JUAN Pate sent at 6/4/2018  1:59 PM EDT -----  Please let patient know that he can stop his iron. Labs show increasing potassium level. Instead of increasing lisinopril try to increase coreg to 6.25 mg BID. Repeat labs in 2 weeks.

## 2018-06-06 ENCOUNTER — TELEPHONE (OUTPATIENT)
Dept: CARDIOLOGY | Facility: CLINIC | Age: 83
End: 2018-06-06

## 2018-06-06 DIAGNOSIS — Z79.899 HIGH RISK MEDICATION USE: Primary | ICD-10-CM

## 2018-06-06 NOTE — TELEPHONE ENCOUNTER
Called and advised patient lab results reviewed and ok to stop Iron, potassium has increased and that instead of increasing lisinopril, to increase coreg from 3.125 to 6.25 bid and to have BMP checked in 2 weeks.  He verbalized understanding and requests a reminder call.

## 2018-06-20 NOTE — TELEPHONE ENCOUNTER
Called patient to remind him about BMP, he reports he is going to have labs done at [facility] and will have copy sent to us.  He states going on 6/28/18.

## 2018-07-09 ENCOUNTER — OFFICE VISIT (OUTPATIENT)
Dept: CARDIOLOGY | Facility: HOSPITAL | Age: 83
End: 2018-07-09

## 2018-07-09 VITALS
DIASTOLIC BLOOD PRESSURE: 73 MMHG | OXYGEN SATURATION: 96 % | BODY MASS INDEX: 24.55 KG/M2 | RESPIRATION RATE: 16 BRPM | WEIGHT: 162 LBS | HEIGHT: 68 IN | TEMPERATURE: 97.1 F | SYSTOLIC BLOOD PRESSURE: 146 MMHG | HEART RATE: 79 BPM

## 2018-07-09 DIAGNOSIS — I35.0 NONRHEUMATIC AORTIC VALVE STENOSIS: Primary | ICD-10-CM

## 2018-07-09 DIAGNOSIS — I50.23 ACUTE ON CHRONIC SYSTOLIC CONGESTIVE HEART FAILURE (HCC): ICD-10-CM

## 2018-07-09 DIAGNOSIS — I25.10 CORONARY ARTERY DISEASE INVOLVING NATIVE CORONARY ARTERY OF NATIVE HEART WITHOUT ANGINA PECTORIS: ICD-10-CM

## 2018-07-09 PROCEDURE — 99213 OFFICE O/P EST LOW 20 MIN: CPT | Performed by: NURSE PRACTITIONER

## 2018-07-09 RX ORDER — OMEPRAZOLE 20 MG/1
20 CAPSULE, DELAYED RELEASE ORAL DAILY PRN
COMMUNITY
End: 2019-12-10 | Stop reason: ALTCHOICE

## 2018-07-09 NOTE — PROGRESS NOTES
TAVR APRN Evaluation    Tad Moon 11/30/1926 07/09/18    PCP: Pramod Hyde MD  Primary Cardiologist: ***    TAVR Team:  1.  Lv Purdy MD  2.  Fransico Starks MD  3.  Carola Mendez MD  4.  Juan M Sood MD    Chief Complaint: Follow-up ( 1 month post TAVR)      Identification: This is a 89 y.o. year old male from ***.    History of Present Illness: Mr. Moon was referred for consideration of TAVR due to ***.  his aortic valve indices are as follows:  MARII ***, Aortic valve Vmax ***, and AV mean gradient ***.    Problem List:   ***  Past Surgical History:  Past Surgical History:   Procedure Laterality Date   • ABDOMINAL HERNIA REPAIR      x 2   • AORTIC VALVE REPAIR/REPLACEMENT N/A 4/26/2018    Procedure: Transcatheter Aortic Valve Replacement, LANDY;  Surgeon: Lv Purdy MD;  Location:  [facility];  Service: Cardiothoracic   • AORTIC VALVE REPAIR/REPLACEMENT N/A 4/26/2018    Procedure: Transcatheter Aortic Valve Replacement;  Surgeon: Juan M Sood MD;  Location:  [facility];  Service: Cardiology   • APPENDECTOMY     • CARDIAC CATHETERIZATION N/A 10/10/2017    Procedure: Left Heart Cath;  Surgeon: Juan M Sood MD;  Location:  [facility];  Service:    • CARDIAC CATHETERIZATION N/A 4/6/2018    Procedure: Left Heart Cath;  Surgeon: Lv Cobian MD;  Location:  [facility];  Service: Cardiovascular   • CHOLECYSTECTOMY     • COLONOSCOPY     • CORONARY ANGIOPLASTY WITH STENT PLACEMENT      07 one placed,  and in 2017 had another stent placed and one repaired -   so pt thinks 3 stents in place    • CORONARY ARTERY BYPASS GRAFT      4 bypass in 92    • REPLACEMENT TOTAL KNEE Left 2014   • WISDOM TOOTH EXTRACTION         Allergies:  Hctz [hydrochlorothiazide]    Social History:  Social History     Social History   • Marital status:      Spouse name: N/A   • Number of children: 3   • Years of education: N/A     Occupational History   •  Retired       insurance agency      Social History Main Topics   • Smoking status: Former Smoker     Packs/day: 0.25     Types: Cigarettes     Quit date: 1975   • Smokeless tobacco: Never Used      Comment: quit 45 years ago- smoked since 19 yo    • Alcohol use 0.6 - 1.2 oz/week     1 - 2 Glasses of wine per week      Comment: occas   • Drug use: No   • Sexual activity: Defer     Other Topics Concern   • Not on file     Social History Narrative    Lives with Wife Julia in [place], [place]        Family History:  Family History   Problem Relation Age of Onset   • Stroke Mother    • Heart disease Father        Current Medications:    Current Outpatient Prescriptions:   •  ALPRAZolam (XANAX) 0.5 MG tablet, Take 0.5 mg by mouth At Night As Needed for Sleep., Disp: , Rfl: 2  •  aspirin 81 MG tablet, Take 1 tablet by mouth daily., Disp: 90 tablet, Rfl: 3  •  atorvastatin (LIPITOR) 80 MG tablet, Take 1 tablet by mouth daily., Disp: 90 tablet, Rfl: 3  •  carvedilol (COREG) 3.125 MG tablet, Take 1 tablet by mouth Every 12 (Twelve) Hours. (Patient taking differently: Take 3.125 mg by mouth Every 12 (Twelve) Hours. Hold for SBP < 110, HR < 60), Disp: 60 tablet, Rfl: 11  •  cholecalciferol (VITAMIN D3) 1000 UNITS tablet, Take 1,000 Units by mouth Daily., Disp: , Rfl:   •  clopidogrel (PLAVIX) 75 MG tablet, Take 1 tablet by mouth daily., Disp: 90 tablet, Rfl: 3  •  ferrous sulfate 325 (65 FE) MG tablet, Take 1 tablet by mouth 2 (Two) Times a Day With Meals., Disp: 30 tablet, Rfl: 1  •  lisinopril (PRINIVIL,ZESTRIL) 2.5 MG tablet, Take 1 tablet by mouth Daily., Disp: 60 tablet, Rfl: 11  •  nitroglycerin (NITROSTAT) 0.4 MG SL tablet, Place 1 tablet under the tongue Every 5 (Five) Minutes As Needed for Chest Pain., Disp: 25 tablet, Rfl: 6  •  sertraline (ZOLOFT) 50 MG tablet, Take 50 mg by mouth Daily., Disp: , Rfl: 5  •  spironolactone (ALDACTONE) 25 MG tablet, Take 1 tablet by mouth Daily., Disp: 30 tablet, Rfl: 3  •  torsemide  (DEMADEX) 20 MG tablet, Take 1 tablet by mouth Daily., Disp: 30 tablet, Rfl: 11    Review of Systems:  Review of Systems   Constitution: Positive for weakness. Negative for chills, decreased appetite, diaphoresis, fever, malaise/fatigue, night sweats, weight gain and weight loss.   HENT: Negative for congestion, hearing loss, hoarse voice and nosebleeds.    Eyes: Negative for blurred vision, visual disturbance and visual halos.   Cardiovascular: Negative for chest pain, claudication, cyanosis, dyspnea on exertion, irregular heartbeat, leg swelling, near-syncope, orthopnea, palpitations, paroxysmal nocturnal dyspnea and syncope.   Respiratory: Negative for cough, hemoptysis, shortness of breath, sleep disturbances due to breathing, snoring, sputum production and wheezing.    Hematologic/Lymphatic: Negative for bleeding problem. Bruises/bleeds easily.   Skin: Negative for dry skin, itching and rash.   Musculoskeletal: Negative for arthritis, joint pain, joint swelling and myalgias.   Gastrointestinal: Positive for diarrhea. Negative for bloating, abdominal pain, constipation, flatus, heartburn, hematemesis, hematochezia, melena, nausea and vomiting.   Genitourinary: Positive for frequency. Negative for dysuria, hematuria, nocturia and urgency.   Neurological: Negative for excessive daytime sleepiness, dizziness, headaches, light-headedness and loss of balance.   Psychiatric/Behavioral: Negative for depression. The patient does not have insomnia and is not nervous/anxious.         Physical Exam:  Physical Exam    [unfilled]      Diagnostic Data to Date:  Transthoracic echo: ***    Transesophageal echo: ***    Cardiac Cath: ***    CTA Chest, Abdomen, and Pelvis: ***    Carotid Doppler: ***    KCCQ12 Questionnaire: ***    VERDE Index of [place] with ADL's: ***    [place] Instrumental ADL's: ***     Strength Frailty Test: ***    5 Meter Walk Test:  Walk #1 ***  Walk #2 ***  Walk #3 ***   Average Gait Speed of  *** Seconds/ 5 meters which is slow gait speed and therefore indicates frailty.      Assessment/ Plan:   Active Problems:    * No active hospital problems. *          Judit Bowles  07/09/18  10:48 AM

## 2018-07-09 NOTE — PROGRESS NOTES
"  Subjective:     Encounter Date:07/09/2018      Patient ID: Tad Moon is a 89 y.o. male.    Chief Complaint: One month TAVR follow up    History of Present Illness:  Mr. Moon underwent TAVR for severe symptomatic aortic stenosis on 4/26/18. PMH is also notable for CAD s/p CABG and PCI as well as LVEF 20-25%.  Prior to TAVR he was having frequent exacerbations of HF and unable to do his usual household activities.      Presently, he states he still feels \"tired\" but does feel like he can tolerate more activity.  He is walking daily and using some light weights to strengthen his arms.  He plans to re-start golfing once a week very soon.  Overall dyspnea is markedly improved and he no longer has orthopnea.    He is back to driving.    Past Medical History:   Diagnosis Date   • Aortic stenosis    • Arthritis    • CHF (congestive heart failure) (CMS/HCC)    • CLL (chronic lymphocytic leukemia) (CMS/[facility])     15 years    • Coronary artery disease    • GERD (gastroesophageal reflux disease)    • History of tobacco abuse    • Hyperlipidemia    • Hypertension    • Low kidney function     very recently and did kidney function test and said decreased function so being watched to get all fluid off    • MI (myocardial infarction)     mid 90s very mild - few years after bypass surgery    • Skin cancer     basal and squamous from various location    • SOBOE (shortness of breath on exertion)    • Uses hearing aid     bilat ears        Past Surgical History:   Procedure Laterality Date   • ABDOMINAL HERNIA REPAIR      x 2   • AORTIC VALVE REPAIR/REPLACEMENT N/A 4/26/2018    Procedure: Transcatheter Aortic Valve Replacement, LANDY;  Surgeon: Lv Purdy MD;  Location:  [facility];  Service: Cardiothoracic   • AORTIC VALVE REPAIR/REPLACEMENT N/A 4/26/2018    Procedure: Transcatheter Aortic Valve Replacement;  Surgeon: Juan M Sood MD;  Location:  [facility];  Service: Cardiology   • APPENDECTOMY     • CARDIAC " CATHETERIZATION N/A 10/10/2017    Procedure: Left Heart Cath;  Surgeon: Juan M Sood MD;  Location:  [facility];  Service:    • CARDIAC CATHETERIZATION N/A 4/6/2018    Procedure: Left Heart Cath;  Surgeon: Lv Cobian MD;  Location:  [facility];  Service: Cardiovascular   • CHOLECYSTECTOMY     • COLONOSCOPY     • CORONARY ANGIOPLASTY WITH STENT PLACEMENT      07 one placed,  and in 2017 had another stent placed and one repaired -   so pt thinks 3 stents in place    • CORONARY ARTERY BYPASS GRAFT      4 bypass in 92    • REPLACEMENT TOTAL KNEE Left 2014   • WISDOM TOOTH EXTRACTION         Social History     Social History   • Marital status:      Spouse name: N/A   • Number of children: 3   • Years of education: N/A     Occupational History   • Retired       insurance agency      Social History Main Topics   • Smoking status: Former Smoker     Packs/day: 0.25     Types: Cigarettes     Quit date: 1975   • Smokeless tobacco: Never Used      Comment: quit 45 years ago- smoked since 17 yo    • Alcohol use 0.6 - 1.2 oz/week     1 - 2 Glasses of wine per week      Comment: occas   • Drug use: No   • Sexual activity: Defer     Other Topics Concern   • Not on file     Social History Narrative    Lives with Wife Julia in [place], [place]     Caffeine:  3 servings per day           Family History   Problem Relation Age of Onset   • Stroke Mother    • Heart disease Father    • Heart disease Brother        Review of Systems   Constitution: Positive for weakness. Negative for chills, decreased appetite, diaphoresis, fever, malaise/fatigue, night sweats, weight gain and weight loss.   HENT: Negative for congestion, hearing loss, hoarse voice and nosebleeds.    Eyes: Negative for blurred vision, visual disturbance and visual halos.   Cardiovascular: Negative for chest pain, claudication, cyanosis, dyspnea on exertion, irregular heartbeat, leg swelling, near-syncope, orthopnea,  palpitations, paroxysmal nocturnal dyspnea and syncope.   Respiratory: Negative for cough, hemoptysis, shortness of breath, sleep disturbances due to breathing, snoring, sputum production and wheezing.    Hematologic/Lymphatic: Negative for bleeding problem. Bruises/bleeds easily.   Skin: Negative for dry skin, itching and rash.   Musculoskeletal: Negative for arthritis, joint pain, joint swelling and myalgias.   Gastrointestinal: Positive for diarrhea. Negative for bloating, abdominal pain, constipation, flatus, heartburn, hematemesis, hematochezia, melena, nausea and vomiting.   Genitourinary: Positive for frequency. Negative for dysuria, hematuria, nocturia and urgency.   Neurological: Negative for excessive daytime sleepiness, dizziness, headaches, light-headedness and loss of balance.   Psychiatric/Behavioral: Negative for depression. The patient does not have insomnia and is not nervous/anxious.          Objective:     Physical Exam   Constitutional: He is oriented to person, place, and time. He appears well-developed and well-nourished. No distress.   Still appears younger than stated age   HENT:   Head: Normocephalic and atraumatic.   Eyes: Conjunctivae are normal. Pupils are equal, round, and reactive to light. No scleral icterus.   Neck: Neck supple. No JVD present.   Cardiovascular: Normal rate, regular rhythm and intact distal pulses.  Exam reveals no gallop and no friction rub.    Murmur heard.  Systolic murmur I/VI   Pulmonary/Chest: Breath sounds normal. No respiratory distress. He has no wheezes. He has no rales. He exhibits no tenderness.   Musculoskeletal: Normal range of motion. He exhibits edema.   Trace pedal and ankle edema   Neurological: He is alert and oriented to person, place, and time. No cranial nerve deficit.   Skin: Skin is warm and dry.   Psychiatric: He has a normal mood and affect. His behavior is normal.       Lab Review: Echocardiogram Interpretation Summary 6/4/18    · Left  ventricular systolic function is severely decreased. Estimated EF = 20%.  · Mild mitral valve regurgitation is present  · TAVR valve present. Normal functioning.     KCCQ12    5 meter walk       Assessment/ Plan:          Diagnosis Plan   1. Nonrheumatic aortic valve stenosis s/p TAVR Echocardiogram one month post procedure noted above indicated prosthesis is functioning normally.  Symptomatically improved per KCCQ12 score and functionally he seems improved despite 5 meter walk speed being slower than pre-op.  Will follow up (in conjunction with Dr. Cobian) in one year.      2. Chronic systolic congestive heart failure (CMS/HCC)  LVEF remains low @ 20%.  HF meds per Dr. Cobian.  NHYA class II.    3. Coronary artery disease involving native coronary artery of native heart without angina pectoris  No anginal symptoms.

## 2018-08-06 ENCOUNTER — OFFICE VISIT (OUTPATIENT)
Dept: CARDIOLOGY | Facility: CLINIC | Age: 83
End: 2018-08-06

## 2018-08-06 VITALS
HEART RATE: 64 BPM | BODY MASS INDEX: 23.95 KG/M2 | SYSTOLIC BLOOD PRESSURE: 120 MMHG | HEIGHT: 68 IN | DIASTOLIC BLOOD PRESSURE: 54 MMHG | WEIGHT: 158 LBS

## 2018-08-06 DIAGNOSIS — I25.810 CORONARY ARTERY DISEASE INVOLVING CORONARY BYPASS GRAFT OF NATIVE HEART WITHOUT ANGINA PECTORIS: Primary | ICD-10-CM

## 2018-08-06 DIAGNOSIS — I35.0 NONRHEUMATIC AORTIC VALVE STENOSIS: ICD-10-CM

## 2018-08-06 DIAGNOSIS — E78.5 HYPERLIPIDEMIA LDL GOAL <100: ICD-10-CM

## 2018-08-06 PROCEDURE — 99214 OFFICE O/P EST MOD 30 MIN: CPT | Performed by: INTERNAL MEDICINE

## 2018-08-06 NOTE — PROGRESS NOTES
Subjective:     Encounter Date:08/06/2018      Patient ID: Tad Moon is a 89 y.o. male.    Chief Complaint: Coronary Artery Disease      PROBLEM LIST:  1. Coronary artery disease:  a. CABG in 1992, [place], [place]. LIMA  to LAD, SVG to PDA, SVG to distal RCA, SVG to OM1.   b. Non STEMI, 2007 with 3.5 x 16 Taxus to SVG to RCA (Dr. Sheikh).   c. Cardiac catheterization, 2012, medical management, incomplete database.   d. On 08/15/2014, functional class III angina/unstable.  Heart catheterization EF 40% with inferior wall akinesis. Severe distal left main and proximal LAD stenosis with a patent LIMA graft to LAD. An 80% proximal left circumflex heavily calcified stenosis tortuous segment with occluded vein graft. Chronically  occluded RCA and saphenous vein graft to RCA with 3+ collaterals.   e. Chronic functional class II-III angina.    f. Echo, 11/6/2016: LVEF 35%.  g. Cardiac catheterization, 10/10/2017: triple-vessel CAD of the native arteries; patent LIMA graft to the LAD; occluded vein grafts to the circumflex and the right coronary artery; successful PTCA/stenting of the distal left main, proximal circumflex and mid circumflex with placement of overlapping 2.5 x 38 and 3.0 x 23 mm drug-eluting stents reducing severe multiple 80% stenosis to no significant residual disease; severe left ventricular systolic dysfunction, ejection fraction 25-30%; 21 mm gradient across the aortic valve consistent with known aortic stenosis.  2. Aortic stenosis  a. EF 35% peak aortic valve gradient 41.  b. 4/26/18 TAVR  c. Echocardiogram (06/04/2018): EF 20%. Mild mitral regurgitation. TAVR valve present with normal function.  3. Heart failure (2017).  4. Hypertension.   5. Dyslipidemia.   6. History of tobacco use.   7. GERD.   8. Seizure disorder  9. Chronic lymphocytic leukemia.  10. Surgical history:   a. Left total knee replacement.   b. Remote cholecystectomy.   c. Appendectomy.   d. Abdominal hernia repair  x2    History of Present Illness  Patient returns today for follow up with a history of coronary artery disease and cardiac risk factors. Since last visit feeling well overall from a cardiovascular standpoint. He states that he is breathing better and feeling well since his TAVR. Denies any exertional chest pain, shortness of breath, orthopnea, PND, or palpitations. He has been remaining as active as possible and walking for exercise. He has lost 15 pounds of fluid since his procedure.    Allergies   Allergen Reactions   • Hctz [Hydrochlorothiazide] Other (See Comments)     hyponatremia         Current Outpatient Prescriptions:   •  ALPRAZolam (XANAX) 0.5 MG tablet, Take 0.5 mg by mouth At Night As Needed for Sleep., Disp: , Rfl: 2  •  aspirin 81 MG tablet, Take 1 tablet by mouth daily., Disp: 90 tablet, Rfl: 3  •  atorvastatin (LIPITOR) 80 MG tablet, Take 1 tablet by mouth daily., Disp: 90 tablet, Rfl: 3  •  carvedilol (COREG) 3.125 MG tablet, Take 1 tablet by mouth Every 12 (Twelve) Hours. (Patient taking differently: Take 3.125 mg by mouth Every 12 (Twelve) Hours. Hold for SBP < 110, HR < 60), Disp: 60 tablet, Rfl: 11  •  cholecalciferol (VITAMIN D3) 1000 UNITS tablet, Take 1,000 Units by mouth Daily., Disp: , Rfl:   •  clopidogrel (PLAVIX) 75 MG tablet, Take 1 tablet by mouth daily., Disp: 90 tablet, Rfl: 3  •  lisinopril (PRINIVIL,ZESTRIL) 2.5 MG tablet, Take 1 tablet by mouth Daily., Disp: 60 tablet, Rfl: 11  •  nitroglycerin (NITROSTAT) 0.4 MG SL tablet, Place 1 tablet under the tongue Every 5 (Five) Minutes As Needed for Chest Pain., Disp: 25 tablet, Rfl: 6  •  omeprazole (priLOSEC) 20 MG capsule, Take 20 mg by mouth Daily As Needed. For acid reflux, Disp: , Rfl:   •  sertraline (ZOLOFT) 50 MG tablet, Take 50 mg by mouth Daily., Disp: , Rfl: 5  •  spironolactone (ALDACTONE) 25 MG tablet, Take 1 tablet by mouth Daily., Disp: 30 tablet, Rfl: 3  •  torsemide (DEMADEX) 20 MG tablet, Take 1 tablet by mouth  "Daily., Disp: 30 tablet, Rfl: 11    The following portions of the patient's history were reviewed and updated as appropriate: allergies, current medications, past family history, past medical history, past social history, past surgical history and problem list.    Review of Systems   Constitution: Negative.   Cardiovascular: Negative.    Respiratory: Negative.    Hematologic/Lymphatic: Negative for bleeding problem. Does not bruise/bleed easily.   Skin: Negative for rash.   Musculoskeletal: Negative for muscle weakness and myalgias.   Gastrointestinal: Negative for heartburn, nausea and vomiting.   Neurological: Negative.           Objective:     Vitals:    08/06/18 1105   BP: 120/54   BP Location: Right arm   Patient Position: Sitting   Pulse: 64   Weight: 71.7 kg (158 lb)   Height: 172.7 cm (68\")         Physical Exam   Constitutional: He is oriented to person, place, and time. He appears well-developed and well-nourished.   HENT:   Mouth/Throat: Oropharynx is clear and moist.   Neck: No JVD present. Carotid bruit is not present. No thyromegaly present.   Cardiovascular: Regular rhythm, S1 normal, S2 normal, normal heart sounds and intact distal pulses.  Exam reveals no gallop, no S3 and no S4.    No murmur heard.  Pulses:       Carotid pulses are 2+ on the right side, and 2+ on the left side.       Radial pulses are 2+ on the right side, and 2+ on the left side.   Pulmonary/Chest: Breath sounds normal.   Abdominal: Soft. Bowel sounds are normal. He exhibits no mass. There is no tenderness.   Musculoskeletal: He exhibits no edema.   Neurological: He is alert and oriented to person, place, and time.   Skin: Skin is warm and dry. No rash noted.       Lab Review:    Lab Results   Component Value Date    GLUCOSE 97 06/04/2018    BUN 36 (H) 06/04/2018    CREATININE 1.60 (H) 06/04/2018    EGFRIFNONA 41 (L) 06/04/2018    BCR 22.5 06/04/2018    K 5.4 06/04/2018    CO2 28.0 06/04/2018    CALCIUM 9.6 06/04/2018    ALBUMIN " 3.70 04/28/2018    AST 18 04/28/2018    ALT 14 04/28/2018     Lab Results   Component Value Date    WBC 16.09 (H) 06/04/2018    HGB 11.7 (L) 06/04/2018    HCT 35.8 (L) 06/04/2018    MCV 93.2 06/04/2018     06/04/2018     Lab Results   Component Value Date    INR 1.09 04/28/2018    INR 1.10 (H) 04/27/2018    INR 1.10 (H) 04/26/2018    PROTIME 11.4 04/28/2018    PROTIME 11.6 (H) 04/27/2018    PROTIME 11.6 (H) 04/26/2018     Procedures        Assessment:   Tad was seen today for coronary artery disease.    Diagnoses and all orders for this visit:    Coronary artery disease involving coronary bypass graft of native heart without angina pectoris    Nonrheumatic aortic valve stenosis s/p TAVR    Hyperlipidemia LDL goal <100        Impression  1. Coronary artery disease, status post stenting less than one year ago.  No current angina  2. Status post TAVR.  Stable.  3. 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  No current heart failure however.  4. Up retention controlled  5. Dyslipidemia controlled    Plan:  1. Given patient's age and multiple conditions he is doing very well is extremely active for age.  2. Continue current medications.  3. Revisit in 6 MO, or sooner as needed.    Scribed for Lv Cobian MD by Mc Chery. 8/6/2018  11:26 AM    I, Lv Cobian MD, personally performed the services described in this documentation as scribed by the above individual in my presence, and it is both accurate and complete      Please note that portions of this note may have been completed with a voice recognition program. Efforts were made to edit the dictations, but occasionally words are mistranscribed.

## 2018-09-06 ENCOUNTER — HOSPITAL ENCOUNTER (EMERGENCY)
Facility: HOSPITAL | Age: 83
Discharge: HOME | End: 2018-09-06
Attending: EMERGENCY MEDICINE | Admitting: EMERGENCY MEDICINE

## 2018-09-06 VITALS
WEIGHT: 156 LBS | RESPIRATION RATE: 16 BRPM | BODY MASS INDEX: 23.11 KG/M2 | TEMPERATURE: 97.9 F | HEIGHT: 69 IN | HEART RATE: 60 BPM | SYSTOLIC BLOOD PRESSURE: 161 MMHG | DIASTOLIC BLOOD PRESSURE: 67 MMHG | OXYGEN SATURATION: 99 %

## 2018-09-06 DIAGNOSIS — R29.6 MULTIPLE FALLS: ICD-10-CM

## 2018-09-06 DIAGNOSIS — I49.8 VENTRICULAR BIGEMINY: ICD-10-CM

## 2018-09-06 DIAGNOSIS — R42 DIZZINESS: Primary | ICD-10-CM

## 2018-09-06 LAB
ALBUMIN SERPL-MCNC: 4.21 G/DL (ref 3.2–4.8)
ALBUMIN/GLOB SERPL: 1.6 G/DL (ref 1.5–2.5)
ALP SERPL-CCNC: 117 U/L (ref 25–100)
ALT SERPL W P-5'-P-CCNC: 28 U/L (ref 7–40)
ANION GAP SERPL CALCULATED.3IONS-SCNC: 9 MMOL/L (ref 3–11)
AST SERPL-CCNC: 29 U/L (ref 0–33)
BACTERIA UR QL AUTO: NORMAL /HPF
BASOPHILS # BLD AUTO: 0.06 10*3/MM3 (ref 0–0.2)
BASOPHILS NFR BLD AUTO: 0.4 % (ref 0–1)
BILIRUB SERPL-MCNC: 0.5 MG/DL (ref 0.3–1.2)
BILIRUB UR QL STRIP: NEGATIVE
BUN BLD-MCNC: 33 MG/DL (ref 9–23)
BUN/CREAT SERPL: 20.4 (ref 7–25)
CALCIUM SPEC-SCNC: 9.1 MG/DL (ref 8.7–10.4)
CHLORIDE SERPL-SCNC: 98 MMOL/L (ref 99–109)
CLARITY UR: CLEAR
CO2 SERPL-SCNC: 26 MMOL/L (ref 20–31)
COLOR UR: YELLOW
CREAT BLD-MCNC: 1.62 MG/DL (ref 0.6–1.3)
DEPRECATED RDW RBC AUTO: 47 FL (ref 37–54)
EOSINOPHIL # BLD AUTO: 0.75 10*3/MM3 (ref 0–0.3)
EOSINOPHIL NFR BLD AUTO: 5.2 % (ref 0–3)
ERYTHROCYTE [DISTWIDTH] IN BLOOD BY AUTOMATED COUNT: 13.6 % (ref 11.3–14.5)
GFR SERPL CREATININE-BSD FRML MDRD: 40 ML/MIN/1.73
GLOBULIN UR ELPH-MCNC: 2.6 GM/DL
GLUCOSE BLD-MCNC: 126 MG/DL (ref 70–100)
GLUCOSE UR STRIP-MCNC: NEGATIVE MG/DL
HCT VFR BLD AUTO: 32.8 % (ref 38.9–50.9)
HGB BLD-MCNC: 10.4 G/DL (ref 13.1–17.5)
HGB UR QL STRIP.AUTO: NEGATIVE
HOLD SPECIMEN: NORMAL
HOLD SPECIMEN: NORMAL
HYALINE CASTS UR QL AUTO: NORMAL /LPF
IMM GRANULOCYTES # BLD: 0.04 10*3/MM3 (ref 0–0.03)
IMM GRANULOCYTES NFR BLD: 0.3 % (ref 0–0.6)
KETONES UR QL STRIP: NEGATIVE
LEUKOCYTE ESTERASE UR QL STRIP.AUTO: ABNORMAL
LYMPHOCYTES # BLD AUTO: 6.87 10*3/MM3 (ref 0.6–4.8)
LYMPHOCYTES NFR BLD AUTO: 47.2 % (ref 24–44)
MAGNESIUM SERPL-MCNC: 1.4 MG/DL (ref 1.3–2.7)
MCH RBC QN AUTO: 30.1 PG (ref 27–31)
MCHC RBC AUTO-ENTMCNC: 31.7 G/DL (ref 32–36)
MCV RBC AUTO: 94.8 FL (ref 80–99)
MONOCYTES # BLD AUTO: 0.69 10*3/MM3 (ref 0–1)
MONOCYTES NFR BLD AUTO: 4.7 % (ref 0–12)
NEUTROPHILS # BLD AUTO: 6.18 10*3/MM3 (ref 1.5–8.3)
NEUTROPHILS NFR BLD AUTO: 42.5 % (ref 41–71)
NITRITE UR QL STRIP: NEGATIVE
PH UR STRIP.AUTO: 6 [PH] (ref 5–8)
PLATELET # BLD AUTO: 227 10*3/MM3 (ref 150–450)
PMV BLD AUTO: 11.1 FL (ref 6–12)
POTASSIUM BLD-SCNC: 3.9 MMOL/L (ref 3.5–5.5)
PROT SERPL-MCNC: 6.8 G/DL (ref 5.7–8.2)
PROT UR QL STRIP: NEGATIVE
RBC # BLD AUTO: 3.46 10*6/MM3 (ref 4.2–5.76)
RBC # UR: NORMAL /HPF
REF LAB TEST METHOD: NORMAL
SODIUM BLD-SCNC: 133 MMOL/L (ref 132–146)
SP GR UR STRIP: 1.01 (ref 1–1.03)
SQUAMOUS #/AREA URNS HPF: NORMAL /HPF
TROPONIN I SERPL-MCNC: 0 NG/ML (ref 0–0.07)
TROPONIN I SERPL-MCNC: 0 NG/ML (ref 0–0.07)
UROBILINOGEN UR QL STRIP: ABNORMAL
WBC NRBC COR # BLD: 14.55 10*3/MM3 (ref 3.5–10.8)
WBC UR QL AUTO: NORMAL /HPF
WHOLE BLOOD HOLD SPECIMEN: NORMAL
WHOLE BLOOD HOLD SPECIMEN: NORMAL

## 2018-09-06 PROCEDURE — 85025 COMPLETE CBC W/AUTO DIFF WBC: CPT | Performed by: EMERGENCY MEDICINE

## 2018-09-06 PROCEDURE — 81001 URINALYSIS AUTO W/SCOPE: CPT | Performed by: EMERGENCY MEDICINE

## 2018-09-06 PROCEDURE — 83735 ASSAY OF MAGNESIUM: CPT | Performed by: EMERGENCY MEDICINE

## 2018-09-06 PROCEDURE — 84484 ASSAY OF TROPONIN QUANT: CPT

## 2018-09-06 PROCEDURE — 80053 COMPREHEN METABOLIC PANEL: CPT | Performed by: EMERGENCY MEDICINE

## 2018-09-06 PROCEDURE — 93005 ELECTROCARDIOGRAM TRACING: CPT

## 2018-09-06 PROCEDURE — 99285 EMERGENCY DEPT VISIT HI MDM: CPT

## 2018-09-06 PROCEDURE — 93005 ELECTROCARDIOGRAM TRACING: CPT | Performed by: EMERGENCY MEDICINE

## 2018-09-06 RX ORDER — SODIUM CHLORIDE 0.9 % (FLUSH) 0.9 %
10 SYRINGE (ML) INJECTION AS NEEDED
Status: DISCONTINUED | OUTPATIENT
Start: 2018-09-06 | End: 2018-09-07 | Stop reason: HOSPADM

## 2018-09-06 RX ADMIN — Medication 10 ML: at 17:46

## 2018-09-06 NOTE — ED PROVIDER NOTES
Subjective   Mr. Tad Moon is a 89 y.o. male who presents to the ED with c/o recurrent falls onset today. Pt reports earlier today he tripped on a rug causing him to fall against a wall with point of impact being head and RUE. He denies LOC and states he was feeling well post fall, however a few hours later he was walking inside his house and lost his balance causing him to fall backwards. This second fall did not involve head injury or LOC. He complains of generalized weakness and abrasions to RUE, however he denies any pain, dizziness, HA, N/V/D, SoA, CP, diarrhea, and any other acute sx at this time. Pt notes sx may be secondary to dehydration due to taking his fluid pills. Additionally, family notes en route EMS reported a BP of 70/40. At baseline pt ambulates without use of cane or walker. Past medical hx significant for CAD, HTN, HLD, GERD, CHF, aortic stenosis, CLL, skin cancer, arthritis, MI, low kidney function, cholecystectomy, CABG, appendectomy, cardiac cath, hernia repair, aortic valve replacement, total knee replacement.               History provided by:  Patient  Fall   Mechanism of injury: fall    Injury location:  Head/neck  Head/neck injury location:  Head  Incident location:  Home  Fall:     Fall occurred:  Tripped and walking    Point of impact:  Head and buttocks  Associated symptoms: no chest pain, no headaches, no loss of consciousness, no nausea and no vomiting    Risk factors: CABG, CAD and CHF        Review of Systems   Constitutional: Negative for fever.   Respiratory: Negative for shortness of breath.    Cardiovascular: Negative for chest pain.   Gastrointestinal: Negative for diarrhea, nausea and vomiting.   Skin: Positive for wound (abrasions RUE).   Neurological: Positive for weakness (generalized ). Negative for dizziness, loss of consciousness and headaches.   All other systems reviewed and are negative.      Past Medical History:   Diagnosis Date   • Aortic stenosis    •  Arthritis    • CHF (congestive heart failure) (CMS/HCC)    • CLL (chronic lymphocytic leukemia) (CMS/HCC)     15 years    • Coronary artery disease    • GERD (gastroesophageal reflux disease)    • History of tobacco abuse    • Hyperlipidemia    • Hypertension    • Low kidney function     very recently and did kidney function test and said decreased function so being watched to get all fluid off    • MI (myocardial infarction)     mid 90s very mild - few years after bypass surgery    • Skin cancer     basal and squamous from various location    • SOBOE (shortness of breath on exertion)    • Uses hearing aid     bilat ears        Allergies   Allergen Reactions   • Hctz [Hydrochlorothiazide] Other (See Comments)     hyponatremia       Past Surgical History:   Procedure Laterality Date   • ABDOMINAL HERNIA REPAIR      x 2   • AORTIC VALVE REPAIR/REPLACEMENT N/A 4/26/2018    Procedure: Transcatheter Aortic Valve Replacement, LANDY;  Surgeon: Lv Purdy MD;  Location:  [facility];  Service: Cardiothoracic   • AORTIC VALVE REPAIR/REPLACEMENT N/A 4/26/2018    Procedure: Transcatheter Aortic Valve Replacement;  Surgeon: Juan M Sood MD;  Location:  [facility];  Service: Cardiology   • APPENDECTOMY     • CARDIAC CATHETERIZATION N/A 10/10/2017    Procedure: Left Heart Cath;  Surgeon: Juan M Sood MD;  Location:  [facility];  Service:    • CARDIAC CATHETERIZATION N/A 4/6/2018    Procedure: Left Heart Cath;  Surgeon: Lv Cobian MD;  Location:  [facility];  Service: Cardiovascular   • CHOLECYSTECTOMY     • COLONOSCOPY     • CORONARY ANGIOPLASTY WITH STENT PLACEMENT      07 one placed,  and in 2017 had another stent placed and one repaired -   so pt thinks 3 stents in place    • CORONARY ARTERY BYPASS GRAFT      4 bypass in 92    • REPLACEMENT TOTAL KNEE Left 2014   • WISDOM TOOTH EXTRACTION         Family History   Problem Relation Age of Onset   • Stroke Mother    •  Heart disease Father    • Heart disease Brother        Social History     Social History   • Marital status:    • Number of children: 3     Occupational History   • Retired       insurance agency      Social History Main Topics   • Smoking status: Former Smoker     Packs/day: 0.25     Types: Cigarettes     Quit date: 1975   • Smokeless tobacco: Never Used      Comment: quit 45 years ago- smoked since 17 yo    • Alcohol use 0.6 - 1.2 oz/week     1 - 2 Glasses of wine per week      Comment: occas   • Drug use: No   • Sexual activity: Defer     Other Topics Concern   • Not on file     Social History Narrative    Lives with Wife Julia in [place], [place]     Caffeine:  3 servings per day             Objective   Physical Exam   Constitutional: He is oriented to person, place, and time. He appears well-developed and well-nourished. No distress.   HENT:   Head: Normocephalic and atraumatic.   Right Ear: External ear normal.   Left Ear: External ear normal.   Nose: Nose normal.   Eyes: Pupils are equal, round, and reactive to light. Conjunctivae and EOM are normal. No scleral icterus.   Neck: Normal range of motion. Neck supple.   Cardiovascular: An irregular rhythm present. Bradycardia present.  Exam reveals no gallop and no friction rub.    Murmur heard.   Systolic (upper sternal border) murmur is present with a grade of 3/6   Pulmonary/Chest: Effort normal and breath sounds normal. No respiratory distress. He has no wheezes. He has no rales. He exhibits no tenderness.   Abdominal: Soft. Bowel sounds are normal. There is no tenderness. There is no rebound and no guarding.   Musculoskeletal: Normal range of motion.   Abrasions to the R hand, forearm, and lower leg.  Normal ROM, no deformity.   Neurological: He is alert and oriented to person, place, and time. No cranial nerve deficit. He exhibits normal muscle tone. Coordination normal.   Skin: Skin is warm and dry. He is not diaphoretic.   Psychiatric: He has a  normal mood and affect. His behavior is normal.   Nursing note and vitals reviewed.      Procedures         ED Course     Orthostatics negative.  EKG sinus rhythm with bigeminy, RBBB, 1st degree AV block.  This is new compared to priors.  Labs benign.  No hypotension here.  Pt able to stand without difficulty.  Initially his wife wanted him to be admitted which I found reasonable, but by the end of the visit they were both insistent that he was well enough to go home, and pt said he had no interest in hospital admission.  He does have a walker at home he can use.                MDM  Number of Diagnoses or Management Options  Dizziness:   Multiple falls:   Ventricular bigeminy:      Amount and/or Complexity of Data Reviewed  Clinical lab tests: reviewed and ordered  Tests in the radiology section of CPT®: reviewed and ordered  Decide to obtain previous medical records or to obtain history from someone other than the patient: yes  Obtain history from someone other than the patient: yes  Independent visualization of images, tracings, or specimens: yes        Final diagnoses:   Dizziness   Multiple falls   Ventricular bigeminy       Documentation assistance provided by dia Steel.  Information recorded by the scribe was done at my direction and has been verified and validated by me.     Alejandro Steel  09/06/18 3122       Lance David MD  09/07/18 0057

## 2018-09-24 ENCOUNTER — HOSPITAL ENCOUNTER (EMERGENCY)
Facility: HOSPITAL | Age: 83
Discharge: HOME | End: 2018-09-24
Attending: EMERGENCY MEDICINE | Admitting: EMERGENCY MEDICINE

## 2018-09-24 VITALS
RESPIRATION RATE: 16 BRPM | OXYGEN SATURATION: 97 % | TEMPERATURE: 98.9 F | HEART RATE: 61 BPM | BODY MASS INDEX: 23.7 KG/M2 | WEIGHT: 160 LBS | SYSTOLIC BLOOD PRESSURE: 147 MMHG | DIASTOLIC BLOOD PRESSURE: 65 MMHG | HEIGHT: 69 IN

## 2018-09-24 DIAGNOSIS — S80.11XA LEG HEMATOMA, RIGHT, INITIAL ENCOUNTER: Primary | ICD-10-CM

## 2018-09-24 DIAGNOSIS — L03.115 CELLULITIS OF RIGHT LEG: ICD-10-CM

## 2018-09-24 PROCEDURE — 99283 EMERGENCY DEPT VISIT LOW MDM: CPT

## 2018-09-24 RX ORDER — LIDOCAINE HYDROCHLORIDE AND EPINEPHRINE 10; 10 MG/ML; UG/ML
10 INJECTION, SOLUTION INFILTRATION; PERINEURAL ONCE
Status: COMPLETED | OUTPATIENT
Start: 2018-09-24 | End: 2018-09-24

## 2018-09-24 RX ORDER — SULFAMETHOXAZOLE AND TRIMETHOPRIM 800; 160 MG/1; MG/1
1 TABLET ORAL 2 TIMES DAILY
Qty: 14 TABLET | Refills: 0 | Status: SHIPPED | OUTPATIENT
Start: 2018-09-24 | End: 2018-10-01

## 2018-09-24 RX ORDER — TRAMADOL HYDROCHLORIDE 50 MG/1
50 TABLET ORAL EVERY 6 HOURS PRN
Qty: 15 TABLET | Refills: 0 | Status: SHIPPED | OUTPATIENT
Start: 2018-09-24 | End: 2019-02-11

## 2018-09-24 RX ORDER — CEPHALEXIN 500 MG/1
500 CAPSULE ORAL 2 TIMES DAILY
Qty: 14 CAPSULE | Refills: 0 | Status: SHIPPED | OUTPATIENT
Start: 2018-09-24 | End: 2018-10-01

## 2018-09-24 RX ADMIN — LIDOCAINE HYDROCHLORIDE,EPINEPHRINE BITARTRATE 10 ML: 10; .01 INJECTION, SOLUTION INFILTRATION; PERINEURAL at 17:23

## 2018-09-24 NOTE — DISCHARGE INSTRUCTIONS
Patient is advised to remove packing from right leg wound in 2 days.    Keep wound clean with soap and water.    Reapply ace wrap with a moderate amount of pressure for 2 days after the packing is removed.    He should have the wound reevaluated in 2 days.    He is advised take antibiotic as prescribed, take Ultram as needed for pain    Return to the ER with any further concerns or worsening of symptoms.

## 2018-09-24 NOTE — ED PROVIDER NOTES
Subjective   Mr. Tad Moon is a 89 y.o. male who presents to the ED with c/o leg swelling onset approx. 2 weeks ago. Pt reports approx. 2 weeks ago he had a mechanical trip and fall causing abrasions to right lower extremity. He reported to this ED where a thorough workup was performed and pt was discharged. Post discharge, pt progressively developed erythema and edema to RLE. He reported to [facility] approx. 8 days ago where he was given Keflex however this did not provide relief and the erythema and edema have continued to worsen. He returned to [facility] today where he was instructed to report to ED for further evaluation. Pt complains of pain at right lower leg and intermittent chills, however he denies fever, cough, SoA, difficulty with ambulation and any other acute sx at this time. Past medical hx significant for CAD with stents, HTN, HLD, GERD, CHF, aortic stenosis, CLL, skin cancer, MI, low kidney function, arthritis, chronic anti-coagulation with Plavix, cholecystectomy, CABG, appendectomy, cardiac cath, hernia repair, colonoscopy.        History provided by:  Patient  Lower Extremity Issue   Location:  Leg  Leg location:  R lower leg  Pain details:     Radiates to:  Does not radiate    Severity:  Moderate    Onset quality:  Sudden    Duration:  2 weeks    Timing:  Constant    Progression:  Worsening  Chronicity:  New  Relieved by:  Nothing  Worsened by:  Nothing  Ineffective treatments: Keflex.  Associated symptoms: swelling    Associated symptoms: no decreased ROM and no fever        Review of Systems   Constitutional: Positive for chills. Negative for fever.   Respiratory: Negative for cough and shortness of breath.    All other systems reviewed and are negative.      Past Medical History:   Diagnosis Date   • Aortic stenosis    • Arthritis    • CHF (congestive heart failure) (CMS/HCC)    • CLL (chronic lymphocytic leukemia) (CMS/[facility])     15 years    • Coronary artery disease    • GERD  (gastroesophageal reflux disease)    • History of tobacco abuse    • Hyperlipidemia    • Hypertension    • Low kidney function     very recently and did kidney function test and said decreased function so being watched to get all fluid off    • MI (myocardial infarction)     mid 90s very mild - few years after bypass surgery    • Skin cancer     basal and squamous from various location    • SOBOE (shortness of breath on exertion)    • Uses hearing aid     bilat ears        Allergies   Allergen Reactions   • Hctz [Hydrochlorothiazide] Other (See Comments)     hyponatremia       Past Surgical History:   Procedure Laterality Date   • ABDOMINAL HERNIA REPAIR      x 2   • AORTIC VALVE REPAIR/REPLACEMENT N/A 4/26/2018    Procedure: Transcatheter Aortic Valve Replacement, LANDY;  Surgeon: Lv Purdy MD;  Location:  [facility];  Service: Cardiothoracic   • AORTIC VALVE REPAIR/REPLACEMENT N/A 4/26/2018    Procedure: Transcatheter Aortic Valve Replacement;  Surgeon: Juan M Sood MD;  Location:  [facility];  Service: Cardiology   • APPENDECTOMY     • CARDIAC CATHETERIZATION N/A 10/10/2017    Procedure: Left Heart Cath;  Surgeon: Juan M Sood MD;  Location:  [facility];  Service:    • CARDIAC CATHETERIZATION N/A 4/6/2018    Procedure: Left Heart Cath;  Surgeon: Lv Cobian MD;  Location:  [facility] INV[facility] LOCATION;  Service: Cardiovascular   • CHOLECYSTECTOMY     • COLONOSCOPY     • CORONARY ANGIOPLASTY WITH STENT PLACEMENT      07 one placed,  and in 2017 had another stent placed and one repaired -   so pt thinks 3 stents in place    • CORONARY ARTERY BYPASS GRAFT      4 bypass in 92    • REPLACEMENT TOTAL KNEE Left 2014   • WISDOM TOOTH EXTRACTION         Family History   Problem Relation Age of Onset   • Stroke Mother    • Heart disease Father    • Heart disease Brother        Social History     Social History   • Marital status:    • Number of children: 3     Occupational  History   • Retired       insurance agency      Social History Main Topics   • Smoking status: Former Smoker     Packs/day: 0.25     Types: Cigarettes     Quit date: 1975   • Smokeless tobacco: Never Used      Comment: quit 45 years ago- smoked since 19 yo    • Alcohol use 0.6 - 1.2 oz/week     1 - 2 Glasses of wine per week      Comment: occas   • Drug use: No   • Sexual activity: Defer     Other Topics Concern   • Not on file     Social History Narrative    Lives with Wife Julia in [place], [place]     Caffeine:  3 servings per day             Objective   Physical Exam   Constitutional: He is oriented to person, place, and time. He appears well-developed and well-nourished. No distress.   Pt awake and alert with normal mentation.    HENT:   Head: Normocephalic and atraumatic.   Eyes: Conjunctivae are normal. No scleral icterus.   Neck: Normal range of motion. Neck supple.   Cardiovascular: Normal rate, regular rhythm and normal heart sounds.  Exam reveals no gallop and no friction rub.    No murmur heard.  Pulmonary/Chest: Effort normal and breath sounds normal. No respiratory distress. He has no wheezes. He has no rales. He exhibits no tenderness.   Lungs clear diffusely.    Abdominal: Soft. Bowel sounds are normal. There is no tenderness.   Musculoskeletal: Normal range of motion.   Neurological: He is alert and oriented to person, place, and time. No cranial nerve deficit or sensory deficit. He exhibits normal muscle tone. GCS eye subscore is 4. GCS verbal subscore is 5. GCS motor subscore is 6.   Neurovascularly intact.    Skin: Skin is warm and dry. There is erythema.   Over lateral aspect right leg there are two preivous skin tears that are heealing well. Distal to that there is area of swelling, redness, and mild tenderness with some cellultis that spreads out from that region approximately 20cm in length and 12 cm width   Psychiatric: He has a normal mood and affect. His behavior is normal.   Nursing note  and vitals reviewed.      Incision & Drainage  Date/Time: 9/24/2018 6:34 PM  Performed by: CASSIE TREVIÑO  Authorized by: CASSIE TREVIÑO     Consent:     Consent obtained:  Verbal    Consent given by:  Patient    Risks discussed:  Bleeding, incomplete drainage, pain, infection and damage to other organs  Location:     Type:  Hematoma    Size:  6cm x 3cm    Location:  Lower extremity    Lower extremity location:  Leg    Leg location:  R lower leg  Pre-procedure details:     Skin preparation:  Betadine  Anesthesia (see MAR for exact dosages):     Anesthesia method:  Local infiltration    Local anesthetic:  Lidocaine 1% WITH epi  Procedure type:     Complexity:  Complex  Procedure details:     Incision types:  Single straight    Incision depth:  Subcutaneous    Scalpel blade:  11    Wound management:  Probed and deloculated, irrigated with saline, extensive cleaning and debrided    Drainage:  Bloody    Drainage amount:  Moderate    Wound treatment:  Wound left open    Packing materials:  1/2 in gauze  Post-procedure details:     Patient tolerance of procedure:  Tolerated well, no immediate complications                   ED Course     No results found for this or any previous visit (from the past 24 hour(s)).  Note: In addition to lab results from this visit, the labs listed above may include labs taken at another facility or during a different encounter within the last 24 hours. Please correlate lab times with ED admission and discharge times for further clarification of the services performed during this visit.    No orders to display     Vitals:    09/24/18 1644 09/24/18 1730 09/24/18 1800 09/24/18 1830   BP: 142/60 149/64 152/62 147/65   BP Location:       Patient Position:       Pulse: 62 62 61 61   Resp: 16 16  16   Temp:       TempSrc:       SpO2: 98% 98% 100% 97%   Weight:       Height:         Medications   lidocaine-EPINEPHrine (XYLOCAINE W/EPI) 1 %-1:937420 injection 10 mL (10 mL Injection Given  by Other 9/24/18 1723)     ECG/EMG Results (last 24 hours)     ** No results found for the last 24 hours. **                        MDM  Number of Diagnoses or Management Options  Cellulitis of right leg: new and requires workup  Leg hematoma, right, initial encounter: new and requires workup  Diagnosis management comments: After incision and drainage the wound was noted be a hematoma, there was no pus present.    Packing was left in place to keep hematoma from recollecting and ace wrap was applied over wound.     Patient will be discharged with bactrim and keflex.     Patient is advised to remove packing from right leg wound in 2 days.    Keep wound clean with soap and water.    Reapply ace wrap with a moderate amount of pressure for 2 days after the packing is removed.    He should have the wound reevaluated in 2 days.    He is advised take antibiotic as prescribed, take Ultram as needed for pain    Return to the ER with any further concerns or worsening of symptoms.       Amount and/or Complexity of Data Reviewed  Obtain history from someone other than the patient: yes  Review and summarize past medical records: yes  Independent visualization of images, tracings, or specimens: yes        Final diagnoses:   Leg hematoma, right, initial encounter   Cellulitis of right leg       Documentation assistance provided by dia Steel.  Information recorded by the scribe was done at my direction and has been verified and validated by me.     Alejandro Steel  09/24/18 1715       Alejandro Steel  09/24/18 1920       Albina Cloud MD  09/24/18 1941

## 2018-09-27 ENCOUNTER — TRANSCRIBE ORDERS (OUTPATIENT)
Dept: PHYSICAL THERAPY | Facility: HOSPITAL | Age: 83
End: 2018-09-27

## 2018-09-27 ENCOUNTER — HOSPITAL ENCOUNTER (OUTPATIENT)
Dept: PHYSICAL THERAPY | Facility: HOSPITAL | Age: 83
Setting detail: THERAPIES SERIES
Discharge: HOME | End: 2018-09-27

## 2018-09-27 DIAGNOSIS — L08.9 INFECTED ABRASION: Primary | ICD-10-CM

## 2018-09-27 DIAGNOSIS — S80.12XS TRAUMATIC HEMATOMA OF LEFT LOWER LEG, SEQUELA: ICD-10-CM

## 2018-09-27 DIAGNOSIS — S81.801D OPEN WOUND OF RIGHT LOWER EXTREMITY, SUBSEQUENT ENCOUNTER: Primary | ICD-10-CM

## 2018-09-27 DIAGNOSIS — T14.8XXA INFECTED ABRASION: Primary | ICD-10-CM

## 2018-09-27 PROCEDURE — G8990 OTHER PT/OT CURRENT STATUS: HCPCS

## 2018-09-27 PROCEDURE — 97597 DBRDMT OPN WND 1ST 20 CM/<: CPT

## 2018-09-27 PROCEDURE — 97162 PT EVAL MOD COMPLEX 30 MIN: CPT

## 2018-09-27 PROCEDURE — G8991 OTHER PT/OT GOAL STATUS: HCPCS

## 2018-09-27 NOTE — THERAPY EVALUATION
Outpatient Rehabilitation - Wound/Debridement Initial Eval  [facility]     Patient Name: Tad Moon  : 1926  MRN: [id number]  Today's Date: 2018                  Admit Date: 2018    Visit Dx:    ICD-10-CM ICD-9-CM   1. Open wound of right lower extremity, subsequent encounter S81.801D V58.89     891.0   2. Traumatic hematoma of left lower leg, sequela S80.12XS 906.3     Lateral RLE pre-debridement:    Lateral RLE post-debridement:      Patient Active Problem List   Diagnosis   • Coronary artery disease involving coronary bypass graft of native heart without angina pectoris   • Hyperlipidemia LDL goal <100   • History of tobacco abuse   • CLL (chronic lymphocytic leukemia) (CMS/HCC)   • GERD (gastroesophageal reflux disease)   • Nonrheumatic aortic valve stenosis s/p TAVR   • Acute on chronic systolic congestive heart failure (CMS/HCC)   • CKD (chronic kidney disease) stage 3, GFR 30-59 ml/min   • Aortic valve stenosis        Past Medical History:   Diagnosis Date   • Aortic stenosis    • Arthritis    • CHF (congestive heart failure) (CMS/HCC)    • CLL (chronic lymphocytic leukemia) (CMS/[facility])     15 years    • Coronary artery disease    • GERD (gastroesophageal reflux disease)    • History of tobacco abuse    • Hyperlipidemia    • Hypertension    • Low kidney function     very recently and did kidney function test and said decreased function so being watched to get all fluid off    • MI (myocardial infarction)     mid 90s very mild - few years after bypass surgery    • Skin cancer     basal and squamous from various location    • SOBOE (shortness of breath on exertion)    • Uses hearing aid     bilat ears         Past Surgical History:   Procedure Laterality Date   • ABDOMINAL HERNIA REPAIR      x 2   • AORTIC VALVE REPAIR/REPLACEMENT N/A 2018    Procedure: Transcatheter Aortic Valve Replacement, LANDY;  Surgeon: Lv Purdy MD;  Location: [facility] OR ;  Service:  Cardiothoracic   • AORTIC VALVE REPAIR/REPLACEMENT N/A 4/26/2018    Procedure: Transcatheter Aortic Valve Replacement;  Surgeon: Juan M Sood MD;  Location:  [facility]BR[facility] OR 15;  Service: Cardiology   • APPENDECTOMY     • CARDIAC CATHETERIZATION N/A 10/10/2017    Procedure: Left Heart Cath;  Surgeon: Juan M Sood MD;  Location:  [facility]ATION;  Service:    • CARDIAC CATHETERIZATION N/A 4/6/2018    Procedure: Left Heart Cath;  Surgeon: Lv Cobian MD;  Location:  [facility];  Service: Cardiovascular   • CHOLECYSTECTOMY     • COLONOSCOPY     • CORONARY ANGIOPLASTY WITH STENT PLACEMENT      07 one placed,  and in 2017 had another stent placed and one repaired -   so pt thinks 3 stents in place    • CORONARY ARTERY BYPASS GRAFT      4 bypass in 92    • REPLACEMENT TOTAL KNEE Left 2014   • WISDOM TOOTH EXTRACTION               Patient History     Row Name 09/27/18 1500             History    Chief Complaint Ulcer, wound or other skin conditions;Pain;Swelling  -      Type of Pain Lower Extremity / Leg  -      Brief Description of Current Complaint Pt reports about a month ago he fell and hit his leg, developed hematoma with redness and swelling.  Pt initially received oral Keflex without improvement, prompting ED visit 9/24/18 and I&D of hematoma was performed.  Pt now referred by PCP for wound care and packing of wound.  -      Previous treatment for THIS PROBLEM Medication  -      Patient/Caregiver Goals Heal wound;Know what to do to help the symptoms  -      Occupation/sports/leisure activities Retired, lives with wife.  Pt's niece has been helping with dressing changes.  -      Patient seeing anyone else for problem(s)? PCP SOPHY Huang  -      How has patient tried to help current problem? leg elevation, taking oral abx  -         Pain     Pain Location Leg  -      Tim-[place]rad[place] FACES Pain Rating  4  -         Fall Risk Assessment    Any falls in the past  year: Yes  -JM         Services    Are you currently receiving Home Health services No  -JM      Do you plan to receive Home Health services in the near future Yes   Patient and family would prefer to have HH  -         Daily Activities    Primary Language English  -      Patient is concerned about/has problems with Other (comment)   wound healing  -JM      Pt Participated in POC and Goals Yes  -JM         Safety    Are you being hurt, hit, or frightened by anyone at home or in your life? No  -JM      Are you being neglected by a caregiver No  -JM        User Key  (r) = Recorded By, (t) = Taken By, (c) = Cosigned By    Initials Name Provider Type    Lacey Cox, PT Physical Therapist          EVALUATION        PT Ortho     Row Name 09/27/18 1500       Subjective Comments    Subjective Comments Pt's estherece reports she does not feel comfortable changing the packing.  They would like to have Home Health services for wound care due to difficulty coming to outpatient treatment.  -JM       Subjective Pain    Able to rate subjective pain? yes  -JM    Pre-Treatment Pain Level 3  -JM    Post-Treatment Pain Level 3  -JM       Transfers    Sit-Stand [place] (Transfers) independent  -JM    Stand-Sit [place] (Transfers) independent  -JM    Comment (Transfers) Christiane for LEs to recline on stretcher for tx  -JM       Gait/Stairs Assessment/Training    [place] Level (Gait) independent  -    Assistive Device (Gait) cane, quad  -      User Key  (r) = Recorded By, (t) = Taken By, (c) = Cosigned By    Initials Name Provider Type    Lacey Cox, PT Physical Therapist                LDA Wound     Row Name 09/27/18 1500             Wound 09/27/18 1500 Right lateral calf abscess    Wound - Properties Group Date first assessed: 09/27/18  - Time first assessed: 1500  -JM Present On Admission : yes;picture taken  -JM Side: Right  -JM Orientation: lateral  - Location: calf  -JM Type: abscess  -JM  "Additional Comments: traumatic hematoma, s/p I&D 9/24/18  -    Wound Image Images linked: 2  -      Dressing Appearance moist drainage;intact   packing had been removed yesterday at [facility]  -      Base moist;pink   unable to visualize depth of wound due to small opening  -      Periwound intact;redness;swelling;indurated  -      Periwound Temperature warm  -      Periwound Skin Turgor firm  -      Edges open  -      Wound Length (cm) 0.7 cm  -      Wound Width (cm) 0.2 cm  -      Wound Depth (cm) 1 cm  -      Undermining [Depth (cm)/Location] 2.2cm@12:00, 3.0cm@6:00  -      Drainage Characteristics/Odor serosanguineous  -      Drainage Amount small  -      Care, Wound cleansed with;sterile normal saline;debrided;honey applied   therahoney to superior abrasions  -      Dressing Care, Wound packed with;gauze, iodoform;low-adherent;foam   1/4\" iodoform gauze, 6\" optifoam dressing  -      Periwound Care, Wound cleansed with pH balanced cleanser;dry periwound area maintained  -        User Key  (r) = Recorded By, (t) = Taken By, (c) = Cosigned By    Initials Name Provider Type    Lacey Cox, PT Physical Therapist            WOUND DEBRIDEMENT  Debridement Site 1  Location- Site 1: lateral RLE  Selective Debridement- Site 1: Wound Surface <20cmsq  Instruments- Site 1: tweezers, scissors  Excised Tissue Description- Site 1: moderate, eschar, scant, slough  Bleeding- Site 1: scant                   Therapy Education     Row Name 09/27/18 1500             Therapy Education    Education Details Keep dressing dry when showering, but change cover dressing if wet or disrupted, try to leave packing intact until next tx.  Elevate leg to reduce swelling, return to MD for any S&S of infection.  -SOLA      Given Symptoms/condition management;Edema management;Bandaging/dressing change  -      Program New  -SOLA      How Provided Verbal;Demonstration  -SOLA      Provided to Patient;Caregiver  -SOLA   "    Level of Understanding Verbalized  -        User Key  (r) = Recorded By, (t) = Taken By, (c) = Cosigned By    Initials Name Provider Type    Lacey Cox PT Physical Therapist          Recommendation and Plan        PT Assessment/Plan     Row Name 09/27/18 1500          PT Assessment    Functional Limitations Other (comment)   wound management limitations  -     Impairments Edema;Integumentary integrity;Pain  -     Assessment Comments Pt presents with symptoms of moderate complexity.  Pt with open wound to lateral RLE s/p fall with hematoma development that was drained in ED 9/24/18.  Wound drainage clean and serosanguinous, no necrotic tissue expressed during tx.  PT debrided scabs/eschar from superior abrasions with clean pink base noted.  Pt and family requesting  services for ongoing tx, if possible due to difficulty transporting to outpatient facility.  PT will contact MD office and [facility] to arrange ongoing care.  PT will continue to see pt as outpatient until HH initiated.  -     Please refer to paper survey for additional self-reported information Yes  -     Rehab Potential Good  -     Patient/caregiver participated in establishment of treatment plan and goals Yes  -     Patient would benefit from skilled therapy intervention Yes  -JM        PT Plan    PT Frequency 3x/week  -     Predicted Duration of Therapy Intervention (Therapy Eval) 12 vistis  -     Planned CPT's? PT EVAL MOD COMPLELITY: 20677;PT AMBERLY DEBRIDE OPEN WOUND UP TO 20 CM: 61591;PT NONSELECT DEBRIDE 15 MIN: 99273;PT NLFU MIST: 09608;PT SELF CARE/MGMT/TRAIN 15 MIN: 72244  -     Physical Therapy Interventions (Optional Details) wound care;patient/family education  -     PT Plan Comments dressing management, wound irrigation until HH initiated  -       User Key  (r) = Recorded By, (t) = Taken By, (c) = Cosigned By    Initials Name Provider Type    Lacey Cox, PT Physical Therapist             Goals          PT OP Goals     Row Name 09/27/18 1500          PT Short Term Goals    STG 1 Patient and/ or caregiver able to verbalize signs and symptoms of infection.  -     STG 2 Decrease wound dimensions by 25% as evidence of wound closure.  -        Long Term Goals    LTG 1 Patient and/ or caregiver independent with clean dressing changes.  -     LTG 2 Decrease wound dimensions by 75% as evidence of wound closure.  -        Time Calculation    PT Goal Re-Cert Due Date 12/26/18  -       User Key  (r) = Recorded By, (t) = Taken By, (c) = Cosigned By    Initials Name Provider Type    Lacey Cox, PT Physical Therapist          Time Calculation: Start Time: 1500  Therapy Suggested Charges     Code   Minutes Charges    None           Therapy Charges for Today     Code Description Service Date Service Provider Modifiers Qty    32612137660 HC PT OTHER PRIME FUNCT CURRENT 9/27/2018 Lacey Schmitz, PT GP, CL 1    84557507317 HC PT OTHER PRIME FUNCT PROJECTED 9/27/2018 Lacey Schmitz, PT GP, CJ 1    34618349473 HC AMBERLY DEBRIDE OPEN WOUND UP TO 20CM 9/27/2018 Lacey Schmitz, PT GP 1    43963674785 HC PT EVAL MOD COMPLEXITY 4 9/27/2018 Lacey Schmitz, PT GP 1          PT G-Codes  PT Professional Judgement Used?: Yes  Outcome Measure Options: Lower Extremity Functional Scale (LEFS)  Total: 23  Functional Limitation: Other PT primary  Other PT Primary Current Status (): At least 60 percent but less than 80 percent impaired, limited or restricted  Other PT Primary Goal Status (): At least 20 percent but less than 40 percent impaired, limited or restricted     Lacey Schmitz, PT  9/27/2018

## 2018-10-11 ENCOUNTER — HOSPITAL ENCOUNTER (OUTPATIENT)
Dept: MRI IMAGING | Facility: HOSPITAL | Age: 83
Discharge: HOME | End: 2018-10-11
Admitting: PHYSICIAN ASSISTANT

## 2018-10-11 DIAGNOSIS — R56.9 SEIZURE (HCC): ICD-10-CM

## 2018-10-11 PROCEDURE — 70551 MRI BRAIN STEM W/O DYE: CPT

## 2019-02-11 ENCOUNTER — OFFICE VISIT (OUTPATIENT)
Dept: CARDIOLOGY | Facility: CLINIC | Age: 84
End: 2019-02-11

## 2019-02-11 VITALS
SYSTOLIC BLOOD PRESSURE: 140 MMHG | HEART RATE: 74 BPM | BODY MASS INDEX: 23.93 KG/M2 | HEIGHT: 69 IN | OXYGEN SATURATION: 98 % | DIASTOLIC BLOOD PRESSURE: 62 MMHG | WEIGHT: 161.6 LBS

## 2019-02-11 DIAGNOSIS — I10 HYPERTENSION, UNSPECIFIED TYPE: ICD-10-CM

## 2019-02-11 DIAGNOSIS — E78.5 HYPERLIPIDEMIA LDL GOAL <100: ICD-10-CM

## 2019-02-11 DIAGNOSIS — I25.810 CORONARY ARTERY DISEASE INVOLVING CORONARY BYPASS GRAFT OF NATIVE HEART WITHOUT ANGINA PECTORIS: Primary | ICD-10-CM

## 2019-02-11 PROCEDURE — 99214 OFFICE O/P EST MOD 30 MIN: CPT | Performed by: INTERNAL MEDICINE

## 2019-02-11 RX ORDER — SERTRALINE HYDROCHLORIDE 100 MG/1
100 TABLET, FILM COATED ORAL DAILY
COMMUNITY
End: 2020-05-04 | Stop reason: SDUPTHER

## 2019-02-11 RX ORDER — ATORVASTATIN CALCIUM 80 MG/1
40 TABLET, FILM COATED ORAL DAILY
Qty: 90 TABLET | Refills: 3 | Status: SHIPPED | OUTPATIENT
Start: 2019-02-11

## 2019-02-11 NOTE — PROGRESS NOTES
Subjective:     Encounter Date:02/11/2019      Patient ID: Tad Moon is a 89 y.o. male.    Chief Complaint: Coronary Artery Disease      PROBLEM LIST:  1. Coronary artery disease:  a. CABG in 1992, [place], [place]. LIMA  to LAD, SVG to PDA, SVG to distal RCA, SVG to OM1.   b. Non STEMI, 2007 with 3.5 x 16 Taxus to SVG to RCA (Dr. Sheikh).   c. Cardiac catheterization, 2012, medical management, incomplete database.   d. On 08/15/2014, functional class III angina/unstable.  Heart catheterization EF 40% with inferior wall akinesis. Severe distal left main and proximal LAD stenosis with a patent LIMA graft to LAD. An 80% proximal left circumflex heavily calcified stenosis tortuous segment with occluded vein graft. Chronically  occluded RCA and saphenous vein graft to RCA with 3+ collaterals.   e. Chronic functional class II-III angina.    f. Echo, 11/6/2016: LVEF 35%.  g. Cardiac catheterization, 10/10/2017: triple-vessel CAD of the native arteries; patent LIMA graft to the LAD; occluded vein grafts to the circumflex and the right coronary artery; successful PTCA/stenting of the distal left main, proximal circumflex and mid circumflex with placement of overlapping 2.5 x 38 and 3.0 x 23 mm drug-eluting stents reducing severe multiple 80% stenosis to no significant residual disease; severe left ventricular systolic dysfunction, ejection fraction 25-30%; 21 mm gradient across the aortic valve consistent with known aortic stenosis.  2. Aortic stenosis  a. EF 35% peak aortic valve gradient 41.  b. 4/26/18 TAVR  c. Echocardiogram (06/04/2018): EF 20%. Mild mitral regurgitation. TAVR valve present with normal function.  3. Heart failure (2017).  4. Hypertension.   5. Dyslipidemia.   6. History of tobacco use.   7. GERD.   8. Seizure disorder  9. Chronic lymphocytic leukemia.  10. Surgical history:   a. Left total knee replacement.   b. Remote cholecystectomy.   c. Appendectomy.   d. Abdominal hernia repair  x2    History of Present Illness  Tad Moon returns today for a 12 month follow up with a history of coronary artery disease, aortic stenosis and heart failure. Since last visit he has been doing well from a cardiovascular standpoint. He continues to drive, and has felt much better since his valve repair. He is afraid to go to [place] because his wife's health is deteriorating. Denies any exertional chest pain, shortness of breath, orthopnea, PND, or palpitations.    Allergies   Allergen Reactions   • Hctz [Hydrochlorothiazide] Other (See Comments)     hyponatremia         Current Outpatient Medications:   •  ALPRAZolam (XANAX) 0.5 MG tablet, Take 0.5 mg by mouth At Night As Needed for Sleep., Disp: , Rfl: 2  •  aspirin 81 MG tablet, Take 1 tablet by mouth daily., Disp: 90 tablet, Rfl: 3  •  atorvastatin (LIPITOR) 80 MG tablet, Take 1 tablet by mouth daily., Disp: 90 tablet, Rfl: 3  •  carvedilol (COREG) 3.125 MG tablet, Take 1 tablet by mouth Every 12 (Twelve) Hours. (Patient taking differently: Take 3.125 mg by mouth Every 12 (Twelve) Hours. Hold for SBP < 110, HR < 60), Disp: 60 tablet, Rfl: 11  •  cholecalciferol (VITAMIN D3) 1000 UNITS tablet, Take 1,000 Units by mouth Daily., Disp: , Rfl:   •  clopidogrel (PLAVIX) 75 MG tablet, Take 1 tablet by mouth daily., Disp: 90 tablet, Rfl: 3  •  lisinopril (PRINIVIL,ZESTRIL) 2.5 MG tablet, Take 1 tablet by mouth Daily., Disp: 60 tablet, Rfl: 11  •  nitroglycerin (NITROSTAT) 0.4 MG SL tablet, Place 1 tablet under the tongue Every 5 (Five) Minutes As Needed for Chest Pain., Disp: 25 tablet, Rfl: 6  •  omeprazole (priLOSEC) 20 MG capsule, Take 20 mg by mouth Daily As Needed. For acid reflux, Disp: , Rfl:   •  sertraline (ZOLOFT) 100 MG tablet, Take 100 mg by mouth Daily., Disp: , Rfl:   •  spironolactone (ALDACTONE) 25 MG tablet, Take 1 tablet by mouth Daily., Disp: 30 tablet, Rfl: 3  •  torsemide (DEMADEX) 20 MG tablet, Take 1 tablet by mouth Daily., Disp: 30 tablet,  "Rfl: 11    The following portions of the patient's history were reviewed and updated as appropriate: allergies, current medications, past family history, past medical history, past social history, past surgical history and problem list.    Review of Systems   Constitution: Positive for weight gain.   Cardiovascular: Negative.  Negative for chest pain, leg swelling, near-syncope, orthopnea, palpitations, paroxysmal nocturnal dyspnea and syncope.   Respiratory: Negative.  Negative for cough and shortness of breath.    Hematologic/Lymphatic: Negative for bleeding problem. Does not bruise/bleed easily.   Skin: Negative for rash.   Musculoskeletal: Negative for muscle weakness and myalgias.   Gastrointestinal: Negative for heartburn, nausea and vomiting.   Neurological: Negative.  Negative for dizziness, headaches, light-headedness, loss of balance, tremors and vertigo.   Psychiatric/Behavioral: The patient is not nervous/anxious.           Objective:     Vitals:    02/11/19 1511   BP: 140/62   BP Location: Right arm   Patient Position: Sitting   Pulse: 74   SpO2: 98%   Weight: 73.3 kg (161 lb 9.6 oz)   Height: 174 cm (68.5\")         Physical Exam   Constitutional: He is oriented to person, place, and time. He appears well-developed and well-nourished.   HENT:   Mouth/Throat: Oropharynx is clear and moist.   Neck: No JVD present. Carotid bruit is not present. No thyromegaly present.   Cardiovascular: Regular rhythm, S1 normal, S2 normal, normal heart sounds and intact distal pulses. Exam reveals no gallop, no S3 and no S4.   No murmur heard.  Pulses:       Carotid pulses are 2+ on the right side, and 2+ on the left side.       Radial pulses are 2+ on the right side, and 2+ on the left side.   Pulmonary/Chest: Breath sounds normal.   Abdominal: Soft. Bowel sounds are normal. He exhibits no mass. There is no tenderness.   Musculoskeletal: He exhibits no edema.   Neurological: He is alert and oriented to person, place, and " time.   Skin: Skin is warm and dry. No rash noted.       Lab Review:    Procedures        Assessment:   Tad was seen today for coronary artery disease.    Diagnoses and all orders for this visit:    Coronary artery disease involving coronary bypass graft of native heart without angina pectoris    Hypertension, unspecified type    Hyperlipidemia LDL goal <100    Other orders  -     atorvastatin (LIPITOR) 80 MG tablet; Take 0.5 tablets by mouth Daily.        Impression:  1. Coronary artery disease, stable without angina.  2. Status post TAVR.  Normal functioning valve by echo.  3. Hypertension is well-controlled.  4. Dyslipidemia is controlled with statin therapy.    Plan:  1. Change dosage of Lipitor from 80 mg to 40 mg.   2. Continue current medications.  3. Revisit in 12 MO with echo, or sooner as needed.    Scribed for Lv Cobian MD by Isela Shepherd. 2/11/2019  3:39 PM    I, Lv Cobian MD, personally performed the services described in this documentation as scribed by the above individual in my presence, and it is both accurate and complete      Please note that portions of this note may have been completed with a voice recognition program. Efforts were made to edit the dictations, but occasionally words are mistranscribed.

## 2019-02-21 PROBLEM — I25.5 ISCHEMIC CARDIOMYOPATHY: Status: ACTIVE | Noted: 2019-02-21

## 2019-02-21 PROBLEM — I20.9 ANGINA PECTORIS (HCC): Status: ACTIVE | Noted: 2019-02-21

## 2019-02-21 PROBLEM — R53.83 FATIGUE: Status: ACTIVE | Noted: 2019-02-21

## 2019-02-21 PROBLEM — I25.10 ATHEROSCLEROSIS OF CORONARY ARTERY: Status: ACTIVE | Noted: 2019-02-21

## 2019-02-21 PROBLEM — I50.9 HEART FAILURE, CHRONIC, WITH ACUTE DECOMPENSATION (HCC): Status: ACTIVE | Noted: 2019-02-21

## 2019-02-21 PROBLEM — I50.42 CHRONIC COMBINED SYSTOLIC AND DIASTOLIC CHF (CONGESTIVE HEART FAILURE) (HCC): Status: ACTIVE | Noted: 2019-02-21

## 2019-02-21 PROBLEM — J69.0 ASPIRATION PNEUMONIA (HCC): Status: ACTIVE | Noted: 2019-02-21

## 2019-02-21 PROBLEM — I25.5 GENERALIZED ISCHEMIC MYOCARDIAL DYSFUNCTION: Status: ACTIVE | Noted: 2019-02-21

## 2019-02-21 PROBLEM — I10 BENIGN ESSENTIAL HYPERTENSION: Status: ACTIVE | Noted: 2019-02-21

## 2019-03-05 ENCOUNTER — OFFICE VISIT (OUTPATIENT)
Dept: INTERNAL MEDICINE | Facility: CLINIC | Age: 84
End: 2019-03-05

## 2019-03-05 VITALS
DIASTOLIC BLOOD PRESSURE: 54 MMHG | SYSTOLIC BLOOD PRESSURE: 142 MMHG | HEART RATE: 60 BPM | BODY MASS INDEX: 24.57 KG/M2 | WEIGHT: 164 LBS

## 2019-03-05 DIAGNOSIS — Z79.899 HIGH RISK MEDICATION USE: ICD-10-CM

## 2019-03-05 DIAGNOSIS — I10 BENIGN ESSENTIAL HYPERTENSION: ICD-10-CM

## 2019-03-05 DIAGNOSIS — I25.810 CORONARY ARTERY DISEASE INVOLVING CORONARY BYPASS GRAFT OF NATIVE HEART WITHOUT ANGINA PECTORIS: ICD-10-CM

## 2019-03-05 DIAGNOSIS — C91.10 CLL (CHRONIC LYMPHOCYTIC LEUKEMIA) (HCC): ICD-10-CM

## 2019-03-05 DIAGNOSIS — D63.8 ANEMIA IN OTHER CHRONIC DISEASES CLASSIFIED ELSEWHERE: Primary | ICD-10-CM

## 2019-03-05 DIAGNOSIS — I50.42 CHRONIC COMBINED SYSTOLIC AND DIASTOLIC CHF (CONGESTIVE HEART FAILURE) (HCC): ICD-10-CM

## 2019-03-05 LAB
ANION GAP SERPL CALCULATED.3IONS-SCNC: 9 MMOL/L (ref 3–11)
BASOPHILS # BLD AUTO: 0.06 10*3/MM3 (ref 0–0.2)
BASOPHILS NFR BLD AUTO: 0.5 % (ref 0–1)
BUN BLD-MCNC: 24 MG/DL (ref 9–23)
BUN/CREAT SERPL: 17.3 (ref 7–25)
CALCIUM SPEC-SCNC: 9 MG/DL (ref 8.7–10.4)
CHLORIDE SERPL-SCNC: 105 MMOL/L (ref 99–109)
CO2 SERPL-SCNC: 24 MMOL/L (ref 20–31)
CREAT BLD-MCNC: 1.39 MG/DL (ref 0.6–1.3)
DEPRECATED RDW RBC AUTO: 50.2 FL (ref 37–54)
EOSINOPHIL # BLD AUTO: 0.35 10*3/MM3 (ref 0–0.3)
EOSINOPHIL NFR BLD AUTO: 2.9 % (ref 0–3)
ERYTHROCYTE [DISTWIDTH] IN BLOOD BY AUTOMATED COUNT: 14.7 % (ref 11.3–14.5)
FERRITIN SERPL-MCNC: 103 NG/ML (ref 22–322)
FOLATE SERPL-MCNC: 14.05 NG/ML (ref 3.2–20)
GFR SERPL CREATININE-BSD FRML MDRD: 48 ML/MIN/1.73
GLUCOSE BLD-MCNC: 133 MG/DL (ref 70–100)
HCT VFR BLD AUTO: 35.5 % (ref 38.9–50.9)
HGB BLD-MCNC: 10.6 G/DL (ref 13.1–17.5)
IMM GRANULOCYTES # BLD AUTO: 0.02 10*3/MM3 (ref 0–0.05)
IMM GRANULOCYTES NFR BLD AUTO: 0.2 % (ref 0–0.6)
IRON 24H UR-MRATE: 47 MCG/DL (ref 50–175)
IRON SATN MFR SERPL: 14 % (ref 20–50)
LYMPHOCYTES # BLD AUTO: 5.89 10*3/MM3 (ref 0.6–4.8)
LYMPHOCYTES NFR BLD AUTO: 48.7 % (ref 24–44)
MCH RBC QN AUTO: 28.2 PG (ref 27–31)
MCHC RBC AUTO-ENTMCNC: 29.9 G/DL (ref 32–36)
MCV RBC AUTO: 94.4 FL (ref 80–99)
MONOCYTES # BLD AUTO: 0.62 10*3/MM3 (ref 0–1)
MONOCYTES NFR BLD AUTO: 5.1 % (ref 0–12)
NEUTROPHILS # BLD AUTO: 5.17 10*3/MM3 (ref 1.5–8.3)
NEUTROPHILS NFR BLD AUTO: 42.8 % (ref 41–71)
PLATELET # BLD AUTO: 282 10*3/MM3 (ref 150–450)
PMV BLD AUTO: 11.6 FL (ref 6–12)
POTASSIUM BLD-SCNC: 4.2 MMOL/L (ref 3.5–5.5)
RBC # BLD AUTO: 3.76 10*6/MM3 (ref 4.2–5.76)
SODIUM BLD-SCNC: 138 MMOL/L (ref 132–146)
TIBC SERPL-MCNC: 348 MCG/DL (ref 250–450)
WBC NRBC COR # BLD: 12.09 10*3/MM3 (ref 3.5–10.8)

## 2019-03-05 PROCEDURE — 36415 COLL VENOUS BLD VENIPUNCTURE: CPT | Performed by: INTERNAL MEDICINE

## 2019-03-05 PROCEDURE — 82746 ASSAY OF FOLIC ACID SERUM: CPT | Performed by: NURSE PRACTITIONER

## 2019-03-05 PROCEDURE — 80048 BASIC METABOLIC PNL TOTAL CA: CPT | Performed by: NURSE PRACTITIONER

## 2019-03-05 PROCEDURE — 83540 ASSAY OF IRON: CPT | Performed by: NURSE PRACTITIONER

## 2019-03-05 PROCEDURE — 85025 COMPLETE CBC W/AUTO DIFF WBC: CPT | Performed by: INTERNAL MEDICINE

## 2019-03-05 PROCEDURE — 83550 IRON BINDING TEST: CPT | Performed by: NURSE PRACTITIONER

## 2019-03-05 PROCEDURE — 99214 OFFICE O/P EST MOD 30 MIN: CPT | Performed by: INTERNAL MEDICINE

## 2019-03-05 PROCEDURE — 82728 ASSAY OF FERRITIN: CPT | Performed by: NURSE PRACTITIONER

## 2019-03-05 RX ORDER — ALPRAZOLAM 0.5 MG/1
0.5 TABLET ORAL NIGHTLY PRN
Qty: 30 TABLET | Refills: 2 | Status: SHIPPED | OUTPATIENT
Start: 2019-03-05 | End: 2020-05-04

## 2019-03-05 NOTE — PROGRESS NOTES
[facility]     Tad Moon  11/30/1926   [id number]      Patient Care Team:  Pramod Hyde MD as PCP - General  Lv Cobian MD as Consulting Physician (Cardiology)    Chief Complaint::   Chief Complaint   Patient presents with   • Hyperlipidemia   • Hypertension   • Results     [facility]  Concerns about being anemic        HPI  Mr. Moon is now 89 years old.  He comes in for follow-up of his chronic systolic congestive heart failure, angina, hypertension, hyperlipidemia, CLL, chronic kidney disease and GERD.  Since his TAVR, he has felt much better.  Prior to that procedure, I saw him acutely once or twice every month.  Not seen him since October.  His only complaint is that he likes to take naps.  Yesterday he took 3 naps, and then could not sleep last night.  He is somewhat bored because with the cold weather he can get out, but denies chest pain shortness of breath, orthopnea, or edema.    Chronic Conditions:      Patient Active Problem List   Diagnosis   • Coronary artery disease involving coronary bypass graft of native heart without angina pectoris   • Hypertension   • Hyperlipidemia LDL goal <100   • History of tobacco abuse   • CLL (chronic lymphocytic leukemia) (CMS/HCC)   • GERD (gastroesophageal reflux disease)   • Nonrheumatic aortic valve stenosis s/p TAVR   • Acute on chronic systolic congestive heart failure (CMS/HCC)   • CKD (chronic kidney disease) stage 3, GFR 30-59 ml/min (CMS/HCC)   • Aortic valve stenosis   • Fatigue   • Aspiration pneumonia (CMS/HCC)   • Ischemic cardiomyopathy   • Chronic combined systolic and diastolic CHF (congestive heart failure) (CMS/HCC)   • Angina pectoris (CMS/HCC)   • Atherosclerosis of coronary artery   • Benign essential hypertension   • Heart failure, chronic, with acute decompensation (CMS/HCC)   • Generalized ischemic myocardial dysfunction        Past Medical History:   Diagnosis Date   • Aortic stenosis    •  Arthritis    • BOOP (bronchiolitis obliterans with organizing pneumonia) (CMS/[facility]ly [facility]) 2014   • Chest pain 2007    angioplasty to RCA   • CHF (congestive heart failure) (CMS/[facility]ly [facility])    • CLL (chronic lymphocytic leukemia) (CMS/Formerly [facility])     15 years    • Coronary artery disease    • GERD (gastroesophageal reflux disease)    • Herpes zoster 2012   • History of tobacco abuse    • Hyperlipidemia    • Hypertension    • Low kidney function     very recently and did kidney function test and said decreased function so being watched to get all fluid off    • MI (myocardial infarction) (CMS/[facility]ly [facility])     mid 90s very mild - few years after bypass surgery    • Non-STEMI (non-ST elevated myocardial infarction) (CMS/Formerly [facility]) 10/17/2017    stenting of occluded grafts to left circumflex and RCA   • Skin cancer     basal and squamous from various location    • SOBOE (shortness of breath on exertion)    • Uses hearing aid     bilat ears        Past Surgical History:   Procedure Laterality Date   • ABDOMINAL HERNIA REPAIR      x 2   • AORTIC VALVE REPAIR/REPLACEMENT N/A 4/26/2018    Procedure: Transcatheter Aortic Valve Replacement, LANDY;  Surgeon: Lv Purdy MD;  Location:  [facility];  Service: Cardiothoracic   • AORTIC VALVE REPAIR/REPLACEMENT N/A 4/26/2018    Procedure: Transcatheter Aortic Valve Replacement;  Surgeon: Juan M Sood MD;  Location:  [facility];  Service: Cardiology   • APPENDECTOMY     • CARDIAC CATHETERIZATION N/A 10/10/2017    Procedure: Left Heart Cath;  Surgeon: Juan M Sood MD;  Location:  [facility];  Service:    • CARDIAC CATHETERIZATION N/A 4/6/2018    Procedure: Left Heart Cath;  Surgeon: Lv Cobian MD;  Location:  [facility];  Service: Cardiovascular   • CHOLECYSTECTOMY     • COLONOSCOPY     • CORONARY ANGIOPLASTY WITH STENT PLACEMENT      07 one placed,  and in 2017 had another stent placed and one repaired -   so pt thinks 3 stents in place    • CORONARY ARTERY  BYPASS GRAFT      4 bypass in 92    • REPLACEMENT TOTAL KNEE Left    • WISDOM TOOTH EXTRACTION         Family History   Problem Relation Age of Onset   • Stroke Mother    • Heart disease Father    • Heart disease Brother    • Coronary artery disease Brother        Social History     Socioeconomic History   • Marital status:      Spouse name: Not on file   • Number of children: 3   • Years of education: Not on file   • Highest education level: Not on file   Social Needs   • Financial resource strain: Not on file   • Food insecurity - worry: Not on file   • Food insecurity - inability: Not on file   • Transportation needs - medical: Not on file   • Transportation needs - non-medical: Not on file   Occupational History   • Occupation: Retired      Comment: insurance agency    Tobacco Use   • Smoking status: Former Smoker     Packs/day: 0.25     Types: Cigarettes     Last attempt to quit: 1975     Years since quittin.2   • Smokeless tobacco: Never Used   • Tobacco comment: quit 45 years ago- smoked since 19 yo    Substance and Sexual Activity   • Alcohol use: Yes     Alcohol/week: 0.6 - 1.2 oz     Types: 1 - 2 Glasses of wine per week     Comment: occas   • Drug use: No   • Sexual activity: Defer   Other Topics Concern   • Not on file   Social History Narrative    Lives with Wife Julia in [place], [place]     Caffeine:  3 servings per day       Allergies   Allergen Reactions   • Codeine Unknown (See Comments)     Unknown reaction   • Hctz [Hydrochlorothiazide] Other (See Comments)     hyponatremia         Current Outpatient Medications:   •  ALPRAZolam (XANAX) 0.5 MG tablet, Take 1 tablet by mouth At Night As Needed for Sleep., Disp: 30 tablet, Rfl: 2  •  aspirin 81 MG tablet, Take 1 tablet by mouth daily., Disp: 90 tablet, Rfl: 3  •  atorvastatin (LIPITOR) 80 MG tablet, Take 0.5 tablets by mouth Daily., Disp: 90 tablet, Rfl: 3  •  carvedilol (COREG) 3.125 MG tablet, Take 1 tablet by mouth Every 12  (Twelve) Hours. (Patient taking differently: Take 3.125 mg by mouth Every 12 (Twelve) Hours. Hold for SBP < 110, HR < 60), Disp: 60 tablet, Rfl: 11  •  cholecalciferol (VITAMIN D3) 1000 UNITS tablet, Take 1,000 Units by mouth Daily., Disp: , Rfl:   •  clopidogrel (PLAVIX) 75 MG tablet, Take 1 tablet by mouth daily., Disp: 90 tablet, Rfl: 3  •  lisinopril (PRINIVIL,ZESTRIL) 2.5 MG tablet, Take 1 tablet by mouth Daily. (Patient taking differently: Take 5 mg by mouth Daily.), Disp: 60 tablet, Rfl: 11  •  nitroglycerin (NITROSTAT) 0.4 MG SL tablet, Place 1 tablet under the tongue Every 5 (Five) Minutes As Needed for Chest Pain., Disp: 25 tablet, Rfl: 6  •  omeprazole (priLOSEC) 20 MG capsule, Take 20 mg by mouth Daily As Needed. For acid reflux, Disp: , Rfl:   •  sertraline (ZOLOFT) 100 MG tablet, Take 100 mg by mouth Daily., Disp: , Rfl:   •  spironolactone (ALDACTONE) 25 MG tablet, Take 1 tablet by mouth Daily., Disp: 30 tablet, Rfl: 3  •  torsemide (DEMADEX) 20 MG tablet, Take 1 tablet by mouth Daily., Disp: 30 tablet, Rfl: 11    Review of Systems   Constitutional: Negative for chills, fatigue and fever.   HENT: Negative for congestion, ear pain and sinus pressure.    Respiratory: Negative for cough, chest tightness, shortness of breath and wheezing.    Cardiovascular: Negative for chest pain and palpitations.   Gastrointestinal: Negative for abdominal pain, blood in stool and constipation.   Skin: Negative for color change.   Allergic/Immunologic: Negative for environmental allergies.   Neurological: Negative for dizziness, speech difficulty and headache.   Psychiatric/Behavioral: Negative for decreased concentration. The patient is not nervous/anxious.         Vital Signs  Vitals:    03/05/19 1136   BP: 142/54   BP Location: Left arm   Patient Position: Sitting   Cuff Size: Adult   Pulse: 60   Weight: 74.4 kg (164 lb)       Physical Exam   Constitutional: He is oriented to person, place, and time. He appears  well-developed and well-nourished.   HENT:   Head: Normocephalic and atraumatic.   Cardiovascular: Normal rate, regular rhythm and normal heart sounds.   No murmur heard.  Pulmonary/Chest: Effort normal and breath sounds normal.   Musculoskeletal: He exhibits no edema.   Neurological: He is alert and oriented to person, place, and time.   Psychiatric: He has a normal mood and affect.   Vitals reviewed.     Procedures   #1 congestive heart failure, much improved following T AVR, without evidence of CHF on beta-blocker, ACE inhibitor, spironolactone and torsemide  #2 coronary artery disease stable on carvedilol, atorvastatin, and aspirin.  #3 hypertension controlled on lisinopril and carvedilol.  #4 chronic kidney disease, results pending  #5 CLL, stable.  Followed by oncology   Assessment/Plan:      Plan of care reviewed with patient at the conclusion of today's visit. Education was provided regarding diagnosis, management, and any prescribed or recommended OTC medications.Patient verbalizes understanding of and agreement with management plan.         Pramod Hyde MD

## 2019-03-07 ENCOUNTER — TELEPHONE (OUTPATIENT)
Dept: CARDIOLOGY | Facility: CLINIC | Age: 84
End: 2019-03-07

## 2019-05-17 DIAGNOSIS — I50.42 CHRONIC COMBINED SYSTOLIC AND DIASTOLIC CHF (CONGESTIVE HEART FAILURE) (HCC): ICD-10-CM

## 2019-05-17 DIAGNOSIS — I35.0 NONRHEUMATIC AORTIC VALVE STENOSIS: Primary | ICD-10-CM

## 2019-05-30 ENCOUNTER — OFFICE VISIT (OUTPATIENT)
Dept: CARDIOLOGY | Facility: HOSPITAL | Age: 84
End: 2019-05-30

## 2019-05-30 ENCOUNTER — HOSPITAL ENCOUNTER (OUTPATIENT)
Dept: CARDIOLOGY | Facility: HOSPITAL | Age: 84
Discharge: HOME | End: 2019-05-30
Admitting: NURSE PRACTITIONER

## 2019-05-30 VITALS
HEIGHT: 69 IN | TEMPERATURE: 97.7 F | DIASTOLIC BLOOD PRESSURE: 81 MMHG | SYSTOLIC BLOOD PRESSURE: 148 MMHG | WEIGHT: 160.5 LBS | RESPIRATION RATE: 18 BRPM | HEART RATE: 85 BPM | BODY MASS INDEX: 23.77 KG/M2

## 2019-05-30 DIAGNOSIS — I35.0 NONRHEUMATIC AORTIC VALVE STENOSIS: ICD-10-CM

## 2019-05-30 DIAGNOSIS — I50.22 CHRONIC SYSTOLIC CONGESTIVE HEART FAILURE (HCC): Primary | ICD-10-CM

## 2019-05-30 DIAGNOSIS — I50.42 CHRONIC COMBINED SYSTOLIC AND DIASTOLIC CHF (CONGESTIVE HEART FAILURE) (HCC): ICD-10-CM

## 2019-05-30 DIAGNOSIS — I25.810 CORONARY ARTERY DISEASE INVOLVING CORONARY BYPASS GRAFT OF NATIVE HEART WITHOUT ANGINA PECTORIS: ICD-10-CM

## 2019-05-30 LAB
BH CV ECHO MEAS - AO MAX PG (FULL): 11.2 MMHG
BH CV ECHO MEAS - AO MAX PG: 12.9 MMHG
BH CV ECHO MEAS - AO MEAN PG (FULL): 6.2 MMHG
BH CV ECHO MEAS - AO MEAN PG: 7 MMHG
BH CV ECHO MEAS - AO ROOT AREA (BSA CORRECTED): 1.5
BH CV ECHO MEAS - AO ROOT AREA: 6.3 CM^2
BH CV ECHO MEAS - AO ROOT DIAM: 2.8 CM
BH CV ECHO MEAS - AO V2 MAX: 179.8 CM/SEC
BH CV ECHO MEAS - AO V2 MEAN: 120.8 CM/SEC
BH CV ECHO MEAS - AO V2 VTI: 40.4 CM
BH CV ECHO MEAS - ASC AORTA: 3.2 CM
BH CV ECHO MEAS - AVA(I,A): 1.2 CM^2
BH CV ECHO MEAS - AVA(I,D): 1.2 CM^2
BH CV ECHO MEAS - AVA(V,A): 1.2 CM^2
BH CV ECHO MEAS - AVA(V,D): 1.2 CM^2
BH CV ECHO MEAS - BSA(HAYCOCK): 1.9 M^2
BH CV ECHO MEAS - BSA: 1.9 M^2
BH CV ECHO MEAS - BZI_BMI: 24.3 KILOGRAMS/M^2
BH CV ECHO MEAS - BZI_METRIC_HEIGHT: 172.7 CM
BH CV ECHO MEAS - BZI_METRIC_WEIGHT: 72.6 KG
BH CV ECHO MEAS - EDV(CUBED): 201.5 ML
BH CV ECHO MEAS - EDV(MOD-SP2): 125 ML
BH CV ECHO MEAS - EDV(MOD-SP4): 172 ML
BH CV ECHO MEAS - EDV(TEICH): 170.7 ML
BH CV ECHO MEAS - EF(CUBED): 20 %
BH CV ECHO MEAS - EF(MOD-BP): 16 %
BH CV ECHO MEAS - EF(MOD-SP2): 12 %
BH CV ECHO MEAS - EF(MOD-SP4): 28.5 %
BH CV ECHO MEAS - EF(TEICH): 15.7 %
BH CV ECHO MEAS - ESV(CUBED): 161.2 ML
BH CV ECHO MEAS - ESV(MOD-SP2): 110 ML
BH CV ECHO MEAS - ESV(MOD-SP4): 123 ML
BH CV ECHO MEAS - ESV(TEICH): 143.9 ML
BH CV ECHO MEAS - FS: 7.2 %
BH CV ECHO MEAS - IVS/LVPW: 1
BH CV ECHO MEAS - IVSD: 1.1 CM
BH CV ECHO MEAS - LA DIMENSION: 5.8 CM
BH CV ECHO MEAS - LA/AO: 2.1
BH CV ECHO MEAS - LAD MAJOR: 6.3 CM
BH CV ECHO MEAS - LV DIASTOLIC VOL/BSA (35-75): 92.5 ML/M^2
BH CV ECHO MEAS - LV MASS(C)D: 264.1 GRAMS
BH CV ECHO MEAS - LV MASS(C)DI: 142.1 GRAMS/M^2
BH CV ECHO MEAS - LV MAX PG: 1.7 MMHG
BH CV ECHO MEAS - LV MEAN PG: 0.74 MMHG
BH CV ECHO MEAS - LV SYSTOLIC VOL/BSA (12-30): 66.2 ML/M^2
BH CV ECHO MEAS - LV V1 MAX: 65.2 CM/SEC
BH CV ECHO MEAS - LV V1 MEAN: 37.5 CM/SEC
BH CV ECHO MEAS - LV V1 VTI: 14.9 CM
BH CV ECHO MEAS - LVIDD: 5.9 CM
BH CV ECHO MEAS - LVIDS: 5.4 CM
BH CV ECHO MEAS - LVLD AP2: 9.2 CM
BH CV ECHO MEAS - LVLD AP4: 9.4 CM
BH CV ECHO MEAS - LVLS AP2: 9.7 CM
BH CV ECHO MEAS - LVLS AP4: 8.3 CM
BH CV ECHO MEAS - LVOT AREA (M): 3.1 CM^2
BH CV ECHO MEAS - LVOT AREA: 3.2 CM^2
BH CV ECHO MEAS - LVOT DIAM: 2 CM
BH CV ECHO MEAS - LVPWD: 1.1 CM
BH CV ECHO MEAS - MR ALIAS VEL: 27.9 CM/SEC
BH CV ECHO MEAS - MR ERO: 0.36 CM^2
BH CV ECHO MEAS - MR FLOW RATE: 221.5 CM^3/SEC
BH CV ECHO MEAS - MR MAX PG: 158 MMHG
BH CV ECHO MEAS - MR MAX VEL: 622.6 CM/SEC
BH CV ECHO MEAS - MR MEAN PG: 90.6 MMHG
BH CV ECHO MEAS - MR MEAN VEL: 436.9 CM/SEC
BH CV ECHO MEAS - MR PISA RADIUS: 1.1 CM
BH CV ECHO MEAS - MR PISA: 7.9 CM^2
BH CV ECHO MEAS - MR VOLUME: 77.4 ML
BH CV ECHO MEAS - MR VTI: 217.6 CM
BH CV ECHO MEAS - MV A MAX VEL: 126.2 CM/SEC
BH CV ECHO MEAS - MV DEC TIME: 0.03 SEC
BH CV ECHO MEAS - MV E MAX VEL: 122 CM/SEC
BH CV ECHO MEAS - MV E/A: 0.97
BH CV ECHO MEAS - MV MAX PG: 7.2 MMHG
BH CV ECHO MEAS - MV MEAN PG: 4.1 MMHG
BH CV ECHO MEAS - MV V2 MAX: 134.4 CM/SEC
BH CV ECHO MEAS - MV V2 MEAN: 95.7 CM/SEC
BH CV ECHO MEAS - MV V2 VTI: 34.7 CM
BH CV ECHO MEAS - MVA(VTI): 1.4 CM^2
BH CV ECHO MEAS - PA ACC SLOPE: 667.4 CM/SEC^2
BH CV ECHO MEAS - PA ACC TIME: 0.11 SEC
BH CV ECHO MEAS - PA MAX PG: 3.3 MMHG
BH CV ECHO MEAS - PA PR(ACCEL): 29.1 MMHG
BH CV ECHO MEAS - PA V2 MAX: 90.2 CM/SEC
BH CV ECHO MEAS - RAP SYSTOLE: 8 MMHG
BH CV ECHO MEAS - RVDD: 1.9 CM
BH CV ECHO MEAS - RVSP: 47 MMHG
BH CV ECHO MEAS - SI(AO): 136.6 ML/M^2
BH CV ECHO MEAS - SI(CUBED): 21.7 ML/M^2
BH CV ECHO MEAS - SI(LVOT): 26.1 ML/M^2
BH CV ECHO MEAS - SI(MOD-SP2): 8.1 ML/M^2
BH CV ECHO MEAS - SI(MOD-SP4): 26.4 ML/M^2
BH CV ECHO MEAS - SI(TEICH): 14.4 ML/M^2
BH CV ECHO MEAS - SV(AO): 254 ML
BH CV ECHO MEAS - SV(CUBED): 40.3 ML
BH CV ECHO MEAS - SV(LVOT): 48.5 ML
BH CV ECHO MEAS - SV(MOD-SP2): 15 ML
BH CV ECHO MEAS - SV(MOD-SP4): 49 ML
BH CV ECHO MEAS - SV(TEICH): 26.8 ML
BH CV ECHO MEAS - TAPSE (>1.6): 2.6 CM2
BH CV ECHO MEAS - TR MAX PG: 39 MMHG
BH CV ECHO MEAS - TR MAX VEL: 312.8 CM/SEC
BH CV ECHO MEAS - TV MAX PG: 1.4 MMHG
BH CV ECHO MEAS - TV V2 MAX: 58.2 CM/SEC
BH CV VAS BP RIGHT ARM: NORMAL MMHG
BH CV XLRA - RV BASE: 4 CM
BH CV XLRA - RV LENGTH: 7.9 CM
BH CV XLRA - RV MID: 3.9 CM
BH CV XLRA - TDI S': 10.4 CM/SEC
LV EF 2D ECHO EST: 15 %
MAXIMAL PREDICTED HEART RATE: 128 BPM
STRESS TARGET HR: 109 BPM

## 2019-05-30 PROCEDURE — 93306 TTE W/DOPPLER COMPLETE: CPT | Performed by: INTERNAL MEDICINE

## 2019-05-30 PROCEDURE — 93306 TTE W/DOPPLER COMPLETE: CPT

## 2019-05-30 PROCEDURE — 99213 OFFICE O/P EST LOW 20 MIN: CPT | Performed by: NURSE PRACTITIONER

## 2019-05-30 RX ORDER — FERROUS SULFATE TAB EC 324 MG (65 MG FE EQUIVALENT) 324 (65 FE) MG
324 TABLET DELAYED RESPONSE ORAL
COMMUNITY
End: 2019-08-19

## 2019-05-30 NOTE — PROGRESS NOTES
"[facility]  [facility]  [facility]    Encounter Date:05/30/2019     Tad Moon  [address] [place] [address]  11/30/1926    Pramod Hyde MD    Tad Moon is a 89 y.o. male.      Subjective:     Chief Complaint:  Cardiac Valve Problem and Congestive Heart Failure       HPI :  Mr. Moon is here in the TAVR Clinic today for one year follow up (with echo later today) after TAVR on 4/26/18.  He has done very well since the procedure.  He states he is \"tired sometimes\" but overall feels pretty good.  Prior to TAVR, he experienced multiple hospitalizations for heart failure and was unable to drive or complete basic ADL's.  Now, he is back to walking regularly, driving, and taking care of spouse who has multiple health problems.   He says he could golf if he had someone to go with him.  He denies chest pain or orthopnea.      Allergies   Allergen Reactions   • Hctz [Hydrochlorothiazide] Other (See Comments)     hyponatremia         Current Outpatient Medications:   •  Cyanocobalamin 2500 MCG chewable tablet, Chew 2,500 mcg Daily., Disp: , Rfl:   •  ferrous sulfate 324 (65 Fe) MG tablet delayed-release EC tablet, Take 324 mg by mouth Daily With Breakfast., Disp: , Rfl:   •  ALPRAZolam (XANAX) 0.5 MG tablet, Take 1 tablet by mouth At Night As Needed for Sleep., Disp: 30 tablet, Rfl: 2  •  aspirin 81 MG tablet, Take 1 tablet by mouth daily., Disp: 90 tablet, Rfl: 3  •  atorvastatin (LIPITOR) 80 MG tablet, Take 0.5 tablets by mouth Daily., Disp: 90 tablet, Rfl: 3  •  carvedilol (COREG) 3.125 MG tablet, Take 1 tablet by mouth Every 12 (Twelve) Hours. (Patient taking differently: Take 3.125 mg by mouth Every 12 (Twelve) Hours. Hold for SBP < 110, HR < 60), Disp: 60 tablet, Rfl: 11  •  cholecalciferol (VITAMIN D3) 1000 UNITS tablet, Take 1,000 Units by mouth Daily., Disp: , Rfl:   •  clopidogrel (PLAVIX) 75 MG tablet, Take 1 tablet by mouth daily., Disp: 90 tablet, Rfl: 3  •  " "lisinopril (PRINIVIL,ZESTRIL) 2.5 MG tablet, Take 1 tablet by mouth Daily. (Patient taking differently: Take 2.5 mg by mouth Daily.), Disp: 60 tablet, Rfl: 11  •  nitroglycerin (NITROSTAT) 0.4 MG SL tablet, Place 1 tablet under the tongue Every 5 (Five) Minutes As Needed for Chest Pain., Disp: 25 tablet, Rfl: 6  •  omeprazole (priLOSEC) 20 MG capsule, Take 20 mg by mouth Daily As Needed. For acid reflux, Disp: , Rfl:   •  sertraline (ZOLOFT) 100 MG tablet, Take 100 mg by mouth Daily., Disp: , Rfl:   •  spironolactone (ALDACTONE) 25 MG tablet, Take 1 tablet by mouth Daily., Disp: 30 tablet, Rfl: 3  •  torsemide (DEMADEX) 20 MG tablet, Take 1 tablet by mouth Daily., Disp: 30 tablet, Rfl: 11    The following portions of the patient's history were reviewed and updated as appropriate in Epic:  Problem list, allergies, current medications, past medical and surgical history, past social and family history.     Review of Systems   Constitution: Positive for malaise/fatigue. Negative for weight gain.   HENT: Negative.    Eyes: Negative.    Cardiovascular: Negative for chest pain, dyspnea on exertion, leg swelling, orthopnea and palpitations.   Respiratory: Negative for cough, shortness of breath and sleep disturbances due to breathing.    Endocrine: Negative.    Hematologic/Lymphatic: Bruises/bleeds easily.   Skin: Negative.    Musculoskeletal: Positive for arthritis.   Gastrointestinal: Negative.    Genitourinary: Negative.    Neurological: Negative.    Psychiatric/Behavioral: Negative.    Allergic/Immunologic: Negative.        Objective:     Vitals:    05/30/19 1005   BP: 148/81   BP Location: Right arm   Patient Position: Sitting   Cuff Size: Adult   Pulse: 85   Resp: 18   Temp: 97.7 °F (36.5 °C)   TempSrc: Temporal   Weight: 72.8 kg (160 lb 8 oz)   Height: 174 cm (68.52\")         Physical Exam   Constitutional: He is oriented to person, place, and time. He appears well-developed and well-nourished. No distress.   Appears " younger than stated age   HENT:   Head: Normocephalic and atraumatic.   Eyes: Conjunctivae are normal. Pupils are equal, round, and reactive to light. No scleral icterus.   Neck: Normal range of motion. Neck supple. No JVD present.   Cardiovascular: Normal rate, regular rhythm and intact distal pulses. Exam reveals no gallop and no friction rub.   Murmur heard.  Systolic murmur II/VI   Pulmonary/Chest: Effort normal and breath sounds normal. No respiratory distress. He has no wheezes. He has no rales.   Musculoskeletal: Normal range of motion. He exhibits no edema, tenderness or deformity.   Neurological: He is alert and oriented to person, place, and time. No cranial nerve deficit.   Skin: Skin is warm and dry.   Psychiatric: He has a normal mood and affect. His behavior is normal. Judgment and thought content normal.   Vitals reviewed.      Lab and Diagnostic Review: echo pending    Assessment and Plan:     1. Chronic systolic congestive heart failure (CMS/HCC)  - LVEF after TAVR was 20%  - Echo update pending for today  - Medical therapy:  Coreg, Lisinopril, aldactone, demadex  - NYHA class I-II  - KCCQ12 score is 52/70  - Follow up with Dr. Cobian in July    2. Coronary artery disease involving coronary bypass graft of native heart without angina pectoris  - Hx of remote CABG  - Pre-op TAVR cath 4/6/18 was stable (patent LIMA, chronically occluded RCA and vein graft, 50% in stent restenosis @ distal left main/ ostial circumflex with normal IFR)  - ASA, statin, BB  - Reports no anginal symptoms    3.  Severe AS s/p TAVR  - Functionally and symptomatically improved  - Follow up echo later today   - See copies of 5 meter walk, KCCQ12, and VERDE/ [place] ADL index  - No further follow up required @ [facility] unless directed by Cardiology

## 2019-05-31 PROBLEM — I50.22 CHRONIC SYSTOLIC CONGESTIVE HEART FAILURE (HCC): Status: ACTIVE | Noted: 2017-08-14

## 2019-06-03 ENCOUNTER — TELEPHONE (OUTPATIENT)
Dept: CARDIOLOGY | Facility: HOSPITAL | Age: 84
End: 2019-06-03

## 2019-06-03 DIAGNOSIS — I50.42 CHRONIC COMBINED SYSTOLIC AND DIASTOLIC CHF (CONGESTIVE HEART FAILURE) (HCC): Primary | ICD-10-CM

## 2019-06-03 NOTE — TELEPHONE ENCOUNTER
Called patient re: one year post TAVR echo (LVEF 15%).  Cardiology recommends medical therapy.  Discussed Entresto.  Gave detailed instructions to DC lisinopril and torsemide x 2 days then start Entresto 24-26 mg BID afterwards.  Patient has other appts in town on 6/13 and he will come then for BMP and clinic follow up.  Advised patient to watch for s/sx of hypotension in the interim.  He read back to me the instructions for DC lisinopril/torsemide, start Entresto on Wednesday, and RTC on 6/13 with labs.

## 2019-06-13 ENCOUNTER — LAB (OUTPATIENT)
Dept: LAB | Facility: HOSPITAL | Age: 84
End: 2019-06-13

## 2019-06-13 ENCOUNTER — OFFICE VISIT (OUTPATIENT)
Dept: CARDIOLOGY | Facility: HOSPITAL | Age: 84
End: 2019-06-13

## 2019-06-13 VITALS
SYSTOLIC BLOOD PRESSURE: 121 MMHG | HEART RATE: 74 BPM | DIASTOLIC BLOOD PRESSURE: 60 MMHG | HEIGHT: 68 IN | RESPIRATION RATE: 18 BRPM | BODY MASS INDEX: 24.63 KG/M2 | OXYGEN SATURATION: 100 % | TEMPERATURE: 97 F | WEIGHT: 162.5 LBS

## 2019-06-13 DIAGNOSIS — I25.810 CORONARY ARTERY DISEASE INVOLVING CORONARY BYPASS GRAFT OF NATIVE HEART WITHOUT ANGINA PECTORIS: ICD-10-CM

## 2019-06-13 DIAGNOSIS — I50.42 CHRONIC COMBINED SYSTOLIC AND DIASTOLIC CHF (CONGESTIVE HEART FAILURE) (HCC): ICD-10-CM

## 2019-06-13 DIAGNOSIS — I50.22 CHRONIC SYSTOLIC CONGESTIVE HEART FAILURE (HCC): Primary | ICD-10-CM

## 2019-06-13 LAB
ANION GAP SERPL CALCULATED.3IONS-SCNC: 13 MMOL/L
BUN BLD-MCNC: 21 MG/DL (ref 8–23)
BUN/CREAT SERPL: 13.5 (ref 7–25)
CALCIUM SPEC-SCNC: 9.1 MG/DL (ref 8.2–9.6)
CHLORIDE SERPL-SCNC: 99 MMOL/L (ref 98–107)
CO2 SERPL-SCNC: 23 MMOL/L (ref 22–29)
CREAT BLD-MCNC: 1.55 MG/DL (ref 0.76–1.27)
GFR SERPL CREATININE-BSD FRML MDRD: 42 ML/MIN/1.73
GLUCOSE BLD-MCNC: 96 MG/DL (ref 65–99)
POTASSIUM BLD-SCNC: 4.6 MMOL/L (ref 3.5–5.2)
SODIUM BLD-SCNC: 135 MMOL/L (ref 136–145)

## 2019-06-13 PROCEDURE — 80048 BASIC METABOLIC PNL TOTAL CA: CPT

## 2019-06-13 PROCEDURE — 99212 OFFICE O/P EST SF 10 MIN: CPT | Performed by: NURSE PRACTITIONER

## 2019-06-13 PROCEDURE — 36415 COLL VENOUS BLD VENIPUNCTURE: CPT

## 2019-06-13 NOTE — PROGRESS NOTES
"[facility]  [facility]  HF Clinic    Encounter Date:06/13/2019     Tad Moon  [address] [place] [address]  11/30/1926    Pramod Hyde MD    Tda Moon is a 89 y.o. male.      Subjective:     Chief Complaint:  Follow-up (chf)       HPI :  Mr. Moon's appointment today was scheduled for BP check and lab monitoring anticipated after switch from ACE/diuretic to Entresto.  This medication change was proposed after notation from 1 year post TAVR echo that LVEF remained quite depressed @ 15-20%.  We also note that clinically he has been stable throughout the past year and has NOT encountered hospitalizations or ER visits related to systolic heart failure.    Entresto via commercial pharmacy was cost prohibitive.  However[facility] this [facility] for $15/q 3 months.  He is working with his provider there and should know whether he can get this within the week.      Overall, clinically, he remains \"weaker than I want to be\" but still able to drive, perform all ADL's, and walk for exercise most days.  Currently, he is still taking the coreg, lisinopril, torsemide, aldactone, plavix/aspirin, and atorvastatin.      Past Medical History:   Diagnosis Date   • Aortic stenosis    • Arthritis    • BOOP (bronchiolitis obliterans with organizing pneumonia) (CMS/[facility]) 2014   • Chest pain 2007    angioplasty to RCA   • CHF (congestive heart failure) (CMS/HCC)    • CLL (chronic lymphocytic leukemia) (CMS/HCC)     15 years    • Coronary artery disease    • GERD (gastroesophageal reflux disease)    • Herpes zoster 2012   • History of tobacco abuse    • Hyperlipidemia    • Hypertension    • Low kidney function     very recently and did kidney function test and said decreased function so being watched to get all fluid off    • MI (myocardial infarction) (CMS/HCC)     mid 90s very mild - few years after bypass surgery    • Non-STEMI (non-ST elevated " myocardial infarction) (CMS/[facility]) 10/17/2017    stenting of occluded grafts to left circumflex and RCA   • Skin cancer     basal and squamous from various location    • SOBOE (shortness of breath on exertion)    • Uses hearing aid     bilat ears      Past Surgical History:   Procedure Laterality Date   • ABDOMINAL HERNIA REPAIR      x 2   • AORTIC VALVE REPAIR/REPLACEMENT N/A 4/26/2018    Procedure: Transcatheter Aortic Valve Replacement, LANDY;  Surgeon: Lv Purdy MD;  Location:  [facility];  Service: Cardiothoracic   • AORTIC VALVE REPAIR/REPLACEMENT N/A 4/26/2018    Procedure: Transcatheter Aortic Valve Replacement;  Surgeon: Juan M Sood MD;  Location:  [facility];  Service: Cardiology   • APPENDECTOMY     • CARDIAC CATHETERIZATION N/A 10/10/2017    Procedure: Left Heart Cath;  Surgeon: Juan M Sood MD;  Location:  [facility];  Service:    • CARDIAC CATHETERIZATION N/A 4/6/2018    Procedure: Left Heart Cath;  Surgeon: Lv Cobian MD;  Location:  [facility];  Service: Cardiovascular   • CHOLECYSTECTOMY     • COLONOSCOPY     • CORONARY ANGIOPLASTY WITH STENT PLACEMENT      07 one placed,  and in 2017 had another stent placed and one repaired -   so pt thinks 3 stents in place    • CORONARY ARTERY BYPASS GRAFT      4 bypass in 92    • REPLACEMENT TOTAL KNEE Left 2014   • WISDOM TOOTH EXTRACTION         Allergies   Allergen Reactions   • Hctz [Hydrochlorothiazide] Other (See Comments)     hyponatremia         Current Outpatient Medications:   •  ALPRAZolam (XANAX) 0.5 MG tablet, Take 1 tablet by mouth At Night As Needed for Sleep., Disp: 30 tablet, Rfl: 2  •  aspirin 81 MG tablet, Take 1 tablet by mouth daily., Disp: 90 tablet, Rfl: 3  •  atorvastatin (LIPITOR) 80 MG tablet, Take 0.5 tablets by mouth Daily., Disp: 90 tablet, Rfl: 3  •  carvedilol (COREG) 3.125 MG tablet, Take 1 tablet by mouth Every 12 (Twelve) Hours. (Patient taking differently: Take  3.125 mg by mouth Every 12 (Twelve) Hours. Hold for SBP < 110, HR < 60), Disp: 60 tablet, Rfl: 11  •  cholecalciferol (VITAMIN D3) 1000 UNITS tablet, Take 1,000 Units by mouth Daily., Disp: , Rfl:   •  clopidogrel (PLAVIX) 75 MG tablet, Take 1 tablet by mouth daily., Disp: 90 tablet, Rfl: 3  •  Cyanocobalamin 2500 MCG chewable tablet, Chew 2,500 mcg Daily., Disp: , Rfl:   •  ferrous sulfate 324 (65 Fe) MG tablet delayed-release EC tablet, Take 324 mg by mouth Daily With Breakfast., Disp: , Rfl:   •  nitroglycerin (NITROSTAT) 0.4 MG SL tablet, Place 1 tablet under the tongue Every 5 (Five) Minutes As Needed for Chest Pain., Disp: 25 tablet, Rfl: 6  •  omeprazole (priLOSEC) 20 MG capsule, Take 20 mg by mouth Daily As Needed. For acid reflux, Disp: , Rfl:   •  sacubitril-valsartan (ENTRESTO) 24-26 MG tablet, Take 1 tablet by mouth 2 (Two) Times a Day. (Patient taking differently: Take 1 tablet by mouth 2 (Two) Times a Day. 6/13/19- [facility], taking lisinopril 2.5mg daily and torsemide 20mg daily in the mean time), Disp: 60 tablet, Rfl: 1  •  sertraline (ZOLOFT) 100 MG tablet, Take 100 mg by mouth Daily., Disp: , Rfl:   •  spironolactone (ALDACTONE) 25 MG tablet, Take 1 tablet by mouth Daily., Disp: 30 tablet, Rfl: 3    The following portions of the patient's history were reviewed and updated as appropriate in Epic:  Problem list, allergies, current medications, past medical and surgical history, past social and family history.     Review of Systems   Constitution: Positive for weakness. Negative for weight gain.   HENT: Negative.    Eyes: Negative.    Cardiovascular: Negative for chest pain, dyspnea on exertion, leg swelling, orthopnea and palpitations.   Endocrine: Negative.    Hematologic/Lymphatic: Bruises/bleeds easily.   Skin: Negative.    Musculoskeletal: Positive for arthritis. Negative for falls.   Gastrointestinal: Negative.    Genitourinary: Negative.    Neurological: Positive for excessive  "daytime sleepiness. Negative for dizziness.   Psychiatric/Behavioral: The patient has insomnia.    Allergic/Immunologic: Negative.        Objective:     Vitals:    06/13/19 1402   BP: 121/60   BP Location: Right arm   Patient Position: Sitting   Cuff Size: Adult   Pulse: 74   Resp: 18   Temp: 97 °F (36.1 °C)   TempSrc: Temporal   SpO2: 100%   Weight: 73.7 kg (162 lb 8 oz)   Height: 172.7 cm (68\")         Physical Exam   Constitutional: He is oriented to person, place, and time. He appears well-developed and well-nourished. No distress.   HENT:   Head: Normocephalic and atraumatic.   Eyes: Conjunctivae are normal. Pupils are equal, round, and reactive to light.   Neck: Neck supple. No JVD present.   Pulmonary/Chest: Effort normal.   Musculoskeletal: Normal range of motion. He exhibits no edema.   Neurological: He is alert and oriented to person, place, and time. No cranial nerve deficit.   Skin: Skin is warm and dry.   Psychiatric: He has a normal mood and affect. His behavior is normal.   Vitals reviewed.      Lab and Diagnostic Review:  Glucose 65 - 99 mg/dL 96  133 Abnormally high  R 126 Abnormally high  R   BUN 8 - 23 mg/dL 21  24 Abnormally high  R 33 Abnormally high  R   Creatinine 0.76 - 1.27 mg/dL 1.55 Abnormally high   1.39 Abnormally high  R 1.62 Abnormally high  R   Sodium 136 - 145 mmol/L 135 Abnormally low   138 R 133 R   Potassium 3.5 - 5.2 mmol/L 4.6  4.2 R 3.9 R   Chloride 98 - 107 mmol/L 99  105 R 98 Abnormally low  R   CO2 22.0 - 29.0 mmol/L 23.0  24.0 R 26.0 R   Calcium 8.2 - 9.6 mg/dL 9.1  9.0 R 9.1 R   eGFR Non African Amer >60 mL/min/1.73 42 Abnormally low   48 Abnormally low   40 Abnormally low     BUN/Creatinine Ratio 7.0 - 25.0 13.5  17.3  20.4    Anion Gap mmol/L 13.0  9.0 R 9.0 R     Echo 5/30/19 Interpretation Summary     · Estimated EF = 15%.  · Normal functioning TAVR  · Severe mitral valve regurgitation is present         Assessment and Plan:     1. Chronic systolic congestive heart " failure (CMS/[facility])  - Current NYHA class II  - Discussed echo results again in detail (persistently reduced LVEF 15-20%).    - Patient confirms he is not interested in any invasive testing or new procedures  - He wishes to continue medical therapy  - Discussed Entresto vs. Current regimen  - Patient agreeable to continue current regimen due to cost if Entresto cannot be obtained thru [facility].  If he is able to try Entresto, he will return for short follow up approximately two weeks after the change (DC lisinopril/ torsemide) for labs and clinic follow up.  If he keeps meds the same, then he will keep his regularly scheduled follow up with Cardiology for 2/2020.       2. Coronary artery disease involving coronary bypass graft of native heart without angina pectoris  - asa, statin, bb  - Stable without anginal symptoms

## 2019-06-20 ENCOUNTER — TELEPHONE (OUTPATIENT)
Dept: CARDIOLOGY | Facility: HOSPITAL | Age: 84
End: 2019-06-20

## 2019-06-20 NOTE — TELEPHONE ENCOUNTER
Spoke with Mr. Moon this AM.  He states he was contacted by [facility] pharmacy service staff and he is NOT eligible for Entresto Rx coverage.  Patient states he plans to keep taking his current meds as is due to cost.    Parvin MARTINEZ    ----- Message from Gabby Aguilar [facility] sent at 6/20/2019  7:44 AM EDT -----  Regarding: Patient call  Mr. Tad Moon (11/30/1926) left a voicemail and said that he wanted to speak to you, only you, about something. His return number is [phone].

## 2019-06-24 ENCOUNTER — OFFICE VISIT (OUTPATIENT)
Dept: INTERNAL MEDICINE | Facility: CLINIC | Age: 84
End: 2019-06-24

## 2019-06-24 ENCOUNTER — TELEPHONE (OUTPATIENT)
Dept: INTERNAL MEDICINE | Facility: CLINIC | Age: 84
End: 2019-06-24

## 2019-06-24 VITALS
DIASTOLIC BLOOD PRESSURE: 68 MMHG | BODY MASS INDEX: 24.4 KG/M2 | TEMPERATURE: 97.6 F | HEIGHT: 68 IN | WEIGHT: 161 LBS | HEART RATE: 72 BPM | SYSTOLIC BLOOD PRESSURE: 132 MMHG

## 2019-06-24 DIAGNOSIS — M10.041 ACUTE IDIOPATHIC GOUT OF RIGHT HAND: Primary | ICD-10-CM

## 2019-06-24 PROCEDURE — 99213 OFFICE O/P EST LOW 20 MIN: CPT | Performed by: NURSE PRACTITIONER

## 2019-06-24 RX ORDER — COLCHICINE 0.6 MG/1
0.6 TABLET ORAL 2 TIMES DAILY
Qty: 10 TABLET | Refills: 0 | Status: SHIPPED | OUTPATIENT
Start: 2019-06-24 | End: 2019-09-26

## 2019-06-24 RX ORDER — PREDNISONE 10 MG/1
TABLET ORAL
Qty: 30 TABLET | Refills: 0 | Status: SHIPPED | OUTPATIENT
Start: 2019-06-24 | End: 2019-07-15

## 2019-06-24 NOTE — PROGRESS NOTES
Tad Moon  11/30/1926  [id number]  Patient Care Team:  Pramod Hyde MD as PCP - General  Lv Cobian MD as Consulting Physician (Cardiology)    Tad Moon is a pleasant 89 y.o. male who presents for evaluation of Gout (right pointer finger )      Chief Complaint   Patient presents with   • Gout     right pointer finger        HPI:   Right index finger pain and swelling 7-10 days.  Past Medical History:   Diagnosis Date   • Aortic stenosis    • Arthritis    • BOOP (bronchiolitis obliterans with organizing pneumonia) (CMS/[facility]) 2014   • Chest pain 2007    angioplasty to RCA   • CHF (congestive heart failure) (CMS/[facility])    • CLL (chronic lymphocytic leukemia) (CMS/[facility])     15 years    • Coronary artery disease    • GERD (gastroesophageal reflux disease)    • Herpes zoster 2012   • History of tobacco abuse    • Hyperlipidemia    • Hypertension    • Low kidney function     very recently and did kidney function test and said decreased function so being watched to get all fluid off    • MI (myocardial infarction) (CMS/[facility])     mid 90s very mild - few years after bypass surgery    • Non-STEMI (non-ST elevated myocardial infarction) (CMS/[facility]) 10/17/2017    stenting of occluded grafts to left circumflex and RCA   • Skin cancer     basal and squamous from various location    • SOBOE (shortness of breath on exertion)    • Uses hearing aid     bilat ears      Past Surgical History:   Procedure Laterality Date   • ABDOMINAL HERNIA REPAIR      x 2   • AORTIC VALVE REPAIR/REPLACEMENT N/A 4/26/2018    Procedure: Transcatheter Aortic Valve Replacement, LANDY;  Surgeon: Lv Purdy MD;  Location:  [facility];  Service: Cardiothoracic   • AORTIC VALVE REPAIR/REPLACEMENT N/A 4/26/2018    Procedure: Transcatheter Aortic Valve Replacement;  Surgeon: Juan M Sood MD;  Location:  [facility];  Service: Cardiology   • APPENDECTOMY     • CARDIAC CATHETERIZATION N/A 10/10/2017    Procedure: Left Heart  Cath;  Surgeon: Juan M Sood MD;  Location:  [facility];  Service:    • CARDIAC CATHETERIZATION N/A 2018    Procedure: Left Heart Cath;  Surgeon: Lv Cobian MD;  Location:  [facility];  Service: Cardiovascular   • CHOLECYSTECTOMY     • COLONOSCOPY     • CORONARY ANGIOPLASTY WITH STENT PLACEMENT      07 one placed,  and in 2017 had another stent placed and one repaired -   so pt thinks 3 stents in place    • CORONARY ARTERY BYPASS GRAFT      4 bypass in 92    • REPLACEMENT TOTAL KNEE Left    • WISDOM TOOTH EXTRACTION       Family History   Problem Relation Age of Onset   • Stroke Mother    • Heart disease Father    • Heart disease Brother    • Coronary artery disease Brother      Social History     Tobacco Use   Smoking Status Former Smoker   • Packs/day: 0.25   • Types: Cigarettes   • Last attempt to quit:    • Years since quittin.5   Smokeless Tobacco Never Used   Tobacco Comment    quit 45 years ago- smoked since 17 yo      Allergies   Allergen Reactions   • Hctz [Hydrochlorothiazide] Other (See Comments)     hyponatremia       Current Outpatient Medications:   •  ALPRAZolam (XANAX) 0.5 MG tablet, Take 1 tablet by mouth At Night As Needed for Sleep., Disp: 30 tablet, Rfl: 2  •  aspirin 81 MG tablet, Take 1 tablet by mouth daily., Disp: 90 tablet, Rfl: 3  •  atorvastatin (LIPITOR) 80 MG tablet, Take 0.5 tablets by mouth Daily., Disp: 90 tablet, Rfl: 3  •  carvedilol (COREG) 3.125 MG tablet, Take 1 tablet by mouth Every 12 (Twelve) Hours. (Patient taking differently: Take 3.125 mg by mouth Every 12 (Twelve) Hours. Hold for SBP < 110, HR < 60), Disp: 60 tablet, Rfl: 11  •  cholecalciferol (VITAMIN D3) 1000 UNITS tablet, Take 1,000 Units by mouth Daily., Disp: , Rfl:   •  clopidogrel (PLAVIX) 75 MG tablet, Take 1 tablet by mouth daily., Disp: 90 tablet, Rfl: 3  •  Cyanocobalamin 2500 MCG chewable tablet, Chew 2,500 mcg Daily., Disp: , Rfl:   •  ferrous sulfate  324 (65 Fe) MG tablet delayed-release EC tablet, Take 324 mg by mouth Daily With Breakfast., Disp: , Rfl:   •  nitroglycerin (NITROSTAT) 0.4 MG SL tablet, Place 1 tablet under the tongue Every 5 (Five) Minutes As Needed for Chest Pain., Disp: 25 tablet, Rfl: 6  •  omeprazole (priLOSEC) 20 MG capsule, Take 20 mg by mouth Daily As Needed. For acid reflux, Disp: , Rfl:   •  sacubitril-valsartan (ENTRESTO) 24-26 MG tablet, Take 1 tablet by mouth 2 (Two) Times a Day. (Patient taking differently: Take 1 tablet by mouth 2 (Two) Times a Day. 6/13/19- [facility], taking lisinopril 2.5mg daily and torsemide 20mg daily in the mean time), Disp: 60 tablet, Rfl: 1  •  sertraline (ZOLOFT) 100 MG tablet, Take 100 mg by mouth Daily., Disp: , Rfl:   •  spironolactone (ALDACTONE) 25 MG tablet, Take 1 tablet by mouth Daily., Disp: 30 tablet, Rfl: 3  •  colchicine 0.6 MG tablet, Take 1 tablet by mouth 2 (Two) Times a Day., Disp: 10 tablet, Rfl: 0  •  predniSONE (DELTASONE) 10 MG tablet, Take 4 tablets every am for 4 days, then 3 tablets every am for 3 days, then 2 tablets every am for 2 days, then 1 tablet the last am., Disp: 30 tablet, Rfl: 0    Review of Systems   Constitutional: Negative for chills, fatigue and fever.   HENT: Negative for congestion, ear pain and sinus pressure.    Respiratory: Positive for shortness of breath. Negative for cough, chest tightness and wheezing.    Cardiovascular: Negative for chest pain and palpitations.   Gastrointestinal: Negative for abdominal pain, blood in stool and constipation.   Skin: Negative for color change.   Allergic/Immunologic: Negative for environmental allergies.   Neurological: Negative for dizziness, speech difficulty and headache.   Psychiatric/Behavioral: Negative for decreased concentration. The patient is nervous/anxious.      /68 (BP Location: Left arm, Patient Position: Sitting, Cuff Size: Adult)   Pulse 72   Temp 97.6 °F (36.4 °C) (Temporal)   Ht 172.7  "cm (67.99\")   Wt 73 kg (161 lb)   BMI 24.49 kg/m²     Physical Exam   Musculoskeletal:        Hands:      Assessment/Plan:  Tad was seen today for gout.    Diagnoses and all orders for this visit:    Acute idiopathic gout of right hand    Other orders  -     colchicine 0.6 MG tablet; Take 1 tablet by mouth 2 (Two) Times a Day.  -     predniSONE (DELTASONE) 10 MG tablet; Take 4 tablets every am for 4 days, then 3 tablets every am for 3 days, then 2 tablets every am for 2 days, then 1 tablet the last am.       Patient Instructions   Sending colchicine rx to have on hand for future gout flare up.  No need to take now, won't help after this many day.    Start prednisone taper today.    Gout  Gout is painful swelling that can happen in some of your joints. Gout is a type of arthritis. This condition is caused by having too much uric acid in your body. Uric acid is a chemical that is made when your body breaks down substances called purines. If your body has too much uric acid, sharp crystals can form and build up in your joints. This causes pain and swelling.  Gout attacks can happen quickly and be very painful (acute gout). Over time, the attacks can affect more joints and happen more often (chronic gout).  Follow these instructions at home:  During a gout attack  · If directed, put ice on the painful area:  ? Put ice in a plastic bag.  ? Place a towel between your skin and the bag.  ? Leave the ice on for 20 minutes, 2-3 times a day.  · Rest the joint as much as possible. If the joint is in your leg, you may be given crutches to use.  · Raise (elevate) the painful joint above the level of your heart as often as you can.  · Drink enough fluids to keep your pee (urine) pale yellow.  · Take over-the-counter and prescription medicines only as told by your doctor.  · Do not drive or use heavy machinery while taking prescription pain medicine.  · Follow instructions from your doctor about what you can or cannot eat and " drink.  · Return to your normal activities as told by your doctor. Ask your doctor what activities are safe for you.  Avoiding future gout attacks  · Follow a low-purine diet as told by a specialist (dietitian) or your doctor. Avoid foods and drinks that have a lot of purines, such as:  ? Liver.  ? Kidney.  ? Anchovies.  ? Asparagus.  ? Herring.  ? Mushrooms  ? Mussels.  ? Beer.  · Limit alcohol intake to no more than 1 drink a day for nonpregnant women and 2 drinks a day for men. One drink equals 12 oz of beer, 5 oz of wine, or 1½ oz of hard liquor.  · Stay at a healthy weight or lose weight if you are overweight. If you want to lose weight, talk with your doctor. It is important that you do not lose weight too fast.  · Start or continue an exercise plan as told by your doctor.  · Drink enough fluids to keep your pee pale yellow.  · Take over-the-counter and prescription medicines only as told by your doctor.  · Keep all follow-up visits as told by your doctor. This is important.  Contact a doctor if:  · You have another gout attack.  · You still have symptoms of a gout attack after10 days of treatment.  · You have problems (side effects) because of your medicines.  · You have chills or a fever.  · You have burning pain when you pee (urinate).  · You have pain in your lower back or belly.  Get help right away if:  · You have very bad pain.  · Your pain cannot be controlled.  · You cannot pee.  This information is not intended to replace advice given to you by your health care provider. Make sure you discuss any questions you have with your health care provider.  Document Released: 09/26/2009 Document Revised: 08/01/2018 Document Reviewed: 09/29/2016  Elsevier Interactive Patient Education © 2019 [employer]      Plan of care reviewed with patient at the conclusion of today's visit. Education was provided regarding diagnosis, management and any prescribed or recommended OTC medications.  Patient verbalizes  understanding of and agreement with management plan.    Return if symptoms worsen or fail to improve.    *Note that portions of this note were completed with a voice recognition program.  Efforts were made to edit the dictation but occasionally words are transcribed.    Kell Plummer, APRN

## 2019-06-24 NOTE — TELEPHONE ENCOUNTER
RX REQUESTED MEDICATION THAT DR HOFFMAN HAD PRESCRIBED ABOUT 3 YRS FOR GOUT IN HIS RT HAND FINGER.   SEND TO [facility] IN [place] IF APPROVED . THANK YOU

## 2019-06-24 NOTE — PATIENT INSTRUCTIONS
Sending colchicine rx to have on hand for future gout flare up.  No need to take now, won't help after this many day.    Start prednisone taper today.    Gout  Gout is painful swelling that can happen in some of your joints. Gout is a type of arthritis. This condition is caused by having too much uric acid in your body. Uric acid is a chemical that is made when your body breaks down substances called purines. If your body has too much uric acid, sharp crystals can form and build up in your joints. This causes pain and swelling.  Gout attacks can happen quickly and be very painful (acute gout). Over time, the attacks can affect more joints and happen more often (chronic gout).  Follow these instructions at home:  During a gout attack  · If directed, put ice on the painful area:  ? Put ice in a plastic bag.  ? Place a towel between your skin and the bag.  ? Leave the ice on for 20 minutes, 2-3 times a day.  · Rest the joint as much as possible. If the joint is in your leg, you may be given crutches to use.  · Raise (elevate) the painful joint above the level of your heart as often as you can.  · Drink enough fluids to keep your pee (urine) pale yellow.  · Take over-the-counter and prescription medicines only as told by your doctor.  · Do not drive or use heavy machinery while taking prescription pain medicine.  · Follow instructions from your doctor about what you can or cannot eat and drink.  · Return to your normal activities as told by your doctor. Ask your doctor what activities are safe for you.  Avoiding future gout attacks  · Follow a low-purine diet as told by a specialist (dietitian) or your doctor. Avoid foods and drinks that have a lot of purines, such as:  ? Liver.  ? Kidney.  ? Anchovies.  ? Asparagus.  ? Herring.  ? Mushrooms  ? Mussels.  ? Beer.  · Limit alcohol intake to no more than 1 drink a day for nonpregnant women and 2 drinks a day for men. One drink equals 12 oz of beer, 5 oz of wine, or 1½ oz  of hard liquor.  · Stay at a healthy weight or lose weight if you are overweight. If you want to lose weight, talk with your doctor. It is important that you do not lose weight too fast.  · Start or continue an exercise plan as told by your doctor.  · Drink enough fluids to keep your pee pale yellow.  · Take over-the-counter and prescription medicines only as told by your doctor.  · Keep all follow-up visits as told by your doctor. This is important.  Contact a doctor if:  · You have another gout attack.  · You still have symptoms of a gout attack after10 days of treatment.  · You have problems (side effects) because of your medicines.  · You have chills or a fever.  · You have burning pain when you pee (urinate).  · You have pain in your lower back or belly.  Get help right away if:  · You have very bad pain.  · Your pain cannot be controlled.  · You cannot pee.  This information is not intended to replace advice given to you by your health care provider. Make sure you discuss any questions you have with your health care provider.  Document Released: 09/26/2009 Document Revised: 08/01/2018 Document Reviewed: 09/29/2016  [employer] Interactive Patient Education © 2019 [employer] Inc[employer]

## 2019-06-24 NOTE — TELEPHONE ENCOUNTER
Looks like pt had been given colcrys 0.6 mg bid #20. This is not in medication list. If ok, please add

## 2019-06-24 NOTE — TELEPHONE ENCOUNTER
I sent prednisone 10 mg twice a day for 3 days and Colcrys 0.6 mg twice a day for 3 days for the gout please let them know

## 2019-07-15 ENCOUNTER — OFFICE VISIT (OUTPATIENT)
Dept: INTERNAL MEDICINE | Facility: CLINIC | Age: 84
End: 2019-07-15

## 2019-07-15 ENCOUNTER — HOSPITAL ENCOUNTER (OUTPATIENT)
Dept: GENERAL RADIOLOGY | Facility: HOSPITAL | Age: 84
Discharge: HOME | End: 2019-07-15
Admitting: INTERNAL MEDICINE

## 2019-07-15 VITALS
WEIGHT: 163 LBS | BODY MASS INDEX: 24.71 KG/M2 | HEIGHT: 68 IN | DIASTOLIC BLOOD PRESSURE: 62 MMHG | TEMPERATURE: 98.3 F | SYSTOLIC BLOOD PRESSURE: 136 MMHG

## 2019-07-15 DIAGNOSIS — J18.9 PNEUMONIA OF RIGHT LOWER LOBE DUE TO INFECTIOUS ORGANISM: ICD-10-CM

## 2019-07-15 DIAGNOSIS — J18.9 PNEUMONIA OF RIGHT LOWER LOBE DUE TO INFECTIOUS ORGANISM: Primary | ICD-10-CM

## 2019-07-15 PROCEDURE — 71046 X-RAY EXAM CHEST 2 VIEWS: CPT

## 2019-07-15 PROCEDURE — 99213 OFFICE O/P EST LOW 20 MIN: CPT | Performed by: INTERNAL MEDICINE

## 2019-07-15 PROCEDURE — 96372 THER/PROPH/DIAG INJ SC/IM: CPT | Performed by: INTERNAL MEDICINE

## 2019-07-15 RX ORDER — TORSEMIDE 20 MG/1
20 TABLET ORAL DAILY
COMMUNITY
End: 2019-11-20 | Stop reason: DRUGHIGH

## 2019-07-15 RX ORDER — LISINOPRIL 2.5 MG/1
2.5 TABLET ORAL DAILY
COMMUNITY
End: 2019-11-20 | Stop reason: DRUGHIGH

## 2019-07-15 RX ORDER — AMOXICILLIN AND CLAVULANATE POTASSIUM 875; 125 MG/1; MG/1
1 TABLET, FILM COATED ORAL 2 TIMES DAILY
COMMUNITY
End: 2019-07-31

## 2019-07-15 RX ORDER — DOXYCYCLINE HYCLATE 100 MG/1
100 CAPSULE ORAL 2 TIMES DAILY
Qty: 14 CAPSULE | Refills: 0 | Status: SHIPPED | OUTPATIENT
Start: 2019-07-15 | End: 2019-07-31

## 2019-07-15 RX ORDER — TRIAMCINOLONE ACETONIDE 40 MG/ML
40 INJECTION, SUSPENSION INTRA-ARTICULAR; INTRAMUSCULAR ONCE
Status: COMPLETED | OUTPATIENT
Start: 2019-07-15 | End: 2019-07-15

## 2019-07-15 RX ADMIN — TRIAMCINOLONE ACETONIDE 40 MG: 40 INJECTION, SUSPENSION INTRA-ARTICULAR; INTRAMUSCULAR at 10:31

## 2019-07-15 NOTE — PROGRESS NOTES
[facility]     Tad Moon  11/30/1926   [id number]      Patient Care Team:  Pramod Hyde MD as PCP - General  Lv Cobian MD as Consulting Physician (Cardiology)    Chief Complaint::   Chief Complaint   Patient presents with   • Cough     onset Thurs - went to [facility] Sat   • Shortness of Breath   • Wheezing   • congestion        HPI  Mr. Moon comes in complaining of cough and chest congestion for 5 days.  2 days ago he went to the [facility].  His lungs were reported to be clear at the time and he was treated with Augmentin for sinusitis.  However since then he has worsened.  He initially had diarrhea with Augmentin and has a little nausea today, but is much more congested.  He has a persistent cough which is nonproductive.  He had chills at the onset but no fever.  He complains of mild shortness of breath with exertion.    Chronic Conditions:      Patient Active Problem List   Diagnosis   • Coronary artery disease involving coronary bypass graft of native heart without angina pectoris   • Hyperlipidemia LDL goal <100   • History of tobacco abuse   • CLL (chronic lymphocytic leukemia) (CMS/[facility])   • GERD (gastroesophageal reflux disease)   • Nonrheumatic aortic valve stenosis s/p TAVR   • Chronic systolic congestive heart failure (CMS/[facility])   • CKD (chronic kidney disease) stage 3, GFR 30-59 ml/min (CMS/[facility])   • Aortic valve stenosis   • Fatigue   • Ischemic cardiomyopathy   • Chronic combined systolic and diastolic CHF (congestive heart failure) (CMS/[facility])   • Angina pectoris (CMS/[facility])   • Atherosclerosis of coronary artery   • Benign essential hypertension   • Heart failure, chronic, with acute decompensation (CMS/[facility])   • Generalized ischemic myocardial dysfunction        Past Medical History:   Diagnosis Date   • Aortic stenosis    • Arthritis    • BOOP (bronchiolitis obliterans with organizing pneumonia) (CMS/[facility]) 2014   • Chest pain 2007    angioplasty to  RCA   • CHF (congestive heart failure) (CMS/[facility])    • CLL (chronic lymphocytic leukemia) (CMS/[facility])     15 years    • Coronary artery disease    • GERD (gastroesophageal reflux disease)    • Herpes zoster 2012   • History of tobacco abuse    • Hyperlipidemia    • Hypertension    • Low kidney function     very recently and did kidney function test and said decreased function so being watched to get all fluid off    • MI (myocardial infarction) (CMS/[facility])     mid 90s very mild - few years after bypass surgery    • Non-STEMI (non-ST elevated myocardial infarction) (CMS/[facility]) 10/17/2017    stenting of occluded grafts to left circumflex and RCA   • Skin cancer     basal and squamous from various location    • SOBOE (shortness of breath on exertion)    • Uses hearing aid     bilat ears        Past Surgical History:   Procedure Laterality Date   • ABDOMINAL HERNIA REPAIR      x 2   • AORTIC VALVE REPAIR/REPLACEMENT N/A 4/26/2018    Procedure: Transcatheter Aortic Valve Replacement, LANDY;  Surgeon: Lv Purdy MD;  Location:  [facility];  Service: Cardiothoracic   • AORTIC VALVE REPAIR/REPLACEMENT N/A 4/26/2018    Procedure: Transcatheter Aortic Valve Replacement;  Surgeon: Juan M Sood MD;  Location:  [facility];  Service: Cardiology   • APPENDECTOMY     • CARDIAC CATHETERIZATION N/A 10/10/2017    Procedure: Left Heart Cath;  Surgeon: Juan M Sood MD;  Location:  [facility] CATH INVASIVE LOCATION;  Service:    • CARDIAC CATHETERIZATION N/A 4/6/2018    Procedure: Left Heart Cath;  Surgeon: Lv Cobian MD;  Location:  [facility] CATH INVASIVE LOCATION;  Service: Cardiovascular   • CHOLECYSTECTOMY     • COLONOSCOPY     • CORONARY ANGIOPLASTY WITH STENT PLACEMENT      07 one placed,  and in 2017 had another stent placed and one repaired -   so pt thinks 3 stents in place    • CORONARY ARTERY BYPASS GRAFT      4 bypass in 92    • REPLACEMENT TOTAL KNEE Left 2014   • WISDOM TOOTH EXTRACTION         Family History    Problem Relation Age of Onset   • Stroke Mother    • Heart disease Father    • Heart disease Brother    • Coronary artery disease Brother        Social History     Socioeconomic History   • Marital status:      Spouse name: Not on file   • Number of children: 3   • Years of education: Not on file   • Highest education level: Not on file   Occupational History   • Occupation: Retired      Comment: insurance agency    Tobacco Use   • Smoking status: Former Smoker     Packs/day: 0.25     Types: Cigarettes     Last attempt to quit: 1975     Years since quittin.5   • Smokeless tobacco: Never Used   • Tobacco comment: quit 45 years ago- smoked since 19 yo    Substance and Sexual Activity   • Alcohol use: Yes     Alcohol/week: 0.6 - 1.2 oz     Types: 1 - 2 Glasses of wine per week     Comment: occas   • Drug use: No   • Sexual activity: Defer   Social History Narrative    Lives with Wife Julia in [place], [place]     Caffeine:  3 servings per day       Allergies   Allergen Reactions   • Hctz [Hydrochlorothiazide] Other (See Comments)     hyponatremia         Current Outpatient Medications:   •  ALPRAZolam (XANAX) 0.5 MG tablet, Take 1 tablet by mouth At Night As Needed for Sleep., Disp: 30 tablet, Rfl: 2  •  amoxicillin-clavulanate (AUGMENTIN) 875-125 MG per tablet, Take 1 tablet by mouth 2 (Two) Times a Day. For 10 days, Disp: , Rfl:   •  aspirin 81 MG tablet, Take 1 tablet by mouth daily., Disp: 90 tablet, Rfl: 3  •  atorvastatin (LIPITOR) 80 MG tablet, Take 0.5 tablets by mouth Daily., Disp: 90 tablet, Rfl: 3  •  carvedilol (COREG) 3.125 MG tablet, Take 1 tablet by mouth Every 12 (Twelve) Hours. (Patient taking differently: Take 3.125 mg by mouth Every 12 (Twelve) Hours. Hold for SBP < 110, HR < 60), Disp: 60 tablet, Rfl: 11  •  cholecalciferol (VITAMIN D3) 1000 UNITS tablet, Take 1,000 Units by mouth Daily., Disp: , Rfl:   •  clopidogrel (PLAVIX) 75 MG tablet, Take 1 tablet by mouth daily., Disp: 90 tablet,  Rfl: 3  •  colchicine 0.6 MG tablet, Take 1 tablet by mouth 2 (Two) Times a Day., Disp: 10 tablet, Rfl: 0  •  Cyanocobalamin 2500 MCG chewable tablet, Chew 2,500 mcg Daily., Disp: , Rfl:   •  ferrous sulfate 324 (65 Fe) MG tablet delayed-release EC tablet, Take 324 mg by mouth Daily With Breakfast., Disp: , Rfl:   •  lisinopril (PRINIVIL,ZESTRIL) 2.5 MG tablet, Take 2.5 mg by mouth Daily., Disp: , Rfl:   •  nitroglycerin (NITROSTAT) 0.4 MG SL tablet, Place 1 tablet under the tongue Every 5 (Five) Minutes As Needed for Chest Pain., Disp: 25 tablet, Rfl: 6  •  omeprazole (priLOSEC) 20 MG capsule, Take 20 mg by mouth Daily As Needed. For acid reflux, Disp: , Rfl:   •  sertraline (ZOLOFT) 100 MG tablet, Take 100 mg by mouth Daily., Disp: , Rfl:   •  spironolactone (ALDACTONE) 25 MG tablet, Take 1 tablet by mouth Daily., Disp: 30 tablet, Rfl: 3  •  torsemide (DEMADEX) 20 MG tablet, Take 20 mg by mouth Daily., Disp: , Rfl:   •  doxycycline (VIBRAMYCIN) 100 MG capsule, Take 1 capsule by mouth 2 (Two) Times a Day., Disp: 14 capsule, Rfl: 0  •  predniSONE (DELTASONE) 10 MG tablet, Take 4 tablets every am for 4 days, then 3 tablets every am for 3 days, then 2 tablets every am for 2 days, then 1 tablet the last am., Disp: 30 tablet, Rfl: 0  •  sacubitril-valsartan (ENTRESTO) 24-26 MG tablet, Take 1 tablet by mouth 2 (Two) Times a Day. (Patient taking differently: Take 1 tablet by mouth 2 (Two) Times a Day. 6/13/19- [facility] supply [facility], taking lisinopril 2.5mg daily and torsemide 20mg daily in the mean time), Disp: 60 tablet, Rfl: 1    Current Facility-Administered Medications:   •  triamcinolone acetonide (KENALOG-40) injection 40 mg, 40 mg, Intramuscular, Once, Pramod Hyde MD    Review of Systems   Constitutional: Negative for chills, fatigue and fever.   HENT: Positive for congestion. Negative for ear pain and sinus pressure.    Respiratory: Positive for cough, shortness of breath and wheezing. Negative for  "chest tightness.    Cardiovascular: Negative for chest pain and palpitations.   Gastrointestinal: Negative for abdominal pain, blood in stool and constipation.   Skin: Negative for color change.   Allergic/Immunologic: Negative for environmental allergies.   Neurological: Positive for dizziness and headache. Negative for speech difficulty.   Psychiatric/Behavioral: Negative for decreased concentration. The patient is not nervous/anxious.         Vital Signs  Vitals:    07/15/19 0951   BP: 136/62   BP Location: Left arm   Patient Position: Sitting   Cuff Size: Adult   Temp: 98.3 °F (36.8 °C)   Weight: 73.9 kg (163 lb)   Height: 172.7 cm (67.99\")       Physical Exam   Constitutional: He is oriented to person, place, and time. He appears well-developed and well-nourished.   HENT:   Head: Normocephalic and atraumatic.   Cardiovascular: Normal rate, regular rhythm and normal heart sounds.   No murmur heard.  Pulmonary/Chest: Effort normal. He has wheezes in the right upper field, the right middle field, the right lower field, the left upper field, the left middle field and the left lower field. He has rhonchi in the right lower field.   Neurological: He is alert and oriented to person, place, and time.   Psychiatric: He has a normal mood and affect.   Vitals reviewed.     Procedures    ACE III MINI             Assessment/Plan:    Tad was seen today for cough, shortness of breath, wheezing and congestion.    Diagnoses and all orders for this visit:    Pneumonia of right lower lobe due to infectious organism (CMS/[facility])  -     triamcinolone acetonide (KENALOG-40) injection 40 mg  -     XR Chest PA & Lateral; Future    Other orders  -     doxycycline (VIBRAMYCIN) 100 MG capsule; Take 1 capsule by mouth 2 (Two) Times a Day.    Plan    Lower respiratory infection with progression despite Augmentin.  He now has rhonchi in the right lower lobe and diffuse coarse wheezing.  Add doxycycline to Augmentin.  He is also given IM " triamcinolone and instructed to use Mucinex for his cough.  I will see him in 48 hours to assess his response.      Plan of care reviewed with patient at the conclusion of today's visit. Education was provided regarding diagnosis, management, and any prescribed or recommended OTC medications.Patient verbalizes understanding of and agreement with management plan.         Pramod Hyde MD

## 2019-07-17 ENCOUNTER — OFFICE VISIT (OUTPATIENT)
Dept: INTERNAL MEDICINE | Facility: CLINIC | Age: 84
End: 2019-07-17

## 2019-07-17 VITALS
BODY MASS INDEX: 24.71 KG/M2 | TEMPERATURE: 98.2 F | DIASTOLIC BLOOD PRESSURE: 64 MMHG | SYSTOLIC BLOOD PRESSURE: 142 MMHG | HEART RATE: 72 BPM | HEIGHT: 68 IN | WEIGHT: 163 LBS

## 2019-07-17 DIAGNOSIS — J18.9 PNEUMONIA OF LEFT LOWER LOBE DUE TO INFECTIOUS ORGANISM: Primary | ICD-10-CM

## 2019-07-17 PROCEDURE — 99213 OFFICE O/P EST LOW 20 MIN: CPT | Performed by: INTERNAL MEDICINE

## 2019-07-17 NOTE — PROGRESS NOTES
[facility]     Tad Moon  11/30/1926   [id number]      Patient Care Team:  Pramod Hyde MD as PCP - General  Lv Cobian MD as Consulting Physician (Cardiology)    Chief Complaint::   Chief Complaint   Patient presents with   • Pneumonia     follow up from 7/15   • Cough        HPI  Mr. Moon comes in for follow-up of his pneumonia.  On the 15th he was diagnosed with pneumonia, confirmed with chest x-ray.  He was already on Augmentin, doxycycline was added and he was given triamcinolone.  He still has a significant cough but is clearly better.  His color is better and he is less short of breath.    Chronic Conditions:      Patient Active Problem List   Diagnosis   • Coronary artery disease involving coronary bypass graft of native heart without angina pectoris   • Hyperlipidemia LDL goal <100   • History of tobacco abuse   • CLL (chronic lymphocytic leukemia) (CMS/[facility])   • GERD (gastroesophageal reflux disease)   • Nonrheumatic aortic valve stenosis s/p TAVR   • Chronic systolic congestive heart failure (CMS/[facility])   • CKD (chronic kidney disease) stage 3, GFR 30-59 ml/min (CMS/[facility])   • Aortic valve stenosis   • Fatigue   • Ischemic cardiomyopathy   • Chronic combined systolic and diastolic CHF (congestive heart failure) (CMS/[facility])   • Angina pectoris (CMS/[facility])   • Atherosclerosis of coronary artery   • Benign essential hypertension   • Heart failure, chronic, with acute decompensation (CMS/[facility])   • Generalized ischemic myocardial dysfunction        Past Medical History:   Diagnosis Date   • Aortic stenosis    • Arthritis    • BOOP (bronchiolitis obliterans with organizing pneumonia) (CMS/[facility]) 2014   • Chest pain 2007    angioplasty to RCA   • CHF (congestive heart failure) (CMS/[facility])    • CLL (chronic lymphocytic leukemia) (CMS/[facility])     15 years    • Coronary artery disease    • GERD (gastroesophageal reflux disease)    • Herpes zoster 2012   • History of tobacco abuse    •  Hyperlipidemia    • Hypertension    • Low kidney function     very recently and did kidney function test and said decreased function so being watched to get all fluid off    • MI (myocardial infarction) (CMS/[facility])     mid 90s very mild - few years after bypass surgery    • Non-STEMI (non-ST elevated myocardial infarction) (CMS/[facility]) 10/17/2017    stenting of occluded grafts to left circumflex and RCA   • Skin cancer     basal and squamous from various location    • SOBOE (shortness of breath on exertion)    • Uses hearing aid     bilat ears        Past Surgical History:   Procedure Laterality Date   • ABDOMINAL HERNIA REPAIR      x 2   • AORTIC VALVE REPAIR/REPLACEMENT N/A 4/26/2018    Procedure: Transcatheter Aortic Valve Replacement, LANDY;  Surgeon: Lv Purdy MD;  Location:  [facility];  Service: Cardiothoracic   • AORTIC VALVE REPAIR/REPLACEMENT N/A 4/26/2018    Procedure: Transcatheter Aortic Valve Replacement;  Surgeon: Juan M Sood MD;  Location:  [facility];  Service: Cardiology   • APPENDECTOMY     • CARDIAC CATHETERIZATION N/A 10/10/2017    Procedure: Left Heart Cath;  Surgeon: Juan M Sood MD;  Location:  [facility];  Service:    • CARDIAC CATHETERIZATION N/A 4/6/2018    Procedure: Left Heart Cath;  Surgeon: Lv Cobian MD;  Location:  [facility] CATH INVASIVE LOCATION;  Service: Cardiovascular   • CHOLECYSTECTOMY     • COLONOSCOPY     • CORONARY ANGIOPLASTY WITH STENT PLACEMENT      07 one placed,  and in 2017 had another stent placed and one repaired -   so pt thinks 3 stents in place    • CORONARY ARTERY BYPASS GRAFT      4 bypass in 92    • REPLACEMENT TOTAL KNEE Left 2014   • WISDOM TOOTH EXTRACTION         Family History   Problem Relation Age of Onset   • Stroke Mother    • Heart disease Father    • Heart disease Brother    • Coronary artery disease Brother        Social History     Socioeconomic History   • Marital status:      Spouse name: Not on file    • Number of children: 3   • Years of education: Not on file   • Highest education level: Not on file   Occupational History   • Occupation: Retired      Comment: insurance agency    Tobacco Use   • Smoking status: Former Smoker     Packs/day: 0.25     Types: Cigarettes     Last attempt to quit: 1975     Years since quittin.5   • Smokeless tobacco: Never Used   • Tobacco comment: quit 45 years ago- smoked since 19 yo    Substance and Sexual Activity   • Alcohol use: Yes     Alcohol/week: 0.6 - 1.2 oz     Types: 1 - 2 Glasses of wine per week     Comment: occas   • Drug use: No   • Sexual activity: Defer   Social History Narrative    Lives with Wife Julia in [place], [place]     Caffeine:  3 servings per day       Allergies   Allergen Reactions   • Hctz [Hydrochlorothiazide] Other (See Comments)     hyponatremia         Current Outpatient Medications:   •  ALPRAZolam (XANAX) 0.5 MG tablet, Take 1 tablet by mouth At Night As Needed for Sleep., Disp: 30 tablet, Rfl: 2  •  amoxicillin-clavulanate (AUGMENTIN) 875-125 MG per tablet, Take 1 tablet by mouth 2 (Two) Times a Day. For 10 days, Disp: , Rfl:   •  aspirin 81 MG tablet, Take 1 tablet by mouth daily., Disp: 90 tablet, Rfl: 3  •  atorvastatin (LIPITOR) 80 MG tablet, Take 0.5 tablets by mouth Daily., Disp: 90 tablet, Rfl: 3  •  carvedilol (COREG) 3.125 MG tablet, Take 1 tablet by mouth Every 12 (Twelve) Hours. (Patient taking differently: Take 3.125 mg by mouth Every 12 (Twelve) Hours. Hold for SBP < 110, HR < 60), Disp: 60 tablet, Rfl: 11  •  cholecalciferol (VITAMIN D3) 1000 UNITS tablet, Take 1,000 Units by mouth Daily., Disp: , Rfl:   •  clopidogrel (PLAVIX) 75 MG tablet, Take 1 tablet by mouth daily., Disp: 90 tablet, Rfl: 3  •  colchicine 0.6 MG tablet, Take 1 tablet by mouth 2 (Two) Times a Day., Disp: 10 tablet, Rfl: 0  •  Cyanocobalamin 2500 MCG chewable tablet, Chew 2,500 mcg Daily., Disp: , Rfl:   •  doxycycline (VIBRAMYCIN) 100 MG capsule, Take 1  "capsule by mouth 2 (Two) Times a Day., Disp: 14 capsule, Rfl: 0  •  ferrous sulfate 324 (65 Fe) MG tablet delayed-release EC tablet, Take 324 mg by mouth Daily With Breakfast., Disp: , Rfl:   •  lisinopril (PRINIVIL,ZESTRIL) 2.5 MG tablet, Take 2.5 mg by mouth Daily., Disp: , Rfl:   •  nitroglycerin (NITROSTAT) 0.4 MG SL tablet, Place 1 tablet under the tongue Every 5 (Five) Minutes As Needed for Chest Pain., Disp: 25 tablet, Rfl: 6  •  omeprazole (priLOSEC) 20 MG capsule, Take 20 mg by mouth Daily As Needed. For acid reflux, Disp: , Rfl:   •  sertraline (ZOLOFT) 100 MG tablet, Take 100 mg by mouth Daily., Disp: , Rfl:   •  spironolactone (ALDACTONE) 25 MG tablet, Take 1 tablet by mouth Daily., Disp: 30 tablet, Rfl: 3  •  torsemide (DEMADEX) 20 MG tablet, Take 20 mg by mouth Daily., Disp: , Rfl:     Review of Systems   Constitutional: Positive for fatigue. Negative for chills and fever.   HENT: Positive for congestion. Negative for ear pain and sinus pressure.    Respiratory: Positive for cough, shortness of breath and wheezing. Negative for chest tightness.    Cardiovascular: Negative for chest pain and palpitations.   Gastrointestinal: Negative for abdominal pain, blood in stool and constipation.   Skin: Negative for color change.   Allergic/Immunologic: Negative for environmental allergies.   Neurological: Negative for dizziness, speech difficulty and headache.   Psychiatric/Behavioral: Negative for decreased concentration. The patient is not nervous/anxious.         Vital Signs  Vitals:    07/17/19 1002   BP: 142/64   BP Location: Left arm   Patient Position: Sitting   Cuff Size: Adult   Pulse: 72   Temp: 98.2 °F (36.8 °C)   Weight: 73.9 kg (163 lb)   Height: 172.7 cm (67.99\")   PainSc:   2   PainLoc: Chest       Physical Exam   Constitutional: He is oriented to person, place, and time. He appears well-developed and well-nourished.   HENT:   Head: Normocephalic and atraumatic.   Cardiovascular: Normal rate, " regular rhythm and normal heart sounds.   No murmur heard.  Pulmonary/Chest: Effort normal. He has decreased breath sounds in the left lower field.   Neurological: He is alert and oriented to person, place, and time.   Psychiatric: He has a normal mood and affect.   Vitals reviewed.     Procedures    ACE III MINI             Assessment/Plan:    Tad was seen today for pneumonia and cough.    Diagnoses and all orders for this visit:    Pneumonia of left lower lobe due to infectious organism (CMS/[facility])    Plan    He will continue doxycycline and Augmentin.  He is clearly improving.  He is given Tussionex to help suppress the cough at night.      Plan of care reviewed with patient at the conclusion of today's visit. Education was provided regarding diagnosis, management, and any prescribed or recommended OTC medications.Patient verbalizes understanding of and agreement with management plan.         Pramod Hyde MD

## 2019-07-31 ENCOUNTER — HOSPITAL ENCOUNTER (OUTPATIENT)
Dept: GENERAL RADIOLOGY | Facility: HOSPITAL | Age: 84
Discharge: HOME | End: 2019-07-31
Admitting: INTERNAL MEDICINE

## 2019-07-31 ENCOUNTER — OFFICE VISIT (OUTPATIENT)
Dept: INTERNAL MEDICINE | Facility: CLINIC | Age: 84
End: 2019-07-31

## 2019-07-31 VITALS
HEART RATE: 68 BPM | TEMPERATURE: 98 F | SYSTOLIC BLOOD PRESSURE: 128 MMHG | WEIGHT: 157 LBS | DIASTOLIC BLOOD PRESSURE: 64 MMHG | BODY MASS INDEX: 23.79 KG/M2 | HEIGHT: 68 IN

## 2019-07-31 DIAGNOSIS — J18.9 PNEUMONIA OF LEFT LOWER LOBE DUE TO INFECTIOUS ORGANISM: Primary | ICD-10-CM

## 2019-07-31 DIAGNOSIS — J18.9 PNEUMONIA OF LEFT LOWER LOBE DUE TO INFECTIOUS ORGANISM: ICD-10-CM

## 2019-07-31 PROCEDURE — 71046 X-RAY EXAM CHEST 2 VIEWS: CPT

## 2019-07-31 PROCEDURE — 99213 OFFICE O/P EST LOW 20 MIN: CPT | Performed by: INTERNAL MEDICINE

## 2019-07-31 NOTE — PROGRESS NOTES
[facility]     Tad Moon  11/30/1926   [id number]      Patient Care Team:  Pramod Hyde MD as PCP - General  Pramod Hyde MD as PCP - Claims Attributed  Lv Cobian MD as Consulting Physician (Cardiology)    Chief Complaint::   Chief Complaint   Patient presents with   • Pneumonia     2 week follow up   • Cough        HPI  Mr. Moon comes in for follow-up of pneumonia.  2 weeks ago he was treated with doxycycline and Augmentin.  He is better, but still have has a productive cough although it is less productive than it was.  He is using Tussionex at night and Mucinex during the day to suppress the cough.  He has had no fevers or chills, but remains very weak.  He admits he has little appetite.    Chronic Conditions:      Patient Active Problem List   Diagnosis   • Coronary artery disease involving coronary bypass graft of native heart without angina pectoris   • Hyperlipidemia LDL goal <100   • History of tobacco abuse   • CLL (chronic lymphocytic leukemia) (CMS/[facility])   • GERD (gastroesophageal reflux disease)   • Nonrheumatic aortic valve stenosis s/p TAVR   • Chronic systolic congestive heart failure (CMS/HCC)   • CKD (chronic kidney disease) stage 3, GFR 30-59 ml/min (CMS/HCC)   • Aortic valve stenosis   • Fatigue   • Ischemic cardiomyopathy   • Chronic combined systolic and diastolic CHF (congestive heart failure) (CMS/HCC)   • Angina pectoris (CMS/HCC)   • Atherosclerosis of coronary artery   • Benign essential hypertension   • Heart failure, chronic, with acute decompensation (CMS/HCC)   • Generalized ischemic myocardial dysfunction        Past Medical History:   Diagnosis Date   • Aortic stenosis    • Arthritis    • BOOP (bronchiolitis obliterans with organizing pneumonia) (CMS/HCC) 2014   • Chest pain 2007    angioplasty to RCA   • CHF (congestive heart failure) (CMS/HCC)    • CLL (chronic lymphocytic leukemia) (CMS/HCC)     15 years    • Coronary artery disease     • GERD (gastroesophageal reflux disease)    • Herpes zoster 2012   • History of tobacco abuse    • Hyperlipidemia    • Hypertension    • Low kidney function     very recently and did kidney function test and said decreased function so being watched to get all fluid off    • MI (myocardial infarction) (CMS/[facility])     mid 90s very mild - few years after bypass surgery    • Non-STEMI (non-ST elevated myocardial infarction) (CMS/[facility]) 10/17/2017    stenting of occluded grafts to left circumflex and RCA   • Skin cancer     basal and squamous from various location    • SOBOE (shortness of breath on exertion)    • Uses hearing aid     bilat ears        Past Surgical History:   Procedure Laterality Date   • ABDOMINAL HERNIA REPAIR      x 2   • AORTIC VALVE REPAIR/REPLACEMENT N/A 4/26/2018    Procedure: Transcatheter Aortic Valve Replacement, LANDY;  Surgeon: Lv Purdy MD;  Location:  [facility] HYBRID OR 15;  Service: Cardiothoracic   • AORTIC VALVE REPAIR/REPLACEMENT N/A 4/26/2018    Procedure: Transcatheter Aortic Valve Replacement;  Surgeon: Juan M Sood MD;  Location:  [facility] HYBRID OR 15;  Service: Cardiology   • APPENDECTOMY     • CARDIAC CATHETERIZATION N/A 10/10/2017    Procedure: Left Heart Cath;  Surgeon: Juan M Sood MD;  Location:  [facility] CATH INVASIVE LOCATION;  Service:    • CARDIAC CATHETERIZATION N/A 4/6/2018    Procedure: Left Heart Cath;  Surgeon: Lv Cobian MD;  Location:  [facility] CATH INVASIVE LOCATION;  Service: Cardiovascular   • CHOLECYSTECTOMY     • COLONOSCOPY     • CORONARY ANGIOPLASTY WITH STENT PLACEMENT      07 one placed,  and in 2017 had another stent placed and one repaired -   so pt thinks 3 stents in place    • CORONARY ARTERY BYPASS GRAFT      4 bypass in 92    • REPLACEMENT TOTAL KNEE Left 2014   • WISDOM TOOTH EXTRACTION         Family History   Problem Relation Age of Onset   • Stroke Mother    • Heart disease Father    • Heart disease Brother    • Coronary artery disease Brother         Social History     Socioeconomic History   • Marital status:      Spouse name: Not on file   • Number of children: 3   • Years of education: Not on file   • Highest education level: Not on file   Occupational History   • Occupation: Retired      Comment: insurance agency    Tobacco Use   • Smoking status: Former Smoker     Packs/day: 0.25     Types: Cigarettes     Last attempt to quit: 1975     Years since quittin.6   • Smokeless tobacco: Never Used   • Tobacco comment: quit 45 years ago- smoked since 17 yo    Substance and Sexual Activity   • Alcohol use: Yes     Alcohol/week: 0.6 - 1.2 oz     Types: 1 - 2 Glasses of wine per week     Comment: occas   • Drug use: No   • Sexual activity: Defer   Social History Narrative    Lives with Wife Julia in [place], [place]     Caffeine:  3 servings per day       Allergies   Allergen Reactions   • Hctz [Hydrochlorothiazide] Other (See Comments)     hyponatremia         Current Outpatient Medications:   •  ALPRAZolam (XANAX) 0.5 MG tablet, Take 1 tablet by mouth At Night As Needed for Sleep., Disp: 30 tablet, Rfl: 2  •  aspirin 81 MG tablet, Take 1 tablet by mouth daily., Disp: 90 tablet, Rfl: 3  •  atorvastatin (LIPITOR) 80 MG tablet, Take 0.5 tablets by mouth Daily., Disp: 90 tablet, Rfl: 3  •  carvedilol (COREG) 3.125 MG tablet, Take 1 tablet by mouth Every 12 (Twelve) Hours. (Patient taking differently: Take 3.125 mg by mouth Every 12 (Twelve) Hours. Hold for SBP < 110, HR < 60), Disp: 60 tablet, Rfl: 11  •  cholecalciferol (VITAMIN D3) 1000 UNITS tablet, Take 1,000 Units by mouth Daily., Disp: , Rfl:   •  clopidogrel (PLAVIX) 75 MG tablet, Take 1 tablet by mouth daily., Disp: 90 tablet, Rfl: 3  •  colchicine 0.6 MG tablet, Take 1 tablet by mouth 2 (Two) Times a Day., Disp: 10 tablet, Rfl: 0  •  Cyanocobalamin 2500 MCG chewable tablet, Chew 2,500 mcg Daily., Disp: , Rfl:   •  ferrous sulfate 324 (65 Fe) MG tablet delayed-release EC tablet, Take 324 mg by  "mouth Daily With Breakfast., Disp: , Rfl:   •  lisinopril (PRINIVIL,ZESTRIL) 2.5 MG tablet, Take 2.5 mg by mouth Daily., Disp: , Rfl:   •  nitroglycerin (NITROSTAT) 0.4 MG SL tablet, Place 1 tablet under the tongue Every 5 (Five) Minutes As Needed for Chest Pain., Disp: 25 tablet, Rfl: 6  •  omeprazole (priLOSEC) 20 MG capsule, Take 20 mg by mouth Daily As Needed. For acid reflux, Disp: , Rfl:   •  sertraline (ZOLOFT) 100 MG tablet, Take 100 mg by mouth Daily., Disp: , Rfl:   •  spironolactone (ALDACTONE) 25 MG tablet, Take 1 tablet by mouth Daily., Disp: 30 tablet, Rfl: 3  •  torsemide (DEMADEX) 20 MG tablet, Take 20 mg by mouth Daily., Disp: , Rfl:   •  amoxicillin-clavulanate (AUGMENTIN) 875-125 MG per tablet, Take 1 tablet by mouth 2 (Two) Times a Day. For 10 days, Disp: , Rfl:     Review of Systems   Constitutional: Positive for fatigue. Negative for chills and fever.   HENT: Positive for congestion. Negative for ear pain and sinus pressure.    Respiratory: Positive for cough and wheezing. Negative for chest tightness and shortness of breath.    Cardiovascular: Negative for chest pain and palpitations.   Gastrointestinal: Negative for abdominal pain, blood in stool and constipation.   Skin: Negative for color change.   Allergic/Immunologic: Negative for environmental allergies.   Neurological: Negative for dizziness, speech difficulty and headache.   Psychiatric/Behavioral: Negative for decreased concentration. The patient is not nervous/anxious.         Vital Signs  Vitals:    07/31/19 1124   BP: 128/64   BP Location: Right arm   Patient Position: Sitting   Cuff Size: Adult   Pulse: 68   Temp: 98 °F (36.7 °C)   Weight: 71.2 kg (157 lb)   Height: 172.7 cm (67.99\")   PainSc: 0-No pain       Physical Exam   Constitutional: He is oriented to person, place, and time. He appears well-developed and well-nourished.   HENT:   Head: Normocephalic and atraumatic.   Cardiovascular: Normal rate, regular rhythm and normal " heart sounds.   No murmur heard.  Pulmonary/Chest: Effort normal. He has decreased breath sounds in the left lower field. He has rhonchi in the right lower field.   Neurological: He is alert and oriented to person, place, and time.   Psychiatric: He has a normal mood and affect.   Vitals reviewed.     Procedures    ACE III MINI             Assessment/Plan:    Tad was seen today for pneumonia and cough.    Diagnoses and all orders for this visit:    Pneumonia of left lower lobe due to infectious organism (CMS/HCC)  -     XR Chest PA & Lateral; Future    Plan    Pneumonia is clinically resolved but he has dullness at the left base.  We will repeat x-ray today to rule out pleural effusion.  Further treatment pending x-ray results.  Otherwise he should expect residual fatigue for 3-4 more weeks.  I encouraged him to make sure he is getting enough calories even if he has to supplement with boost.    Plan of care reviewed with patient at the conclusion of today's visit. Education was provided regarding diagnosis, management, and any prescribed or recommended OTC medications.Patient verbalizes understanding of and agreement with management plan.         Pramod Hyde MD

## 2019-08-19 ENCOUNTER — OFFICE VISIT (OUTPATIENT)
Dept: INTERNAL MEDICINE | Facility: CLINIC | Age: 84
End: 2019-08-19

## 2019-08-19 VITALS
SYSTOLIC BLOOD PRESSURE: 130 MMHG | WEIGHT: 157.5 LBS | HEART RATE: 64 BPM | BODY MASS INDEX: 23.87 KG/M2 | TEMPERATURE: 99 F | HEIGHT: 68 IN | DIASTOLIC BLOOD PRESSURE: 58 MMHG

## 2019-08-19 DIAGNOSIS — J18.9 PNEUMONIA OF RIGHT LOWER LOBE DUE TO INFECTIOUS ORGANISM: Primary | ICD-10-CM

## 2019-08-19 PROCEDURE — 99213 OFFICE O/P EST LOW 20 MIN: CPT | Performed by: INTERNAL MEDICINE

## 2019-08-19 NOTE — PROGRESS NOTES
[facility]     Tad Moon  11/30/1926   [id number]      Patient Care Team:  Pramod Hyde MD as PCP - General  Pramod Hyde MD as PCP - [facility] Attributed  Lv Cobian MD as Consulting Physician (Cardiology)    Chief Complaint::   Chief Complaint   Patient presents with   • Shortness of Breath   • Cough        HPI  Mr. Moon comes in concerned about persistent shortness of breath following his pneumonia 1 month ago.  He was treated on July 15 with Augmentin and doxycycline for what was felt to be a right lower lobe pneumonia.  He was seen again on the 31st, and although he did not feel much better, he had measurable improvement without fever although he still had cough.  Chest x-ray at that time showed improvement.  Today he states that his cough is better although he still coughs at night.  He is still not eating much.  He admits though his mucus is now clear where it was dark.  He is only short of breath when he tries to exert himself by walking in the driveway.  He is not sleeping at night, but he is sleeping in his chair during the day.    Chronic Conditions:      Patient Active Problem List   Diagnosis   • Coronary artery disease involving coronary bypass graft of native heart without angina pectoris   • Hyperlipidemia LDL goal <100   • History of tobacco abuse   • CLL (chronic lymphocytic leukemia) (CMS/HCC)   • GERD (gastroesophageal reflux disease)   • Nonrheumatic aortic valve stenosis s/p TAVR   • Chronic systolic congestive heart failure (CMS/HCC)   • CKD (chronic kidney disease) stage 3, GFR 30-59 ml/min (CMS/HCC)   • Aortic valve stenosis   • Fatigue   • Ischemic cardiomyopathy   • Chronic combined systolic and diastolic CHF (congestive heart failure) (CMS/HCC)   • Angina pectoris (CMS/HCC)   • Atherosclerosis of coronary artery   • Benign essential hypertension   • Heart failure, chronic, with acute decompensation (CMS/HCC)   • Generalized ischemic myocardial  dysfunction        Past Medical History:   Diagnosis Date   • Aortic stenosis    • Arthritis    • BOOP (bronchiolitis obliterans with organizing pneumonia) (CMS/[facility]) 2014   • Chest pain 2007    angioplasty to RCA   • CHF (congestive heart failure) (CMS/[facility])    • CLL (chronic lymphocytic leukemia) (CMS/[facility])     15 years    • Coronary artery disease    • GERD (gastroesophageal reflux disease)    • Herpes zoster 2012   • History of tobacco abuse    • Hyperlipidemia    • Hypertension    • Low kidney function     very recently and did kidney function test and said decreased function so being watched to get all fluid off    • MI (myocardial infarction) (CMS/[facility])     mid 90s very mild - few years after bypass surgery    • Non-STEMI (non-ST elevated myocardial infarction) (CMS/[facility]) 10/17/2017    stenting of occluded grafts to left circumflex and RCA   • Skin cancer     basal and squamous from various location    • SOBOE (shortness of breath on exertion)    • Uses hearing aid     bilat ears        Past Surgical History:   Procedure Laterality Date   • ABDOMINAL HERNIA REPAIR      x 2   • AORTIC VALVE REPAIR/REPLACEMENT N/A 4/26/2018    Procedure: Transcatheter Aortic Valve Replacement, LANDY;  Surgeon: Lv Purdy MD;  Location:  [email or web address] HYBRID OR 15;  Service: Cardiothoracic   • AORTIC VALVE REPAIR/REPLACEMENT N/A 4/26/2018    Procedure: Transcatheter Aortic Valve Replacement;  Surgeon: Juan M Sood MD;  Location:  [email or web address] HYBRID OR 15;  Service: Cardiology   • APPENDECTOMY     • CARDIAC CATHETERIZATION N/A 10/10/2017    Procedure: Left Heart Cath;  Surgeon: Juan M Sood MD;  Location:  [email or web address] CATH INVASIVE LOCATION;  Service:    • CARDIAC CATHETERIZATION N/A 4/6/2018    Procedure: Left Heart Cath;  Surgeon: Lv Cobian MD;  Location:  [email or web address] CATH INVASIVE LOCATION;  Service: Cardiovascular   • CHOLECYSTECTOMY     • COLONOSCOPY     • CORONARY ANGIOPLASTY WITH STENT PLACEMENT      07 one placed,  and in 2017 had another  stent placed and one repaired -   so pt thinks 3 stents in place    • CORONARY ARTERY BYPASS GRAFT      4 bypass in 92    • REPLACEMENT TOTAL KNEE Left    • WISDOM TOOTH EXTRACTION         Family History   Problem Relation Age of Onset   • Stroke Mother    • Heart disease Father    • Heart disease Brother    • Coronary artery disease Brother        Social History     Socioeconomic History   • Marital status:      Spouse name: Not on file   • Number of children: 3   • Years of education: Not on file   • Highest education level: Not on file   Occupational History   • Occupation: Retired      Comment: insurance agency    Tobacco Use   • Smoking status: Former Smoker     Packs/day: 0.25     Types: Cigarettes     Last attempt to quit:      Years since quittin.6   • Smokeless tobacco: Never Used   • Tobacco comment: quit 45 years ago- smoked since 19 yo    Substance and Sexual Activity   • Alcohol use: Yes     Alcohol/week: 0.6 - 1.2 oz     Types: 1 - 2 Glasses of wine per week     Comment: occas   • Drug use: No   • Sexual activity: Defer   Social History Narrative    Lives with Wife Julia in [place], [place]     Caffeine:  3 servings per day       Allergies   Allergen Reactions   • Hctz [Hydrochlorothiazide] Other (See Comments)     hyponatremia         Current Outpatient Medications:   •  ALPRAZolam (XANAX) 0.5 MG tablet, Take 1 tablet by mouth At Night As Needed for Sleep., Disp: 30 tablet, Rfl: 2  •  aspirin 81 MG tablet, Take 1 tablet by mouth daily., Disp: 90 tablet, Rfl: 3  •  atorvastatin (LIPITOR) 80 MG tablet, Take 0.5 tablets by mouth Daily., Disp: 90 tablet, Rfl: 3  •  carvedilol (COREG) 3.125 MG tablet, Take 1 tablet by mouth Every 12 (Twelve) Hours. (Patient taking differently: Take 3.125 mg by mouth Every 12 (Twelve) Hours. Hold for SBP < 110, HR < 60), Disp: 60 tablet, Rfl: 11  •  cholecalciferol (VITAMIN D3) 1000 UNITS tablet, Take 1,000 Units by mouth Daily., Disp: , Rfl:   •   "clopidogrel (PLAVIX) 75 MG tablet, Take 1 tablet by mouth daily., Disp: 90 tablet, Rfl: 3  •  colchicine 0.6 MG tablet, Take 1 tablet by mouth 2 (Two) Times a Day., Disp: 10 tablet, Rfl: 0  •  Cyanocobalamin 2500 MCG chewable tablet, Chew 2,500 mcg Daily., Disp: , Rfl:   •  lisinopril (PRINIVIL,ZESTRIL) 2.5 MG tablet, Take 2.5 mg by mouth Daily., Disp: , Rfl:   •  nitroglycerin (NITROSTAT) 0.4 MG SL tablet, Place 1 tablet under the tongue Every 5 (Five) Minutes As Needed for Chest Pain., Disp: 25 tablet, Rfl: 6  •  omeprazole (priLOSEC) 20 MG capsule, Take 20 mg by mouth Daily As Needed. For acid reflux, Disp: , Rfl:   •  sertraline (ZOLOFT) 100 MG tablet, Take 100 mg by mouth Daily., Disp: , Rfl:   •  spironolactone (ALDACTONE) 25 MG tablet, Take 1 tablet by mouth Daily., Disp: 30 tablet, Rfl: 3  •  torsemide (DEMADEX) 20 MG tablet, Take 20 mg by mouth Daily., Disp: , Rfl:   •  Fluticasone-Umeclidin-Vilant (TRELEGY ELLIPTA) 100-62.5-25 MCG/INH aerosol powder , Inhale 1 each Daily., Disp: 2 each, Rfl: 0    Review of Systems   Constitutional: Negative for chills, fatigue and fever.   HENT: Negative for congestion, ear pain and sinus pressure.    Respiratory: Positive for cough and shortness of breath. Negative for chest tightness and wheezing.    Cardiovascular: Negative for chest pain and palpitations.   Gastrointestinal: Negative for abdominal pain, blood in stool and constipation.   Skin: Negative for color change.   Allergic/Immunologic: Negative for environmental allergies.   Neurological: Positive for dizziness. Negative for speech difficulty and headache.   Psychiatric/Behavioral: Negative for decreased concentration. The patient is not nervous/anxious.         Vital Signs  Vitals:    08/19/19 1636   BP: 130/58   BP Location: Left arm   Patient Position: Sitting   Cuff Size: Adult   Pulse: 64   Temp: 99 °F (37.2 °C)   Weight: 71.4 kg (157 lb 8 oz)   Height: 172.7 cm (67.99\")   PainSc: 0-No pain       Physical " Exam   Constitutional: He is oriented to person, place, and time. He appears well-developed and well-nourished.   HENT:   Head: Normocephalic and atraumatic.   Cardiovascular: Normal rate, regular rhythm and normal heart sounds.   No murmur heard.  Pulmonary/Chest: Effort normal. He has decreased breath sounds in the left lower field. He has rales in the right lower field.   Neurological: He is alert and oriented to person, place, and time.   Psychiatric: He has a normal mood and affect.   Vitals reviewed.     Procedures    ACE III MINI             Assessment/Plan:    Tad was seen today for shortness of breath and cough.    Diagnoses and all orders for this visit:    Pneumonia of right lower lobe due to infectious organism (CMS/[facility])    Plan    Objective evidence suggests that he continues to slowly improve.  I see no indication for further antibiotic therapy.  He has persistent dry rales at the right base and dullness at the left.  He is given Trelegy inhaler to use once daily, Tussionex for his cough at night and is encouraged to avoid sleeping during the day.  We are now at about 4 weeks, and he is slowly improving.      Plan of care reviewed with patient at the conclusion of today's visit. Education was provided regarding diagnosis, management, and any prescribed or recommended OTC medications.Patient verbalizes understanding of and agreement with management plan.         Pramod Hyde MD

## 2019-08-29 ENCOUNTER — OFFICE VISIT (OUTPATIENT)
Dept: INTERNAL MEDICINE | Facility: CLINIC | Age: 84
End: 2019-08-29

## 2019-08-29 VITALS
WEIGHT: 155 LBS | DIASTOLIC BLOOD PRESSURE: 70 MMHG | HEIGHT: 68 IN | HEART RATE: 68 BPM | BODY MASS INDEX: 23.49 KG/M2 | TEMPERATURE: 98.2 F | SYSTOLIC BLOOD PRESSURE: 124 MMHG

## 2019-08-29 DIAGNOSIS — J30.1 SEASONAL ALLERGIC RHINITIS DUE TO POLLEN: ICD-10-CM

## 2019-08-29 DIAGNOSIS — I25.5 ISCHEMIC CARDIOMYOPATHY: ICD-10-CM

## 2019-08-29 DIAGNOSIS — J18.9 PNEUMONIA OF RIGHT LOWER LOBE DUE TO INFECTIOUS ORGANISM: Primary | ICD-10-CM

## 2019-08-29 PROCEDURE — 99213 OFFICE O/P EST LOW 20 MIN: CPT | Performed by: INTERNAL MEDICINE

## 2019-08-29 NOTE — PROGRESS NOTES
[facility]     Tad Moon  11/30/1926   [id number]      Patient Care Team:  Pramod Hyde MD as PCP - General  Pramod Hyde MD as PCP - Claims Attributed  Lv Cobian MD as Consulting Physician (Cardiology)    Chief Complaint::   Chief Complaint   Patient presents with   • Pneumonia   • Hoarse        HPI  Mr. Moon comes in for follow-up of his pneumonia.  He is finally getting back to normal.  His appetite is better, his cough has resolved, and his strength is getting better.  His only complaint is hoarseness and postnasal drainage.  He feels the Trelegy inhaler has helped.  His cardiomyopathy remains stable without orthopnea shortness of breath or edema.    Chronic Conditions:      Patient Active Problem List   Diagnosis   • Coronary artery disease involving coronary bypass graft of native heart without angina pectoris   • Hyperlipidemia LDL goal <100   • History of tobacco abuse   • CLL (chronic lymphocytic leukemia) (CMS/HCC)   • GERD (gastroesophageal reflux disease)   • Nonrheumatic aortic valve stenosis s/p TAVR   • Chronic systolic congestive heart failure (CMS/HCC)   • CKD (chronic kidney disease) stage 3, GFR 30-59 ml/min (CMS/HCC)   • Aortic valve stenosis   • Fatigue   • Ischemic cardiomyopathy   • Chronic combined systolic and diastolic CHF (congestive heart failure) (CMS/HCC)   • Angina pectoris (CMS/HCC)   • Atherosclerosis of coronary artery   • Benign essential hypertension   • Heart failure, chronic, with acute decompensation (CMS/HCC)   • Generalized ischemic myocardial dysfunction        Past Medical History:   Diagnosis Date   • Aortic stenosis    • Arthritis    • BOOP (bronchiolitis obliterans with organizing pneumonia) (CMS/HCC) 2014   • Chest pain 2007    angioplasty to RCA   • CHF (congestive heart failure) (CMS/HCC)    • CLL (chronic lymphocytic leukemia) (CMS/HCC)     15 years    • Coronary artery disease    • GERD (gastroesophageal reflux  disease)    • Herpes zoster 2012   • History of tobacco abuse    • Hyperlipidemia    • Hypertension    • Low kidney function     very recently and did kidney function test and said decreased function so being watched to get all fluid off    • MI (myocardial infarction) (CMS/[facility])     mid 90s very mild - few years after bypass surgery    • Non-STEMI (non-ST elevated myocardial infarction) (CMS/[facility]) 10/17/2017    stenting of occluded grafts to left circumflex and RCA   • Skin cancer     basal and squamous from various location    • SOBOE (shortness of breath on exertion)    • Uses hearing aid     bilat ears        Past Surgical History:   Procedure Laterality Date   • ABDOMINAL HERNIA REPAIR      x 2   • AORTIC VALVE REPAIR/REPLACEMENT N/A 4/26/2018    Procedure: Transcatheter Aortic Valve Replacement, LANDY;  Surgeon: Lv Purdy MD;  Location:  [facility] HYBRID OR 15;  Service: Cardiothoracic   • AORTIC VALVE REPAIR/REPLACEMENT N/A 4/26/2018    Procedure: Transcatheter Aortic Valve Replacement;  Surgeon: Juan M Sood MD;  Location:  [facility] HYBRID OR 15;  Service: Cardiology   • APPENDECTOMY     • CARDIAC CATHETERIZATION N/A 10/10/2017    Procedure: Left Heart Cath;  Surgeon: Juan M Sood MD;  Location: [facility] CATH INVASIVE LOCATION;  Service:    • CARDIAC CATHETERIZATION N/A 4/6/2018    Procedure: Left Heart Cath;  Surgeon: Lv Cobian MD;  Location:  [facility] CATH INVASIVE LOCATION;  Service: Cardiovascular   • CHOLECYSTECTOMY     • COLONOSCOPY     • CORONARY ANGIOPLASTY WITH STENT PLACEMENT      07 one placed,  and in 2017 had another stent placed and one repaired -   so pt thinks 3 stents in place    • CORONARY ARTERY BYPASS GRAFT      4 bypass in 92    • REPLACEMENT TOTAL KNEE Left 2014   • WISDOM TOOTH EXTRACTION         Family History   Problem Relation Age of Onset   • Stroke Mother    • Heart disease Father    • Heart disease Brother    • Coronary artery disease Brother        Social History     Socioeconomic  History   • Marital status:      Spouse name: Not on file   • Number of children: 3   • Years of education: Not on file   • Highest education level: Not on file   Occupational History   • Occupation: Retired      Comment: insurance agency    Tobacco Use   • Smoking status: Former Smoker     Packs/day: 0.25     Types: Cigarettes     Last attempt to quit: 1975     Years since quittin.6   • Smokeless tobacco: Never Used   • Tobacco comment: quit 45 years ago- smoked since 17 yo    Substance and Sexual Activity   • Alcohol use: Yes     Alcohol/week: 0.6 - 1.2 oz     Types: 1 - 2 Glasses of wine per week     Comment: occas   • Drug use: No   • Sexual activity: Defer   Social History Narrative    Lives with Wife Julia in [place], [place]     Caffeine:  3 servings per day       Allergies   Allergen Reactions   • Hctz [Hydrochlorothiazide] Other (See Comments)     hyponatremia         Current Outpatient Medications:   •  ALPRAZolam (XANAX) 0.5 MG tablet, Take 1 tablet by mouth At Night As Needed for Sleep., Disp: 30 tablet, Rfl: 2  •  aspirin 81 MG tablet, Take 1 tablet by mouth daily., Disp: 90 tablet, Rfl: 3  •  atorvastatin (LIPITOR) 80 MG tablet, Take 0.5 tablets by mouth Daily., Disp: 90 tablet, Rfl: 3  •  carvedilol (COREG) 3.125 MG tablet, Take 1 tablet by mouth Every 12 (Twelve) Hours. (Patient taking differently: Take 3.125 mg by mouth Every 12 (Twelve) Hours. Hold for SBP < 110, HR < 60), Disp: 60 tablet, Rfl: 11  •  cholecalciferol (VITAMIN D3) 1000 UNITS tablet, Take 1,000 Units by mouth Daily., Disp: , Rfl:   •  clopidogrel (PLAVIX) 75 MG tablet, Take 1 tablet by mouth daily., Disp: 90 tablet, Rfl: 3  •  colchicine 0.6 MG tablet, Take 1 tablet by mouth 2 (Two) Times a Day., Disp: 10 tablet, Rfl: 0  •  Cyanocobalamin 2500 MCG chewable tablet, Chew 2,500 mcg Daily., Disp: , Rfl:   •  Fluticasone-Umeclidin-Vilant (TRELEGY ELLIPTA) 100-62.5-25 MCG/INH aerosol powder , Inhale 1 each Daily., Disp: 2 each,  "Rfl: 0  •  lisinopril (PRINIVIL,ZESTRIL) 2.5 MG tablet, Take 2.5 mg by mouth Daily., Disp: , Rfl:   •  nitroglycerin (NITROSTAT) 0.4 MG SL tablet, Place 1 tablet under the tongue Every 5 (Five) Minutes As Needed for Chest Pain., Disp: 25 tablet, Rfl: 6  •  omeprazole (priLOSEC) 20 MG capsule, Take 20 mg by mouth Daily As Needed. For acid reflux, Disp: , Rfl:   •  sertraline (ZOLOFT) 100 MG tablet, Take 100 mg by mouth Daily., Disp: , Rfl:   •  spironolactone (ALDACTONE) 25 MG tablet, Take 1 tablet by mouth Daily., Disp: 30 tablet, Rfl: 3  •  torsemide (DEMADEX) 20 MG tablet, Take 20 mg by mouth Daily., Disp: , Rfl:     Review of Systems   Constitutional: Negative for chills, fatigue and fever.   HENT: Negative for congestion, ear pain and sinus pressure.    Respiratory: Positive for shortness of breath. Negative for cough, chest tightness and wheezing.    Cardiovascular: Negative for chest pain and palpitations.   Gastrointestinal: Negative for abdominal pain, blood in stool and constipation.   Skin: Negative for color change.   Allergic/Immunologic: Negative for environmental allergies.   Neurological: Negative for dizziness, speech difficulty and headache.   Psychiatric/Behavioral: Negative for decreased concentration. The patient is not nervous/anxious.         Vital Signs  Vitals:    08/29/19 1248   BP: 124/70   BP Location: Left arm   Patient Position: Sitting   Cuff Size: Adult   Pulse: 68   Temp: 98.2 °F (36.8 °C)   TempSrc: Temporal   Weight: 70.3 kg (155 lb)   Height: 172.7 cm (67.99\")   PainSc: 0-No pain       Physical Exam   Constitutional: He is oriented to person, place, and time. He appears well-developed and well-nourished.   HENT:   Head: Normocephalic and atraumatic.   Mouth/Throat: No posterior oropharyngeal erythema.   Cardiovascular: Normal rate, regular rhythm and normal heart sounds.   No murmur heard.  Pulmonary/Chest: Effort normal and breath sounds normal.   Neurological: He is alert and " oriented to person, place, and time.   Psychiatric: He has a normal mood and affect.   Vitals reviewed.     Procedures    ACE III MINI             Assessment/Plan:    Tad was seen today for pneumonia and hoarse.    Diagnoses and all orders for this visit:    Pneumonia of right lower lobe due to infectious organism (CMS/HCC)    Seasonal allergic rhinitis due to pollen    Ischemic cardiomyopathy    Plan    Pneumonia finally appears completely resolved after more than a month.  He will continue Trelegy for another week or 2 and then can probably discontinue that.    He may use loratadine or cetirizine, Nasacort or Flonase for his allergies.    Cardiomyopathy is stable on current regimen.  He may attempt to go off torsemide but should continue lisinopril and carvedilol.      Plan of care reviewed with patient at the conclusion of today's visit. Education was provided regarding diagnosis, management, and any prescribed or recommended OTC medications.Patient verbalizes understanding of and agreement with management plan.         Pramod Hyde MD

## 2019-09-06 ENCOUNTER — TELEPHONE (OUTPATIENT)
Dept: INTERNAL MEDICINE | Facility: CLINIC | Age: 84
End: 2019-09-06

## 2019-09-06 NOTE — TELEPHONE ENCOUNTER
Patient's daughter Nelly Rome ([phone]) called and requested a handicap permit form be completed and to call her so that she can  here in the office.

## 2019-09-26 ENCOUNTER — OFFICE VISIT (OUTPATIENT)
Dept: INTERNAL MEDICINE | Facility: CLINIC | Age: 84
End: 2019-09-26

## 2019-09-26 ENCOUNTER — LAB (OUTPATIENT)
Dept: LAB | Facility: HOSPITAL | Age: 84
End: 2019-09-26

## 2019-09-26 VITALS
DIASTOLIC BLOOD PRESSURE: 72 MMHG | TEMPERATURE: 97.4 F | HEART RATE: 58 BPM | HEIGHT: 67 IN | BODY MASS INDEX: 25.27 KG/M2 | SYSTOLIC BLOOD PRESSURE: 138 MMHG | WEIGHT: 161 LBS

## 2019-09-26 DIAGNOSIS — C91.10 CLL (CHRONIC LYMPHOCYTIC LEUKEMIA) (HCC): ICD-10-CM

## 2019-09-26 DIAGNOSIS — R53.83 OTHER FATIGUE: ICD-10-CM

## 2019-09-26 DIAGNOSIS — I10 BENIGN ESSENTIAL HYPERTENSION: ICD-10-CM

## 2019-09-26 DIAGNOSIS — E78.5 HYPERLIPIDEMIA LDL GOAL <100: ICD-10-CM

## 2019-09-26 DIAGNOSIS — I35.0 NONRHEUMATIC AORTIC VALVE STENOSIS: ICD-10-CM

## 2019-09-26 DIAGNOSIS — Z00.00 PREVENTATIVE HEALTH CARE: Primary | ICD-10-CM

## 2019-09-26 DIAGNOSIS — D50.9 IRON DEFICIENCY ANEMIA, UNSPECIFIED IRON DEFICIENCY ANEMIA TYPE: ICD-10-CM

## 2019-09-26 DIAGNOSIS — N18.30 CKD (CHRONIC KIDNEY DISEASE) STAGE 3, GFR 30-59 ML/MIN (HCC): ICD-10-CM

## 2019-09-26 DIAGNOSIS — I25.118 CORONARY ARTERY DISEASE INVOLVING NATIVE CORONARY ARTERY OF NATIVE HEART WITH OTHER FORM OF ANGINA PECTORIS (HCC): ICD-10-CM

## 2019-09-26 DIAGNOSIS — I73.9 PERIPHERAL VASCULAR DISEASE (HCC): ICD-10-CM

## 2019-09-26 DIAGNOSIS — I25.5 ISCHEMIC CARDIOMYOPATHY: ICD-10-CM

## 2019-09-26 DIAGNOSIS — I50.42 CHRONIC COMBINED SYSTOLIC AND DIASTOLIC CHF (CONGESTIVE HEART FAILURE) (HCC): ICD-10-CM

## 2019-09-26 LAB
ALBUMIN SERPL-MCNC: 4.3 G/DL (ref 3.5–5.2)
ALBUMIN/GLOB SERPL: 1.5 G/DL
ALP SERPL-CCNC: 99 U/L (ref 39–117)
ALT SERPL W P-5'-P-CCNC: 12 U/L (ref 1–41)
ANION GAP SERPL CALCULATED.3IONS-SCNC: 13.4 MMOL/L (ref 5–15)
AST SERPL-CCNC: 12 U/L (ref 1–40)
BASOPHILS # BLD AUTO: 0.1 10*3/MM3 (ref 0–0.2)
BASOPHILS NFR BLD AUTO: 0.8 % (ref 0–1.5)
BILIRUB SERPL-MCNC: 0.5 MG/DL (ref 0.2–1.2)
BUN BLD-MCNC: 23 MG/DL (ref 8–23)
BUN/CREAT SERPL: 15.1 (ref 7–25)
CALCIUM SPEC-SCNC: 9.2 MG/DL (ref 8.2–9.6)
CHLORIDE SERPL-SCNC: 99 MMOL/L (ref 98–107)
CHOLEST SERPL-MCNC: 196 MG/DL (ref 0–200)
CO2 SERPL-SCNC: 24.6 MMOL/L (ref 22–29)
CREAT BLD-MCNC: 1.52 MG/DL (ref 0.76–1.27)
DEPRECATED RDW RBC AUTO: 50.5 FL (ref 37–54)
EOSINOPHIL # BLD AUTO: 0 10*3/MM3 (ref 0–0.4)
EOSINOPHIL NFR BLD AUTO: 0 % (ref 0.3–6.2)
ERYTHROCYTE [DISTWIDTH] IN BLOOD BY AUTOMATED COUNT: 15.3 % (ref 12.3–15.4)
GFR SERPL CREATININE-BSD FRML MDRD: 43 ML/MIN/1.73
GLOBULIN UR ELPH-MCNC: 2.9 GM/DL
GLUCOSE BLD-MCNC: 101 MG/DL (ref 65–99)
HCT VFR BLD AUTO: 33.3 % (ref 37.5–51)
HDLC SERPL-MCNC: 54 MG/DL (ref 40–60)
HGB BLD-MCNC: 11.1 G/DL (ref 13–17.7)
IMM GRANULOCYTES # BLD AUTO: 0.04 10*3/MM3 (ref 0–0.05)
IMM GRANULOCYTES NFR BLD AUTO: 0.3 % (ref 0–0.5)
IRON 24H UR-MRATE: 68 MCG/DL (ref 59–158)
IRON SATN MFR SERPL: 17 % (ref 20–50)
LDLC SERPL CALC-MCNC: 119 MG/DL (ref 0–100)
LDLC/HDLC SERPL: 2.2 {RATIO}
LYMPHOCYTES # BLD AUTO: 5.41 10*3/MM3 (ref 0.7–3.1)
LYMPHOCYTES NFR BLD AUTO: 45.5 % (ref 19.6–45.3)
MCH RBC QN AUTO: 30.3 PG (ref 26.6–33)
MCHC RBC AUTO-ENTMCNC: 33.3 G/DL (ref 31.5–35.7)
MCV RBC AUTO: 91 FL (ref 79–97)
MONOCYTES # BLD AUTO: 0.61 10*3/MM3 (ref 0.1–0.9)
MONOCYTES NFR BLD AUTO: 5.1 % (ref 5–12)
NEUTROPHILS # BLD AUTO: 5.74 10*3/MM3 (ref 1.7–7)
NEUTROPHILS NFR BLD AUTO: 48.3 % (ref 42.7–76)
NRBC BLD AUTO-RTO: 0 /100 WBC (ref 0–0.2)
PLATELET # BLD AUTO: 258 10*3/MM3 (ref 140–450)
PMV BLD AUTO: 11 FL (ref 6–12)
POTASSIUM BLD-SCNC: 5 MMOL/L (ref 3.5–5.2)
PROT SERPL-MCNC: 7.2 G/DL (ref 6–8.5)
RBC # BLD AUTO: 3.66 10*6/MM3 (ref 4.14–5.8)
SODIUM BLD-SCNC: 137 MMOL/L (ref 136–145)
TIBC SERPL-MCNC: 398 MCG/DL (ref 298–536)
TRANSFERRIN SERPL-MCNC: 267 MG/DL (ref 200–360)
TRIGL SERPL-MCNC: 117 MG/DL (ref 0–150)
TSH SERPL DL<=0.05 MIU/L-ACNC: 3.92 UIU/ML (ref 0.27–4.2)
VIT B12 BLD-MCNC: 438 PG/ML (ref 211–946)
VLDLC SERPL-MCNC: 23.4 MG/DL (ref 5–40)
WBC NRBC COR # BLD: 11.9 10*3/MM3 (ref 3.4–10.8)

## 2019-09-26 PROCEDURE — 82607 VITAMIN B-12: CPT

## 2019-09-26 PROCEDURE — 83540 ASSAY OF IRON: CPT

## 2019-09-26 PROCEDURE — 80061 LIPID PANEL: CPT

## 2019-09-26 PROCEDURE — 85025 COMPLETE CBC W/AUTO DIFF WBC: CPT

## 2019-09-26 PROCEDURE — G0439 PPPS, SUBSEQ VISIT: HCPCS | Performed by: INTERNAL MEDICINE

## 2019-09-26 PROCEDURE — 84443 ASSAY THYROID STIM HORMONE: CPT

## 2019-09-26 PROCEDURE — 84466 ASSAY OF TRANSFERRIN: CPT

## 2019-09-26 PROCEDURE — 80053 COMPREHEN METABOLIC PANEL: CPT

## 2019-09-26 NOTE — PROGRESS NOTES
The ABCs of the Annual Wellness Visit  Subsequent Medicare Wellness Visit    Chief Complaint   Patient presents with   • Hyperlipidemia     f/u   • Hypertension   • Medicare Wellness-subsequent       Subjective   History of Present Illness:  Tad Moon is a 89 y.o. male who presents for a Subsequent Medicare Wellness Visit.  He is doing well.  He has recovered from recent pneumonia.  He underwent successful TAVR earlier this year.     HEALTH RISK ASSESSMENT    Recent Hospitalizations:  Recently treated at the following:  [facility]    Current Medical Providers:  Patient Care Team:  Pramod Hyde MD as PCP - General  Pramod Hyde MD as PCP - Claims Attributed  vL Cobian MD as Consulting Physician (Cardiology)    Smoking Status:  Social History     Tobacco Use   Smoking Status Former Smoker   • Packs/day: 0.25   • Types: Cigarettes   • Last attempt to quit:    • Years since quittin.7   Smokeless Tobacco Never Used   Tobacco Comment    quit 45 years ago- smoked since 17 yo        Alcohol Consumption:  Social History     Substance and Sexual Activity   Alcohol Use Yes   • Alcohol/week: 0.6 - 1.2 oz   • Types: 1 - 2 Glasses of wine per week    Comment: occas       Depression Screen:   PHQ-2/PHQ-9 Depression Screening 2019   Little interest or pleasure in doing things 0   Feeling down, depressed, or hopeless 0   Total Score 0       Fall Risk Screen:  JOSE Fall Risk Assessment was completed, and patient is at HIGH risk for falls. Assessment completed on:2019    Health Habits and Functional and Cognitive Screening:  Functional & Cognitive Status 2019   Do you have difficulty preparing food and eating? No   Do you have difficulty bathing yourself, getting dressed or grooming yourself? No   Do you have difficulty using the toilet? No   Do you have difficulty moving around from place to place? No   Do you have trouble with steps or getting out of a bed or a  chair? No   Current Diet Well Balanced Diet   Dental Exam Up to date   Eye Exam Not up to date   Exercise (times per week) 0 times per week   Current Exercise Activities Include Walking   Do you need help using the phone?  No   Are you deaf or do you have serious difficulty hearing?  Yes   Do you need help with transportation? No   Do you need help shopping? No   Do you need help preparing meals?  No   Do you need help with housework?  Yes   Do you need help with laundry? No   Do you need help taking your medications? No   Do you need help managing money? No   Do you ever drive or ride in a car without wearing a seat belt? No   Have you felt unusual stress, anger or loneliness in the last month? No   Who do you live with? Spouse   If you need help, do you have trouble finding someone available to you? No   Have you been bothered in the last four weeks by sexual problems? No   Do you have difficulty concentrating, remembering or making decisions? No         Does the patient have evidence of cognitive impairment? No    Asprin use counseling:Taking ASA appropriately as indicated    Age-appropriate Screening Schedule:  Refer to the list below for future screening recommendations based on patient's age, sex and/or medical conditions. Orders for these recommended tests are listed in the plan section. The patient has been provided with a written plan.    Health Maintenance   Topic Date Due   • URINE MICROALBUMIN  11/30/1926   • TDAP/TD VACCINES (1 - Tdap) [birth year]/1945   • ZOSTER VACCINE (1 of 2) 11/30/1976   • DIABETIC EYE EXAM  08/14/2017   • HEMOGLOBIN A1C  10/25/2018   • INFLUENZA VACCINE  08/01/2019   • LIPID PANEL  09/26/2020   • PNEUMOCOCCAL VACCINES (65+ LOW/MEDIUM RISK)  Completed          The following portions of the patient's history were reviewed and updated as appropriate: allergies, current medications, past family history, past medical history, past social history, past surgical history and problem  list.    Outpatient Medications Prior to Visit   Medication Sig Dispense Refill   • ALPRAZolam (XANAX) 0.5 MG tablet Take 1 tablet by mouth At Night As Needed for Sleep. 30 tablet 2   • aspirin 81 MG tablet Take 1 tablet by mouth daily. 90 tablet 3   • atorvastatin (LIPITOR) 80 MG tablet Take 0.5 tablets by mouth Daily. 90 tablet 3   • carvedilol (COREG) 3.125 MG tablet Take 1 tablet by mouth Every 12 (Twelve) Hours. (Patient taking differently: Take 3.125 mg by mouth Every 12 (Twelve) Hours. Hold for SBP < 110, HR < 60) 60 tablet 11   • cholecalciferol (VITAMIN D3) 1000 UNITS tablet Take 1,000 Units by mouth Daily.     • clopidogrel (PLAVIX) 75 MG tablet Take 1 tablet by mouth daily. 90 tablet 3   • Cyanocobalamin 2500 MCG chewable tablet Chew 2,500 mcg Daily.     • lisinopril (PRINIVIL,ZESTRIL) 2.5 MG tablet Take 2.5 mg by mouth Daily.     • nitroglycerin (NITROSTAT) 0.4 MG SL tablet Place 1 tablet under the tongue Every 5 (Five) Minutes As Needed for Chest Pain. 25 tablet 6   • omeprazole (priLOSEC) 20 MG capsule Take 20 mg by mouth Daily As Needed. For acid reflux     • sertraline (ZOLOFT) 100 MG tablet Take 100 mg by mouth Daily.     • spironolactone (ALDACTONE) 25 MG tablet Take 1 tablet by mouth Daily. 30 tablet 3   • torsemide (DEMADEX) 20 MG tablet Take 20 mg by mouth Daily.     • colchicine 0.6 MG tablet Take 1 tablet by mouth 2 (Two) Times a Day. 10 tablet 0   • Fluticasone-Umeclidin-Vilant (TRELEGY ELLIPTA) 100-62.5-25 MCG/INH aerosol powder  Inhale 1 each Daily. 2 each 0     No facility-administered medications prior to visit.        Patient Active Problem List   Diagnosis   • Coronary artery disease involving coronary bypass graft of native heart without angina pectoris   • Hyperlipidemia LDL goal <100   • History of tobacco abuse   • CLL (chronic lymphocytic leukemia) (CMS/[facility])   • GERD (gastroesophageal reflux disease)   • Nonrheumatic aortic valve stenosis s/p TAVR   • Chronic systolic congestive  "heart failure (CMS/[facility])   • CKD (chronic kidney disease) stage 3, GFR 30-59 ml/min (CMS/[facility])   • Aortic valve stenosis   • Fatigue   • Ischemic cardiomyopathy   • Chronic combined systolic and diastolic CHF (congestive heart failure) (CMS/[facility])   • Angina pectoris (CMS/[facility])   • Atherosclerosis of coronary artery   • Benign essential hypertension   • Heart failure, chronic, with acute decompensation (CMS/[facility])   • Generalized ischemic myocardial dysfunction   • Peripheral vascular disease (CMS/[facility])       Advanced Care Planning:  Patient has an advance directive - a copy has been provided and is visible in patient header    Review of Systems   Constitutional: Positive for fatigue. Negative for chills and fever.   Respiratory: Positive for shortness of breath. Negative for cough and wheezing.    Cardiovascular: Negative for chest pain and palpitations.   Gastrointestinal: Negative for abdominal pain, blood in stool, constipation and diarrhea.       Compared to one year ago, the patient feels his physical health is the same.  Compared to one year ago, the patient feels his mental health is the same.    Reviewed chart for potential of high risk medication in the elderly: yes  Reviewed chart for potential of harmful drug interactions in the elderly:yes    Objective         Vitals:    09/26/19 0801   BP: 138/72   BP Location: Left arm   Patient Position: Sitting   Cuff Size: Adult   Pulse: 58   Temp: 97.4 °F (36.3 °C)   Weight: 73 kg (161 lb)   Height: 170.2 cm (67\")   PainSc: 0-No pain       Body mass index is 25.22 kg/m².  Discussed the patient's BMI with him. The BMI is in the acceptable range.    Physical Exam   Constitutional: He is oriented to person, place, and time. He appears well-developed and well-nourished.   HENT:   Head: Normocephalic and atraumatic.   Neck: Normal range of motion. Neck supple. No JVD present. No thyromegaly present.   Cardiovascular: Normal rate, regular rhythm and intact distal pulses. "   Murmur heard.   Crescendo systolic murmur is present with a grade of 3/6.  Pulmonary/Chest: Effort normal and breath sounds normal.   Musculoskeletal: Normal range of motion. He exhibits no edema.   Lymphadenopathy:     He has no cervical adenopathy.   Neurological: He is alert and oriented to person, place, and time. He displays normal reflexes. No cranial nerve deficit or sensory deficit. He exhibits normal muscle tone. Coordination normal.   Skin: Skin is warm and dry.   Psychiatric: He has a normal mood and affect. His behavior is normal. Judgment and thought content normal.       Lab Results   Component Value Date    TRIG 117 09/26/2019    HDL 54 09/26/2019     (H) 09/26/2019    VLDL 23.4 09/26/2019        Assessment/Plan   Medicare Risks and Personalized Health Plan  CMS Preventative Services Quick Reference  Cardiovascular risk    The above risks/problems have been discussed with the patient.  Pertinent information has been shared with the patient in the After Visit Summary.  Follow up plans and orders are seen below in the Assessment/Plan Section.    Diagnoses and all orders for this visit:    1. Preventative health care (Primary)    2. Hyperlipidemia LDL goal <100  -     Lipid Panel; Future    3. Benign essential hypertension  -     CBC & Differential; Future  -     Comprehensive Metabolic Panel; Future  -     Microalbumin / Creatinine Urine Ratio - Urine, Clean Catch; Future    4. CKD (chronic kidney disease) stage 3, GFR 30-59 ml/min (CMS/[facility])    5. CLL (chronic lymphocytic leukemia) (CMS/[facility])  -     CBC & Differential; Future    6. Other fatigue  -     Vitamin B12; Future  -     TSH; Future    7. Iron deficiency anemia, unspecified iron deficiency anemia type  -     Iron Profile; Future    8. Peripheral vascular disease (CMS/[facility])    9. Coronary artery disease involving native coronary artery of native heart with other form of angina pectoris (CMS/[facility])    10. Nonrheumatic aortic valve stenosis  s/p TAVR    11. Ischemic cardiomyopathy    12. Chronic combined systolic and diastolic CHF (congestive heart failure) (CMS/HCC)      Follow Up:  Return in about 6 months (around 3/26/2020) for follow up fasting.     An After Visit Summary and PPPS were given to the patient.

## 2019-09-29 PROBLEM — I73.9 PERIPHERAL VASCULAR DISEASE (HCC): Status: ACTIVE | Noted: 2019-09-29

## 2019-11-15 RX ORDER — NITROGLYCERIN 0.4 MG/1
0.4 TABLET SUBLINGUAL
Qty: 25 TABLET | Refills: 6 | Status: SHIPPED | OUTPATIENT
Start: 2019-11-15

## 2019-11-15 NOTE — TELEPHONE ENCOUNTER
Shirley with [facility] called and said pt walked up to the counter trying to get his nitroglycerin refilled.  She and the pt were unsure where he had it refilled before.  Pt is requesting a refill for the nitroglycerin be sent to the [facility] in [place].

## 2019-11-20 ENCOUNTER — OFFICE VISIT (OUTPATIENT)
Dept: CARDIOLOGY | Facility: HOSPITAL | Age: 84
End: 2019-11-20

## 2019-11-20 ENCOUNTER — APPOINTMENT (OUTPATIENT)
Dept: LAB | Facility: HOSPITAL | Age: 84
End: 2019-11-20

## 2019-11-20 ENCOUNTER — HOSPITAL ENCOUNTER (OUTPATIENT)
Dept: GENERAL RADIOLOGY | Facility: HOSPITAL | Age: 84
Discharge: HOME | End: 2019-11-20
Admitting: NURSE PRACTITIONER

## 2019-11-20 VITALS
OXYGEN SATURATION: 92 % | RESPIRATION RATE: 18 BRPM | DIASTOLIC BLOOD PRESSURE: 68 MMHG | HEIGHT: 67 IN | BODY MASS INDEX: 25.96 KG/M2 | TEMPERATURE: 97.2 F | HEART RATE: 90 BPM | WEIGHT: 165.38 LBS | SYSTOLIC BLOOD PRESSURE: 153 MMHG

## 2019-11-20 DIAGNOSIS — I50.42 CHRONIC COMBINED SYSTOLIC AND DIASTOLIC CHF (CONGESTIVE HEART FAILURE) (HCC): ICD-10-CM

## 2019-11-20 DIAGNOSIS — I50.42 CHRONIC COMBINED SYSTOLIC AND DIASTOLIC CHF (CONGESTIVE HEART FAILURE) (HCC): Primary | ICD-10-CM

## 2019-11-20 DIAGNOSIS — I34.0 NONRHEUMATIC MITRAL VALVE REGURGITATION: ICD-10-CM

## 2019-11-20 DIAGNOSIS — N18.30 CKD (CHRONIC KIDNEY DISEASE) STAGE 3, GFR 30-59 ML/MIN (HCC): ICD-10-CM

## 2019-11-20 DIAGNOSIS — I25.810 CORONARY ARTERY DISEASE INVOLVING CORONARY BYPASS GRAFT OF NATIVE HEART WITHOUT ANGINA PECTORIS: ICD-10-CM

## 2019-11-20 LAB
ANION GAP SERPL CALCULATED.3IONS-SCNC: 12 MMOL/L (ref 5–15)
BUN BLD-MCNC: 21 MG/DL (ref 8–23)
BUN/CREAT SERPL: 13.4 (ref 7–25)
CALCIUM SPEC-SCNC: 9.6 MG/DL (ref 8.2–9.6)
CHLORIDE SERPL-SCNC: 102 MMOL/L (ref 98–107)
CO2 SERPL-SCNC: 27 MMOL/L (ref 22–29)
CREAT BLD-MCNC: 1.57 MG/DL (ref 0.76–1.27)
GFR SERPL CREATININE-BSD FRML MDRD: 42 ML/MIN/1.73
GLUCOSE BLD-MCNC: 97 MG/DL (ref 65–99)
POTASSIUM BLD-SCNC: 4.8 MMOL/L (ref 3.5–5.2)
SODIUM BLD-SCNC: 141 MMOL/L (ref 136–145)

## 2019-11-20 PROCEDURE — 36415 COLL VENOUS BLD VENIPUNCTURE: CPT | Performed by: NURSE PRACTITIONER

## 2019-11-20 PROCEDURE — 71046 X-RAY EXAM CHEST 2 VIEWS: CPT

## 2019-11-20 PROCEDURE — 99214 OFFICE O/P EST MOD 30 MIN: CPT | Performed by: NURSE PRACTITIONER

## 2019-11-20 PROCEDURE — 80048 BASIC METABOLIC PNL TOTAL CA: CPT | Performed by: NURSE PRACTITIONER

## 2019-11-20 RX ORDER — SPIRONOLACTONE 25 MG/1
25 TABLET ORAL 2 TIMES DAILY
Qty: 60 TABLET | Refills: 3 | Status: SHIPPED | OUTPATIENT
Start: 2019-11-20 | End: 2019-12-10 | Stop reason: ALTCHOICE

## 2019-11-20 RX ORDER — LISINOPRIL 2.5 MG/1
2.5 TABLET ORAL DAILY
Qty: 30 TABLET | Refills: 3
Start: 2019-11-20 | End: 2019-12-10 | Stop reason: ALTCHOICE

## 2019-11-20 RX ORDER — TORSEMIDE 20 MG/1
20 TABLET ORAL DAILY
Qty: 30 TABLET | Refills: 3
Start: 2019-11-20 | End: 2019-12-30 | Stop reason: DRUGHIGH

## 2019-11-20 RX ORDER — DOXYCYCLINE HYCLATE 50 MG/1
324 CAPSULE, GELATIN COATED ORAL 2 TIMES DAILY WITH MEALS
Qty: 60 TABLET | Refills: 1 | Status: SHIPPED | OUTPATIENT
Start: 2019-11-20 | End: 2019-12-10 | Stop reason: SINTOL

## 2019-11-20 RX ORDER — FAMOTIDINE 20 MG/1
20 TABLET, FILM COATED ORAL DAILY PRN
COMMUNITY
End: 2020-01-01 | Stop reason: HOSPADM

## 2019-11-20 NOTE — PROGRESS NOTES
[facility]  Heart and Valve Center  HFClinic    Encounter Date:11/20/2019     Tad Moon  [address] [facility] [address]    11/30/1926    Pramod Hyde MD    Tad Moon is a 89 y.o. male.      Subjective:     Chief Complaint:  Congestive Heart Failure       HPI :  Mr. Moon comes in to the HF Clinic today per his request.  Patient relates that over the past couple months, he has become more SOA as compared to how he was feeling back in May/June when he was here last.  He also relates he had a pneumonia back in July and seemed to get over it adequately.      Patient's son states that he is now limited to only walking out to hte garage before having to sit and rest. Last HF clinic, he was still able to walk 20-30 minutes several times per week for exercise.  Patient adds that he has become weak in his legs and fallen twice in the past 2 weeks.  He has also felt SOA upon awakening during the night.  He has stopped driving his car since we last saw him, too.      Past Medical History:   Diagnosis Date   • Aortic stenosis    • Arthritis    • BOOP (bronchiolitis obliterans with organizing pneumonia) (CMS/[facility]) 2014   • Chest pain 2007    angioplasty to RCA   • CHF (congestive heart failure) (CMS/HCC)    • CLL (chronic lymphocytic leukemia) (CMS/[facility])     15 years    • Coronary artery disease    • GERD (gastroesophageal reflux disease)    • Herpes zoster 2012   • History of tobacco abuse    • Hyperlipidemia    • Hypertension    • Low kidney function     very recently and did kidney function test and said decreased function so being watched to get all fluid off    • MI (myocardial infarction) (CMS/HCC)     mid 90s very mild - few years after bypass surgery    • Non-STEMI (non-ST elevated myocardial infarction) (CMS/[facility]) 10/17/2017    stenting of occluded grafts to left circumflex and RCA   • Skin cancer     basal and squamous from various location    • SOBOE (shortness of breath on  exertion)    • Uses hearing aid     bilat ears      Past Surgical History:   Procedure Laterality Date   • ABDOMINAL HERNIA REPAIR      x 2   • AORTIC VALVE REPAIR/REPLACEMENT N/A 4/26/2018    Procedure: Transcatheter Aortic Valve Replacement, LANDY;  Surgeon: Lv Purdy MD;  Location: [facility] HYBRID OR 15;  Service: Cardiothoracic   • AORTIC VALVE REPAIR/REPLACEMENT N/A 4/26/2018    Procedure: Transcatheter Aortic Valve Replacement;  Surgeon: Juan M Sood MD;  Location: [facility] HYBRID OR 15;  Service: Cardiology   • APPENDECTOMY     • CARDIAC CATHETERIZATION N/A 10/10/2017    Procedure: Left Heart Cath;  Surgeon: Juan M Sood MD;  Location:  [facility] CATH INVASIVE LOCATION;  Service:    • CARDIAC CATHETERIZATION N/A 4/6/2018    Procedure: Left Heart Cath;  Surgeon: Lv Cobian MD;  Location:  [facility] CATH INVASIVE LOCATION;  Service: Cardiovascular   • CHOLECYSTECTOMY     • COLONOSCOPY     • CORONARY ANGIOPLASTY WITH STENT PLACEMENT      07 one placed,  and in 2017 had another stent placed and one repaired -   so pt thinks 3 stents in place    • CORONARY ARTERY BYPASS GRAFT      4 bypass in 92    • REPLACEMENT TOTAL KNEE Left 2014   • WISDOM TOOTH EXTRACTION         Allergies   Allergen Reactions   • Hctz [Hydrochlorothiazide] Other (See Comments)     hyponatremia         Current Outpatient Medications:   •  aspirin 81 MG tablet, Take 1 tablet by mouth daily., Disp: 90 tablet, Rfl: 3  •  atorvastatin (LIPITOR) 80 MG tablet, Take 0.5 tablets by mouth Daily., Disp: 90 tablet, Rfl: 3  •  carvedilol (COREG) 3.125 MG tablet, Take 1 tablet by mouth Every 12 (Twelve) Hours. (Patient taking differently: Take 3.125 mg by mouth Every 12 (Twelve) Hours. Hold for SBP < 110, HR < 60), Disp: 60 tablet, Rfl: 11  •  clopidogrel (PLAVIX) 75 MG tablet, Take 1 tablet by mouth daily., Disp: 90 tablet, Rfl: 3  •  famotidine (PEPCID) 20 MG tablet, Take 20 mg by mouth 2 (Two) Times a Day., Disp: , Rfl:   •  lisinopril  (PRINIVIL,ZESTRIL) 2.5 MG tablet, Take 1 tablet by mouth Daily., Disp: 30 tablet, Rfl: 3  •  nitroglycerin (NITROSTAT) 0.4 MG SL tablet, Place 1 tablet under the tongue Every 5 (Five) Minutes As Needed for Chest Pain., Disp: 25 tablet, Rfl: 6  •  sertraline (ZOLOFT) 100 MG tablet, Take 100 mg by mouth Daily., Disp: , Rfl:   •  ALPRAZolam (XANAX) 0.5 MG tablet, Take 1 tablet by mouth At Night As Needed for Sleep., Disp: 30 tablet, Rfl: 2  •  cholecalciferol (VITAMIN D3) 1000 UNITS tablet, Take 1,000 Units by mouth Daily., Disp: , Rfl:   •  Cyanocobalamin 2500 MCG chewable tablet, Chew 2,500 mcg Daily., Disp: , Rfl:   •  ferrous gluconate (FERGON) 324 MG tablet, Take 1 tablet by mouth 2 (Two) Times a Day With Meals., Disp: 60 tablet, Rfl: 1  •  omeprazole (priLOSEC) 20 MG capsule, Take 20 mg by mouth Daily As Needed. For acid reflux, Disp: , Rfl:   •  spironolactone (ALDACTONE) 25 MG tablet, Take 1 tablet by mouth 2 (Two) Times a Day., Disp: 60 tablet, Rfl: 3  •  torsemide (DEMADEX) 20 MG tablet, Take 1 tablet by mouth Daily., Disp: 30 tablet, Rfl: 3    The following portions of the patient's history were reviewed and updated as appropriate in Epic:  Problem list, allergies, current medications, past medical and surgical history, past social and family history.     Review of Systems   Constitution: Positive for weakness and malaise/fatigue.   HENT: Negative.    Eyes: Negative.    Cardiovascular: Positive for dyspnea on exertion and paroxysmal nocturnal dyspnea. Negative for chest pain.   Respiratory: Positive for shortness of breath.    Endocrine: Negative.    Hematologic/Lymphatic: Bruises/bleeds easily.   Skin: Negative.    Musculoskeletal: Positive for falls.   Gastrointestinal: Positive for heartburn.   Genitourinary: Positive for nocturia.   Neurological: Positive for excessive daytime sleepiness and loss of balance.   Allergic/Immunologic: Positive for environmental allergies.       Objective:     Vitals:     "11/20/19 0905   BP: 153/68   BP Location: Right arm   Patient Position: Sitting   Cuff Size: Adult   Pulse: 90   Resp: 18   Temp: 97.2 °F (36.2 °C)   TempSrc: Temporal   SpO2: 92%   Weight: 75 kg (165 lb 6 oz)   Height: 170.2 cm (67\")         Physical Exam   Constitutional: He is oriented to person, place, and time. He appears well-developed. No distress.   Appears younger than stated age.  Accompanied by son.   HENT:   Head: Normocephalic and atraumatic.   Eyes: Conjunctivae are normal. Pupils are equal, round, and reactive to light. No scleral icterus.   Neck: Normal range of motion. Neck supple. No JVD present.   Cardiovascular: Normal rate, regular rhythm and intact distal pulses. Exam reveals no gallop and no friction rub.   Murmur heard.  Systolic murmur II/VI LUSB   Pulmonary/Chest: Effort normal. No respiratory distress. He has no wheezes. He has rales.   Bibasilar rales   Abdominal: Soft. Bowel sounds are normal. He exhibits distension. There is no tenderness.   Abdomen appears mildly protuberant but not tender or firm   Musculoskeletal: Normal range of motion. He exhibits no edema or deformity.   No pedal or ankle edema   Neurological: He is alert and oriented to person, place, and time. No cranial nerve deficit.   Skin: Skin is warm and dry.   Psychiatric: He has a normal mood and affect. His behavior is normal. Judgment and thought content normal.   Vitals reviewed.      Lab and Diagnostic Review:  Echocardiogram Interpretation Summary 5/30/19    · Estimated EF = 15%.  · Normal functioning TAVR  · Severe mitral valve regurgitation is present       Assessment and Plan:     1. Chronic combined systolic and diastolic CHF (congestive heart failure) (CMS/[facility])  - s/p TAVR  - LVEF was 20-25% prior to TAVR 4/26/18 and patient was previously well compensated wth LVEF @ 15% until the past few weeks  - Weight is up 10 lbs since PCP visit in August  - Bibasilar rales noted on exam  - Adjust aldactone 25 mg to BID.  " Continue coreg and torsemide at present doses  - Entresto Rx had previously been offered (back in May), however, the cost was prohibitive for the patient  - Will not adjust lisinopril just yet as potassium is historically on the upper end or normal.    - Requested December OV with Dr. Cobian but if not available, will see patient back in HF clinic 2-3 weeks.  - Add ferrous gluconate for mild anemia of chronic disease  - check BMP and CXR today    2. Coronary artery disease involving coronary bypass graft of native heart without angina pectoris  - Hx of CABG and PCI  - No anginal symptoms  - ASA/ statin/ BB/ plavix    3. CKD (chronic kidney disease) stage 3, GFR 30-59 ml/min (CMS/[facility])  - monitor renal function with medication adjustments    4.  Severe MR  -  Worsened since echo last summer 6/2018.  Noted to be mild post TAVR

## 2019-11-26 ENCOUNTER — TELEPHONE (OUTPATIENT)
Dept: CARDIOLOGY | Facility: HOSPITAL | Age: 84
End: 2019-11-26

## 2019-12-05 ENCOUNTER — TELEPHONE (OUTPATIENT)
Dept: INTERNAL MEDICINE | Facility: CLINIC | Age: 84
End: 2019-12-05

## 2019-12-05 RX ORDER — CEFDINIR 300 MG/1
300 CAPSULE ORAL 2 TIMES DAILY
Qty: 14 CAPSULE | Refills: 0 | Status: SHIPPED | OUTPATIENT
Start: 2019-12-05 | End: 2019-12-30

## 2019-12-05 NOTE — TELEPHONE ENCOUNTER
Pt states he started getting sick Sun. He has been using mucinex and tussionex. Has about 2 doses of tussionex left. Still coughing and wheezing and chest feels tight

## 2019-12-05 NOTE — TELEPHONE ENCOUNTER
Reason for Call:  WANTS MEDICATION CALLED IN    Symptoms: COUGH, COUGHING UP STUFF, SORE THROAT, CHEST AND BACK HURTING    Onset (when it began): 12/01/2019    Have they tried anything? COUGH SYRUP THAT DR HOFFMAN PRESCRIBED EARLIER.    Other pertinent info:  WANTS AN ANTIBIOTIC CALLED IN IF POSSIBLE.  NIECE WOULD LIKE A CALL BACK.  NIECE IS WORRIED ABOUT PNEUMONIA RETURNING.

## 2019-12-10 ENCOUNTER — OFFICE VISIT (OUTPATIENT)
Dept: CARDIOLOGY | Facility: HOSPITAL | Age: 84
End: 2019-12-10

## 2019-12-10 ENCOUNTER — TELEPHONE (OUTPATIENT)
Dept: CARDIOLOGY | Facility: HOSPITAL | Age: 84
End: 2019-12-10

## 2019-12-10 ENCOUNTER — LAB (OUTPATIENT)
Dept: LAB | Facility: HOSPITAL | Age: 84
End: 2019-12-10

## 2019-12-10 VITALS
RESPIRATION RATE: 18 BRPM | HEIGHT: 67 IN | WEIGHT: 162.25 LBS | SYSTOLIC BLOOD PRESSURE: 138 MMHG | TEMPERATURE: 96.6 F | BODY MASS INDEX: 25.47 KG/M2 | DIASTOLIC BLOOD PRESSURE: 65 MMHG

## 2019-12-10 DIAGNOSIS — I34.0 NONRHEUMATIC MITRAL VALVE REGURGITATION: ICD-10-CM

## 2019-12-10 DIAGNOSIS — I50.22 CHRONIC SYSTOLIC CONGESTIVE HEART FAILURE (HCC): Primary | ICD-10-CM

## 2019-12-10 DIAGNOSIS — I25.810 CORONARY ARTERY DISEASE INVOLVING CORONARY BYPASS GRAFT OF NATIVE HEART WITHOUT ANGINA PECTORIS: ICD-10-CM

## 2019-12-10 LAB
ANION GAP SERPL CALCULATED.3IONS-SCNC: 13 MMOL/L (ref 5–15)
BASOPHILS # BLD AUTO: 0.04 10*3/MM3 (ref 0–0.2)
BASOPHILS NFR BLD AUTO: 0.5 % (ref 0–1.5)
BUN BLD-MCNC: 25 MG/DL (ref 8–23)
BUN/CREAT SERPL: 15.1 (ref 7–25)
CALCIUM SPEC-SCNC: 9.4 MG/DL (ref 8.2–9.6)
CHLORIDE SERPL-SCNC: 98 MMOL/L (ref 98–107)
CO2 SERPL-SCNC: 23 MMOL/L (ref 22–29)
CREAT BLD-MCNC: 1.66 MG/DL (ref 0.76–1.27)
DEPRECATED RDW RBC AUTO: 46.7 FL (ref 37–54)
EOSINOPHIL # BLD AUTO: 0.16 10*3/MM3 (ref 0–0.4)
EOSINOPHIL NFR BLD AUTO: 2 % (ref 0.3–6.2)
ERYTHROCYTE [DISTWIDTH] IN BLOOD BY AUTOMATED COUNT: 13.7 % (ref 12.3–15.4)
GFR SERPL CREATININE-BSD FRML MDRD: 39 ML/MIN/1.73
GLUCOSE BLD-MCNC: 119 MG/DL (ref 65–99)
HCT VFR BLD AUTO: 35.7 % (ref 37.5–51)
HGB BLD-MCNC: 10.9 G/DL (ref 13–17.7)
IMM GRANULOCYTES # BLD AUTO: 0.02 10*3/MM3 (ref 0–0.05)
IMM GRANULOCYTES NFR BLD AUTO: 0.2 % (ref 0–0.5)
LYMPHOCYTES # BLD AUTO: 3.39 10*3/MM3 (ref 0.7–3.1)
LYMPHOCYTES NFR BLD AUTO: 41.5 % (ref 19.6–45.3)
MCH RBC QN AUTO: 28.4 PG (ref 26.6–33)
MCHC RBC AUTO-ENTMCNC: 30.5 G/DL (ref 31.5–35.7)
MCV RBC AUTO: 93 FL (ref 79–97)
MONOCYTES # BLD AUTO: 0.41 10*3/MM3 (ref 0.1–0.9)
MONOCYTES NFR BLD AUTO: 5 % (ref 5–12)
NEUTROPHILS # BLD AUTO: 4.14 10*3/MM3 (ref 1.7–7)
NEUTROPHILS NFR BLD AUTO: 50.8 % (ref 42.7–76)
NRBC BLD AUTO-RTO: 0 /100 WBC (ref 0–0.2)
PLATELET # BLD AUTO: 248 10*3/MM3 (ref 140–450)
PMV BLD AUTO: 11 FL (ref 6–12)
POTASSIUM BLD-SCNC: 4.9 MMOL/L (ref 3.5–5.2)
RBC # BLD AUTO: 3.84 10*6/MM3 (ref 4.14–5.8)
SODIUM BLD-SCNC: 134 MMOL/L (ref 136–145)
WBC NRBC COR # BLD: 8.16 10*3/MM3 (ref 3.4–10.8)

## 2019-12-10 PROCEDURE — 85025 COMPLETE CBC W/AUTO DIFF WBC: CPT

## 2019-12-10 PROCEDURE — 99214 OFFICE O/P EST MOD 30 MIN: CPT | Performed by: NURSE PRACTITIONER

## 2019-12-10 PROCEDURE — 36415 COLL VENOUS BLD VENIPUNCTURE: CPT | Performed by: NURSE PRACTITIONER

## 2019-12-10 PROCEDURE — 80048 BASIC METABOLIC PNL TOTAL CA: CPT | Performed by: NURSE PRACTITIONER

## 2019-12-10 NOTE — PROGRESS NOTES
[facility]  Heart and Valve Center  HF Clinic    Encounter Date:12/10/2019     Tad Moon  [address] [facility] [address]    11/30/1926    Pramod Hyde MD    Tad Moon is a 89 y.o. male.      Subjective:     Chief Complaint:  Follow-up and Congestive Heart Failure       HPI:  Mr. Moon is here for a 3 week follow up after medication changes to address HF symptoms (weakness and SOA).  His CXR indicated worsened pulmonary vascularity, BMP indicated stable renal function and electrolytes.  Aldactone was increased to BID.  Mild anemia was noted from September labs and a trial of iron was added, too.      Since our last visit, he has continued to feel poorly.  He has been having nocturia, has been somewhat confused at night, and PCP recently started streatment for bronchitis with tussionex/ omnicef.   He states that his sputum has been thick and green since last week 12/5 when he started the Omnicef.  He has not had any falls since we last met.  Additionally, he stopped taking the iron supplement due to constipation.  His daughter in law accompanies him today.  She is wondering if he is dehydrated or on too many fluid pills.  He still gets out and goes to [facility] each day.      Past Medical History:   Diagnosis Date   • Aortic stenosis    • Arthritis    • BOOP (bronchiolitis obliterans with organizing pneumonia) (CMS/HCC) 2014   • Chest pain 2007    angioplasty to RCA   • CHF (congestive heart failure) (CMS/HCC)    • CLL (chronic lymphocytic leukemia) (CMS/HCC)     15 years    • Coronary artery disease    • GERD (gastroesophageal reflux disease)    • Herpes zoster 2012   • History of tobacco abuse    • Hyperlipidemia    • Hypertension    • Low kidney function     very recently and did kidney function test and said decreased function so being watched to get all fluid off    • MI (myocardial infarction) (CMS/HCC)     mid 90s very mild - few years after bypass surgery    • Non-STEMI  (non-ST elevated myocardial infarction) (CMS/[facility]) 10/17/2017    stenting of occluded grafts to left circumflex and RCA   • Skin cancer     basal and squamous from various location    • SOBOE (shortness of breath on exertion)    • Uses hearing aid     bilat ears      Past Surgical History:   Procedure Laterality Date   • ABDOMINAL HERNIA REPAIR      x 2   • AORTIC VALVE REPAIR/REPLACEMENT N/A 4/26/2018    Procedure: Transcatheter Aortic Valve Replacement, LANDY;  Surgeon: Lv Purdy MD;  Location:  [facility] HYBRID OR 15;  Service: Cardiothoracic   • AORTIC VALVE REPAIR/REPLACEMENT N/A 4/26/2018    Procedure: Transcatheter Aortic Valve Replacement;  Surgeon: Juan M Sood MD;  Location:  [facility] HYBRID OR 15;  Service: Cardiology   • APPENDECTOMY     • CARDIAC CATHETERIZATION N/A 10/10/2017    Procedure: Left Heart Cath;  Surgeon: Juan M Sood MD;  Location:  [facility] CATH INVASIVE LOCATION;  Service:    • CARDIAC CATHETERIZATION N/A 4/6/2018    Procedure: Left Heart Cath;  Surgeon: Lv Cobian MD;  Location:  [facility] CATH INVASIVE LOCATION;  Service: Cardiovascular   • CHOLECYSTECTOMY     • COLONOSCOPY     • CORONARY ANGIOPLASTY WITH STENT PLACEMENT      07 one placed,  and in 2017 had another stent placed and one repaired -   so pt thinks 3 stents in place    • CORONARY ARTERY BYPASS GRAFT      4 bypass in 92    • REPLACEMENT TOTAL KNEE Left 2014   • WISDOM TOOTH EXTRACTION         Allergies   Allergen Reactions   • Hctz [Hydrochlorothiazide] Other (See Comments)     hyponatremia         Current Outpatient Medications:   •  ALPRAZolam (XANAX) 0.5 MG tablet, Take 1 tablet by mouth At Night As Needed for Sleep., Disp: 30 tablet, Rfl: 2  •  aspirin 81 MG tablet, Take 1 tablet by mouth daily., Disp: 90 tablet, Rfl: 3  •  atorvastatin (LIPITOR) 80 MG tablet, Take 0.5 tablets by mouth Daily., Disp: 90 tablet, Rfl: 3  •  carvedilol (COREG) 3.125 MG tablet, Take 1 tablet by mouth Every 12 (Twelve) Hours. (Patient taking  differently: Take 3.125 mg by mouth Every 12 (Twelve) Hours. Hold for SBP < 110, HR < 60), Disp: 60 tablet, Rfl: 11  •  cefdinir (OMNICEF) 300 MG capsule, Take 1 capsule by mouth 2 (Two) Times a Day., Disp: 14 capsule, Rfl: 0  •  cholecalciferol (VITAMIN D3) 1000 UNITS tablet, Take 1,000 Units by mouth Daily., Disp: , Rfl:   •  clopidogrel (PLAVIX) 75 MG tablet, Take 1 tablet by mouth daily., Disp: 90 tablet, Rfl: 3  •  famotidine (PEPCID) 20 MG tablet, Take 20 mg by mouth 2 (Two) Times a Day., Disp: , Rfl:   •  HYDROcod Polst-CPM Polst ER (TUSSIONEX PENNKINETIC ER) 10-8 MG/5ML ER suspension, Take 5 mL by mouth Every 12 (Twelve) Hours As Needed for Cough., Disp: 115 mL, Rfl: 0  •  nitroglycerin (NITROSTAT) 0.4 MG SL tablet, Place 1 tablet under the tongue Every 5 (Five) Minutes As Needed for Chest Pain., Disp: 25 tablet, Rfl: 6  •  sertraline (ZOLOFT) 100 MG tablet, Take 100 mg by mouth Daily., Disp: , Rfl:   •  torsemide (DEMADEX) 20 MG tablet, Take 1 tablet by mouth Daily., Disp: 30 tablet, Rfl: 3  •  sacubitril-valsartan (ENTRESTO) 24-26 MG tablet, Take 1 tablet by mouth 2 (Two) Times a Day., Disp: 60 tablet, Rfl: 1    The following portions of the patient's history were reviewed and updated as appropriate in Epic:  Problem list, allergies, current medications, past medical and surgical history, past social and family history.     Review of Systems   Constitution: Positive for malaise/fatigue.   HENT: Positive for congestion and hearing loss.    Eyes: Negative.    Cardiovascular: Positive for dyspnea on exertion.   Respiratory: Positive for cough, shortness of breath and sputum production.    Endocrine: Negative.    Hematologic/Lymphatic: Bruises/bleeds easily.   Skin: Negative.    Musculoskeletal: Negative.    Gastrointestinal: Positive for constipation.   Genitourinary: Positive for frequency and nocturia.   Neurological: Negative.    Psychiatric/Behavioral: Positive for altered mental status. The patient has  "insomnia and is nervous/anxious.    Allergic/Immunologic: Negative.        Objective:     Vitals:    12/10/19 1041   BP: 138/65   BP Location: Right arm   Patient Position: Sitting   Cuff Size: Adult   Resp: 18   Temp: 96.6 °F (35.9 °C)   TempSrc: Temporal   Weight: 73.6 kg (162 lb 4 oz)   Height: 170.2 cm (67\")         Physical Exam   Constitutional: He is oriented to person, place, and time. He appears well-developed and well-nourished. He appears distressed.   Well groomed, extremely pleasant elderly gentleman in NAD   HENT:   Head: Normocephalic and atraumatic.   Eyes: Pupils are equal, round, and reactive to light. Conjunctivae are normal. No scleral icterus.   Neck: Normal range of motion. Neck supple. No JVD present.   Cardiovascular: Normal rate, regular rhythm and intact distal pulses. Exam reveals no gallop and no friction rub.   Murmur heard.  Systolic murmur II/VI    Pulmonary/Chest: Effort normal. No respiratory distress. He has no wheezes. He has rales.   Bibasilar rales   Abdominal: Soft. Bowel sounds are normal. There is no tenderness.   Musculoskeletal: Normal range of motion. He exhibits edema.   Trace ankle edema   Neurological: He is alert and oriented to person, place, and time. No cranial nerve deficit.   Skin: Skin is warm and dry.   Psychiatric: He has a normal mood and affect. His behavior is normal. Judgment and thought content normal.   Vitals reviewed.      Lab and Diagnostic Review:    Basic Metabolic Panel   Component  Ref Range & Units 1d ago 3wk ago 2mo ago 6mo ago 9mo ago 1yr ago   Glucose  65 - 99 mg/dL 119High   97  101High   96  133High  R 126High  R   BUN  8 - 23 mg/dL 25High   21  23  21  24High  R 33High  R   Creatinine  0.76 - 1.27 mg/dL 1.66High   1.57High   1.52High   1.55High   1.39High  R 1.62High  R   Sodium  136 - 145 mmol/L 134Low   141  137  135Low   138 R 133 R   Potassium  3.5 - 5.2 mmol/L 4.9  4.8  5.0  4.6  4.2 R 3.9 R   Chloride  98 - 107 mmol/L 98  102  99  99  " 105 R 98Low  R   CO2  22.0 - 29.0 mmol/L 23.0  27.0  24.6  23.0  24.0 R 26.0 R   Calcium  8.2 - 9.6 mg/dL 9.4  9.6  9.2  9.1  9.0 R 9.1 R   eGFR Non African Amer  >60 mL/min/1.73 39Low   42Low   43Low   42Low   48Low   40Low            CBC Auto Differential   Component  Ref Range & Units 1d ago 2mo ago 9mo ago   WBC  3.40 - 10.80 10*3/mm3 8.16  11.90High   12.09High  R   RBC  4.14 - 5.80 10*6/mm3 3.84Low   3.66Low   3.76Low  R   Hemoglobin  13.0 - 17.7 g/dL 10.9Low   11.1Low   10.6Low  R   Hematocrit  37.5 - 51.0 % 35.7Low   33.3Low   35.5Low  R   MCV  79.0 - 97.0 fL 93.0  91.0  94.4 R   MCH  26.6 - 33.0 pg 28.4  30.3  28.2 R   MCHC  31.5 - 35.7 g/dL 30.5Low   33.3  29.9Low  R   RDW  12.3 - 15.4 % 13.7  15.3  14.7High  R   RDW-SD  37.0 - 54.0 fl 46.7  50.5  50.2    MPV  6.0 - 12.0 fL 11.0  11.0  11.6    Platelets  140 - 450 10*3/mm3 248  258  282 R             Assessment and Plan:     1. Chronic systolic congestive heart failure (CMS/HCC)  - LVEF 15%  - NYHA class III symptoms  - recent titration of aldactone does not seem to be resolving pulmonary vascular congestion and dyspnea  - We discussed limitations of currently medical therapy (lisinopril, coreg, torsemide, aldactone)  - Consider new attempt to try Entresto.  Printed Rx provided to patient.  His daughter in law will help him take it to [facility] today  - She will let me know the feedback from [facility] pharmacist  - Re-check BMP today in relation to aldactone adjustment    2. Coronary artery disease involving coronary bypass graft of native heart without angina pectoris  - ASA, statin, BB  - pre-TAVR cath indicated patent LIMA, 50% LM-ostial circ in stent re-stenosis with normal IFR, and occluded RCA and Vein graft  - No anginal symptoms    3. Nonrheumatic mitral valve regurgitation  - Was moderate to severe pre-TAVR, then mild post procedure, now severe again with further left ventricular dilation  - most likely contributing to current HF exacerbation  - Again, try  to obtain further medical therapy options (Entresto)  - Follow up 3 weeks

## 2019-12-11 ENCOUNTER — TELEPHONE (OUTPATIENT)
Dept: CARDIOLOGY | Facility: HOSPITAL | Age: 84
End: 2019-12-11

## 2019-12-11 NOTE — TELEPHONE ENCOUNTER
Daughter inquired about [facility] reaching out to [facility] about the Entresto. I told daughter that we had not at this time and that she should [facility] with the [facility]. She said she would and had no further questions at this time    Spoke with Pita Everett MD, PCP for Mr. Moon @ [facility] primary care. She states her records show that [facility] pharmacy declined Mr. Moon's Rx for Entresto last spring due to renal function parameters.  I reviewed his recent clinic status changes.  She let me know that I could send OV notes, labs, med list, echo's, cath, and OP note to her via fax [phone].  She will forward to Cardiology for repeat review.  Records personally sent this afternoon.  I also called Una Perry (daughter in law) to let her know records were sent and my communication above.  She requested a Rx for Entresto to be sent to [place].  She would like to pay cash for one month supply just to see how he feels with the medication changes.  Rx sent.  See patient at 3 week follow up.    [facility] MARTINEZ

## 2019-12-17 ENCOUNTER — TELEPHONE (OUTPATIENT)
Dept: CARDIOLOGY | Facility: HOSPITAL | Age: 84
End: 2019-12-17

## 2019-12-17 DIAGNOSIS — I35.0 NONRHEUMATIC AORTIC VALVE STENOSIS: Primary | ICD-10-CM

## 2019-12-17 NOTE — TELEPHONE ENCOUNTER
Patient's daughter called and wanting to know what to do about the Entresto free trial in January when he needs a refill. Her office number is [phone] or her home [phone].

## 2019-12-30 ENCOUNTER — OFFICE VISIT (OUTPATIENT)
Dept: CARDIOLOGY | Facility: HOSPITAL | Age: 84
End: 2019-12-30

## 2019-12-30 VITALS
HEIGHT: 67 IN | RESPIRATION RATE: 18 BRPM | WEIGHT: 160.13 LBS | BODY MASS INDEX: 25.13 KG/M2 | SYSTOLIC BLOOD PRESSURE: 116 MMHG | TEMPERATURE: 96.9 F | DIASTOLIC BLOOD PRESSURE: 74 MMHG | HEART RATE: 56 BPM

## 2019-12-30 DIAGNOSIS — I50.22 CHRONIC SYSTOLIC CONGESTIVE HEART FAILURE (HCC): Primary | ICD-10-CM

## 2019-12-30 DIAGNOSIS — I25.810 CORONARY ARTERY DISEASE INVOLVING CORONARY BYPASS GRAFT OF NATIVE HEART WITHOUT ANGINA PECTORIS: ICD-10-CM

## 2019-12-30 PROCEDURE — 99213 OFFICE O/P EST LOW 20 MIN: CPT | Performed by: NURSE PRACTITIONER

## 2019-12-30 RX ORDER — TORSEMIDE 20 MG/1
20 TABLET ORAL EVERY OTHER DAY
Qty: 15 TABLET | Refills: 1 | Status: ON HOLD
Start: 2019-12-30 | End: 2020-01-01

## 2019-12-30 NOTE — PROGRESS NOTES
[facility]  [facility]  HF Clinic    Encounter Date:12/30/2019     Tad Moon  [address] [facility] [address]    11/30/1926    Pramod Hyde MD    Tad Moon is a 89 y.o. male.      Subjective:     Chief Complaint:  Follow-up (labs)       HPI :  Mr. Moon returns to the HF clinic to follow up after starting Entresto 24-26 mg BID last visit on 12/10/19 for symptomatic systolic heart failure.  He is again accompanied by his daughter in law, Una.  Overall, both relate general improvement since starting Entresto:  Less cough, less ELAM, no longer stopping for rest multiple times during ADL's, less weakness.  His weight is down 2 lbs since last OV. He brings in labs drawn Friday @ [facility].  He states he met with the HF Clinic PA there who let him know he is not optimistic about approval of Entresto per [facility] pharmacy/ cardiology staff.    Past Medical History:   Diagnosis Date   • Aortic stenosis    • Arthritis    • BOOP (bronchiolitis obliterans with organizing pneumonia) (CMS/HCC) 2014   • Chest pain 2007    angioplasty to RCA   • CHF (congestive heart failure) (CMS/HCC)    • CLL (chronic lymphocytic leukemia) (CMS/HCC)     15 years    • Coronary artery disease    • GERD (gastroesophageal reflux disease)    • Herpes zoster 2012   • History of tobacco abuse    • Hyperlipidemia    • Hypertension    • Low kidney function     very recently and did kidney function test and said decreased function so being watched to get all fluid off    • MI (myocardial infarction) (CMS/HCC)     mid 90s very mild - few years after bypass surgery    • Non-STEMI (non-ST elevated myocardial infarction) (CMS/HCC) 10/17/2017    stenting of occluded grafts to left circumflex and RCA   • Skin cancer     basal and squamous from various location    • SOBOE (shortness of breath on exertion)    • Uses hearing aid     bilat ears      Past Surgical History:   Procedure Laterality Date   • ABDOMINAL  HERNIA REPAIR      x 2   • AORTIC VALVE REPAIR/REPLACEMENT N/A 4/26/2018    Procedure: Transcatheter Aortic Valve Replacement, LANDY;  Surgeon: Lv Purdy MD;  Location: [facility] HYBRID OR 15;  Service: Cardiothoracic   • AORTIC VALVE REPAIR/REPLACEMENT N/A 4/26/2018    Procedure: Transcatheter Aortic Valve Replacement;  Surgeon: Juan M Sood MD;  Location:  [facility] HYBRID OR 15;  Service: Cardiology   • APPENDECTOMY     • CARDIAC CATHETERIZATION N/A 10/10/2017    Procedure: Left Heart Cath;  Surgeon: Juan M Sood MD;  Location:  [facility] CATH INVASIVE LOCATION;  Service:    • CARDIAC CATHETERIZATION N/A 4/6/2018    Procedure: Left Heart Cath;  Surgeon: Lv Cobian MD;  Location:  [facility] CATH INVASIVE LOCATION;  Service: Cardiovascular   • CHOLECYSTECTOMY     • COLONOSCOPY     • CORONARY ANGIOPLASTY WITH STENT PLACEMENT      07 one placed,  and in 2017 had another stent placed and one repaired -   so pt thinks 3 stents in place    • CORONARY ARTERY BYPASS GRAFT      4 bypass in 92    • REPLACEMENT TOTAL KNEE Left 2014   • WISDOM TOOTH EXTRACTION         Allergies   Allergen Reactions   • Hctz [Hydrochlorothiazide] Other (See Comments)     hyponatremia         Current Outpatient Medications:   •  ALPRAZolam (XANAX) 0.5 MG tablet, Take 1 tablet by mouth At Night As Needed for Sleep., Disp: 30 tablet, Rfl: 2  •  aspirin 81 MG tablet, Take 1 tablet by mouth daily., Disp: 90 tablet, Rfl: 3  •  atorvastatin (LIPITOR) 80 MG tablet, Take 0.5 tablets by mouth Daily., Disp: 90 tablet, Rfl: 3  •  carvedilol (COREG) 3.125 MG tablet, Take 1 tablet by mouth Every 12 (Twelve) Hours. (Patient taking differently: Take 3.125 mg by mouth Every 12 (Twelve) Hours. Hold for SBP < 110, HR < 60), Disp: 60 tablet, Rfl: 11  •  cholecalciferol (VITAMIN D3) 1000 UNITS tablet, Take 1,000 Units by mouth Daily., Disp: , Rfl:   •  clopidogrel (PLAVIX) 75 MG tablet, Take 1 tablet by mouth daily., Disp: 90 tablet, Rfl: 3  •  famotidine (PEPCID) 20  "MG tablet, Take 20 mg by mouth Daily., Disp: , Rfl:   •  nitroglycerin (NITROSTAT) 0.4 MG SL tablet, Place 1 tablet under the tongue Every 5 (Five) Minutes As Needed for Chest Pain., Disp: 25 tablet, Rfl: 6  •  sacubitril-valsartan (ENTRESTO) 24-26 MG tablet, Take 1 tablet by mouth 2 (Two) Times a Day., Disp: 60 tablet, Rfl: 1  •  sertraline (ZOLOFT) 100 MG tablet, Take 100 mg by mouth Daily., Disp: , Rfl:   •  torsemide (DEMADEX) 20 MG tablet, Take 1 tablet by mouth Every Other Day., Disp: 15 tablet, Rfl: 1    The following portions of the patient's history were reviewed and updated as appropriate in Epic:  Problem list, allergies, current medications, past medical and surgical history, past social and family history.     Review of Systems   Constitution: Positive for malaise/fatigue.   HENT: Positive for congestion and hearing loss.    Eyes: Negative.    Cardiovascular: Positive for dyspnea on exertion. Negative for chest pain.   Respiratory: Positive for sputum production.    Endocrine: Negative.    Hematologic/Lymphatic: Negative.    Skin: Negative.    Musculoskeletal: Negative.    Gastrointestinal: Negative.    Genitourinary: Negative.    Neurological: Negative.    Psychiatric/Behavioral: Negative.        Objective:     Vitals:    12/30/19 1041   BP: 116/74   BP Location: Right arm   Patient Position: Sitting   Cuff Size: Adult   Pulse: 56   Resp: 18   Temp: 96.9 °F (36.1 °C)   TempSrc: Temporal   Weight: 72.6 kg (160 lb 2 oz)   Height: 170.2 cm (67\")         Physical Exam   Constitutional: He is oriented to person, place, and time. He appears well-developed and well-nourished. No distress.   HENT:   Head: Normocephalic and atraumatic.   [place]   Eyes: Pupils are equal, round, and reactive to light. Conjunctivae are normal. No scleral icterus.   Neck: Normal range of motion. Neck supple. No JVD present.   Cardiovascular: Normal rate, regular rhythm and intact distal pulses. Exam reveals no gallop and no friction " rub.   Murmur heard.  Systolic murmur II/VI   Pulmonary/Chest: Effort normal. No respiratory distress. He has no wheezes. He has rales.   Rales right base   Musculoskeletal: Normal range of motion. He exhibits no edema.   Neurological: He is alert and oriented to person, place, and time. No cranial nerve deficit.   Skin: Skin is warm and dry.   Psychiatric: He has a normal mood and affect. His behavior is normal. Judgment and thought content normal.   Vitals reviewed.      Lab and Diagnostic Review: see scanned copy of BMP/ BNP    Assessment and Plan:     1. Chronic systolic congestive heart failure (CMS/HCC)  - NYHA class II-III  - Symptomatically improved with addition of Entresto 24/26mg BID and tolerating this with regard to BP and renal function per labs  - He obtained a free first month supply from local pharmacy and is able to obtain some samples from Dr. Cobian's office today  - Reduce torsemide to QOD as tolerated  - See Dr. Cobian on 1/27/20 with echo prior to the visit as scheduled  - Follow up in the HF clinic at the discretion of Dr. Cobian    2.  CAD  - Continues to be free from chest pain symptoms.  HF symptoms improved as noted above  - Continue ASA, statin, BB, plavix

## 2020-01-01 ENCOUNTER — TELEPHONE (OUTPATIENT)
Dept: CARDIOLOGY | Facility: CLINIC | Age: 85
End: 2020-01-01

## 2020-01-01 ENCOUNTER — OFFICE VISIT (OUTPATIENT)
Dept: INTERNAL MEDICINE | Facility: CLINIC | Age: 85
End: 2020-01-01

## 2020-01-01 ENCOUNTER — TELEPHONE (OUTPATIENT)
Dept: INTERNAL MEDICINE | Facility: CLINIC | Age: 85
End: 2020-01-01

## 2020-01-01 ENCOUNTER — OFFICE VISIT (OUTPATIENT)
Dept: CARDIOLOGY | Facility: HOSPITAL | Age: 85
End: 2020-01-01

## 2020-01-01 ENCOUNTER — APPOINTMENT (OUTPATIENT)
Dept: CARDIOLOGY | Facility: HOSPITAL | Age: 85
End: 2020-01-01

## 2020-01-01 ENCOUNTER — APPOINTMENT (OUTPATIENT)
Dept: GENERAL RADIOLOGY | Facility: HOSPITAL | Age: 85
End: 2020-01-01

## 2020-01-01 ENCOUNTER — OFFICE VISIT (OUTPATIENT)
Dept: CARDIOLOGY | Facility: CLINIC | Age: 85
End: 2020-01-01

## 2020-01-01 ENCOUNTER — TRANSITIONAL CARE MANAGEMENT TELEPHONE ENCOUNTER (OUTPATIENT)
Dept: CALL CENTER | Facility: HOSPITAL | Age: 85
End: 2020-01-01

## 2020-01-01 ENCOUNTER — DOCUMENTATION (OUTPATIENT)
Dept: CARDIAC REHAB | Facility: HOSPITAL | Age: 85
End: 2020-01-01

## 2020-01-01 ENCOUNTER — LAB (OUTPATIENT)
Dept: LAB | Facility: HOSPITAL | Age: 85
End: 2020-01-01

## 2020-01-01 ENCOUNTER — HOSPITAL ENCOUNTER (EMERGENCY)
Facility: HOSPITAL | Age: 85
Discharge: HOME | End: 2020-10-04
Attending: EMERGENCY MEDICINE | Admitting: EMERGENCY MEDICINE

## 2020-01-01 ENCOUNTER — TELEPHONE (OUTPATIENT)
Dept: CARDIOLOGY | Facility: HOSPITAL | Age: 85
End: 2020-01-01

## 2020-01-01 ENCOUNTER — HOSPITAL ENCOUNTER (OUTPATIENT)
Dept: GENERAL RADIOLOGY | Facility: HOSPITAL | Age: 85
Discharge: HOME | End: 2020-08-03
Admitting: NURSE PRACTITIONER

## 2020-01-01 ENCOUNTER — READMISSION MANAGEMENT (OUTPATIENT)
Dept: CALL CENTER | Facility: HOSPITAL | Age: 85
End: 2020-01-01

## 2020-01-01 ENCOUNTER — HOSPITAL ENCOUNTER (INPATIENT)
Facility: HOSPITAL | Age: 85
LOS: 4 days | Discharge: HOME HEALTH | End: 2020-10-28
Attending: EMERGENCY MEDICINE | Admitting: HOSPITALIST

## 2020-01-01 ENCOUNTER — HOSPITAL ENCOUNTER (OUTPATIENT)
Dept: GENERAL RADIOLOGY | Facility: HOSPITAL | Age: 85
Discharge: HOME | End: 2020-08-12
Admitting: NURSE PRACTITIONER

## 2020-01-01 ENCOUNTER — HOSPITAL ENCOUNTER (OUTPATIENT)
Facility: HOSPITAL | Age: 85
Discharge: HOME | End: 2020-07-24
Attending: EMERGENCY MEDICINE | Admitting: INTERNAL MEDICINE

## 2020-01-01 ENCOUNTER — HOSPITAL ENCOUNTER (INPATIENT)
Facility: HOSPITAL | Age: 85
LOS: 2 days | Discharge: HOME | End: 2020-10-11
Attending: EMERGENCY MEDICINE | Admitting: HOSPITALIST

## 2020-01-01 VITALS
TEMPERATURE: 97.2 F | OXYGEN SATURATION: 97 % | HEIGHT: 68 IN | WEIGHT: 176.13 LBS | SYSTOLIC BLOOD PRESSURE: 116 MMHG | DIASTOLIC BLOOD PRESSURE: 66 MMHG | RESPIRATION RATE: 16 BRPM | HEART RATE: 84 BPM | BODY MASS INDEX: 26.69 KG/M2

## 2020-01-01 VITALS
DIASTOLIC BLOOD PRESSURE: 68 MMHG | OXYGEN SATURATION: 98 % | TEMPERATURE: 98.2 F | RESPIRATION RATE: 18 BRPM | SYSTOLIC BLOOD PRESSURE: 147 MMHG | WEIGHT: 174 LBS | HEART RATE: 86 BPM | HEIGHT: 68 IN | BODY MASS INDEX: 26.37 KG/M2

## 2020-01-01 VITALS
BODY MASS INDEX: 25.86 KG/M2 | HEIGHT: 69 IN | OXYGEN SATURATION: 97 % | DIASTOLIC BLOOD PRESSURE: 68 MMHG | HEART RATE: 83 BPM | WEIGHT: 174.6 LBS | SYSTOLIC BLOOD PRESSURE: 138 MMHG

## 2020-01-01 VITALS
OXYGEN SATURATION: 95 % | WEIGHT: 174 LBS | SYSTOLIC BLOOD PRESSURE: 132 MMHG | DIASTOLIC BLOOD PRESSURE: 60 MMHG | TEMPERATURE: 97.1 F | HEIGHT: 68 IN | BODY MASS INDEX: 26.37 KG/M2 | HEART RATE: 72 BPM

## 2020-01-01 VITALS
HEIGHT: 69 IN | WEIGHT: 174.13 LBS | BODY MASS INDEX: 25.79 KG/M2 | TEMPERATURE: 97.7 F | SYSTOLIC BLOOD PRESSURE: 138 MMHG | RESPIRATION RATE: 20 BRPM | DIASTOLIC BLOOD PRESSURE: 73 MMHG | OXYGEN SATURATION: 95 % | HEART RATE: 85 BPM

## 2020-01-01 VITALS
WEIGHT: 168.8 LBS | SYSTOLIC BLOOD PRESSURE: 120 MMHG | RESPIRATION RATE: 18 BRPM | HEIGHT: 68 IN | TEMPERATURE: 97.7 F | OXYGEN SATURATION: 91 % | DIASTOLIC BLOOD PRESSURE: 65 MMHG | BODY MASS INDEX: 25.58 KG/M2 | HEART RATE: 71 BPM

## 2020-01-01 VITALS
TEMPERATURE: 97.8 F | OXYGEN SATURATION: 98 % | HEART RATE: 77 BPM | DIASTOLIC BLOOD PRESSURE: 68 MMHG | HEIGHT: 69 IN | SYSTOLIC BLOOD PRESSURE: 134 MMHG | WEIGHT: 172.13 LBS | RESPIRATION RATE: 19 BRPM | BODY MASS INDEX: 25.5 KG/M2

## 2020-01-01 VITALS
HEART RATE: 63 BPM | HEIGHT: 68 IN | OXYGEN SATURATION: 96 % | TEMPERATURE: 97.5 F | SYSTOLIC BLOOD PRESSURE: 117 MMHG | DIASTOLIC BLOOD PRESSURE: 64 MMHG | WEIGHT: 170 LBS | BODY MASS INDEX: 25.76 KG/M2 | RESPIRATION RATE: 18 BRPM

## 2020-01-01 VITALS
SYSTOLIC BLOOD PRESSURE: 118 MMHG | BODY MASS INDEX: 25.76 KG/M2 | HEIGHT: 68 IN | DIASTOLIC BLOOD PRESSURE: 56 MMHG | WEIGHT: 170 LBS | HEART RATE: 72 BPM

## 2020-01-01 VITALS
SYSTOLIC BLOOD PRESSURE: 124 MMHG | DIASTOLIC BLOOD PRESSURE: 62 MMHG | HEIGHT: 68 IN | WEIGHT: 176.4 LBS | BODY MASS INDEX: 26.73 KG/M2 | TEMPERATURE: 97.8 F | HEART RATE: 80 BPM

## 2020-01-01 VITALS
HEIGHT: 68 IN | BODY MASS INDEX: 26.67 KG/M2 | DIASTOLIC BLOOD PRESSURE: 67 MMHG | OXYGEN SATURATION: 98 % | TEMPERATURE: 97.6 F | SYSTOLIC BLOOD PRESSURE: 119 MMHG | RESPIRATION RATE: 20 BRPM | HEART RATE: 83 BPM | WEIGHT: 176 LBS

## 2020-01-01 VITALS
DIASTOLIC BLOOD PRESSURE: 68 MMHG | TEMPERATURE: 97.3 F | HEART RATE: 80 BPM | HEIGHT: 69 IN | SYSTOLIC BLOOD PRESSURE: 118 MMHG | OXYGEN SATURATION: 94 % | WEIGHT: 168.8 LBS | BODY MASS INDEX: 25 KG/M2

## 2020-01-01 VITALS
WEIGHT: 175.25 LBS | OXYGEN SATURATION: 100 % | BODY MASS INDEX: 25.96 KG/M2 | HEART RATE: 81 BPM | TEMPERATURE: 98 F | RESPIRATION RATE: 20 BRPM | HEIGHT: 69 IN | DIASTOLIC BLOOD PRESSURE: 70 MMHG | SYSTOLIC BLOOD PRESSURE: 148 MMHG

## 2020-01-01 VITALS
HEART RATE: 76 BPM | BODY MASS INDEX: 26.4 KG/M2 | DIASTOLIC BLOOD PRESSURE: 70 MMHG | TEMPERATURE: 98.4 F | HEIGHT: 68 IN | WEIGHT: 174.2 LBS | SYSTOLIC BLOOD PRESSURE: 134 MMHG

## 2020-01-01 VITALS
BODY MASS INDEX: 25.76 KG/M2 | DIASTOLIC BLOOD PRESSURE: 68 MMHG | HEART RATE: 89 BPM | OXYGEN SATURATION: 95 % | SYSTOLIC BLOOD PRESSURE: 144 MMHG | WEIGHT: 170 LBS | HEIGHT: 68 IN | RESPIRATION RATE: 16 BRPM | TEMPERATURE: 97.8 F

## 2020-01-01 DIAGNOSIS — I44.2 COMPLETE HEART BLOCK (HCC): Primary | ICD-10-CM

## 2020-01-01 DIAGNOSIS — Z76.89 REFERRED BY PRIMARY CARE PHYSICIAN: ICD-10-CM

## 2020-01-01 DIAGNOSIS — I25.810 CORONARY ARTERY DISEASE INVOLVING CORONARY BYPASS GRAFT OF NATIVE HEART WITHOUT ANGINA PECTORIS: ICD-10-CM

## 2020-01-01 DIAGNOSIS — I35.0 NONRHEUMATIC AORTIC VALVE STENOSIS: ICD-10-CM

## 2020-01-01 DIAGNOSIS — I50.22 CHRONIC SYSTOLIC (CONGESTIVE) HEART FAILURE (HCC): Primary | ICD-10-CM

## 2020-01-01 DIAGNOSIS — N18.32 STAGE 3B CHRONIC KIDNEY DISEASE (HCC): ICD-10-CM

## 2020-01-01 DIAGNOSIS — R60.9 PERIPHERAL EDEMA: ICD-10-CM

## 2020-01-01 DIAGNOSIS — I44.2 COMPLETE HEART BLOCK (HCC): ICD-10-CM

## 2020-01-01 DIAGNOSIS — Z00.00 PREVENTATIVE HEALTH CARE: Primary | ICD-10-CM

## 2020-01-01 DIAGNOSIS — I50.42 CHRONIC COMBINED SYSTOLIC AND DIASTOLIC CHF (CONGESTIVE HEART FAILURE) (HCC): ICD-10-CM

## 2020-01-01 DIAGNOSIS — I50.9 CONGESTIVE HEART FAILURE, UNSPECIFIED HF CHRONICITY, UNSPECIFIED HEART FAILURE TYPE (HCC): Primary | ICD-10-CM

## 2020-01-01 DIAGNOSIS — S22.079D CLOSED FRACTURE OF NINTH THORACIC VERTEBRA WITH ROUTINE HEALING, UNSPECIFIED FRACTURE MORPHOLOGY, SUBSEQUENT ENCOUNTER: ICD-10-CM

## 2020-01-01 DIAGNOSIS — I50.22 CHRONIC SYSTOLIC CONGESTIVE HEART FAILURE (HCC): ICD-10-CM

## 2020-01-01 DIAGNOSIS — I25.810 CORONARY ARTERY DISEASE INVOLVING CORONARY BYPASS GRAFT OF NATIVE HEART WITHOUT ANGINA PECTORIS: Primary | ICD-10-CM

## 2020-01-01 DIAGNOSIS — I42.9 CARDIOMYOPATHY, UNSPECIFIED TYPE (HCC): Primary | ICD-10-CM

## 2020-01-01 DIAGNOSIS — C91.10 CLL (CHRONIC LYMPHOCYTIC LEUKEMIA) (HCC): ICD-10-CM

## 2020-01-01 DIAGNOSIS — I50.23 ACUTE ON CHRONIC SYSTOLIC CHF (CONGESTIVE HEART FAILURE) (HCC): Primary | ICD-10-CM

## 2020-01-01 DIAGNOSIS — I95.9 HYPOTENSION, UNSPECIFIED HYPOTENSION TYPE: ICD-10-CM

## 2020-01-01 DIAGNOSIS — D64.9 CHRONIC ANEMIA: ICD-10-CM

## 2020-01-01 DIAGNOSIS — I25.118 CORONARY ARTERY DISEASE INVOLVING NATIVE CORONARY ARTERY OF NATIVE HEART WITH OTHER FORM OF ANGINA PECTORIS (HCC): ICD-10-CM

## 2020-01-01 DIAGNOSIS — N17.9 ACUTE RENAL FAILURE SUPERIMPOSED ON STAGE 4 CHRONIC KIDNEY DISEASE, UNSPECIFIED ACUTE RENAL FAILURE TYPE (HCC): ICD-10-CM

## 2020-01-01 DIAGNOSIS — I50.41 ACUTE COMBINED SYSTOLIC AND DIASTOLIC CONGESTIVE HEART FAILURE (HCC): Primary | ICD-10-CM

## 2020-01-01 DIAGNOSIS — I10 ESSENTIAL HYPERTENSION: ICD-10-CM

## 2020-01-01 DIAGNOSIS — N18.30 CKD (CHRONIC KIDNEY DISEASE) STAGE 3, GFR 30-59 ML/MIN (HCC): ICD-10-CM

## 2020-01-01 DIAGNOSIS — R09.02 HYPOXIA: ICD-10-CM

## 2020-01-01 DIAGNOSIS — R06.00 DYSPNEA, UNSPECIFIED TYPE: ICD-10-CM

## 2020-01-01 DIAGNOSIS — I25.5 ISCHEMIC CARDIOMYOPATHY: ICD-10-CM

## 2020-01-01 DIAGNOSIS — Z78.9 FAILURE OF OUTPATIENT TREATMENT: ICD-10-CM

## 2020-01-01 DIAGNOSIS — I50.23 ACUTE ON CHRONIC SYSTOLIC CHF (CONGESTIVE HEART FAILURE) (HCC): ICD-10-CM

## 2020-01-01 DIAGNOSIS — I44.2 AV BLOCK, COMPLETE (HCC): ICD-10-CM

## 2020-01-01 DIAGNOSIS — E78.2 MIXED HYPERLIPIDEMIA: ICD-10-CM

## 2020-01-01 DIAGNOSIS — I48.91 ATRIAL FIBRILLATION, UNSPECIFIED TYPE (HCC): ICD-10-CM

## 2020-01-01 DIAGNOSIS — F32.4 MAJOR DEPRESSIVE DISORDER WITH SINGLE EPISODE, IN PARTIAL REMISSION (HCC): ICD-10-CM

## 2020-01-01 DIAGNOSIS — R55 SYNCOPE, UNSPECIFIED SYNCOPE TYPE: Primary | ICD-10-CM

## 2020-01-01 DIAGNOSIS — R63.5 WEIGHT GAIN: ICD-10-CM

## 2020-01-01 DIAGNOSIS — N18.4 ACUTE RENAL FAILURE SUPERIMPOSED ON STAGE 4 CHRONIC KIDNEY DISEASE, UNSPECIFIED ACUTE RENAL FAILURE TYPE (HCC): ICD-10-CM

## 2020-01-01 DIAGNOSIS — I50.42 CHRONIC COMBINED SYSTOLIC AND DIASTOLIC CHF (CONGESTIVE HEART FAILURE) (HCC): Primary | ICD-10-CM

## 2020-01-01 DIAGNOSIS — I73.9 PERIPHERAL VASCULAR DISEASE (HCC): ICD-10-CM

## 2020-01-01 DIAGNOSIS — L97.921 NON-PRESSURE ULCER OF LEFT LOWER EXTREMITY, LIMITED TO BREAKDOWN OF SKIN (HCC): ICD-10-CM

## 2020-01-01 DIAGNOSIS — I50.23 ACUTE ON CHRONIC SYSTOLIC CONGESTIVE HEART FAILURE (HCC): ICD-10-CM

## 2020-01-01 DIAGNOSIS — I50.23 ACUTE ON CHRONIC SYSTOLIC CONGESTIVE HEART FAILURE (HCC): Primary | ICD-10-CM

## 2020-01-01 LAB
ALBUMIN SERPL-MCNC: 3.2 G/DL (ref 3.5–5.2)
ALBUMIN SERPL-MCNC: 3.5 G/DL (ref 3.5–5.2)
ALBUMIN SERPL-MCNC: 3.8 G/DL (ref 3.5–5.2)
ALBUMIN SERPL-MCNC: 3.8 G/DL (ref 3.5–5.2)
ALBUMIN SERPL-MCNC: 4 G/DL (ref 3.5–5.2)
ALBUMIN/GLOB SERPL: 1.1 G/DL
ALBUMIN/GLOB SERPL: 1.1 G/DL
ALBUMIN/GLOB SERPL: 1.2 G/DL
ALBUMIN/GLOB SERPL: 1.3 G/DL
ALBUMIN/GLOB SERPL: 1.4 G/DL
ALP SERPL-CCNC: 106 U/L (ref 39–117)
ALP SERPL-CCNC: 121 U/L (ref 39–117)
ALP SERPL-CCNC: 134 U/L (ref 39–117)
ALP SERPL-CCNC: 142 U/L (ref 39–117)
ALP SERPL-CCNC: 150 U/L (ref 39–117)
ALT SERPL W P-5'-P-CCNC: 11 U/L (ref 1–41)
ALT SERPL W P-5'-P-CCNC: 14 U/L (ref 1–41)
ALT SERPL W P-5'-P-CCNC: 16 U/L (ref 1–41)
ALT SERPL W P-5'-P-CCNC: 18 U/L (ref 1–41)
ALT SERPL W P-5'-P-CCNC: 21 U/L (ref 1–41)
ANION GAP SERPL CALCULATED.3IONS-SCNC: 10 MMOL/L (ref 5–15)
ANION GAP SERPL CALCULATED.3IONS-SCNC: 11 MMOL/L (ref 5–15)
ANION GAP SERPL CALCULATED.3IONS-SCNC: 11 MMOL/L (ref 5–15)
ANION GAP SERPL CALCULATED.3IONS-SCNC: 11.2 MMOL/L (ref 5–15)
ANION GAP SERPL CALCULATED.3IONS-SCNC: 12 MMOL/L (ref 5–15)
ANION GAP SERPL CALCULATED.3IONS-SCNC: 13 MMOL/L (ref 5–15)
ANION GAP SERPL CALCULATED.3IONS-SCNC: 13.5 MMOL/L (ref 5–15)
ANION GAP SERPL CALCULATED.3IONS-SCNC: 14 MMOL/L (ref 5–15)
ANION GAP SERPL CALCULATED.3IONS-SCNC: 14 MMOL/L (ref 5–15)
ANION GAP SERPL CALCULATED.3IONS-SCNC: 8 MMOL/L (ref 5–15)
AST SERPL-CCNC: 17 U/L (ref 1–40)
AST SERPL-CCNC: 18 U/L (ref 1–40)
AST SERPL-CCNC: 20 U/L (ref 1–40)
AST SERPL-CCNC: 24 U/L (ref 1–40)
AST SERPL-CCNC: 24 U/L (ref 1–40)
B PARAPERT DNA SPEC QL NAA+PROBE: NOT DETECTED
B PERT DNA SPEC QL NAA+PROBE: NOT DETECTED
BASOPHILS # BLD AUTO: 0.04 10*3/MM3 (ref 0–0.2)
BASOPHILS # BLD AUTO: 0.05 10*3/MM3 (ref 0–0.2)
BASOPHILS # BLD AUTO: 0.05 10*3/MM3 (ref 0–0.2)
BASOPHILS # BLD AUTO: 0.06 10*3/MM3 (ref 0–0.2)
BASOPHILS # BLD AUTO: 0.07 10*3/MM3 (ref 0–0.2)
BASOPHILS NFR BLD AUTO: 0.5 % (ref 0–1.5)
BASOPHILS NFR BLD AUTO: 0.6 % (ref 0–1.5)
BASOPHILS NFR BLD AUTO: 0.9 % (ref 0–1.5)
BH CV ECHO MEAS - AI DEC SLOPE: 212 CM/SEC^2
BH CV ECHO MEAS - AI MAX PG: 36.7 MMHG
BH CV ECHO MEAS - AI MAX VEL: 302.9 CM/SEC
BH CV ECHO MEAS - AI P1/2T: 418.4 MSEC
BH CV ECHO MEAS - AO MAX PG (FULL): 13.3 MMHG
BH CV ECHO MEAS - AO MAX PG: 16 MMHG
BH CV ECHO MEAS - AO MEAN PG (FULL): 6.9 MMHG
BH CV ECHO MEAS - AO MEAN PG: 8.4 MMHG
BH CV ECHO MEAS - AO ROOT AREA (BSA CORRECTED): 1.6
BH CV ECHO MEAS - AO ROOT AREA: 7.2 CM^2
BH CV ECHO MEAS - AO ROOT DIAM: 3 CM
BH CV ECHO MEAS - AO V2 MAX: 200 CM/SEC
BH CV ECHO MEAS - AO V2 MEAN: 132.8 CM/SEC
BH CV ECHO MEAS - AO V2 VTI: 41.2 CM
BH CV ECHO MEAS - AVA(I,A): 1.4 CM^2
BH CV ECHO MEAS - AVA(I,D): 1.4 CM^2
BH CV ECHO MEAS - AVA(V,A): 1.3 CM^2
BH CV ECHO MEAS - AVA(V,D): 1.3 CM^2
BH CV ECHO MEAS - BSA(HAYCOCK): 2 M^2
BH CV ECHO MEAS - BSA: 1.9 M^2
BH CV ECHO MEAS - BZI_BMI: 26.6 KILOGRAMS/M^2
BH CV ECHO MEAS - BZI_METRIC_HEIGHT: 172.7 CM
BH CV ECHO MEAS - BZI_METRIC_WEIGHT: 79.4 KG
BH CV ECHO MEAS - EDV(CUBED): 248.2 ML
BH CV ECHO MEAS - EDV(MOD-SP2): 247 ML
BH CV ECHO MEAS - EDV(MOD-SP4): 253 ML
BH CV ECHO MEAS - EDV(TEICH): 200.1 ML
BH CV ECHO MEAS - EF(CUBED): 28.5 %
BH CV ECHO MEAS - EF(MOD-SP2): 27.1 %
BH CV ECHO MEAS - EF(MOD-SP4): 45.1 %
BH CV ECHO MEAS - EF(TEICH): 22.6 %
BH CV ECHO MEAS - ESV(CUBED): 177.5 ML
BH CV ECHO MEAS - ESV(MOD-SP2): 180 ML
BH CV ECHO MEAS - ESV(MOD-SP4): 139 ML
BH CV ECHO MEAS - ESV(TEICH): 154.9 ML
BH CV ECHO MEAS - FS: 10.6 %
BH CV ECHO MEAS - IVS/LVPW: 1.3
BH CV ECHO MEAS - IVSD: 1.3 CM
BH CV ECHO MEAS - LA DIMENSION: 5.1 CM
BH CV ECHO MEAS - LA/AO: 1.7
BH CV ECHO MEAS - LAD MAJOR: 6.3 CM
BH CV ECHO MEAS - LAT PEAK E' VEL: 3.5 CM/SEC
BH CV ECHO MEAS - LATERAL E/E' RATIO: 25.5
BH CV ECHO MEAS - LV DIASTOLIC VOL/BSA (35-75): 131 ML/M^2
BH CV ECHO MEAS - LV MASS(C)D: 315.1 GRAMS
BH CV ECHO MEAS - LV MASS(C)DI: 163.1 GRAMS/M^2
BH CV ECHO MEAS - LV MAX PG: 2.7 MMHG
BH CV ECHO MEAS - LV MEAN PG: 1.5 MMHG
BH CV ECHO MEAS - LV SYSTOLIC VOL/BSA (12-30): 72 ML/M^2
BH CV ECHO MEAS - LV V1 MAX: 81.9 CM/SEC
BH CV ECHO MEAS - LV V1 MEAN: 57.1 CM/SEC
BH CV ECHO MEAS - LV V1 VTI: 18.3 CM
BH CV ECHO MEAS - LVIDD: 6.3 CM
BH CV ECHO MEAS - LVIDS: 5.6 CM
BH CV ECHO MEAS - LVLD AP2: 9.4 CM
BH CV ECHO MEAS - LVLD AP4: 9 CM
BH CV ECHO MEAS - LVLS AP2: 8.1 CM
BH CV ECHO MEAS - LVLS AP4: 7.4 CM
BH CV ECHO MEAS - LVOT AREA (M): 3.1 CM^2
BH CV ECHO MEAS - LVOT AREA: 3.1 CM^2
BH CV ECHO MEAS - LVOT DIAM: 2 CM
BH CV ECHO MEAS - LVPWD: 0.99 CM
BH CV ECHO MEAS - MED PEAK E' VEL: 3.3 CM/SEC
BH CV ECHO MEAS - MEDIAL E/E' RATIO: 27.1
BH CV ECHO MEAS - MV A MAX VEL: 63.2 CM/SEC
BH CV ECHO MEAS - MV DEC SLOPE: 404.2 CM/SEC^2
BH CV ECHO MEAS - MV DEC TIME: 0.17 SEC
BH CV ECHO MEAS - MV E MAX VEL: 92.8 CM/SEC
BH CV ECHO MEAS - MV E/A: 1.5
BH CV ECHO MEAS - MV MAX PG: 7.5 MMHG
BH CV ECHO MEAS - MV MEAN PG: 2.3 MMHG
BH CV ECHO MEAS - MV P1/2T MAX VEL: 129.5 CM/SEC
BH CV ECHO MEAS - MV P1/2T: 93.9 MSEC
BH CV ECHO MEAS - MV V2 MAX: 137.2 CM/SEC
BH CV ECHO MEAS - MV V2 MEAN: 69.1 CM/SEC
BH CV ECHO MEAS - MV V2 VTI: 40.4 CM
BH CV ECHO MEAS - MVA P1/2T LCG: 1.7 CM^2
BH CV ECHO MEAS - MVA(P1/2T): 2.3 CM^2
BH CV ECHO MEAS - MVA(VTI): 1.4 CM^2
BH CV ECHO MEAS - PA ACC SLOPE: 622.4 CM/SEC^2
BH CV ECHO MEAS - PA ACC TIME: 0.09 SEC
BH CV ECHO MEAS - PA PR(ACCEL): 39.4 MMHG
BH CV ECHO MEAS - RAP SYSTOLE: 15 MMHG
BH CV ECHO MEAS - RVSP: 53 MMHG
BH CV ECHO MEAS - SI(AO): 154 ML/M^2
BH CV ECHO MEAS - SI(CUBED): 36.6 ML/M^2
BH CV ECHO MEAS - SI(LVOT): 29 ML/M^2
BH CV ECHO MEAS - SI(MOD-SP2): 34.7 ML/M^2
BH CV ECHO MEAS - SI(MOD-SP4): 59 ML/M^2
BH CV ECHO MEAS - SI(TEICH): 23.4 ML/M^2
BH CV ECHO MEAS - SV(AO): 297.3 ML
BH CV ECHO MEAS - SV(CUBED): 70.8 ML
BH CV ECHO MEAS - SV(LVOT): 56 ML
BH CV ECHO MEAS - SV(MOD-SP2): 67 ML
BH CV ECHO MEAS - SV(MOD-SP4): 114 ML
BH CV ECHO MEAS - SV(TEICH): 45.2 ML
BH CV ECHO MEAS - TAPSE (>1.6): 2 CM
BH CV ECHO MEAS - TR MAX PG: 38 MMHG
BH CV ECHO MEAS - TR MAX VEL: 304.8 CM/SEC
BH CV ECHO MEASUREMENTS AVERAGE E/E' RATIO: 27.29
BH CV VAS BP RIGHT ARM: NORMAL MMHG
BH CV XLRA - RV BASE: 4.6 CM
BH CV XLRA - RV LENGTH: 8.2 CM
BH CV XLRA - RV MID: 3.1 CM
BH CV XLRA - TDI S': 7.5 CM/SEC
BILIRUB SERPL-MCNC: 0.3 MG/DL (ref 0–1.2)
BILIRUB SERPL-MCNC: 0.4 MG/DL (ref 0–1.2)
BILIRUB SERPL-MCNC: 0.4 MG/DL (ref 0–1.2)
BILIRUB SERPL-MCNC: 0.6 MG/DL (ref 0–1.2)
BILIRUB SERPL-MCNC: 0.6 MG/DL (ref 0–1.2)
BILIRUB UR QL STRIP: NEGATIVE
BUN SERPL-MCNC: 23 MG/DL (ref 8–23)
BUN SERPL-MCNC: 27 MG/DL (ref 8–23)
BUN SERPL-MCNC: 28 MG/DL (ref 8–23)
BUN SERPL-MCNC: 29 MG/DL (ref 8–23)
BUN SERPL-MCNC: 30 MG/DL (ref 8–23)
BUN SERPL-MCNC: 31 MG/DL (ref 8–23)
BUN SERPL-MCNC: 32 MG/DL (ref 8–23)
BUN/CREAT SERPL: 13 (ref 7–25)
BUN/CREAT SERPL: 14 (ref 7–25)
BUN/CREAT SERPL: 14.5 (ref 7–25)
BUN/CREAT SERPL: 14.6 (ref 7–25)
BUN/CREAT SERPL: 15.3 (ref 7–25)
BUN/CREAT SERPL: 16 (ref 7–25)
BUN/CREAT SERPL: 16 (ref 7–25)
BUN/CREAT SERPL: 16.3 (ref 7–25)
BUN/CREAT SERPL: 16.4 (ref 7–25)
BUN/CREAT SERPL: 16.4 (ref 7–25)
BUN/CREAT SERPL: 16.7 (ref 7–25)
BUN/CREAT SERPL: 16.9 (ref 7–25)
BUN/CREAT SERPL: 16.9 (ref 7–25)
BUN/CREAT SERPL: 17.3 (ref 7–25)
BUN/CREAT SERPL: 17.4 (ref 7–25)
BUN/CREAT SERPL: 17.7 (ref 7–25)
BUN/CREAT SERPL: 18.3 (ref 7–25)
C PNEUM DNA NPH QL NAA+NON-PROBE: NOT DETECTED
CALCIUM SPEC-SCNC: 8.3 MG/DL (ref 8.2–9.6)
CALCIUM SPEC-SCNC: 8.5 MG/DL (ref 8.2–9.6)
CALCIUM SPEC-SCNC: 8.6 MG/DL (ref 8.2–9.6)
CALCIUM SPEC-SCNC: 8.6 MG/DL (ref 8.2–9.6)
CALCIUM SPEC-SCNC: 8.7 MG/DL (ref 8.2–9.6)
CALCIUM SPEC-SCNC: 8.7 MG/DL (ref 8.2–9.6)
CALCIUM SPEC-SCNC: 8.8 MG/DL (ref 8.2–9.6)
CALCIUM SPEC-SCNC: 9 MG/DL (ref 8.2–9.6)
CALCIUM SPEC-SCNC: 9.1 MG/DL (ref 8.2–9.6)
CALCIUM SPEC-SCNC: 9.2 MG/DL (ref 8.2–9.6)
CALCIUM SPEC-SCNC: 9.2 MG/DL (ref 8.2–9.6)
CALCIUM SPEC-SCNC: 9.3 MG/DL (ref 8.2–9.6)
CALCIUM SPEC-SCNC: 9.4 MG/DL (ref 8.2–9.6)
CALCIUM SPEC-SCNC: 9.4 MG/DL (ref 8.2–9.6)
CALCIUM SPEC-SCNC: 9.5 MG/DL (ref 8.2–9.6)
CHLORIDE SERPL-SCNC: 100 MMOL/L (ref 98–107)
CHLORIDE SERPL-SCNC: 101 MMOL/L (ref 98–107)
CHLORIDE SERPL-SCNC: 101 MMOL/L (ref 98–107)
CHLORIDE SERPL-SCNC: 102 MMOL/L (ref 98–107)
CHLORIDE SERPL-SCNC: 103 MMOL/L (ref 98–107)
CHLORIDE SERPL-SCNC: 95 MMOL/L (ref 98–107)
CHLORIDE SERPL-SCNC: 96 MMOL/L (ref 98–107)
CHLORIDE SERPL-SCNC: 96 MMOL/L (ref 98–107)
CHLORIDE SERPL-SCNC: 97 MMOL/L (ref 98–107)
CHLORIDE SERPL-SCNC: 97 MMOL/L (ref 98–107)
CHLORIDE SERPL-SCNC: 98 MMOL/L (ref 98–107)
CHLORIDE SERPL-SCNC: 98 MMOL/L (ref 98–107)
CHLORIDE SERPL-SCNC: 99 MMOL/L (ref 98–107)
CHLORIDE SERPL-SCNC: 99 MMOL/L (ref 98–107)
CHOLEST SERPL-MCNC: 118 MG/DL (ref 0–200)
CLARITY UR: CLEAR
CO2 SERPL-SCNC: 22 MMOL/L (ref 22–29)
CO2 SERPL-SCNC: 25 MMOL/L (ref 22–29)
CO2 SERPL-SCNC: 25.8 MMOL/L (ref 22–29)
CO2 SERPL-SCNC: 26 MMOL/L (ref 22–29)
CO2 SERPL-SCNC: 26.5 MMOL/L (ref 22–29)
CO2 SERPL-SCNC: 27 MMOL/L (ref 22–29)
CO2 SERPL-SCNC: 27 MMOL/L (ref 22–29)
CO2 SERPL-SCNC: 28 MMOL/L (ref 22–29)
CO2 SERPL-SCNC: 29 MMOL/L (ref 22–29)
CO2 SERPL-SCNC: 29 MMOL/L (ref 22–29)
COLOR UR: YELLOW
CREAT SERPL-MCNC: 1.56 MG/DL (ref 0.76–1.27)
CREAT SERPL-MCNC: 1.58 MG/DL (ref 0.76–1.27)
CREAT SERPL-MCNC: 1.59 MG/DL (ref 0.76–1.27)
CREAT SERPL-MCNC: 1.62 MG/DL (ref 0.76–1.27)
CREAT SERPL-MCNC: 1.64 MG/DL (ref 0.76–1.27)
CREAT SERPL-MCNC: 1.67 MG/DL (ref 0.76–1.27)
CREAT SERPL-MCNC: 1.69 MG/DL (ref 0.76–1.27)
CREAT SERPL-MCNC: 1.74 MG/DL (ref 0.76–1.27)
CREAT SERPL-MCNC: 1.75 MG/DL (ref 0.76–1.27)
CREAT SERPL-MCNC: 1.75 MG/DL (ref 0.76–1.27)
CREAT SERPL-MCNC: 1.77 MG/DL (ref 0.76–1.27)
CREAT SERPL-MCNC: 1.77 MG/DL (ref 0.76–1.27)
CREAT SERPL-MCNC: 1.78 MG/DL (ref 0.76–1.27)
CREAT SERPL-MCNC: 1.78 MG/DL (ref 0.76–1.27)
CREAT SERPL-MCNC: 1.79 MG/DL (ref 0.76–1.27)
CREAT SERPL-MCNC: 1.81 MG/DL (ref 0.76–1.27)
CREAT SERPL-MCNC: 1.84 MG/DL (ref 0.76–1.27)
CREAT SERPL-MCNC: 2.03 MG/DL (ref 0.76–1.27)
CREAT SERPL-MCNC: 2.07 MG/DL (ref 0.76–1.27)
DEPRECATED RDW RBC AUTO: 45.2 FL (ref 37–54)
DEPRECATED RDW RBC AUTO: 49.4 FL (ref 37–54)
DEPRECATED RDW RBC AUTO: 49.8 FL (ref 37–54)
DEPRECATED RDW RBC AUTO: 49.9 FL (ref 37–54)
DEPRECATED RDW RBC AUTO: 50.1 FL (ref 37–54)
DEPRECATED RDW RBC AUTO: 50.7 FL (ref 37–54)
DEPRECATED RDW RBC AUTO: 50.9 FL (ref 37–54)
DEPRECATED RDW RBC AUTO: 51 FL (ref 37–54)
DEPRECATED RDW RBC AUTO: 51.6 FL (ref 37–54)
DEPRECATED RDW RBC AUTO: 51.8 FL (ref 37–54)
EOSINOPHIL # BLD AUTO: 0.08 10*3/MM3 (ref 0–0.4)
EOSINOPHIL # BLD AUTO: 0.13 10*3/MM3 (ref 0–0.4)
EOSINOPHIL # BLD AUTO: 0.14 10*3/MM3 (ref 0–0.4)
EOSINOPHIL # BLD AUTO: 0.15 10*3/MM3 (ref 0–0.4)
EOSINOPHIL # BLD AUTO: 0.21 10*3/MM3 (ref 0–0.4)
EOSINOPHIL NFR BLD AUTO: 1 % (ref 0.3–6.2)
EOSINOPHIL NFR BLD AUTO: 1.5 % (ref 0.3–6.2)
EOSINOPHIL NFR BLD AUTO: 1.7 % (ref 0.3–6.2)
EOSINOPHIL NFR BLD AUTO: 2 % (ref 0.3–6.2)
EOSINOPHIL NFR BLD AUTO: 2.1 % (ref 0.3–6.2)
ERYTHROCYTE [DISTWIDTH] IN BLOOD BY AUTOMATED COUNT: 13.2 % (ref 12.3–15.4)
ERYTHROCYTE [DISTWIDTH] IN BLOOD BY AUTOMATED COUNT: 13.7 % (ref 12.3–15.4)
ERYTHROCYTE [DISTWIDTH] IN BLOOD BY AUTOMATED COUNT: 13.8 % (ref 12.3–15.4)
ERYTHROCYTE [DISTWIDTH] IN BLOOD BY AUTOMATED COUNT: 14.5 % (ref 12.3–15.4)
ERYTHROCYTE [DISTWIDTH] IN BLOOD BY AUTOMATED COUNT: 14.6 % (ref 12.3–15.4)
ERYTHROCYTE [DISTWIDTH] IN BLOOD BY AUTOMATED COUNT: 14.7 % (ref 12.3–15.4)
ERYTHROCYTE [DISTWIDTH] IN BLOOD BY AUTOMATED COUNT: 14.9 % (ref 12.3–15.4)
ERYTHROCYTE [DISTWIDTH] IN BLOOD BY AUTOMATED COUNT: 14.9 % (ref 12.3–15.4)
FLUAV H1 2009 PAND RNA NPH QL NAA+PROBE: NOT DETECTED
FLUAV H1 HA GENE NPH QL NAA+PROBE: NOT DETECTED
FLUAV H3 RNA NPH QL NAA+PROBE: NOT DETECTED
FLUAV SUBTYP SPEC NAA+PROBE: NOT DETECTED
FLUBV RNA ISLT QL NAA+PROBE: NOT DETECTED
GFR SERPL CREATININE-BSD FRML MDRD: 30 ML/MIN/1.73
GFR SERPL CREATININE-BSD FRML MDRD: 31 ML/MIN/1.73
GFR SERPL CREATININE-BSD FRML MDRD: 35 ML/MIN/1.73
GFR SERPL CREATININE-BSD FRML MDRD: 35 ML/MIN/1.73
GFR SERPL CREATININE-BSD FRML MDRD: 36 ML/MIN/1.73
GFR SERPL CREATININE-BSD FRML MDRD: 37 ML/MIN/1.73
GFR SERPL CREATININE-BSD FRML MDRD: 38 ML/MIN/1.73
GFR SERPL CREATININE-BSD FRML MDRD: 39 ML/MIN/1.73
GFR SERPL CREATININE-BSD FRML MDRD: 39 ML/MIN/1.73
GFR SERPL CREATININE-BSD FRML MDRD: 40 ML/MIN/1.73
GFR SERPL CREATININE-BSD FRML MDRD: 41 ML/MIN/1.73
GFR SERPL CREATININE-BSD FRML MDRD: 41 ML/MIN/1.73
GFR SERPL CREATININE-BSD FRML MDRD: 42 ML/MIN/1.73
GLOBULIN UR ELPH-MCNC: 2.8 GM/DL
GLOBULIN UR ELPH-MCNC: 2.9 GM/DL
GLOBULIN UR ELPH-MCNC: 3 GM/DL
GLOBULIN UR ELPH-MCNC: 3.1 GM/DL
GLOBULIN UR ELPH-MCNC: 3.2 GM/DL
GLUCOSE SERPL-MCNC: 100 MG/DL (ref 65–99)
GLUCOSE SERPL-MCNC: 102 MG/DL (ref 65–99)
GLUCOSE SERPL-MCNC: 103 MG/DL (ref 65–99)
GLUCOSE SERPL-MCNC: 105 MG/DL (ref 65–99)
GLUCOSE SERPL-MCNC: 111 MG/DL (ref 65–99)
GLUCOSE SERPL-MCNC: 113 MG/DL (ref 65–99)
GLUCOSE SERPL-MCNC: 114 MG/DL (ref 65–99)
GLUCOSE SERPL-MCNC: 115 MG/DL (ref 65–99)
GLUCOSE SERPL-MCNC: 117 MG/DL (ref 65–99)
GLUCOSE SERPL-MCNC: 121 MG/DL (ref 65–99)
GLUCOSE SERPL-MCNC: 126 MG/DL (ref 65–99)
GLUCOSE SERPL-MCNC: 127 MG/DL (ref 65–99)
GLUCOSE SERPL-MCNC: 129 MG/DL (ref 65–99)
GLUCOSE SERPL-MCNC: 162 MG/DL (ref 65–99)
GLUCOSE SERPL-MCNC: 91 MG/DL (ref 65–99)
GLUCOSE SERPL-MCNC: 93 MG/DL (ref 65–99)
GLUCOSE SERPL-MCNC: 97 MG/DL (ref 65–99)
GLUCOSE SERPL-MCNC: 98 MG/DL (ref 65–99)
GLUCOSE SERPL-MCNC: 98 MG/DL (ref 65–99)
GLUCOSE UR STRIP-MCNC: NEGATIVE MG/DL
HADV DNA SPEC NAA+PROBE: NOT DETECTED
HCOV 229E RNA SPEC QL NAA+PROBE: NOT DETECTED
HCOV HKU1 RNA SPEC QL NAA+PROBE: NOT DETECTED
HCOV NL63 RNA SPEC QL NAA+PROBE: NOT DETECTED
HCOV OC43 RNA SPEC QL NAA+PROBE: NOT DETECTED
HCT VFR BLD AUTO: 27.7 % (ref 37.5–51)
HCT VFR BLD AUTO: 28.2 % (ref 37.5–51)
HCT VFR BLD AUTO: 28.6 % (ref 37.5–51)
HCT VFR BLD AUTO: 29.4 % (ref 37.5–51)
HCT VFR BLD AUTO: 29.7 % (ref 37.5–51)
HCT VFR BLD AUTO: 30.6 % (ref 37.5–51)
HCT VFR BLD AUTO: 30.7 % (ref 37.5–51)
HCT VFR BLD AUTO: 30.9 % (ref 37.5–51)
HCT VFR BLD AUTO: 32.4 % (ref 37.5–51)
HCT VFR BLD AUTO: 33.2 % (ref 37.5–51)
HDLC SERPL-MCNC: 43 MG/DL (ref 40–60)
HGB BLD-MCNC: 10.1 G/DL (ref 13–17.7)
HGB BLD-MCNC: 10.4 G/DL (ref 13–17.7)
HGB BLD-MCNC: 8.4 G/DL (ref 13–17.7)
HGB BLD-MCNC: 8.5 G/DL (ref 13–17.7)
HGB BLD-MCNC: 8.5 G/DL (ref 13–17.7)
HGB BLD-MCNC: 9 G/DL (ref 13–17.7)
HGB BLD-MCNC: 9.2 G/DL (ref 13–17.7)
HGB BLD-MCNC: 9.4 G/DL (ref 13–17.7)
HGB BLD-MCNC: 9.5 G/DL (ref 13–17.7)
HGB BLD-MCNC: 9.5 G/DL (ref 13–17.7)
HGB UR QL STRIP.AUTO: NEGATIVE
HMPV RNA NPH QL NAA+NON-PROBE: NOT DETECTED
HOLD SPECIMEN: NORMAL
HPIV1 RNA SPEC QL NAA+PROBE: NOT DETECTED
HPIV2 RNA SPEC QL NAA+PROBE: NOT DETECTED
HPIV3 RNA NPH QL NAA+PROBE: NOT DETECTED
HPIV4 P GENE NPH QL NAA+PROBE: NOT DETECTED
IMM GRANULOCYTES # BLD AUTO: 0.02 10*3/MM3 (ref 0–0.05)
IMM GRANULOCYTES # BLD AUTO: 0.03 10*3/MM3 (ref 0–0.05)
IMM GRANULOCYTES # BLD AUTO: 0.03 10*3/MM3 (ref 0–0.05)
IMM GRANULOCYTES NFR BLD AUTO: 0.2 % (ref 0–0.5)
IMM GRANULOCYTES NFR BLD AUTO: 0.2 % (ref 0–0.5)
IMM GRANULOCYTES NFR BLD AUTO: 0.3 % (ref 0–0.5)
IMM GRANULOCYTES NFR BLD AUTO: 0.4 % (ref 0–0.5)
IMM GRANULOCYTES NFR BLD AUTO: 0.4 % (ref 0–0.5)
INR PPP: 1.25 (ref 0.85–1.16)
KETONES UR QL STRIP: NEGATIVE
LDLC SERPL CALC-MCNC: 62 MG/DL (ref 0–100)
LDLC/HDLC SERPL: 1.44 {RATIO}
LEFT ATRIUM VOLUME INDEX: 46.1 ML/M^2
LEFT ATRIUM VOLUME: 89 ML
LEUKOCYTE ESTERASE UR QL STRIP.AUTO: NEGATIVE
LV EF 2D ECHO EST: 20 %
LYMPHOCYTES # BLD AUTO: 2.61 10*3/MM3 (ref 0.7–3.1)
LYMPHOCYTES # BLD AUTO: 2.9 10*3/MM3 (ref 0.7–3.1)
LYMPHOCYTES # BLD AUTO: 3.29 10*3/MM3 (ref 0.7–3.1)
LYMPHOCYTES # BLD AUTO: 3.41 10*3/MM3 (ref 0.7–3.1)
LYMPHOCYTES # BLD AUTO: 3.94 10*3/MM3 (ref 0.7–3.1)
LYMPHOCYTES NFR BLD AUTO: 31.1 % (ref 19.6–45.3)
LYMPHOCYTES NFR BLD AUTO: 34.8 % (ref 19.6–45.3)
LYMPHOCYTES NFR BLD AUTO: 40.3 % (ref 19.6–45.3)
LYMPHOCYTES NFR BLD AUTO: 41.9 % (ref 19.6–45.3)
LYMPHOCYTES NFR BLD AUTO: 44 % (ref 19.6–45.3)
M PNEUMO IGG SER IA-ACNC: NOT DETECTED
MAGNESIUM SERPL-MCNC: 1.7 MG/DL (ref 1.7–2.3)
MAGNESIUM SERPL-MCNC: 1.7 MG/DL (ref 1.7–2.3)
MAGNESIUM SERPL-MCNC: 1.8 MG/DL (ref 1.7–2.3)
MAGNESIUM SERPL-MCNC: 1.8 MG/DL (ref 1.7–2.3)
MAXIMAL PREDICTED HEART RATE: 127 BPM
MCH RBC QN AUTO: 28.1 PG (ref 26.6–33)
MCH RBC QN AUTO: 28.8 PG (ref 26.6–33)
MCH RBC QN AUTO: 28.9 PG (ref 26.6–33)
MCH RBC QN AUTO: 28.9 PG (ref 26.6–33)
MCH RBC QN AUTO: 29 PG (ref 26.6–33)
MCH RBC QN AUTO: 29.2 PG (ref 26.6–33)
MCH RBC QN AUTO: 29.3 PG (ref 26.6–33)
MCH RBC QN AUTO: 29.7 PG (ref 26.6–33)
MCH RBC QN AUTO: 30 PG (ref 26.6–33)
MCH RBC QN AUTO: 30.9 PG (ref 26.6–33)
MCHC RBC AUTO-ENTMCNC: 29.7 G/DL (ref 31.5–35.7)
MCHC RBC AUTO-ENTMCNC: 29.8 G/DL (ref 31.5–35.7)
MCHC RBC AUTO-ENTMCNC: 30.4 G/DL (ref 31.5–35.7)
MCHC RBC AUTO-ENTMCNC: 30.6 G/DL (ref 31.5–35.7)
MCHC RBC AUTO-ENTMCNC: 30.7 G/DL (ref 31.5–35.7)
MCHC RBC AUTO-ENTMCNC: 30.9 G/DL (ref 31.5–35.7)
MCHC RBC AUTO-ENTMCNC: 31 G/DL (ref 31.5–35.7)
MCHC RBC AUTO-ENTMCNC: 31 G/DL (ref 31.5–35.7)
MCHC RBC AUTO-ENTMCNC: 31.2 G/DL (ref 31.5–35.7)
MCHC RBC AUTO-ENTMCNC: 31.3 G/DL (ref 31.5–35.7)
MCV RBC AUTO: 101.1 FL (ref 79–97)
MCV RBC AUTO: 92.2 FL (ref 79–97)
MCV RBC AUTO: 93.4 FL (ref 79–97)
MCV RBC AUTO: 94.4 FL (ref 79–97)
MCV RBC AUTO: 94.5 FL (ref 79–97)
MCV RBC AUTO: 94.9 FL (ref 79–97)
MCV RBC AUTO: 96.6 FL (ref 79–97)
MCV RBC AUTO: 99.1 FL (ref 79–97)
MONOCYTES # BLD AUTO: 0.5 10*3/MM3 (ref 0.1–0.9)
MONOCYTES # BLD AUTO: 0.51 10*3/MM3 (ref 0.1–0.9)
MONOCYTES # BLD AUTO: 0.55 10*3/MM3 (ref 0.1–0.9)
MONOCYTES # BLD AUTO: 0.55 10*3/MM3 (ref 0.1–0.9)
MONOCYTES # BLD AUTO: 0.63 10*3/MM3 (ref 0.1–0.9)
MONOCYTES NFR BLD AUTO: 6 % (ref 5–12)
MONOCYTES NFR BLD AUTO: 6.3 % (ref 5–12)
MONOCYTES NFR BLD AUTO: 6.4 % (ref 5–12)
MONOCYTES NFR BLD AUTO: 6.6 % (ref 5–12)
MONOCYTES NFR BLD AUTO: 7.4 % (ref 5–12)
NEUTROPHILS NFR BLD AUTO: 3.43 10*3/MM3 (ref 1.7–7)
NEUTROPHILS NFR BLD AUTO: 3.99 10*3/MM3 (ref 1.7–7)
NEUTROPHILS NFR BLD AUTO: 4.73 10*3/MM3 (ref 1.7–7)
NEUTROPHILS NFR BLD AUTO: 4.92 10*3/MM3 (ref 1.7–7)
NEUTROPHILS NFR BLD AUTO: 45.8 % (ref 42.7–76)
NEUTROPHILS NFR BLD AUTO: 49 % (ref 42.7–76)
NEUTROPHILS NFR BLD AUTO: 5.07 10*3/MM3 (ref 1.7–7)
NEUTROPHILS NFR BLD AUTO: 50.4 % (ref 42.7–76)
NEUTROPHILS NFR BLD AUTO: 56.6 % (ref 42.7–76)
NEUTROPHILS NFR BLD AUTO: 60.4 % (ref 42.7–76)
NITRITE UR QL STRIP: NEGATIVE
NRBC BLD AUTO-RTO: 0 /100 WBC (ref 0–0.2)
NT-PROBNP SERPL-MCNC: 6839 PG/ML (ref 0–1800)
NT-PROBNP SERPL-MCNC: 9014 PG/ML (ref 0–1800)
NT-PROBNP SERPL-MCNC: ABNORMAL PG/ML (ref 0–1800)
PH UR STRIP.AUTO: 7 [PH] (ref 5–8)
PLATELET # BLD AUTO: 232 10*3/MM3 (ref 140–450)
PLATELET # BLD AUTO: 237 10*3/MM3 (ref 140–450)
PLATELET # BLD AUTO: 246 10*3/MM3 (ref 140–450)
PLATELET # BLD AUTO: 252 10*3/MM3 (ref 140–450)
PLATELET # BLD AUTO: 261 10*3/MM3 (ref 140–450)
PLATELET # BLD AUTO: 267 10*3/MM3 (ref 140–450)
PLATELET # BLD AUTO: 286 10*3/MM3 (ref 140–450)
PLATELET # BLD AUTO: 293 10*3/MM3 (ref 140–450)
PLATELET # BLD AUTO: 301 10*3/MM3 (ref 140–450)
PLATELET # BLD AUTO: 304 10*3/MM3 (ref 140–450)
PMV BLD AUTO: 10.4 FL (ref 6–12)
PMV BLD AUTO: 10.6 FL (ref 6–12)
PMV BLD AUTO: 10.8 FL (ref 6–12)
PMV BLD AUTO: 10.9 FL (ref 6–12)
PMV BLD AUTO: 11 FL (ref 6–12)
PMV BLD AUTO: 11.1 FL (ref 6–12)
PMV BLD AUTO: 11.1 FL (ref 6–12)
PMV BLD AUTO: 11.3 FL (ref 6–12)
POTASSIUM SERPL-SCNC: 3.2 MMOL/L (ref 3.5–5.2)
POTASSIUM SERPL-SCNC: 3.3 MMOL/L (ref 3.5–5.2)
POTASSIUM SERPL-SCNC: 3.6 MMOL/L (ref 3.5–5.2)
POTASSIUM SERPL-SCNC: 3.6 MMOL/L (ref 3.5–5.2)
POTASSIUM SERPL-SCNC: 3.7 MMOL/L (ref 3.5–5.2)
POTASSIUM SERPL-SCNC: 3.8 MMOL/L (ref 3.5–5.2)
POTASSIUM SERPL-SCNC: 3.8 MMOL/L (ref 3.5–5.2)
POTASSIUM SERPL-SCNC: 3.9 MMOL/L (ref 3.5–5.2)
POTASSIUM SERPL-SCNC: 4 MMOL/L (ref 3.5–5.2)
POTASSIUM SERPL-SCNC: 4.1 MMOL/L (ref 3.5–5.2)
POTASSIUM SERPL-SCNC: 4.3 MMOL/L (ref 3.5–5.2)
POTASSIUM SERPL-SCNC: 4.4 MMOL/L (ref 3.5–5.2)
POTASSIUM SERPL-SCNC: 4.5 MMOL/L (ref 3.5–5.2)
POTASSIUM SERPL-SCNC: 4.5 MMOL/L (ref 3.5–5.2)
PROT SERPL-MCNC: 6 G/DL (ref 6–8.5)
PROT SERPL-MCNC: 6.7 G/DL (ref 6–8.5)
PROT SERPL-MCNC: 6.8 G/DL (ref 6–8.5)
PROT SERPL-MCNC: 6.9 G/DL (ref 6–8.5)
PROT SERPL-MCNC: 6.9 G/DL (ref 6–8.5)
PROT UR QL STRIP: NEGATIVE
PROTHROMBIN TIME: 15.4 SECONDS (ref 11.5–14)
QT INTERVAL: 446 MS
QTC INTERVAL: 521 MS
RBC # BLD AUTO: 2.83 10*6/MM3 (ref 4.14–5.8)
RBC # BLD AUTO: 2.92 10*6/MM3 (ref 4.14–5.8)
RBC # BLD AUTO: 2.93 10*6/MM3 (ref 4.14–5.8)
RBC # BLD AUTO: 3.11 10*6/MM3 (ref 4.14–5.8)
RBC # BLD AUTO: 3.18 10*6/MM3 (ref 4.14–5.8)
RBC # BLD AUTO: 3.24 10*6/MM3 (ref 4.14–5.8)
RBC # BLD AUTO: 3.25 10*6/MM3 (ref 4.14–5.8)
RBC # BLD AUTO: 3.27 10*6/MM3 (ref 4.14–5.8)
RBC # BLD AUTO: 3.35 10*6/MM3 (ref 4.14–5.8)
RBC # BLD AUTO: 3.5 10*6/MM3 (ref 4.14–5.8)
RHINOVIRUS RNA SPEC NAA+PROBE: NOT DETECTED
RSV RNA NPH QL NAA+NON-PROBE: NOT DETECTED
SARS-COV-2 RNA NPH QL NAA+NON-PROBE: NOT DETECTED
SARS-COV-2 RNA RESP QL NAA+PROBE: NOT DETECTED
SARS-COV-2 RNA RESP QL NAA+PROBE: NOT DETECTED
SODIUM SERPL-SCNC: 135 MMOL/L (ref 136–145)
SODIUM SERPL-SCNC: 136 MMOL/L (ref 136–145)
SODIUM SERPL-SCNC: 137 MMOL/L (ref 136–145)
SODIUM SERPL-SCNC: 137 MMOL/L (ref 136–145)
SODIUM SERPL-SCNC: 138 MMOL/L (ref 136–145)
SODIUM SERPL-SCNC: 139 MMOL/L (ref 136–145)
SODIUM SERPL-SCNC: 140 MMOL/L (ref 136–145)
SODIUM SERPL-SCNC: 141 MMOL/L (ref 136–145)
SODIUM SERPL-SCNC: 142 MMOL/L (ref 136–145)
SP GR UR STRIP: 1.01 (ref 1–1.03)
STRESS TARGET HR: 108 BPM
TRIGL SERPL-MCNC: 66 MG/DL (ref 0–150)
TROPONIN T SERPL-MCNC: 0.02 NG/ML (ref 0–0.03)
TROPONIN T SERPL-MCNC: 0.02 NG/ML (ref 0–0.03)
TROPONIN T SERPL-MCNC: 0.03 NG/ML (ref 0–0.03)
TSH SERPL DL<=0.05 MIU/L-ACNC: 3.75 UIU/ML (ref 0.27–4.2)
TSH SERPL DL<=0.05 MIU/L-ACNC: 3.82 UIU/ML (ref 0.27–4.2)
UROBILINOGEN UR QL STRIP: NORMAL
VLDLC SERPL-MCNC: 13.2 MG/DL
WBC # BLD AUTO: 7.36 10*3/MM3 (ref 3.4–10.8)
WBC # BLD AUTO: 7.48 10*3/MM3 (ref 3.4–10.8)
WBC # BLD AUTO: 8.14 10*3/MM3 (ref 3.4–10.8)
WBC # BLD AUTO: 8.34 10*3/MM3 (ref 3.4–10.8)
WBC # BLD AUTO: 8.34 10*3/MM3 (ref 3.4–10.8)
WBC # BLD AUTO: 8.39 10*3/MM3 (ref 3.4–10.8)
WBC # BLD AUTO: 8.76 10*3/MM3 (ref 3.4–10.8)
WBC # BLD AUTO: 9.63 10*3/MM3 (ref 3.4–10.8)
WBC # BLD AUTO: 9.65 10*3/MM3 (ref 3.4–10.8)
WBC # BLD AUTO: 9.78 10*3/MM3 (ref 3.4–10.8)
WHOLE BLOOD HOLD SPECIMEN: NORMAL

## 2020-01-01 PROCEDURE — 99283 EMERGENCY DEPT VISIT LOW MDM: CPT

## 2020-01-01 PROCEDURE — 99233 SBSQ HOSP IP/OBS HIGH 50: CPT | Performed by: HOSPITALIST

## 2020-01-01 PROCEDURE — 93306 TTE W/DOPPLER COMPLETE: CPT

## 2020-01-01 PROCEDURE — G0008 ADMIN INFLUENZA VIRUS VAC: HCPCS | Performed by: INTERNAL MEDICINE

## 2020-01-01 PROCEDURE — 63710000001 CARVEDILOL 6.25 MG TABLET: Performed by: INTERNAL MEDICINE

## 2020-01-01 PROCEDURE — 85027 COMPLETE CBC AUTOMATED: CPT | Performed by: INTERNAL MEDICINE

## 2020-01-01 PROCEDURE — 80048 BASIC METABOLIC PNL TOTAL CA: CPT | Performed by: NURSE PRACTITIONER

## 2020-01-01 PROCEDURE — 71045 X-RAY EXAM CHEST 1 VIEW: CPT

## 2020-01-01 PROCEDURE — 85027 COMPLETE CBC AUTOMATED: CPT | Performed by: NURSE PRACTITIONER

## 2020-01-01 PROCEDURE — C1786 PMKR, SINGLE, RATE-RESP: HCPCS | Performed by: INTERNAL MEDICINE

## 2020-01-01 PROCEDURE — 87635 SARS-COV-2 COVID-19 AMP PRB: CPT | Performed by: EMERGENCY MEDICINE

## 2020-01-01 PROCEDURE — 25010000002 ONDANSETRON PER 1 MG: Performed by: EMERGENCY MEDICINE

## 2020-01-01 PROCEDURE — U0004 COV-19 TEST NON-CDC HGH THRU: HCPCS | Performed by: INTERNAL MEDICINE

## 2020-01-01 PROCEDURE — 93005 ELECTROCARDIOGRAM TRACING: CPT | Performed by: EMERGENCY MEDICINE

## 2020-01-01 PROCEDURE — 83880 ASSAY OF NATRIURETIC PEPTIDE: CPT | Performed by: NURSE PRACTITIONER

## 2020-01-01 PROCEDURE — 99214 OFFICE O/P EST MOD 30 MIN: CPT | Performed by: INTERNAL MEDICINE

## 2020-01-01 PROCEDURE — A9270 NON-COVERED ITEM OR SERVICE: HCPCS | Performed by: INTERNAL MEDICINE

## 2020-01-01 PROCEDURE — 80048 BASIC METABOLIC PNL TOTAL CA: CPT | Performed by: INTERNAL MEDICINE

## 2020-01-01 PROCEDURE — 25010000003 LIDOCAINE 1 % SOLUTION: Performed by: INTERNAL MEDICINE

## 2020-01-01 PROCEDURE — 25010000002 MIDAZOLAM PER 1 MG: Performed by: INTERNAL MEDICINE

## 2020-01-01 PROCEDURE — 71046 X-RAY EXAM CHEST 2 VIEWS: CPT

## 2020-01-01 PROCEDURE — 63710000001 FERROUS SULFATE 325 (65 FE) MG TABLET: Performed by: INTERNAL MEDICINE

## 2020-01-01 PROCEDURE — 84484 ASSAY OF TROPONIN QUANT: CPT | Performed by: EMERGENCY MEDICINE

## 2020-01-01 PROCEDURE — C1894 INTRO/SHEATH, NON-LASER: HCPCS | Performed by: INTERNAL MEDICINE

## 2020-01-01 PROCEDURE — 83735 ASSAY OF MAGNESIUM: CPT | Performed by: INTERNAL MEDICINE

## 2020-01-01 PROCEDURE — 99152 MOD SED SAME PHYS/QHP 5/>YRS: CPT | Performed by: INTERNAL MEDICINE

## 2020-01-01 PROCEDURE — 83880 ASSAY OF NATRIURETIC PEPTIDE: CPT | Performed by: INTERNAL MEDICINE

## 2020-01-01 PROCEDURE — 80053 COMPREHEN METABOLIC PANEL: CPT | Performed by: EMERGENCY MEDICINE

## 2020-01-01 PROCEDURE — G0439 PPPS, SUBSEQ VISIT: HCPCS | Performed by: INTERNAL MEDICINE

## 2020-01-01 PROCEDURE — 85610 PROTHROMBIN TIME: CPT | Performed by: EMERGENCY MEDICINE

## 2020-01-01 PROCEDURE — 83735 ASSAY OF MAGNESIUM: CPT | Performed by: HOSPITALIST

## 2020-01-01 PROCEDURE — 99223 1ST HOSP IP/OBS HIGH 75: CPT | Performed by: NURSE PRACTITIONER

## 2020-01-01 PROCEDURE — 25010000002 FUROSEMIDE PER 20 MG: Performed by: NURSE PRACTITIONER

## 2020-01-01 PROCEDURE — 25010000002 FUROSEMIDE PER 20 MG: Performed by: EMERGENCY MEDICINE

## 2020-01-01 PROCEDURE — 83880 ASSAY OF NATRIURETIC PEPTIDE: CPT | Performed by: EMERGENCY MEDICINE

## 2020-01-01 PROCEDURE — 84443 ASSAY THYROID STIM HORMONE: CPT | Performed by: NURSE PRACTITIONER

## 2020-01-01 PROCEDURE — 99232 SBSQ HOSP IP/OBS MODERATE 35: CPT | Performed by: INTERNAL MEDICINE

## 2020-01-01 PROCEDURE — 99239 HOSP IP/OBS DSCHRG MGMT >30: CPT | Performed by: HOSPITALIST

## 2020-01-01 PROCEDURE — 93010 ELECTROCARDIOGRAM REPORT: CPT | Performed by: INTERNAL MEDICINE

## 2020-01-01 PROCEDURE — 33210 INSERT ELECTRD/PM CATH SNGL: CPT | Performed by: INTERNAL MEDICINE

## 2020-01-01 PROCEDURE — 80061 LIPID PANEL: CPT | Performed by: NURSE PRACTITIONER

## 2020-01-01 PROCEDURE — G0378 HOSPITAL OBSERVATION PER HR: HCPCS

## 2020-01-01 PROCEDURE — 84443 ASSAY THYROID STIM HORMONE: CPT | Performed by: EMERGENCY MEDICINE

## 2020-01-01 PROCEDURE — 96375 TX/PRO/DX INJ NEW DRUG ADDON: CPT

## 2020-01-01 PROCEDURE — 99495 TRANSJ CARE MGMT MOD F2F 14D: CPT | Performed by: INTERNAL MEDICINE

## 2020-01-01 PROCEDURE — 93279 PRGRMG DEV EVAL PM/LDLS PM: CPT | Performed by: NURSE PRACTITIONER

## 2020-01-01 PROCEDURE — 36415 COLL VENOUS BLD VENIPUNCTURE: CPT

## 2020-01-01 PROCEDURE — 99284 EMERGENCY DEPT VISIT MOD MDM: CPT

## 2020-01-01 PROCEDURE — 99024 POSTOP FOLLOW-UP VISIT: CPT | Performed by: INTERNAL MEDICINE

## 2020-01-01 PROCEDURE — 33274 TCAT INSJ/RPL PERM LDLS PM: CPT | Performed by: INTERNAL MEDICINE

## 2020-01-01 PROCEDURE — 81003 URINALYSIS AUTO W/O SCOPE: CPT | Performed by: INTERNAL MEDICINE

## 2020-01-01 PROCEDURE — 25010000002 SULFUR HEXAFLUORIDE MICROSPH 60.7-25 MG RECONSTITUTED SUSPENSION: Performed by: PHYSICIAN ASSISTANT

## 2020-01-01 PROCEDURE — 25010000002 PROCHLORPERAZINE 10 MG/2ML SOLUTION: Performed by: INTERNAL MEDICINE

## 2020-01-01 PROCEDURE — 25010000002 FUROSEMIDE PER 20 MG: Performed by: INTERNAL MEDICINE

## 2020-01-01 PROCEDURE — 99231 SBSQ HOSP IP/OBS SF/LOW 25: CPT | Performed by: NURSE PRACTITIONER

## 2020-01-01 PROCEDURE — 99214 OFFICE O/P EST MOD 30 MIN: CPT | Performed by: NURSE PRACTITIONER

## 2020-01-01 PROCEDURE — 96374 THER/PROPH/DIAG INJ IV PUSH: CPT

## 2020-01-01 PROCEDURE — 93005 ELECTROCARDIOGRAM TRACING: CPT | Performed by: INTERNAL MEDICINE

## 2020-01-01 PROCEDURE — 85025 COMPLETE CBC W/AUTO DIFF WBC: CPT | Performed by: EMERGENCY MEDICINE

## 2020-01-01 PROCEDURE — 97161 PT EVAL LOW COMPLEX 20 MIN: CPT | Performed by: PHYSICAL THERAPIST

## 2020-01-01 PROCEDURE — 25010000003 CEFAZOLIN IN DEXTROSE 2-4 GM/100ML-% SOLUTION: Performed by: INTERNAL MEDICINE

## 2020-01-01 PROCEDURE — 63710000001 SACUBITRIL-VALSARTAN 24-26 MG TABLET: Performed by: INTERNAL MEDICINE

## 2020-01-01 PROCEDURE — 99223 1ST HOSP IP/OBS HIGH 75: CPT | Performed by: INTERNAL MEDICINE

## 2020-01-01 PROCEDURE — 99232 SBSQ HOSP IP/OBS MODERATE 35: CPT | Performed by: NURSE PRACTITIONER

## 2020-01-01 PROCEDURE — 85025 COMPLETE CBC W/AUTO DIFF WBC: CPT | Performed by: INTERNAL MEDICINE

## 2020-01-01 PROCEDURE — 25010000002 ONDANSETRON PER 1 MG: Performed by: INTERNAL MEDICINE

## 2020-01-01 PROCEDURE — 83735 ASSAY OF MAGNESIUM: CPT | Performed by: NURSE PRACTITIONER

## 2020-01-01 PROCEDURE — 99222 1ST HOSP IP/OBS MODERATE 55: CPT | Performed by: INTERNAL MEDICINE

## 2020-01-01 PROCEDURE — 63710000001 ASPIRIN 81 MG TABLET DELAYED-RELEASE: Performed by: INTERNAL MEDICINE

## 2020-01-01 PROCEDURE — 36415 COLL VENOUS BLD VENIPUNCTURE: CPT | Performed by: NURSE PRACTITIONER

## 2020-01-01 PROCEDURE — 25010000002 FENTANYL CITRATE (PF) 100 MCG/2ML SOLUTION: Performed by: INTERNAL MEDICINE

## 2020-01-01 PROCEDURE — 93005 ELECTROCARDIOGRAM TRACING: CPT

## 2020-01-01 PROCEDURE — 99442 PR PHYS/QHP TELEPHONE EVALUATION 11-20 MIN: CPT | Performed by: INTERNAL MEDICINE

## 2020-01-01 PROCEDURE — C9803 HOPD COVID-19 SPEC COLLECT: HCPCS | Performed by: INTERNAL MEDICINE

## 2020-01-01 PROCEDURE — 99213 OFFICE O/P EST LOW 20 MIN: CPT | Performed by: NURSE PRACTITIONER

## 2020-01-01 PROCEDURE — 99285 EMERGENCY DEPT VISIT HI MDM: CPT

## 2020-01-01 PROCEDURE — 96365 THER/PROPH/DIAG IV INF INIT: CPT

## 2020-01-01 PROCEDURE — 25010000002 MIDAZOLAM PER 1 MG: Performed by: EMERGENCY MEDICINE

## 2020-01-01 PROCEDURE — 80048 BASIC METABOLIC PNL TOTAL CA: CPT

## 2020-01-01 PROCEDURE — 63710000001 CLOPIDOGREL 75 MG TABLET: Performed by: INTERNAL MEDICINE

## 2020-01-01 PROCEDURE — 99232 SBSQ HOSP IP/OBS MODERATE 35: CPT | Performed by: HOSPITALIST

## 2020-01-01 PROCEDURE — 0202U NFCT DS 22 TRGT SARS-COV-2: CPT | Performed by: INTERNAL MEDICINE

## 2020-01-01 PROCEDURE — 80053 COMPREHEN METABOLIC PANEL: CPT | Performed by: NURSE PRACTITIONER

## 2020-01-01 PROCEDURE — 93306 TTE W/DOPPLER COMPLETE: CPT | Performed by: INTERNAL MEDICINE

## 2020-01-01 PROCEDURE — 25010000002 FENTANYL CITRATE (PF) 100 MCG/2ML SOLUTION: Performed by: EMERGENCY MEDICINE

## 2020-01-01 PROCEDURE — 85025 COMPLETE CBC W/AUTO DIFF WBC: CPT

## 2020-01-01 PROCEDURE — 83735 ASSAY OF MAGNESIUM: CPT | Performed by: EMERGENCY MEDICINE

## 2020-01-01 PROCEDURE — 93288 INTERROG EVL PM/LDLS PM IP: CPT | Performed by: NURSE PRACTITIONER

## 2020-01-01 PROCEDURE — 90694 VACC AIIV4 NO PRSRV 0.5ML IM: CPT | Performed by: INTERNAL MEDICINE

## 2020-01-01 PROCEDURE — C1769 GUIDE WIRE: HCPCS | Performed by: INTERNAL MEDICINE

## 2020-01-01 PROCEDURE — 97165 OT EVAL LOW COMPLEX 30 MIN: CPT

## 2020-01-01 PROCEDURE — C9803 HOPD COVID-19 SPEC COLLECT: HCPCS

## 2020-01-01 PROCEDURE — 99024 POSTOP FOLLOW-UP VISIT: CPT | Performed by: NURSE PRACTITIONER

## 2020-01-01 PROCEDURE — 97535 SELF CARE MNGMENT TRAINING: CPT

## 2020-01-01 DEVICE — GEN PM TPS LEADLESS MICRA/AV VVIR RR 1.75G 0.8CC 18MM: Type: IMPLANTABLE DEVICE | Status: FUNCTIONAL

## 2020-01-01 RX ORDER — TRAZODONE HYDROCHLORIDE 100 MG/1
100 TABLET ORAL NIGHTLY
Status: DISCONTINUED | OUTPATIENT
Start: 2020-01-01 | End: 2020-01-01 | Stop reason: HOSPADM

## 2020-01-01 RX ORDER — METOLAZONE 2.5 MG/1
2.5 TABLET ORAL ONCE
Status: COMPLETED | OUTPATIENT
Start: 2020-01-01 | End: 2020-01-01

## 2020-01-01 RX ORDER — CLOPIDOGREL BISULFATE 75 MG/1
75 TABLET ORAL DAILY
Status: DISCONTINUED | OUTPATIENT
Start: 2020-01-01 | End: 2020-01-01 | Stop reason: HOSPADM

## 2020-01-01 RX ORDER — CARVEDILOL 6.25 MG/1
6.25 TABLET ORAL 2 TIMES DAILY WITH MEALS
Status: DISCONTINUED | OUTPATIENT
Start: 2020-01-01 | End: 2020-01-01

## 2020-01-01 RX ORDER — BUMETANIDE 0.25 MG/ML
2 INJECTION INTRAMUSCULAR; INTRAVENOUS EVERY 12 HOURS
Status: DISCONTINUED | OUTPATIENT
Start: 2020-01-01 | End: 2020-01-01 | Stop reason: HOSPADM

## 2020-01-01 RX ORDER — BUMETANIDE 0.25 MG/ML
1 INJECTION INTRAMUSCULAR; INTRAVENOUS ONCE
Status: COMPLETED | OUTPATIENT
Start: 2020-01-01 | End: 2020-01-01

## 2020-01-01 RX ORDER — ACETAMINOPHEN 325 MG/1
650 TABLET ORAL EVERY 4 HOURS PRN
Status: DISCONTINUED | OUTPATIENT
Start: 2020-01-01 | End: 2020-01-01 | Stop reason: HOSPADM

## 2020-01-01 RX ORDER — FUROSEMIDE 10 MG/ML
80 INJECTION INTRAMUSCULAR; INTRAVENOUS ONCE
Status: DISCONTINUED | OUTPATIENT
Start: 2020-01-01 | End: 2020-01-01

## 2020-01-01 RX ORDER — NOREPINEPHRINE BIT/0.9 % NACL 8 MG/250ML
.02-.3 INFUSION BOTTLE (ML) INTRAVENOUS
Status: DISCONTINUED | OUTPATIENT
Start: 2020-01-01 | End: 2020-01-01 | Stop reason: HOSPADM

## 2020-01-01 RX ORDER — ONDANSETRON 2 MG/ML
INJECTION INTRAMUSCULAR; INTRAVENOUS AS NEEDED
Status: DISCONTINUED | OUTPATIENT
Start: 2020-01-01 | End: 2020-01-01 | Stop reason: HOSPADM

## 2020-01-01 RX ORDER — ATORVASTATIN CALCIUM 40 MG/1
40 TABLET, FILM COATED ORAL DAILY
Status: DISCONTINUED | OUTPATIENT
Start: 2020-01-01 | End: 2020-01-01 | Stop reason: HOSPADM

## 2020-01-01 RX ORDER — FUROSEMIDE 10 MG/ML
40 INJECTION INTRAMUSCULAR; INTRAVENOUS ONCE
Status: COMPLETED | OUTPATIENT
Start: 2020-01-01 | End: 2020-01-01

## 2020-01-01 RX ORDER — METOLAZONE 5 MG/1
5 TABLET ORAL DAILY
Qty: 30 TABLET | Refills: 1 | Status: SHIPPED | OUTPATIENT
Start: 2020-01-01

## 2020-01-01 RX ORDER — ONDANSETRON 2 MG/ML
4 INJECTION INTRAMUSCULAR; INTRAVENOUS EVERY 6 HOURS PRN
Status: DISCONTINUED | OUTPATIENT
Start: 2020-01-01 | End: 2020-01-01 | Stop reason: HOSPADM

## 2020-01-01 RX ORDER — POTASSIUM CHLORIDE 750 MG/1
40 CAPSULE, EXTENDED RELEASE ORAL AS NEEDED
Status: DISCONTINUED | OUTPATIENT
Start: 2020-01-01 | End: 2020-01-01

## 2020-01-01 RX ORDER — MAGNESIUM SULFATE HEPTAHYDRATE 40 MG/ML
4 INJECTION, SOLUTION INTRAVENOUS AS NEEDED
Status: DISCONTINUED | OUTPATIENT
Start: 2020-01-01 | End: 2020-01-01

## 2020-01-01 RX ORDER — CHOLECALCIFEROL (VITAMIN D3) 125 MCG
5 CAPSULE ORAL NIGHTLY PRN
Status: DISCONTINUED | OUTPATIENT
Start: 2020-01-01 | End: 2020-01-01 | Stop reason: HOSPADM

## 2020-01-01 RX ORDER — ACETAMINOPHEN 160 MG/5ML
650 SOLUTION ORAL EVERY 4 HOURS PRN
Status: DISCONTINUED | OUTPATIENT
Start: 2020-01-01 | End: 2020-01-01

## 2020-01-01 RX ORDER — NITROGLYCERIN 0.4 MG/1
0.4 TABLET SUBLINGUAL
Status: DISCONTINUED | OUTPATIENT
Start: 2020-01-01 | End: 2020-01-01 | Stop reason: HOSPADM

## 2020-01-01 RX ORDER — SODIUM CHLORIDE 0.9 % (FLUSH) 0.9 %
10 SYRINGE (ML) INJECTION EVERY 12 HOURS SCHEDULED
Status: DISCONTINUED | OUTPATIENT
Start: 2020-01-01 | End: 2020-01-01 | Stop reason: HOSPADM

## 2020-01-01 RX ORDER — POTASSIUM CHLORIDE 750 MG/1
40 CAPSULE, EXTENDED RELEASE ORAL AS NEEDED
Status: DISCONTINUED | OUTPATIENT
Start: 2020-01-01 | End: 2020-01-01 | Stop reason: HOSPADM

## 2020-01-01 RX ORDER — POTASSIUM CHLORIDE 1.5 G/1.77G
40 POWDER, FOR SOLUTION ORAL AS NEEDED
Status: DISCONTINUED | OUTPATIENT
Start: 2020-01-01 | End: 2020-01-01 | Stop reason: HOSPADM

## 2020-01-01 RX ORDER — ASPIRIN 81 MG/1
81 TABLET ORAL DAILY
Status: DISCONTINUED | OUTPATIENT
Start: 2020-01-01 | End: 2020-01-01 | Stop reason: HOSPADM

## 2020-01-01 RX ORDER — BUMETANIDE 2 MG/1
2 TABLET ORAL ONCE
Status: COMPLETED | OUTPATIENT
Start: 2020-01-01 | End: 2020-01-01

## 2020-01-01 RX ORDER — MAGNESIUM SULFATE HEPTAHYDRATE 40 MG/ML
2 INJECTION, SOLUTION INTRAVENOUS AS NEEDED
Status: DISCONTINUED | OUTPATIENT
Start: 2020-01-01 | End: 2020-01-01

## 2020-01-01 RX ORDER — METOLAZONE 2.5 MG/1
2.5 TABLET ORAL DAILY PRN
Qty: 30 TABLET | Refills: 0 | Status: SHIPPED | OUTPATIENT
Start: 2020-01-01 | End: 2020-01-01 | Stop reason: DRUGHIGH

## 2020-01-01 RX ORDER — CEFAZOLIN SODIUM 2 G/100ML
2 INJECTION, SOLUTION INTRAVENOUS ONCE
Status: DISCONTINUED | OUTPATIENT
Start: 2020-01-01 | End: 2020-01-01 | Stop reason: HOSPADM

## 2020-01-01 RX ORDER — MAGNESIUM SULFATE 1 G/100ML
1 INJECTION INTRAVENOUS AS NEEDED
Status: DISCONTINUED | OUTPATIENT
Start: 2020-01-01 | End: 2020-01-01 | Stop reason: HOSPADM

## 2020-01-01 RX ORDER — BUPIVACAINE HYDROCHLORIDE 5 MG/ML
INJECTION, SOLUTION PERINEURAL AS NEEDED
Status: DISCONTINUED | OUTPATIENT
Start: 2020-01-01 | End: 2020-01-01 | Stop reason: HOSPADM

## 2020-01-01 RX ORDER — TORSEMIDE 20 MG/1
20 TABLET ORAL DAILY
COMMUNITY
End: 2020-01-01 | Stop reason: SDUPTHER

## 2020-01-01 RX ORDER — SODIUM CHLORIDE 0.9 % (FLUSH) 0.9 %
10 SYRINGE (ML) INJECTION AS NEEDED
Status: DISCONTINUED | OUTPATIENT
Start: 2020-01-01 | End: 2020-01-01 | Stop reason: HOSPADM

## 2020-01-01 RX ORDER — MAGNESIUM SULFATE HEPTAHYDRATE 40 MG/ML
2 INJECTION, SOLUTION INTRAVENOUS AS NEEDED
Status: DISCONTINUED | OUTPATIENT
Start: 2020-01-01 | End: 2020-01-01 | Stop reason: HOSPADM

## 2020-01-01 RX ORDER — ALPRAZOLAM 0.5 MG/1
0.5 TABLET ORAL 2 TIMES DAILY PRN
Status: DISCONTINUED | OUTPATIENT
Start: 2020-01-01 | End: 2020-01-01

## 2020-01-01 RX ORDER — FERROUS SULFATE 325(65) MG
325 TABLET ORAL EVERY OTHER DAY
Status: DISCONTINUED | OUTPATIENT
Start: 2020-01-01 | End: 2020-01-01 | Stop reason: HOSPADM

## 2020-01-01 RX ORDER — CARVEDILOL 6.25 MG/1
3.12 TABLET ORAL 2 TIMES DAILY WITH MEALS
COMMUNITY

## 2020-01-01 RX ORDER — MIDAZOLAM HYDROCHLORIDE 1 MG/ML
0.5 INJECTION INTRAMUSCULAR; INTRAVENOUS ONCE
Status: COMPLETED | OUTPATIENT
Start: 2020-01-01 | End: 2020-01-01

## 2020-01-01 RX ORDER — ALPRAZOLAM 0.25 MG/1
0.25 TABLET ORAL DAILY PRN
Status: DISCONTINUED | OUTPATIENT
Start: 2020-01-01 | End: 2020-01-01 | Stop reason: HOSPADM

## 2020-01-01 RX ORDER — ALPRAZOLAM 0.25 MG/1
0.25 TABLET ORAL 2 TIMES DAILY PRN
Status: DISCONTINUED | OUTPATIENT
Start: 2020-01-01 | End: 2020-01-01

## 2020-01-01 RX ORDER — ATORVASTATIN CALCIUM 40 MG/1
40 TABLET, FILM COATED ORAL NIGHTLY
Status: DISCONTINUED | OUTPATIENT
Start: 2020-01-01 | End: 2020-01-01 | Stop reason: HOSPADM

## 2020-01-01 RX ORDER — LIDOCAINE HYDROCHLORIDE 10 MG/ML
INJECTION, SOLUTION INFILTRATION; PERINEURAL AS NEEDED
Status: DISCONTINUED | OUTPATIENT
Start: 2020-01-01 | End: 2020-01-01 | Stop reason: HOSPADM

## 2020-01-01 RX ORDER — ONDANSETRON 2 MG/ML
4 INJECTION INTRAMUSCULAR; INTRAVENOUS EVERY 6 HOURS PRN
Status: DISCONTINUED | OUTPATIENT
Start: 2020-01-01 | End: 2020-01-01

## 2020-01-01 RX ORDER — NITROGLYCERIN 0.4 MG/1
0.4 TABLET SUBLINGUAL
Status: DISCONTINUED | OUTPATIENT
Start: 2020-01-01 | End: 2020-01-01 | Stop reason: SDUPTHER

## 2020-01-01 RX ORDER — BUMETANIDE 0.25 MG/ML
1 INJECTION INTRAMUSCULAR; INTRAVENOUS DAILY
Status: DISCONTINUED | OUTPATIENT
Start: 2020-01-01 | End: 2020-01-01

## 2020-01-01 RX ORDER — TORSEMIDE 20 MG/1
40 TABLET ORAL DAILY
Status: ON HOLD
Start: 2020-01-01 | End: 2020-01-01 | Stop reason: SDUPTHER

## 2020-01-01 RX ORDER — CARVEDILOL 6.25 MG/1
6.25 TABLET ORAL 2 TIMES DAILY WITH MEALS
Status: DISCONTINUED | OUTPATIENT
Start: 2020-01-01 | End: 2020-01-01 | Stop reason: HOSPADM

## 2020-01-01 RX ORDER — MIDAZOLAM HYDROCHLORIDE 1 MG/ML
INJECTION INTRAMUSCULAR; INTRAVENOUS AS NEEDED
Status: DISCONTINUED | OUTPATIENT
Start: 2020-01-01 | End: 2020-01-01 | Stop reason: HOSPADM

## 2020-01-01 RX ORDER — FENTANYL CITRATE 50 UG/ML
INJECTION, SOLUTION INTRAMUSCULAR; INTRAVENOUS AS NEEDED
Status: DISCONTINUED | OUTPATIENT
Start: 2020-01-01 | End: 2020-01-01 | Stop reason: HOSPADM

## 2020-01-01 RX ORDER — MAGNESIUM SULFATE HEPTAHYDRATE 40 MG/ML
4 INJECTION, SOLUTION INTRAVENOUS AS NEEDED
Status: DISCONTINUED | OUTPATIENT
Start: 2020-01-01 | End: 2020-01-01 | Stop reason: HOSPADM

## 2020-01-01 RX ORDER — ALPRAZOLAM 0.25 MG/1
0.25 TABLET ORAL ONCE
Status: COMPLETED | OUTPATIENT
Start: 2020-01-01 | End: 2020-01-01

## 2020-01-01 RX ORDER — ONDANSETRON 4 MG/1
4 TABLET, FILM COATED ORAL EVERY 6 HOURS PRN
Status: DISCONTINUED | OUTPATIENT
Start: 2020-01-01 | End: 2020-01-01 | Stop reason: HOSPADM

## 2020-01-01 RX ORDER — BUMETANIDE 2 MG/1
2 TABLET ORAL ONCE
Status: DISCONTINUED | OUTPATIENT
Start: 2020-01-01 | End: 2020-01-01

## 2020-01-01 RX ORDER — FUROSEMIDE 40 MG/1
40 TABLET ORAL 2 TIMES DAILY
Qty: 60 TABLET | Refills: 5 | Status: SHIPPED | OUTPATIENT
Start: 2020-01-01 | End: 2021-01-01 | Stop reason: HOSPADM

## 2020-01-01 RX ORDER — FUROSEMIDE 10 MG/ML
60 INJECTION INTRAMUSCULAR; INTRAVENOUS ONCE
Status: DISCONTINUED | OUTPATIENT
Start: 2020-01-01 | End: 2020-01-01 | Stop reason: HOSPADM

## 2020-01-01 RX ORDER — SODIUM CHLORIDE 9 MG/ML
INJECTION, SOLUTION INTRAVENOUS CONTINUOUS PRN
Status: COMPLETED | OUTPATIENT
Start: 2020-01-01 | End: 2020-01-01

## 2020-01-01 RX ORDER — POTASSIUM CHLORIDE 1.5 G/1.77G
40 POWDER, FOR SOLUTION ORAL AS NEEDED
Status: DISCONTINUED | OUTPATIENT
Start: 2020-01-01 | End: 2020-01-01

## 2020-01-01 RX ORDER — FUROSEMIDE 10 MG/ML
40 INJECTION INTRAMUSCULAR; INTRAVENOUS ONCE
Status: DISCONTINUED | OUTPATIENT
Start: 2020-01-01 | End: 2020-01-01

## 2020-01-01 RX ORDER — TORSEMIDE 20 MG/1
40 TABLET ORAL DAILY
Qty: 60 TABLET | Refills: 1
Start: 2020-01-01 | End: 2020-01-01

## 2020-01-01 RX ORDER — POTASSIUM CHLORIDE 750 MG/1
40 CAPSULE, EXTENDED RELEASE ORAL ONCE
Status: COMPLETED | OUTPATIENT
Start: 2020-01-01 | End: 2020-01-01

## 2020-01-01 RX ORDER — TRAZODONE HYDROCHLORIDE 100 MG/1
100 TABLET ORAL NIGHTLY
Qty: 30 TABLET | Refills: 5 | Status: SHIPPED | OUTPATIENT
Start: 2020-01-01

## 2020-01-01 RX ORDER — FUROSEMIDE 10 MG/ML
40 INJECTION INTRAMUSCULAR; INTRAVENOUS EVERY 12 HOURS
Status: DISCONTINUED | OUTPATIENT
Start: 2020-01-01 | End: 2020-01-01

## 2020-01-01 RX ORDER — TRAZODONE HYDROCHLORIDE 100 MG/1
100 TABLET ORAL NIGHTLY
Qty: 30 TABLET | Refills: 2 | Status: SHIPPED | OUTPATIENT
Start: 2020-01-01 | End: 2020-01-01 | Stop reason: HOSPADM

## 2020-01-01 RX ORDER — TORSEMIDE 20 MG/1
40 TABLET ORAL DAILY
Status: DISCONTINUED | OUTPATIENT
Start: 2020-01-01 | End: 2020-01-01

## 2020-01-01 RX ORDER — SODIUM CHLORIDE 0.9 % (FLUSH) 0.9 %
3 SYRINGE (ML) INJECTION EVERY 12 HOURS SCHEDULED
Status: DISCONTINUED | OUTPATIENT
Start: 2020-01-01 | End: 2020-01-01 | Stop reason: HOSPADM

## 2020-01-01 RX ORDER — HYDROXYZINE HYDROCHLORIDE 10 MG/1
10 TABLET, FILM COATED ORAL NIGHTLY PRN
Status: DISCONTINUED | OUTPATIENT
Start: 2020-01-01 | End: 2020-01-01 | Stop reason: HOSPADM

## 2020-01-01 RX ORDER — ACETAMINOPHEN 650 MG/1
650 SUPPOSITORY RECTAL EVERY 4 HOURS PRN
Status: DISCONTINUED | OUTPATIENT
Start: 2020-01-01 | End: 2020-01-01

## 2020-01-01 RX ORDER — HYDROXYZINE HYDROCHLORIDE 25 MG/1
25 TABLET, FILM COATED ORAL ONCE
Status: COMPLETED | OUTPATIENT
Start: 2020-01-01 | End: 2020-01-01

## 2020-01-01 RX ORDER — FENTANYL CITRATE 50 UG/ML
25 INJECTION, SOLUTION INTRAMUSCULAR; INTRAVENOUS
Status: COMPLETED | OUTPATIENT
Start: 2020-01-01 | End: 2020-01-01

## 2020-01-01 RX ORDER — FUROSEMIDE 10 MG/ML
60 INJECTION INTRAMUSCULAR; INTRAVENOUS ONCE
Status: DISCONTINUED | OUTPATIENT
Start: 2020-01-01 | End: 2020-01-01

## 2020-01-01 RX ORDER — CARVEDILOL 3.12 MG/1
3.12 TABLET ORAL 2 TIMES DAILY WITH MEALS
Status: DISCONTINUED | OUTPATIENT
Start: 2020-01-01 | End: 2020-01-01 | Stop reason: HOSPADM

## 2020-01-01 RX ORDER — CEFAZOLIN SODIUM 2 G/100ML
2 INJECTION, SOLUTION INTRAVENOUS ONCE
Status: COMPLETED | OUTPATIENT
Start: 2020-01-01 | End: 2020-01-01

## 2020-01-01 RX ORDER — FUROSEMIDE 10 MG/ML
40 INJECTION INTRAMUSCULAR; INTRAVENOUS
Status: DISCONTINUED | OUTPATIENT
Start: 2020-01-01 | End: 2020-01-01

## 2020-01-01 RX ORDER — NOREPINEPHRINE BIT/0.9 % NACL 8 MG/250ML
INFUSION BOTTLE (ML) INTRAVENOUS
Status: COMPLETED
Start: 2020-01-01 | End: 2020-01-01

## 2020-01-01 RX ORDER — POTASSIUM CHLORIDE 7.45 MG/ML
10 INJECTION INTRAVENOUS
Status: DISCONTINUED | OUTPATIENT
Start: 2020-01-01 | End: 2020-01-01

## 2020-01-01 RX ORDER — PROCHLORPERAZINE EDISYLATE 5 MG/ML
2.5 INJECTION INTRAMUSCULAR; INTRAVENOUS EVERY 6 HOURS PRN
Status: DISCONTINUED | OUTPATIENT
Start: 2020-01-01 | End: 2020-01-01 | Stop reason: HOSPADM

## 2020-01-01 RX ORDER — ONDANSETRON 2 MG/ML
4 INJECTION INTRAMUSCULAR; INTRAVENOUS ONCE
Status: COMPLETED | OUTPATIENT
Start: 2020-01-01 | End: 2020-01-01

## 2020-01-01 RX ORDER — FAMOTIDINE 20 MG/1
20 TABLET, FILM COATED ORAL DAILY PRN
COMMUNITY

## 2020-01-01 RX ORDER — TORSEMIDE 20 MG/1
40 TABLET ORAL DAILY
Start: 2020-01-01 | End: 2020-01-01 | Stop reason: DRUGHIGH

## 2020-01-01 RX ADMIN — CARVEDILOL 6.25 MG: 6.25 TABLET, FILM COATED ORAL at 21:16

## 2020-01-01 RX ADMIN — SACUBITRIL AND VALSARTAN 1 TABLET: 49; 51 TABLET, FILM COATED ORAL at 21:13

## 2020-01-01 RX ADMIN — FUROSEMIDE 40 MG: 10 INJECTION, SOLUTION INTRAMUSCULAR; INTRAVENOUS at 17:25

## 2020-01-01 RX ADMIN — SODIUM CHLORIDE, PRESERVATIVE FREE 10 ML: 5 INJECTION INTRAVENOUS at 19:25

## 2020-01-01 RX ADMIN — BUMETANIDE 2 MG: 0.25 INJECTION INTRAMUSCULAR; INTRAVENOUS at 22:44

## 2020-01-01 RX ADMIN — FUROSEMIDE 40 MG: 10 INJECTION, SOLUTION INTRAMUSCULAR; INTRAVENOUS at 08:06

## 2020-01-01 RX ADMIN — CARVEDILOL 6.25 MG: 6.25 TABLET, FILM COATED ORAL at 09:17

## 2020-01-01 RX ADMIN — ASPIRIN 81 MG: 81 TABLET, COATED ORAL at 19:13

## 2020-01-01 RX ADMIN — ATORVASTATIN CALCIUM 40 MG: 40 TABLET, FILM COATED ORAL at 20:35

## 2020-01-01 RX ADMIN — MUPIROCIN 1 APPLICATION: 20 OINTMENT TOPICAL at 09:23

## 2020-01-01 RX ADMIN — CARVEDILOL 6.25 MG: 6.25 TABLET, FILM COATED ORAL at 19:24

## 2020-01-01 RX ADMIN — SODIUM CHLORIDE, PRESERVATIVE FREE 10 ML: 5 INJECTION INTRAVENOUS at 20:30

## 2020-01-01 RX ADMIN — SODIUM CHLORIDE, PRESERVATIVE FREE 10 ML: 5 INJECTION INTRAVENOUS at 17:20

## 2020-01-01 RX ADMIN — SODIUM CHLORIDE, PRESERVATIVE FREE 10 ML: 5 INJECTION INTRAVENOUS at 20:37

## 2020-01-01 RX ADMIN — SODIUM CHLORIDE, PRESERVATIVE FREE 10 ML: 5 INJECTION INTRAVENOUS at 09:38

## 2020-01-01 RX ADMIN — ATORVASTATIN CALCIUM 40 MG: 40 TABLET, FILM COATED ORAL at 09:22

## 2020-01-01 RX ADMIN — SERTRALINE HYDROCHLORIDE 50 MG: 50 TABLET ORAL at 22:08

## 2020-01-01 RX ADMIN — SODIUM CHLORIDE, PRESERVATIVE FREE 10 ML: 5 INJECTION INTRAVENOUS at 08:24

## 2020-01-01 RX ADMIN — FERROUS SULFATE TAB 325 MG (65 MG ELEMENTAL FE) 325 MG: 325 (65 FE) TAB at 09:03

## 2020-01-01 RX ADMIN — SODIUM CHLORIDE, PRESERVATIVE FREE 10 ML: 5 INJECTION INTRAVENOUS at 13:44

## 2020-01-01 RX ADMIN — MELATONIN TAB 5 MG 5 MG: 5 TAB at 22:08

## 2020-01-01 RX ADMIN — APIXABAN 2.5 MG: 2.5 TABLET, FILM COATED ORAL at 21:18

## 2020-01-01 RX ADMIN — SACUBITRIL AND VALSARTAN 1 TABLET: 49; 51 TABLET, FILM COATED ORAL at 21:18

## 2020-01-01 RX ADMIN — POTASSIUM CHLORIDE 40 MEQ: 10 CAPSULE, COATED, EXTENDED RELEASE ORAL at 09:06

## 2020-01-01 RX ADMIN — APIXABAN 2.5 MG: 2.5 TABLET, FILM COATED ORAL at 09:05

## 2020-01-01 RX ADMIN — ASPIRIN 81 MG: 81 TABLET, COATED ORAL at 09:03

## 2020-01-01 RX ADMIN — APIXABAN 2.5 MG: 2.5 TABLET, FILM COATED ORAL at 09:38

## 2020-01-01 RX ADMIN — FUROSEMIDE 40 MG: 10 INJECTION, SOLUTION INTRAMUSCULAR; INTRAVENOUS at 08:24

## 2020-01-01 RX ADMIN — SACUBITRIL AND VALSARTAN 1 TABLET: 49; 51 TABLET, FILM COATED ORAL at 09:38

## 2020-01-01 RX ADMIN — SODIUM CHLORIDE, PRESERVATIVE FREE 10 ML: 5 INJECTION INTRAVENOUS at 20:16

## 2020-01-01 RX ADMIN — SERTRALINE HYDROCHLORIDE 50 MG: 50 TABLET ORAL at 21:13

## 2020-01-01 RX ADMIN — SODIUM CHLORIDE, PRESERVATIVE FREE 10 ML: 5 INJECTION INTRAVENOUS at 21:13

## 2020-01-01 RX ADMIN — SACUBITRIL AND VALSARTAN 1 TABLET: 49; 51 TABLET, FILM COATED ORAL at 22:43

## 2020-01-01 RX ADMIN — SACUBITRIL AND VALSARTAN 1 TABLET: 24; 26 TABLET, FILM COATED ORAL at 19:24

## 2020-01-01 RX ADMIN — MUPIROCIN 1 APPLICATION: 20 OINTMENT TOPICAL at 21:18

## 2020-01-01 RX ADMIN — BUMETANIDE 2 MG: 2 TABLET ORAL at 16:03

## 2020-01-01 RX ADMIN — SACUBITRIL AND VALSARTAN 1 TABLET: 49; 51 TABLET, FILM COATED ORAL at 09:17

## 2020-01-01 RX ADMIN — MELATONIN TAB 5 MG 5 MG: 5 TAB at 00:07

## 2020-01-01 RX ADMIN — SERTRALINE HYDROCHLORIDE 50 MG: 50 TABLET ORAL at 20:14

## 2020-01-01 RX ADMIN — APIXABAN 2.5 MG: 2.5 TABLET, FILM COATED ORAL at 09:22

## 2020-01-01 RX ADMIN — MUPIROCIN 1 APPLICATION: 20 OINTMENT TOPICAL at 09:21

## 2020-01-01 RX ADMIN — CARVEDILOL 6.25 MG: 6.25 TABLET, FILM COATED ORAL at 08:54

## 2020-01-01 RX ADMIN — HYDROXYZINE HYDROCHLORIDE 25 MG: 25 TABLET, FILM COATED ORAL at 09:17

## 2020-01-01 RX ADMIN — APIXABAN 2.5 MG: 2.5 TABLET, FILM COATED ORAL at 09:17

## 2020-01-01 RX ADMIN — ATORVASTATIN CALCIUM 40 MG: 40 TABLET, FILM COATED ORAL at 22:40

## 2020-01-01 RX ADMIN — SERTRALINE HYDROCHLORIDE 50 MG: 50 TABLET ORAL at 20:34

## 2020-01-01 RX ADMIN — ASPIRIN 81 MG: 81 TABLET, COATED ORAL at 09:17

## 2020-01-01 RX ADMIN — FERROUS SULFATE TAB 325 MG (65 MG ELEMENTAL FE) 325 MG: 325 (65 FE) TAB at 09:22

## 2020-01-01 RX ADMIN — ASPIRIN 81 MG: 81 TABLET, COATED ORAL at 08:24

## 2020-01-01 RX ADMIN — ATORVASTATIN CALCIUM 40 MG: 40 TABLET, FILM COATED ORAL at 20:11

## 2020-01-01 RX ADMIN — CARVEDILOL 6.25 MG: 6.25 TABLET, FILM COATED ORAL at 08:25

## 2020-01-01 RX ADMIN — SODIUM CHLORIDE, PRESERVATIVE FREE 10 ML: 5 INJECTION INTRAVENOUS at 08:48

## 2020-01-01 RX ADMIN — SULFUR HEXAFLUORIDE 2 ML: KIT at 14:23

## 2020-01-01 RX ADMIN — SERTRALINE HYDROCHLORIDE 50 MG: 50 TABLET ORAL at 22:43

## 2020-01-01 RX ADMIN — SODIUM CHLORIDE, PRESERVATIVE FREE 10 ML: 5 INJECTION INTRAVENOUS at 09:03

## 2020-01-01 RX ADMIN — FERROUS SULFATE TAB 325 MG (65 MG ELEMENTAL FE) 325 MG: 325 (65 FE) TAB at 08:54

## 2020-01-01 RX ADMIN — SACUBITRIL AND VALSARTAN 1 TABLET: 49; 51 TABLET, FILM COATED ORAL at 08:48

## 2020-01-01 RX ADMIN — BUMETANIDE 1 MG: 0.25 INJECTION INTRAMUSCULAR; INTRAVENOUS at 09:38

## 2020-01-01 RX ADMIN — APIXABAN 2.5 MG: 2.5 TABLET, FILM COATED ORAL at 20:30

## 2020-01-01 RX ADMIN — SACUBITRIL AND VALSARTAN 1 TABLET: 49; 51 TABLET, FILM COATED ORAL at 10:54

## 2020-01-01 RX ADMIN — SODIUM CHLORIDE, PRESERVATIVE FREE 10 ML: 5 INJECTION INTRAVENOUS at 08:54

## 2020-01-01 RX ADMIN — SODIUM CHLORIDE, PRESERVATIVE FREE 10 ML: 5 INJECTION INTRAVENOUS at 22:09

## 2020-01-01 RX ADMIN — CARVEDILOL 6.25 MG: 6.25 TABLET, FILM COATED ORAL at 18:41

## 2020-01-01 RX ADMIN — PROCHLORPERAZINE EDISYLATE 2.5 MG: 5 INJECTION INTRAMUSCULAR; INTRAVENOUS at 14:41

## 2020-01-01 RX ADMIN — METOLAZONE 2.5 MG: 2.5 TABLET ORAL at 22:40

## 2020-01-01 RX ADMIN — ATORVASTATIN CALCIUM 40 MG: 40 TABLET, FILM COATED ORAL at 08:25

## 2020-01-01 RX ADMIN — SODIUM CHLORIDE, PRESERVATIVE FREE 10 ML: 5 INJECTION INTRAVENOUS at 09:06

## 2020-01-01 RX ADMIN — FERROUS SULFATE TAB 325 MG (65 MG ELEMENTAL FE) 325 MG: 325 (65 FE) TAB at 08:48

## 2020-01-01 RX ADMIN — CARVEDILOL 6.25 MG: 6.25 TABLET, FILM COATED ORAL at 17:18

## 2020-01-01 RX ADMIN — ALPRAZOLAM 0.25 MG: 0.25 TABLET ORAL at 16:03

## 2020-01-01 RX ADMIN — FUROSEMIDE 40 MG: 10 INJECTION, SOLUTION INTRAMUSCULAR; INTRAVENOUS at 17:19

## 2020-01-01 RX ADMIN — CARVEDILOL 3.12 MG: 3.12 TABLET, FILM COATED ORAL at 08:48

## 2020-01-01 RX ADMIN — SACUBITRIL AND VALSARTAN 1 TABLET: 24; 26 TABLET, FILM COATED ORAL at 08:54

## 2020-01-01 RX ADMIN — TRAZODONE HYDROCHLORIDE 100 MG: 100 TABLET ORAL at 20:35

## 2020-01-01 RX ADMIN — BUMETANIDE 1 MG: 0.25 INJECTION INTRAMUSCULAR; INTRAVENOUS at 11:02

## 2020-01-01 RX ADMIN — SACUBITRIL AND VALSARTAN 1 TABLET: 49; 51 TABLET, FILM COATED ORAL at 22:12

## 2020-01-01 RX ADMIN — Medication 0.02 MCG/KG/MIN: at 13:32

## 2020-01-01 RX ADMIN — APIXABAN 2.5 MG: 2.5 TABLET, FILM COATED ORAL at 22:08

## 2020-01-01 RX ADMIN — BUMETANIDE 1 MG: 0.25 INJECTION INTRAMUSCULAR; INTRAVENOUS at 10:53

## 2020-01-01 RX ADMIN — TRAZODONE HYDROCHLORIDE 100 MG: 100 TABLET ORAL at 22:08

## 2020-01-01 RX ADMIN — SODIUM CHLORIDE, PRESERVATIVE FREE 10 ML: 5 INJECTION INTRAVENOUS at 22:49

## 2020-01-01 RX ADMIN — CEFAZOLIN SODIUM 2 G: 2 INJECTION, SOLUTION INTRAVENOUS at 15:35

## 2020-01-01 RX ADMIN — ALPRAZOLAM 0.5 MG: 0.25 TABLET ORAL at 20:21

## 2020-01-01 RX ADMIN — MELATONIN TAB 5 MG 5 MG: 5 TAB at 20:30

## 2020-01-01 RX ADMIN — SACUBITRIL AND VALSARTAN 1 TABLET: 49; 51 TABLET, FILM COATED ORAL at 08:24

## 2020-01-01 RX ADMIN — ONDANSETRON 4 MG: 2 INJECTION INTRAMUSCULAR; INTRAVENOUS at 13:30

## 2020-01-01 RX ADMIN — SACUBITRIL AND VALSARTAN 1 TABLET: 49; 51 TABLET, FILM COATED ORAL at 09:05

## 2020-01-01 RX ADMIN — SODIUM CHLORIDE, PRESERVATIVE FREE 10 ML: 5 INJECTION INTRAVENOUS at 13:36

## 2020-01-01 RX ADMIN — APIXABAN 2.5 MG: 2.5 TABLET, FILM COATED ORAL at 20:35

## 2020-01-01 RX ADMIN — TRAZODONE HYDROCHLORIDE 100 MG: 100 TABLET ORAL at 20:15

## 2020-01-01 RX ADMIN — BUMETANIDE 2 MG: 0.25 INJECTION INTRAMUSCULAR; INTRAVENOUS at 10:53

## 2020-01-01 RX ADMIN — CLOPIDOGREL BISULFATE 75 MG: 75 TABLET ORAL at 08:54

## 2020-01-01 RX ADMIN — FUROSEMIDE 40 MG: 10 INJECTION, SOLUTION INTRAMUSCULAR; INTRAVENOUS at 09:18

## 2020-01-01 RX ADMIN — ASPIRIN 81 MG: 81 TABLET, COATED ORAL at 13:11

## 2020-01-01 RX ADMIN — SODIUM CHLORIDE, PRESERVATIVE FREE 10 ML: 5 INJECTION INTRAVENOUS at 09:23

## 2020-01-01 RX ADMIN — ATORVASTATIN CALCIUM 40 MG: 40 TABLET, FILM COATED ORAL at 09:17

## 2020-01-01 RX ADMIN — SERTRALINE HYDROCHLORIDE 50 MG: 50 TABLET ORAL at 20:30

## 2020-01-01 RX ADMIN — ASPIRIN 81 MG: 81 TABLET, COATED ORAL at 09:38

## 2020-01-01 RX ADMIN — MIDAZOLAM 0.5 MG: 1 INJECTION INTRAMUSCULAR; INTRAVENOUS at 13:27

## 2020-01-01 RX ADMIN — SODIUM CHLORIDE, PRESERVATIVE FREE 10 ML: 5 INJECTION INTRAVENOUS at 21:18

## 2020-01-01 RX ADMIN — FENTANYL CITRATE 25 MCG: 50 INJECTION, SOLUTION INTRAMUSCULAR; INTRAVENOUS at 13:42

## 2020-01-01 RX ADMIN — POTASSIUM CHLORIDE 40 MEQ: 10 CAPSULE, COATED, EXTENDED RELEASE ORAL at 10:54

## 2020-01-01 RX ADMIN — SODIUM CHLORIDE, PRESERVATIVE FREE 10 ML: 5 INJECTION INTRAVENOUS at 09:17

## 2020-01-01 RX ADMIN — APIXABAN 2.5 MG: 2.5 TABLET, FILM COATED ORAL at 21:13

## 2020-01-01 RX ADMIN — CARVEDILOL 6.25 MG: 6.25 TABLET, FILM COATED ORAL at 18:24

## 2020-01-01 RX ADMIN — CARVEDILOL 6.25 MG: 6.25 TABLET, FILM COATED ORAL at 09:03

## 2020-01-01 RX ADMIN — MELATONIN TAB 5 MG 5 MG: 5 TAB at 20:20

## 2020-01-01 RX ADMIN — APIXABAN 2.5 MG: 2.5 TABLET, FILM COATED ORAL at 20:11

## 2020-01-01 RX ADMIN — ASPIRIN 81 MG: 81 TABLET, COATED ORAL at 08:48

## 2020-01-01 RX ADMIN — APIXABAN 2.5 MG: 2.5 TABLET, FILM COATED ORAL at 22:43

## 2020-01-01 RX ADMIN — CARVEDILOL 6.25 MG: 6.25 TABLET, FILM COATED ORAL at 09:05

## 2020-01-01 RX ADMIN — ASPIRIN 81 MG: 81 TABLET, COATED ORAL at 09:05

## 2020-01-01 RX ADMIN — CARVEDILOL 6.25 MG: 6.25 TABLET, FILM COATED ORAL at 09:37

## 2020-01-01 RX ADMIN — ASPIRIN 81 MG: 81 TABLET, COATED ORAL at 09:22

## 2020-01-01 RX ADMIN — APIXABAN 2.5 MG: 2.5 TABLET, FILM COATED ORAL at 09:03

## 2020-01-01 RX ADMIN — ASPIRIN 81 MG: 81 TABLET, COATED ORAL at 08:54

## 2020-01-01 RX ADMIN — MUPIROCIN: 20 OINTMENT TOPICAL at 13:53

## 2020-01-01 RX ADMIN — ALPRAZOLAM 0.25 MG: 0.25 TABLET ORAL at 21:21

## 2020-01-01 RX ADMIN — MAGNESIUM OXIDE TAB 400 MG (241.3 MG ELEMENTAL MG) 400 MG: 400 (241.3 MG) TAB at 18:22

## 2020-01-01 RX ADMIN — BUMETANIDE 1 MG: 0.25 INJECTION INTRAMUSCULAR; INTRAVENOUS at 10:54

## 2020-01-01 RX ADMIN — ATORVASTATIN CALCIUM 40 MG: 40 TABLET, FILM COATED ORAL at 22:08

## 2020-01-01 RX ADMIN — SACUBITRIL AND VALSARTAN 1 TABLET: 49; 51 TABLET, FILM COATED ORAL at 13:10

## 2020-01-01 RX ADMIN — BUMETANIDE 1 MG: 0.25 INJECTION INTRAMUSCULAR; INTRAVENOUS at 17:30

## 2020-01-01 RX ADMIN — FUROSEMIDE 40 MG: 10 INJECTION, SOLUTION INTRAMUSCULAR; INTRAVENOUS at 08:54

## 2020-01-01 RX ADMIN — TRAZODONE HYDROCHLORIDE 100 MG: 100 TABLET ORAL at 22:40

## 2020-01-01 RX ADMIN — SACUBITRIL AND VALSARTAN 1 TABLET: 49; 51 TABLET, FILM COATED ORAL at 20:15

## 2020-01-01 RX ADMIN — APIXABAN 2.5 MG: 2.5 TABLET, FILM COATED ORAL at 08:24

## 2020-01-01 RX ADMIN — CARVEDILOL 6.25 MG: 6.25 TABLET, FILM COATED ORAL at 17:49

## 2020-01-01 RX ADMIN — SODIUM CHLORIDE, PRESERVATIVE FREE 10 ML: 5 INJECTION INTRAVENOUS at 13:11

## 2020-01-01 RX ADMIN — CARVEDILOL 6.25 MG: 6.25 TABLET, FILM COATED ORAL at 09:22

## 2020-01-01 RX ADMIN — HYDROXYZINE HYDROCHLORIDE 10 MG: 10 TABLET, FILM COATED ORAL at 20:20

## 2020-01-01 RX ADMIN — SACUBITRIL AND VALSARTAN 1 TABLET: 49; 51 TABLET, FILM COATED ORAL at 20:18

## 2020-01-01 RX ADMIN — SERTRALINE HYDROCHLORIDE 50 MG: 50 TABLET ORAL at 21:18

## 2020-01-01 RX ADMIN — ALPRAZOLAM 0.25 MG: 0.25 TABLET ORAL at 21:48

## 2020-01-01 RX ADMIN — BUMETANIDE 2 MG: 0.25 INJECTION INTRAMUSCULAR; INTRAVENOUS at 20:36

## 2020-01-01 RX ADMIN — CARVEDILOL 6.25 MG: 6.25 TABLET, FILM COATED ORAL at 17:30

## 2020-01-01 RX ADMIN — CARVEDILOL 6.25 MG: 6.25 TABLET, FILM COATED ORAL at 18:22

## 2020-01-01 RX ADMIN — POTASSIUM CHLORIDE 40 MEQ: 10 CAPSULE, COATED, EXTENDED RELEASE ORAL at 13:56

## 2020-01-01 RX ADMIN — SERTRALINE HYDROCHLORIDE 50 MG: 50 TABLET ORAL at 20:20

## 2020-01-01 RX ADMIN — ATORVASTATIN CALCIUM 40 MG: 40 TABLET, FILM COATED ORAL at 09:05

## 2020-01-01 RX ADMIN — SACUBITRIL AND VALSARTAN 1 TABLET: 49; 51 TABLET, FILM COATED ORAL at 20:30

## 2020-01-01 RX ADMIN — FERROUS SULFATE TAB 325 MG (65 MG ELEMENTAL FE) 325 MG: 325 (65 FE) TAB at 08:24

## 2020-01-01 RX ADMIN — APIXABAN 2.5 MG: 2.5 TABLET, FILM COATED ORAL at 20:17

## 2020-01-01 RX ADMIN — SODIUM CHLORIDE, PRESERVATIVE FREE 10 ML: 5 INJECTION INTRAVENOUS at 09:18

## 2020-01-01 RX ADMIN — FUROSEMIDE 40 MG: 10 INJECTION, SOLUTION INTRAMUSCULAR; INTRAVENOUS at 11:04

## 2020-01-01 RX ADMIN — APIXABAN 2.5 MG: 2.5 TABLET, FILM COATED ORAL at 08:48

## 2020-01-01 RX ADMIN — APIXABAN 2.5 MG: 2.5 TABLET, FILM COATED ORAL at 13:11

## 2020-01-01 RX ADMIN — BUMETANIDE 2 MG: 0.25 INJECTION INTRAMUSCULAR; INTRAVENOUS at 08:48

## 2020-01-01 RX ADMIN — SACUBITRIL AND VALSARTAN 1 TABLET: 49; 51 TABLET, FILM COATED ORAL at 20:35

## 2020-01-01 RX ADMIN — NITROGLYCERIN 1 INCH: 20 OINTMENT TOPICAL at 17:27

## 2020-01-01 RX ADMIN — SODIUM CHLORIDE, PRESERVATIVE FREE 10 ML: 5 INJECTION INTRAVENOUS at 20:20

## 2020-01-01 RX ADMIN — SACUBITRIL AND VALSARTAN 1 TABLET: 49; 51 TABLET, FILM COATED ORAL at 09:22

## 2020-01-27 ENCOUNTER — OFFICE VISIT (OUTPATIENT)
Dept: CARDIOLOGY | Facility: CLINIC | Age: 85
End: 2020-01-27

## 2020-01-27 ENCOUNTER — HOSPITAL ENCOUNTER (OUTPATIENT)
Dept: CARDIOLOGY | Facility: HOSPITAL | Age: 85
Discharge: HOME | End: 2020-01-27
Admitting: INTERNAL MEDICINE

## 2020-01-27 VITALS
HEART RATE: 60 BPM | OXYGEN SATURATION: 96 % | HEIGHT: 69 IN | DIASTOLIC BLOOD PRESSURE: 62 MMHG | SYSTOLIC BLOOD PRESSURE: 142 MMHG | WEIGHT: 166.8 LBS | BODY MASS INDEX: 24.71 KG/M2

## 2020-01-27 VITALS — BODY MASS INDEX: 25.11 KG/M2 | HEIGHT: 67 IN | WEIGHT: 160 LBS

## 2020-01-27 DIAGNOSIS — I35.0 NONRHEUMATIC AORTIC VALVE STENOSIS: ICD-10-CM

## 2020-01-27 DIAGNOSIS — I25.810 CORONARY ARTERY DISEASE INVOLVING CORONARY BYPASS GRAFT OF NATIVE HEART WITHOUT ANGINA PECTORIS: Primary | ICD-10-CM

## 2020-01-27 DIAGNOSIS — I50.22 CHRONIC SYSTOLIC CONGESTIVE HEART FAILURE (HCC): ICD-10-CM

## 2020-01-27 DIAGNOSIS — I25.5 ISCHEMIC CARDIOMYOPATHY: ICD-10-CM

## 2020-01-27 DIAGNOSIS — E78.5 HYPERLIPIDEMIA LDL GOAL <100: ICD-10-CM

## 2020-01-27 DIAGNOSIS — I10 ESSENTIAL HYPERTENSION: ICD-10-CM

## 2020-01-27 LAB
BH CV ECHO MEAS - AO MAX PG (FULL): 12.5 MMHG
BH CV ECHO MEAS - AO MAX PG: 13.7 MMHG
BH CV ECHO MEAS - AO MEAN PG (FULL): 5 MMHG
BH CV ECHO MEAS - AO MEAN PG: 6 MMHG
BH CV ECHO MEAS - AO ROOT AREA (BSA CORRECTED): 2.1
BH CV ECHO MEAS - AO ROOT AREA: 11.3 CM^2
BH CV ECHO MEAS - AO ROOT DIAM: 3.8 CM
BH CV ECHO MEAS - AO V2 MAX: 184.8 CM/SEC
BH CV ECHO MEAS - AO V2 MEAN: 110.5 CM/SEC
BH CV ECHO MEAS - AO V2 VTI: 38.1 CM
BH CV ECHO MEAS - AVA(I,A): 1.1 CM^2
BH CV ECHO MEAS - AVA(I,D): 1.1 CM^2
BH CV ECHO MEAS - AVA(V,A): 1 CM^2
BH CV ECHO MEAS - AVA(V,D): 1 CM^2
BH CV ECHO MEAS - BSA(HAYCOCK): 1.9 M^2
BH CV ECHO MEAS - BSA: 1.8 M^2
BH CV ECHO MEAS - BZI_BMI: 25.1 KILOGRAMS/M^2
BH CV ECHO MEAS - BZI_METRIC_HEIGHT: 170 CM
BH CV ECHO MEAS - BZI_METRIC_WEIGHT: 72.6 KG
BH CV ECHO MEAS - EDV(CUBED): 185.2 ML
BH CV ECHO MEAS - EDV(MOD-SP2): 181 ML
BH CV ECHO MEAS - EDV(MOD-SP4): 212 ML
BH CV ECHO MEAS - EDV(TEICH): 160 ML
BH CV ECHO MEAS - EF(CUBED): 15 %
BH CV ECHO MEAS - EF(MOD-BP): 22.3 %
BH CV ECHO MEAS - EF(MOD-SP2): 29.8 %
BH CV ECHO MEAS - EF(MOD-SP4): 21.7 %
BH CV ECHO MEAS - EF(TEICH): 11.7 %
BH CV ECHO MEAS - ESV(CUBED): 157.5 ML
BH CV ECHO MEAS - ESV(MOD-SP2): 127 ML
BH CV ECHO MEAS - ESV(MOD-SP4): 166 ML
BH CV ECHO MEAS - ESV(TEICH): 141.3 ML
BH CV ECHO MEAS - FS: 5.3 %
BH CV ECHO MEAS - IVS/LVPW: 2.3
BH CV ECHO MEAS - IVSD: 1.1 CM
BH CV ECHO MEAS - LA DIMENSION: 5.9 CM
BH CV ECHO MEAS - LA/AO: 1.6
BH CV ECHO MEAS - LAD MAJOR: 6 CM
BH CV ECHO MEAS - LAT PEAK E' VEL: 5.2 CM/SEC
BH CV ECHO MEAS - LV DIASTOLIC VOL/BSA (35-75): 115.4 ML/M^2
BH CV ECHO MEAS - LV MASS(C)D: 385 GRAMS
BH CV ECHO MEAS - LV MASS(C)DI: 209.5 GRAMS/M^2
BH CV ECHO MEAS - LV MAX PG: 1.2 MMHG
BH CV ECHO MEAS - LV MEAN PG: 1 MMHG
BH CV ECHO MEAS - LV SYSTOLIC VOL/BSA (12-30): 90.3 ML/M^2
BH CV ECHO MEAS - LV V1 MAX: 54.8 CM/SEC
BH CV ECHO MEAS - LV V1 MEAN: 33.6 CM/SEC
BH CV ECHO MEAS - LV V1 VTI: 12.3 CM
BH CV ECHO MEAS - LVIDD: 5.7 CM
BH CV ECHO MEAS - LVIDS: 5.4 CM
BH CV ECHO MEAS - LVLD AP2: 9.7 CM
BH CV ECHO MEAS - LVLD AP4: 10.4 CM
BH CV ECHO MEAS - LVLS AP2: 8.7 CM
BH CV ECHO MEAS - LVLS AP4: 10.4 CM
BH CV ECHO MEAS - LVOT AREA (M): 3.5 CM^2
BH CV ECHO MEAS - LVOT AREA: 3.5 CM^2
BH CV ECHO MEAS - LVOT DIAM: 2.1 CM
BH CV ECHO MEAS - LVPWD: 0.9 CM
BH CV ECHO MEAS - MED PEAK E' VEL: 2.9 CM/SEC
BH CV ECHO MEAS - MR ALIAS VEL: 30.8 CM/SEC
BH CV ECHO MEAS - MR ERO: 0.03 CM^2
BH CV ECHO MEAS - MR FLOW RATE: 17.4 CM^3/SEC
BH CV ECHO MEAS - MR MAX PG: 125 MMHG
BH CV ECHO MEAS - MR MAX VEL: 558 CM/SEC
BH CV ECHO MEAS - MR MEAN PG: 73 MMHG
BH CV ECHO MEAS - MR MEAN VEL: 398 CM/SEC
BH CV ECHO MEAS - MR PISA RADIUS: 0.3 CM
BH CV ECHO MEAS - MR PISA: 0.57 CM^2
BH CV ECHO MEAS - MR VOLUME: 6.3 ML
BH CV ECHO MEAS - MR VTI: 201 CM
BH CV ECHO MEAS - MV AREA (1 DIAM): 10.8 CM^2
BH CV ECHO MEAS - MV DIAM: 3.7 CM
BH CV ECHO MEAS - MV FLOW AREA(1DIAM): 10.8 CM^2
BH CV ECHO MEAS - PA ACC TIME: 0.12 SEC
BH CV ECHO MEAS - PA PR(ACCEL): 25 MMHG
BH CV ECHO MEAS - SI(AO): 235.4 ML/M^2
BH CV ECHO MEAS - SI(CUBED): 15.1 ML/M^2
BH CV ECHO MEAS - SI(LVOT): 23.2 ML/M^2
BH CV ECHO MEAS - SI(MOD-SP2): 29.4 ML/M^2
BH CV ECHO MEAS - SI(MOD-SP4): 25 ML/M^2
BH CV ECHO MEAS - SI(TEICH): 10.2 ML/M^2
BH CV ECHO MEAS - SV(AO): 432.6 ML
BH CV ECHO MEAS - SV(CUBED): 27.7 ML
BH CV ECHO MEAS - SV(LVOT): 42.6 ML
BH CV ECHO MEAS - SV(MOD-SP2): 54 ML
BH CV ECHO MEAS - SV(MOD-SP4): 46 ML
BH CV ECHO MEAS - SV(TEICH): 18.7 ML
BH CV ECHO MEAS - TAPSE (>1.6): 1.78 CM2
BH CV VAS BP LEFT ARM: NORMAL MMHG
BH CV XLRA - RV BASE: 3.9 CM
BH CV XLRA - RV LENGTH: 6.9 CM
BH CV XLRA - RV MID: 2.8 CM
BH CV XLRA - TDI S': 9 CM/SEC
LEFT ATRIUM VOLUME INDEX: 53.6 ML/M^2
LEFT ATRIUM VOLUME: 98.5 ML
LV EF 2D ECHO EST: 20 %
MAXIMAL PREDICTED HEART RATE: 127 BPM
STRESS TARGET HR: 108 BPM

## 2020-01-27 PROCEDURE — 93306 TTE W/DOPPLER COMPLETE: CPT

## 2020-01-27 PROCEDURE — 99214 OFFICE O/P EST MOD 30 MIN: CPT | Performed by: INTERNAL MEDICINE

## 2020-01-27 PROCEDURE — 25010000002 SULFUR HEXAFLUORIDE MICROSPH 60.7-25 MG RECONSTITUTED SUSPENSION: Performed by: INTERNAL MEDICINE

## 2020-01-27 PROCEDURE — 93306 TTE W/DOPPLER COMPLETE: CPT | Performed by: INTERNAL MEDICINE

## 2020-01-27 RX ADMIN — SULFUR HEXAFLUORIDE 3 ML: KIT at 14:00

## 2020-01-27 NOTE — PROGRESS NOTES
[facility] Cardiology  Subjective:     Encounter Date:01/27/2020      Patient ID: Tad Moon is a  89 y.o. male.    Chief Complaint: Coronary Artery Disease      PROBLEM LIST:  1. Coronary artery disease:  a. CABG in 1992, [place], [place]: LIMA to LAD, SVG to PDA, SVG to distal RCA, SVG to OM1.   b. NSTEMI, 2007 with 3.5 x 16 Taxus to SVG to RCA (Dr. Sheikh).   c. [facility], 2012, medical management, incomplete database.   d. [facility], 08/15/2014, functional class III angina/unstable: EF 40% with inferior wall akinesis. Severe distal left main and proximal LAD stenosis with a patent LIMA graft to LAD. An 80% proximal LCx heavily calcified stenosis tortuous segment with occluded vein graft. Chronically occluded RCA and SVG to RCA with 3+ collaterals.    e. [facility], 10/10/2017: 3-vessel CAD of the native arteries. Patent LIMA graft to the LAD, occluded vein grafts to the LCx and the RCA. Successful PTCA/stenting of the distal left main, proximal LCx and mid LCx with placement of overlapping 2.5 x 38 and 3.0 x 23 mm ESTER's reducing severe multiple 80% stenosis to no significant residual disease. EF 25-30%. 21 mm gradient across the aortic valve consistent with known AS.  f. [facility], 04/06/2018, pre-TAVR: Severe proximal LAD stenosis with widely patent LIMA. 50% hazy ISR of distal LM/ostial LCx with normal IFR. Occluded RCA and vein graft.  2. Chronic systolic heart failure  a. EF 40% by LV gram, 08/15/2014.  b. Echo, 10/06/2016: EF 35%.   c. Echo, 03/26/2018: EF 20%.   d. LANDY, 04/06/2018: EF 20%.   3. Echocardiogram, 06/04/2018: EF 20%.Aortic stenosis:  a. Echo, 10/06/2016: EF 35%. Mod-severe AS, mean gradient 25 mmHg, MARII 0.9 cm2. Mild AI. Moderate MR, mild TR.  b. Echo, 03/26/2018: EF 20%. Severe AS with mean gradient 29 mmHg, max 47 mmHg, MARII 0.91 cm2. Moderate MR.  c. Stress echo, 03/28/2018: Resting mean gradient 25 mmHg, post dobutamine peak aortic valve velocity 419 cm/s, peak gradient 70  mmHg and mean gradient 38 mmHg consistent with severe aortic stenosis.   d. LANDY, 04/06/2018: EF 20%. Severe AS, mean PG 26.1, max 60 mmHg, MARII 0.97 cm2. Mild-to-mod AI. Mod-to-severe MR.  e. TAVR, 04/26/2018: 26 mm Pozo Lydia 3 tissue valve.  f. Echocardiogram, 06/04/2018: EF 20%. Mild MR. TAVR valve present with normal function.  g. Echocardiogram, 05/30/2019: EF 15%. Normal functioning TAVR. Severe MR.  h. Echocardiogram, 01/27/2020: EF 20%. Mild AI. Moderate MR.  4. Hypertension.   5. Dyslipidemia.   6. History of tobacco use.   7. GERD.   8. Seizure disorder  9. Chronic lymphocytic leukemia.  10. Surgical history:   a. Left total knee replacement.   b. Remote cholecystectomy.   c. Appendectomy.   d. Abdominal hernia repair x2      History of Present Illness  Tad Moon returns today for follow up with a history of coronary artery disease, systolic heart failure, bioprosthetic aortic valve, and cardiac risk factors. Since last visit, he has been feeling well overall from a cardiovascular standpoint. Pt states he has been feeling better and more energetic since he was started on Entresto in December. Denies any exertional chest pain, shortness of breath, orthopnea, PND, or palpitations. He monitors his blood pressure at home, with readings typically running 120's systolic.    Allergies   Allergen Reactions   • Hctz [Hydrochlorothiazide] Other (See Comments)     hyponatremia         Current Outpatient Medications:   •  ALPRAZolam (XANAX) 0.5 MG tablet, Take 1 tablet by mouth At Night As Needed for Sleep., Disp: 30 tablet, Rfl: 2  •  aspirin 81 MG tablet, Take 1 tablet by mouth daily., Disp: 90 tablet, Rfl: 3  •  atorvastatin (LIPITOR) 80 MG tablet, Take 0.5 tablets by mouth Daily., Disp: 90 tablet, Rfl: 3  •  carvedilol (COREG) 3.125 MG tablet, Take 1 tablet by mouth Every 12 (Twelve) Hours. (Patient taking differently: Take 3.125 mg by mouth Every 12 (Twelve) Hours. Hold for SBP < 110, HR < 60), Disp: 60  "tablet, Rfl: 11  •  cholecalciferol (VITAMIN D3) 1000 UNITS tablet, Take 1,000 Units by mouth Daily., Disp: , Rfl:   •  clopidogrel (PLAVIX) 75 MG tablet, Take 1 tablet by mouth daily., Disp: 90 tablet, Rfl: 3  •  famotidine (PEPCID) 20 MG tablet, Take 20 mg by mouth Daily., Disp: , Rfl:   •  nitroglycerin (NITROSTAT) 0.4 MG SL tablet, Place 1 tablet under the tongue Every 5 (Five) Minutes As Needed for Chest Pain., Disp: 25 tablet, Rfl: 6  •  sacubitril-valsartan (ENTRESTO) 24-26 MG tablet, Take 1 tablet by mouth 2 (Two) Times a Day., Disp: 60 tablet, Rfl: 1  •  sertraline (ZOLOFT) 100 MG tablet, Take 100 mg by mouth Daily., Disp: , Rfl:   •  torsemide (DEMADEX) 20 MG tablet, Take 1 tablet by mouth Every Other Day., Disp: 15 tablet, Rfl: 1  No current facility-administered medications for this visit.     The following portions of the patient's history were reviewed and updated as appropriate: allergies, current medications, past family history, past medical history, past social history, past surgical history and problem list.    Review of Systems   Constitution: Positive for malaise/fatigue.   Cardiovascular: Positive for dyspnea on exertion. Negative for chest pain, leg swelling, near-syncope, palpitations and syncope.   Respiratory: Positive for sleep disturbances due to breathing and wheezing.    Hematologic/Lymphatic: Bruises/bleeds easily.   Skin: Negative for rash.   Musculoskeletal: Negative for muscle weakness and myalgias.   Gastrointestinal: Negative for heartburn, nausea and vomiting.   Genitourinary: Positive for frequency.   Neurological: Negative.           Objective:     Vitals:    01/27/20 1441   BP: 142/62   BP Location: Left arm   Patient Position: Sitting   Pulse: 60   SpO2: 96%   Weight: 75.7 kg (166 lb 12.8 oz)   Height: 174 cm (68.5\")         Physical Exam   Constitutional: He is oriented to person, place, and time. He appears well-developed and well-nourished.   HENT:   Mouth/Throat: " Oropharynx is clear and moist.   Neck: No JVD present. Carotid bruit is not present. No thyromegaly present.   Cardiovascular: Regular rhythm, S1 normal, S2 normal, normal heart sounds and intact distal pulses. Exam reveals no gallop, no S3 and no S4.   No murmur heard.  Pulses:       Carotid pulses are 2+ on the right side, and 2+ on the left side.       Radial pulses are 2+ on the right side, and 2+ on the left side.   Pulmonary/Chest: Breath sounds normal.   Abdominal: Soft. Bowel sounds are normal. He exhibits no mass. There is no tenderness.   Musculoskeletal: He exhibits no edema.   Neurological: He is alert and oriented to person, place, and time.   Skin: Skin is warm and dry. No rash noted.       Lab Review:  Lab Results   Component Value Date    GLUCOSE 119 (H) 12/10/2019    BUN 25 (H) 12/10/2019    CREATININE 1.66 (H) 12/10/2019    EGFRIFNONA 39 (L) 12/10/2019    BCR 15.1 12/10/2019    K 4.9 12/10/2019    CO2 23.0 12/10/2019    CALCIUM 9.4 12/10/2019    ALBUMIN 4.30 09/26/2019    AST 12 09/26/2019    ALT 12 09/26/2019     Lab Results   Component Value Date    CHOL 196 09/26/2019    TRIG 117 09/26/2019    HDL 54 09/26/2019     (H) 09/26/2019      Lab Results   Component Value Date    WBC 8.16 12/10/2019    HGB 10.9 (L) 12/10/2019    HCT 35.7 (L) 12/10/2019    MCV 93.0 12/10/2019     12/10/2019     Lab Results   Component Value Date    TSH 3.920 09/26/2019       Procedures        Assessment:   Tad was seen today for coronary artery disease.    Diagnoses and all orders for this visit:    Coronary artery disease involving coronary bypass graft of native heart without angina pectoris    Nonrheumatic aortic valve stenosis s/p TAVR    Ischemic cardiomyopathy    Chronic systolic congestive heart failure (CMS/HCC)    Hyperlipidemia LDL goal <100    Essential hypertension        Impression:  1. Coronary artery disease, stable and asymptomatic. On aspirin and Plavix for DAPT.  2. Chronic systolic  congestive heart failure, EF 20% per echocardiogram today. On carvedilol and Entresto.  Results were reviewed with the patient  3. Aortic stenosis s/p TAVR, with normal functioning bioprosthesis per echocardiogram today.  4. Hypertension, well-controlled on current medications.  5. Hyperlipidemia, HDL 54,  (H) in September 2019. On atorvastatin 40 mg. Goal LDL < 70.    Plan:  1. Increase torsemide from 20 mg every other day to 20 mg, 5 days per week.,  With an additional dose as needed.  2. Continue to be as physically active/rehab is much as possible.  3. Continue all other current medications.  4. Revisit in 6 MO, or sooner as needed.    Scribed for Lv Cobian MD by Lorena Daley. 1/27/2020  3:07 PM    Lv Cobian MD      Please note that portions of this note may have been completed with a voice recognition program. Efforts were made to edit the dictations, but occasionally words are mistranscribed.

## 2020-05-04 ENCOUNTER — OFFICE VISIT (OUTPATIENT)
Dept: INTERNAL MEDICINE | Facility: CLINIC | Age: 85
End: 2020-05-04

## 2020-05-04 DIAGNOSIS — I10 ESSENTIAL HYPERTENSION: Primary | ICD-10-CM

## 2020-05-04 DIAGNOSIS — F32.4 MAJOR DEPRESSIVE DISORDER WITH SINGLE EPISODE, IN PARTIAL REMISSION (HCC): ICD-10-CM

## 2020-05-04 DIAGNOSIS — I25.810 CORONARY ARTERY DISEASE INVOLVING CORONARY BYPASS GRAFT OF NATIVE HEART WITHOUT ANGINA PECTORIS: ICD-10-CM

## 2020-05-04 DIAGNOSIS — I50.22 CHRONIC SYSTOLIC CONGESTIVE HEART FAILURE (HCC): ICD-10-CM

## 2020-05-04 DIAGNOSIS — N18.30 CKD (CHRONIC KIDNEY DISEASE) STAGE 3, GFR 30-59 ML/MIN (HCC): ICD-10-CM

## 2020-05-04 PROCEDURE — 99442 PR PHYS/QHP TELEPHONE EVALUATION 11-20 MIN: CPT | Performed by: INTERNAL MEDICINE

## 2020-05-04 RX ORDER — SERTRALINE HYDROCHLORIDE 100 MG/1
TABLET, FILM COATED ORAL
Start: 2020-05-04

## 2020-05-21 ENCOUNTER — HOSPITAL ENCOUNTER (OUTPATIENT)
Facility: HOSPITAL | Age: 85
Setting detail: OBSERVATION
Discharge: HOME | End: 2020-05-23
Attending: EMERGENCY MEDICINE | Admitting: INTERNAL MEDICINE

## 2020-05-21 ENCOUNTER — APPOINTMENT (OUTPATIENT)
Dept: CT IMAGING | Facility: HOSPITAL | Age: 85
End: 2020-05-21

## 2020-05-21 DIAGNOSIS — S09.90XA INJURY OF HEAD, INITIAL ENCOUNTER: ICD-10-CM

## 2020-05-21 DIAGNOSIS — R55 SYNCOPE, UNSPECIFIED SYNCOPE TYPE: Primary | ICD-10-CM

## 2020-05-21 DIAGNOSIS — N18.30 CHRONIC RENAL INSUFFICIENCY, STAGE 3 (MODERATE) (HCC): ICD-10-CM

## 2020-05-21 DIAGNOSIS — Z79.01 CHRONIC ANTICOAGULATION: ICD-10-CM

## 2020-05-21 DIAGNOSIS — S22.000A THORACIC COMPRESSION FRACTURE, CLOSED, INITIAL ENCOUNTER (HCC): ICD-10-CM

## 2020-05-21 DIAGNOSIS — M54.9 ACUTE UPPER BACK PAIN: ICD-10-CM

## 2020-05-21 DIAGNOSIS — D64.9 CHRONIC ANEMIA: ICD-10-CM

## 2020-05-21 DIAGNOSIS — S22.079A CLOSED FRACTURE OF NINTH THORACIC VERTEBRA, UNSPECIFIED FRACTURE MORPHOLOGY, INITIAL ENCOUNTER (HCC): ICD-10-CM

## 2020-05-21 PROBLEM — Z78.9 ALCOHOL USE: Status: ACTIVE | Noted: 2020-05-21

## 2020-05-21 PROBLEM — N17.9 AKI (ACUTE KIDNEY INJURY) (HCC): Status: ACTIVE | Noted: 2020-05-21

## 2020-05-21 LAB
ALBUMIN SERPL-MCNC: 4 G/DL (ref 3.5–5.2)
ALBUMIN/GLOB SERPL: 1.3 G/DL
ALP SERPL-CCNC: 123 U/L (ref 39–117)
ALT SERPL W P-5'-P-CCNC: 12 U/L (ref 1–41)
ANION GAP SERPL CALCULATED.3IONS-SCNC: 15 MMOL/L (ref 5–15)
AST SERPL-CCNC: 20 U/L (ref 1–40)
BASOPHILS # BLD AUTO: 0.07 10*3/MM3 (ref 0–0.2)
BASOPHILS NFR BLD AUTO: 0.8 % (ref 0–1.5)
BILIRUB SERPL-MCNC: 0.3 MG/DL (ref 0.2–1.2)
BUN BLD-MCNC: 38 MG/DL (ref 8–23)
BUN/CREAT SERPL: 20.4 (ref 7–25)
CALCIUM SPEC-SCNC: 8.8 MG/DL (ref 8.2–9.6)
CHLORIDE SERPL-SCNC: 104 MMOL/L (ref 98–107)
CO2 SERPL-SCNC: 25 MMOL/L (ref 22–29)
CREAT BLD-MCNC: 1.86 MG/DL (ref 0.76–1.27)
DEPRECATED RDW RBC AUTO: 51.2 FL (ref 37–54)
EOSINOPHIL # BLD AUTO: 0.26 10*3/MM3 (ref 0–0.4)
EOSINOPHIL NFR BLD AUTO: 2.8 % (ref 0.3–6.2)
ERYTHROCYTE [DISTWIDTH] IN BLOOD BY AUTOMATED COUNT: 14.4 % (ref 12.3–15.4)
GFR SERPL CREATININE-BSD FRML MDRD: 34 ML/MIN/1.73
GLOBULIN UR ELPH-MCNC: 3 GM/DL
GLUCOSE BLD-MCNC: 109 MG/DL (ref 65–99)
HCT VFR BLD AUTO: 32.9 % (ref 37.5–51)
HGB BLD-MCNC: 10.4 G/DL (ref 13–17.7)
HOLD SPECIMEN: NORMAL
HOLD SPECIMEN: NORMAL
IMM GRANULOCYTES # BLD AUTO: 0.05 10*3/MM3 (ref 0–0.05)
IMM GRANULOCYTES NFR BLD AUTO: 0.5 % (ref 0–0.5)
LYMPHOCYTES # BLD AUTO: 4.79 10*3/MM3 (ref 0.7–3.1)
LYMPHOCYTES NFR BLD AUTO: 51.8 % (ref 19.6–45.3)
MAGNESIUM SERPL-MCNC: 1.9 MG/DL (ref 1.7–2.3)
MCH RBC QN AUTO: 30.9 PG (ref 26.6–33)
MCHC RBC AUTO-ENTMCNC: 31.6 G/DL (ref 31.5–35.7)
MCV RBC AUTO: 97.6 FL (ref 79–97)
MONOCYTES # BLD AUTO: 0.53 10*3/MM3 (ref 0.1–0.9)
MONOCYTES NFR BLD AUTO: 5.7 % (ref 5–12)
NEUTROPHILS # BLD AUTO: 3.54 10*3/MM3 (ref 1.7–7)
NEUTROPHILS NFR BLD AUTO: 38.4 % (ref 42.7–76)
NRBC BLD AUTO-RTO: 0 /100 WBC (ref 0–0.2)
PLAT MORPH BLD: NORMAL
PLATELET # BLD AUTO: 204 10*3/MM3 (ref 140–450)
PMV BLD AUTO: 10.6 FL (ref 6–12)
POTASSIUM BLD-SCNC: 4.2 MMOL/L (ref 3.5–5.2)
PROT SERPL-MCNC: 7 G/DL (ref 6–8.5)
RBC # BLD AUTO: 3.37 10*6/MM3 (ref 4.14–5.8)
RBC MORPH BLD: NORMAL
SODIUM BLD-SCNC: 144 MMOL/L (ref 136–145)
TROPONIN T SERPL-MCNC: 0.02 NG/ML (ref 0–0.03)
WBC MORPH BLD: NORMAL
WBC NRBC COR # BLD: 9.24 10*3/MM3 (ref 3.4–10.8)
WHOLE BLOOD HOLD SPECIMEN: NORMAL
WHOLE BLOOD HOLD SPECIMEN: NORMAL

## 2020-05-21 PROCEDURE — 85025 COMPLETE CBC W/AUTO DIFF WBC: CPT

## 2020-05-21 PROCEDURE — 99285 EMERGENCY DEPT VISIT HI MDM: CPT

## 2020-05-21 PROCEDURE — 85045 AUTOMATED RETICULOCYTE COUNT: CPT | Performed by: INTERNAL MEDICINE

## 2020-05-21 PROCEDURE — G0378 HOSPITAL OBSERVATION PER HR: HCPCS

## 2020-05-21 PROCEDURE — 93005 ELECTROCARDIOGRAM TRACING: CPT

## 2020-05-21 PROCEDURE — 70450 CT HEAD/BRAIN W/O DYE: CPT

## 2020-05-21 PROCEDURE — 82746 ASSAY OF FOLIC ACID SERUM: CPT | Performed by: INTERNAL MEDICINE

## 2020-05-21 PROCEDURE — 99220 PR INITIAL OBSERVATION CARE/DAY 70 MINUTES: CPT | Performed by: INTERNAL MEDICINE

## 2020-05-21 PROCEDURE — 80053 COMPREHEN METABOLIC PANEL: CPT

## 2020-05-21 PROCEDURE — 72128 CT CHEST SPINE W/O DYE: CPT

## 2020-05-21 PROCEDURE — 83540 ASSAY OF IRON: CPT | Performed by: INTERNAL MEDICINE

## 2020-05-21 PROCEDURE — 93005 ELECTROCARDIOGRAM TRACING: CPT | Performed by: EMERGENCY MEDICINE

## 2020-05-21 PROCEDURE — 84484 ASSAY OF TROPONIN QUANT: CPT

## 2020-05-21 PROCEDURE — 84466 ASSAY OF TRANSFERRIN: CPT | Performed by: INTERNAL MEDICINE

## 2020-05-21 PROCEDURE — 72125 CT NECK SPINE W/O DYE: CPT

## 2020-05-21 PROCEDURE — 82607 VITAMIN B-12: CPT | Performed by: INTERNAL MEDICINE

## 2020-05-21 PROCEDURE — 82728 ASSAY OF FERRITIN: CPT | Performed by: INTERNAL MEDICINE

## 2020-05-21 PROCEDURE — 85007 BL SMEAR W/DIFF WBC COUNT: CPT

## 2020-05-21 PROCEDURE — 83735 ASSAY OF MAGNESIUM: CPT

## 2020-05-21 RX ORDER — FAMOTIDINE 20 MG/1
20 TABLET, FILM COATED ORAL DAILY
Status: DISCONTINUED | OUTPATIENT
Start: 2020-05-22 | End: 2020-05-23 | Stop reason: HOSPADM

## 2020-05-21 RX ORDER — FOLIC ACID 1 MG/1
1 TABLET ORAL DAILY
Status: DISCONTINUED | OUTPATIENT
Start: 2020-05-22 | End: 2020-05-23 | Stop reason: HOSPADM

## 2020-05-21 RX ORDER — THIAMINE MONONITRATE (VIT B1) 100 MG
100 TABLET ORAL DAILY
Status: DISCONTINUED | OUTPATIENT
Start: 2020-05-22 | End: 2020-05-23 | Stop reason: HOSPADM

## 2020-05-21 RX ORDER — FERROUS SULFATE 325(65) MG
325 TABLET ORAL
Status: DISCONTINUED | OUTPATIENT
Start: 2020-05-22 | End: 2020-05-23 | Stop reason: HOSPADM

## 2020-05-21 RX ORDER — ASCORBIC ACID 500 MG
500 TABLET ORAL
Status: DISCONTINUED | OUTPATIENT
Start: 2020-05-22 | End: 2020-05-23 | Stop reason: HOSPADM

## 2020-05-21 RX ORDER — ASCORBIC ACID 500 MG
500 TABLET ORAL EVERY OTHER DAY
COMMUNITY
End: 2020-01-01 | Stop reason: HOSPADM

## 2020-05-21 RX ORDER — ACETAMINOPHEN 160 MG/5ML
650 SOLUTION ORAL EVERY 4 HOURS PRN
Status: DISCONTINUED | OUTPATIENT
Start: 2020-05-21 | End: 2020-05-23 | Stop reason: HOSPADM

## 2020-05-21 RX ORDER — SODIUM CHLORIDE 0.9 % (FLUSH) 0.9 %
10 SYRINGE (ML) INJECTION AS NEEDED
Status: DISCONTINUED | OUTPATIENT
Start: 2020-05-21 | End: 2020-05-23 | Stop reason: HOSPADM

## 2020-05-21 RX ORDER — FERROUS SULFATE 325(65) MG
325 TABLET ORAL EVERY OTHER DAY
COMMUNITY

## 2020-05-21 RX ORDER — CARVEDILOL 3.12 MG/1
3.12 TABLET ORAL 2 TIMES DAILY WITH MEALS
Status: DISCONTINUED | OUTPATIENT
Start: 2020-05-22 | End: 2020-05-23 | Stop reason: HOSPADM

## 2020-05-21 RX ORDER — DIPHENOXYLATE HYDROCHLORIDE AND ATROPINE SULFATE 2.5; .025 MG/1; MG/1
1 TABLET ORAL DAILY
Status: DISCONTINUED | OUTPATIENT
Start: 2020-05-22 | End: 2020-05-23 | Stop reason: HOSPADM

## 2020-05-21 RX ORDER — CLOPIDOGREL BISULFATE 75 MG/1
75 TABLET ORAL DAILY
Status: DISCONTINUED | OUTPATIENT
Start: 2020-05-22 | End: 2020-05-23 | Stop reason: HOSPADM

## 2020-05-21 RX ORDER — SODIUM CHLORIDE 0.9 % (FLUSH) 0.9 %
10 SYRINGE (ML) INJECTION EVERY 12 HOURS SCHEDULED
Status: DISCONTINUED | OUTPATIENT
Start: 2020-05-22 | End: 2020-05-23 | Stop reason: HOSPADM

## 2020-05-21 RX ORDER — ATORVASTATIN CALCIUM 40 MG/1
40 TABLET, FILM COATED ORAL DAILY
Status: DISCONTINUED | OUTPATIENT
Start: 2020-05-22 | End: 2020-05-23 | Stop reason: HOSPADM

## 2020-05-21 RX ORDER — TORSEMIDE 20 MG/1
20 TABLET ORAL EVERY OTHER DAY
Status: DISCONTINUED | OUTPATIENT
Start: 2020-05-22 | End: 2020-05-23 | Stop reason: HOSPADM

## 2020-05-21 RX ORDER — HEPARIN SODIUM 5000 [USP'U]/ML
5000 INJECTION, SOLUTION INTRAVENOUS; SUBCUTANEOUS EVERY 8 HOURS SCHEDULED
Status: DISCONTINUED | OUTPATIENT
Start: 2020-05-22 | End: 2020-05-23 | Stop reason: HOSPADM

## 2020-05-21 RX ORDER — ACETAMINOPHEN 325 MG/1
650 TABLET ORAL EVERY 4 HOURS PRN
Status: DISCONTINUED | OUTPATIENT
Start: 2020-05-21 | End: 2020-05-23 | Stop reason: HOSPADM

## 2020-05-21 RX ORDER — ACETAMINOPHEN 650 MG/1
650 SUPPOSITORY RECTAL EVERY 4 HOURS PRN
Status: DISCONTINUED | OUTPATIENT
Start: 2020-05-21 | End: 2020-05-23 | Stop reason: HOSPADM

## 2020-05-22 ENCOUNTER — APPOINTMENT (OUTPATIENT)
Dept: CT IMAGING | Facility: HOSPITAL | Age: 85
End: 2020-05-22

## 2020-05-22 ENCOUNTER — APPOINTMENT (OUTPATIENT)
Dept: CARDIOLOGY | Facility: HOSPITAL | Age: 85
End: 2020-05-22

## 2020-05-22 PROBLEM — S22.079A CLOSED FRACTURE OF NINTH THORACIC VERTEBRA (HCC): Status: ACTIVE | Noted: 2020-05-22

## 2020-05-22 LAB
ANION GAP SERPL CALCULATED.3IONS-SCNC: 12 MMOL/L (ref 5–15)
BASOPHILS # BLD AUTO: 0.05 10*3/MM3 (ref 0–0.2)
BASOPHILS NFR BLD AUTO: 0.5 % (ref 0–1.5)
BH CV ECHO MEAS - AO MAX PG (FULL): 11.4 MMHG
BH CV ECHO MEAS - AO MAX PG: 17.6 MMHG
BH CV ECHO MEAS - AO MEAN PG (FULL): 7 MMHG
BH CV ECHO MEAS - AO MEAN PG: 10 MMHG
BH CV ECHO MEAS - AO ROOT AREA (BSA CORRECTED): 1.7
BH CV ECHO MEAS - AO ROOT AREA: 8 CM^2
BH CV ECHO MEAS - AO ROOT DIAM: 3.2 CM
BH CV ECHO MEAS - AO V2 MAX: 210 CM/SEC
BH CV ECHO MEAS - AO V2 MEAN: 150 CM/SEC
BH CV ECHO MEAS - AO V2 VTI: 39.7 CM
BH CV ECHO MEAS - AVA(I,A): 1.9 CM^2
BH CV ECHO MEAS - AVA(I,D): 1.9 CM^2
BH CV ECHO MEAS - AVA(V,A): 1.9 CM^2
BH CV ECHO MEAS - AVA(V,D): 1.9 CM^2
BH CV ECHO MEAS - BSA(HAYCOCK): 1.9 M^2
BH CV ECHO MEAS - BSA: 1.9 M^2
BH CV ECHO MEAS - BZI_BMI: 25.1 KILOGRAMS/M^2
BH CV ECHO MEAS - BZI_METRIC_HEIGHT: 175.3 CM
BH CV ECHO MEAS - BZI_METRIC_WEIGHT: 77.1 KG
BH CV ECHO MEAS - EDV(CUBED): 187.1 ML
BH CV ECHO MEAS - EDV(MOD-SP2): 194 ML
BH CV ECHO MEAS - EDV(MOD-SP4): 217 ML
BH CV ECHO MEAS - EDV(TEICH): 161.3 ML
BH CV ECHO MEAS - EF(CUBED): 32 %
BH CV ECHO MEAS - EF(MOD-BP): 31 %
BH CV ECHO MEAS - EF(MOD-SP2): 24.7 %
BH CV ECHO MEAS - EF(MOD-SP4): 38.7 %
BH CV ECHO MEAS - EF(TEICH): 25.7 %
BH CV ECHO MEAS - ESV(CUBED): 127.3 ML
BH CV ECHO MEAS - ESV(MOD-SP2): 146 ML
BH CV ECHO MEAS - ESV(MOD-SP4): 133 ML
BH CV ECHO MEAS - ESV(TEICH): 119.9 ML
BH CV ECHO MEAS - FS: 12.1 %
BH CV ECHO MEAS - IVS/LVPW: 0.94
BH CV ECHO MEAS - IVSD: 1.1 CM
BH CV ECHO MEAS - LA DIMENSION: 5.1 CM
BH CV ECHO MEAS - LA/AO: 1.6
BH CV ECHO MEAS - LAD MAJOR: 6 CM
BH CV ECHO MEAS - LAT PEAK E' VEL: 6.7 CM/SEC
BH CV ECHO MEAS - LATERAL E/E' RATIO: 19
BH CV ECHO MEAS - LV DIASTOLIC VOL/BSA (35-75): 112.6 ML/M^2
BH CV ECHO MEAS - LV MASS(C)D: 262.9 GRAMS
BH CV ECHO MEAS - LV MASS(C)DI: 136.4 GRAMS/M^2
BH CV ECHO MEAS - LV MAX PG: 6.3 MMHG
BH CV ECHO MEAS - LV MEAN PG: 3 MMHG
BH CV ECHO MEAS - LV SYSTOLIC VOL/BSA (12-30): 69 ML/M^2
BH CV ECHO MEAS - LV V1 MAX: 125 CM/SEC
BH CV ECHO MEAS - LV V1 MEAN: 82.4 CM/SEC
BH CV ECHO MEAS - LV V1 VTI: 24.6 CM
BH CV ECHO MEAS - LVIDD: 5.7 CM
BH CV ECHO MEAS - LVIDS: 5 CM
BH CV ECHO MEAS - LVLD AP2: 9.3 CM
BH CV ECHO MEAS - LVLD AP4: 9.1 CM
BH CV ECHO MEAS - LVLS AP2: 8.8 CM
BH CV ECHO MEAS - LVLS AP4: 8.3 CM
BH CV ECHO MEAS - LVOT AREA (M): 3.1 CM^2
BH CV ECHO MEAS - LVOT AREA: 3.1 CM^2
BH CV ECHO MEAS - LVOT DIAM: 2 CM
BH CV ECHO MEAS - LVPWD: 1.2 CM
BH CV ECHO MEAS - MED PEAK E' VEL: 4.8 CM/SEC
BH CV ECHO MEAS - MEDIAL E/E' RATIO: 26.3
BH CV ECHO MEAS - MR ALIAS VEL: 33.5 CM/SEC
BH CV ECHO MEAS - MR ERO: 0.1 CM^2
BH CV ECHO MEAS - MR FLOW RATE: 52.6 CM^3/SEC
BH CV ECHO MEAS - MR MAX PG: 115 MMHG
BH CV ECHO MEAS - MR MAX VEL: 537 CM/SEC
BH CV ECHO MEAS - MR MEAN PG: 83 MMHG
BH CV ECHO MEAS - MR MEAN VEL: 442 CM/SEC
BH CV ECHO MEAS - MR PISA RADIUS: 0.5 CM
BH CV ECHO MEAS - MR PISA: 1.6 CM^2
BH CV ECHO MEAS - MR VOLUME: 17.5 ML
BH CV ECHO MEAS - MR VTI: 179 CM
BH CV ECHO MEAS - MV A MAX VEL: 124 CM/SEC
BH CV ECHO MEAS - MV AREA (1 DIAM): 9.1 CM^2
BH CV ECHO MEAS - MV DEC SLOPE: 581 CM/SEC^2
BH CV ECHO MEAS - MV DEC TIME: 0.12 SEC
BH CV ECHO MEAS - MV DIAM: 3.4 CM
BH CV ECHO MEAS - MV E MAX VEL: 127 CM/SEC
BH CV ECHO MEAS - MV E/A: 1
BH CV ECHO MEAS - MV FLOW AREA(1DIAM): 9.1 CM^2
BH CV ECHO MEAS - MV MAX PG: 6.6 MMHG
BH CV ECHO MEAS - MV MEAN PG: 4 MMHG
BH CV ECHO MEAS - MV P1/2T MAX VEL: 127 CM/SEC
BH CV ECHO MEAS - MV P1/2T: 64 MSEC
BH CV ECHO MEAS - MV V2 MAX: 128 CM/SEC
BH CV ECHO MEAS - MV V2 MEAN: 95.4 CM/SEC
BH CV ECHO MEAS - MV V2 VTI: 32.4 CM
BH CV ECHO MEAS - MVA P1/2T LCG: 1.7 CM^2
BH CV ECHO MEAS - MVA(P1/2T): 3.4 CM^2
BH CV ECHO MEAS - MVA(VTI): 2.4 CM^2
BH CV ECHO MEAS - PA ACC SLOPE: 667 CM/SEC^2
BH CV ECHO MEAS - PA ACC TIME: 0.08 SEC
BH CV ECHO MEAS - PA MAX PG: 3.9 MMHG
BH CV ECHO MEAS - PA PR(ACCEL): 42.6 MMHG
BH CV ECHO MEAS - PA V2 MAX: 99.1 CM/SEC
BH CV ECHO MEAS - RF(MV,AO)(1 DIAM): -0.09
BH CV ECHO MEAS - RF(MV,LVOT)(1DIAM): 0.74
BH CV ECHO MEAS - SI(AO): 165.6 ML/M^2
BH CV ECHO MEAS - SI(CUBED): 31.1 ML/M^2
BH CV ECHO MEAS - SI(LVOT): 40.1 ML/M^2
BH CV ECHO MEAS - SI(MOD-SP2): 24.9 ML/M^2
BH CV ECHO MEAS - SI(MOD-SP4): 43.6 ML/M^2
BH CV ECHO MEAS - SI(MV 1 DIAM): 152.6 ML/M^2
BH CV ECHO MEAS - SI(TEICH): 21.5 ML/M^2
BH CV ECHO MEAS - SV(AO): 319.3 ML
BH CV ECHO MEAS - SV(CUBED): 59.9 ML
BH CV ECHO MEAS - SV(LVOT): 77.3 ML
BH CV ECHO MEAS - SV(MOD-SP2): 48 ML
BH CV ECHO MEAS - SV(MOD-SP4): 84 ML
BH CV ECHO MEAS - SV(MV 1 DIAM): 294.2 ML
BH CV ECHO MEAS - SV(TEICH): 41.4 ML
BH CV ECHO MEAS - TAPSE (>1.6): 2.1 CM2
BH CV ECHO MEASUREMENTS AVERAGE E/E' RATIO: 22.09
BH CV VAS BP LEFT ARM: NORMAL MMHG
BH CV XLRA - RV BASE: 4 CM
BH CV XLRA - RV LENGTH: 8.8 CM
BH CV XLRA - RV MID: 4.1 CM
BH CV XLRA - TDI S': 12.3 CM/SEC
BILIRUB UR QL STRIP: NEGATIVE
BUN BLD-MCNC: 36 MG/DL (ref 8–23)
BUN/CREAT SERPL: 20.9 (ref 7–25)
CALCIUM SPEC-SCNC: 8.1 MG/DL (ref 8.2–9.6)
CHLORIDE SERPL-SCNC: 104 MMOL/L (ref 98–107)
CLARITY UR: CLEAR
CO2 SERPL-SCNC: 25 MMOL/L (ref 22–29)
COLOR UR: YELLOW
CREAT BLD-MCNC: 1.72 MG/DL (ref 0.76–1.27)
CREAT UR-MCNC: 86.7 MG/DL
DEPRECATED RDW RBC AUTO: 50.6 FL (ref 37–54)
EOSINOPHIL # BLD AUTO: 0.2 10*3/MM3 (ref 0–0.4)
EOSINOPHIL NFR BLD AUTO: 2 % (ref 0.3–6.2)
ERYTHROCYTE [DISTWIDTH] IN BLOOD BY AUTOMATED COUNT: 14.4 % (ref 12.3–15.4)
FERRITIN SERPL-MCNC: 223.3 NG/ML (ref 30–400)
FOLATE SERPL-MCNC: 12.2 NG/ML (ref 4.78–24.2)
GFR SERPL CREATININE-BSD FRML MDRD: 37 ML/MIN/1.73
GLUCOSE BLD-MCNC: 112 MG/DL (ref 65–99)
GLUCOSE UR STRIP-MCNC: NEGATIVE MG/DL
HCT VFR BLD AUTO: 27.3 % (ref 37.5–51)
HEMOCCULT STL QL: NEGATIVE
HGB BLD-MCNC: 8.6 G/DL (ref 13–17.7)
HGB UR QL STRIP.AUTO: NEGATIVE
IMM GRANULOCYTES # BLD AUTO: 0.04 10*3/MM3 (ref 0–0.05)
IMM GRANULOCYTES NFR BLD AUTO: 0.4 % (ref 0–0.5)
IRON 24H UR-MRATE: 35 MCG/DL (ref 59–158)
IRON SATN MFR SERPL: 11 % (ref 20–50)
KETONES UR QL STRIP: NEGATIVE
LEFT ATRIUM VOLUME INDEX: 43.1 ML/M^2
LEFT ATRIUM VOLUME: 83 ML
LEUKOCYTE ESTERASE UR QL STRIP.AUTO: NEGATIVE
LV EF 2D ECHO EST: 30 %
LYMPHOCYTES # BLD AUTO: 4.54 10*3/MM3 (ref 0.7–3.1)
LYMPHOCYTES NFR BLD AUTO: 46.1 % (ref 19.6–45.3)
MAXIMAL PREDICTED HEART RATE: 127 BPM
MCH RBC QN AUTO: 30.7 PG (ref 26.6–33)
MCHC RBC AUTO-ENTMCNC: 31.5 G/DL (ref 31.5–35.7)
MCV RBC AUTO: 97.5 FL (ref 79–97)
MONOCYTES # BLD AUTO: 0.65 10*3/MM3 (ref 0.1–0.9)
MONOCYTES NFR BLD AUTO: 6.6 % (ref 5–12)
MR PISA EROA: 0.1 CM2
NEUTROPHILS # BLD AUTO: 4.36 10*3/MM3 (ref 1.7–7)
NEUTROPHILS NFR BLD AUTO: 44.4 % (ref 42.7–76)
NITRITE UR QL STRIP: NEGATIVE
NRBC BLD AUTO-RTO: 0 /100 WBC (ref 0–0.2)
NT-PROBNP SERPL-MCNC: 6951 PG/ML (ref 5–1800)
PH UR STRIP.AUTO: <=5 [PH] (ref 5–8)
PISA ALIASING VEL: 3.35 M/S
PISA RADIUS: 0.5 CM
PLATELET # BLD AUTO: 193 10*3/MM3 (ref 140–450)
PMV BLD AUTO: 11.1 FL (ref 6–12)
POTASSIUM BLD-SCNC: 3.7 MMOL/L (ref 3.5–5.2)
PROT UR QL STRIP: NEGATIVE
RBC # BLD AUTO: 2.8 10*6/MM3 (ref 4.14–5.8)
RETICS # AUTO: 0.03 10*6/MM3 (ref 0.02–0.13)
RETICS/RBC NFR AUTO: 0.92 % (ref 0.7–1.9)
SODIUM BLD-SCNC: 141 MMOL/L (ref 136–145)
SODIUM UR-SCNC: 38 MMOL/L
SP GR UR STRIP: 1.01 (ref 1–1.03)
STRESS TARGET HR: 108 BPM
TIBC SERPL-MCNC: 325 MCG/DL (ref 298–536)
TRANSFERRIN SERPL-MCNC: 218 MG/DL (ref 200–360)
TROPONIN T SERPL-MCNC: 0.02 NG/ML (ref 0–0.03)
TROPONIN T SERPL-MCNC: 0.03 NG/ML (ref 0–0.03)
UROBILINOGEN UR QL STRIP: NORMAL
VIT B12 BLD-MCNC: 313 PG/ML (ref 211–946)
WBC NRBC COR # BLD: 9.84 10*3/MM3 (ref 3.4–10.8)

## 2020-05-22 PROCEDURE — 99226 PR SBSQ OBSERVATION CARE/DAY 35 MINUTES: CPT | Performed by: HOSPITALIST

## 2020-05-22 PROCEDURE — 25010000002 SULFUR HEXAFLUORIDE MICROSPH 60.7-25 MG RECONSTITUTED SUSPENSION: Performed by: INTERNAL MEDICINE

## 2020-05-22 PROCEDURE — 81003 URINALYSIS AUTO W/O SCOPE: CPT | Performed by: INTERNAL MEDICINE

## 2020-05-22 PROCEDURE — 84300 ASSAY OF URINE SODIUM: CPT | Performed by: INTERNAL MEDICINE

## 2020-05-22 PROCEDURE — G0378 HOSPITAL OBSERVATION PER HR: HCPCS

## 2020-05-22 PROCEDURE — 99214 OFFICE O/P EST MOD 30 MIN: CPT | Performed by: INTERNAL MEDICINE

## 2020-05-22 PROCEDURE — 71250 CT THORAX DX C-: CPT

## 2020-05-22 PROCEDURE — 25010000002 HEPARIN (PORCINE) PER 1000 UNITS: Performed by: INTERNAL MEDICINE

## 2020-05-22 PROCEDURE — 97162 PT EVAL MOD COMPLEX 30 MIN: CPT

## 2020-05-22 PROCEDURE — 93306 TTE W/DOPPLER COMPLETE: CPT | Performed by: INTERNAL MEDICINE

## 2020-05-22 PROCEDURE — 85025 COMPLETE CBC W/AUTO DIFF WBC: CPT | Performed by: INTERNAL MEDICINE

## 2020-05-22 PROCEDURE — 80048 BASIC METABOLIC PNL TOTAL CA: CPT | Performed by: INTERNAL MEDICINE

## 2020-05-22 PROCEDURE — 82570 ASSAY OF URINE CREATININE: CPT | Performed by: INTERNAL MEDICINE

## 2020-05-22 PROCEDURE — 25010000002 CYANOCOBALAMIN PER 1000 MCG: Performed by: HOSPITALIST

## 2020-05-22 PROCEDURE — 93306 TTE W/DOPPLER COMPLETE: CPT

## 2020-05-22 PROCEDURE — 96372 THER/PROPH/DIAG INJ SC/IM: CPT

## 2020-05-22 PROCEDURE — 83880 ASSAY OF NATRIURETIC PEPTIDE: CPT | Performed by: INTERNAL MEDICINE

## 2020-05-22 PROCEDURE — 84484 ASSAY OF TROPONIN QUANT: CPT | Performed by: INTERNAL MEDICINE

## 2020-05-22 PROCEDURE — 82272 OCCULT BLD FECES 1-3 TESTS: CPT | Performed by: INTERNAL MEDICINE

## 2020-05-22 RX ORDER — HYDROCODONE BITARTRATE AND ACETAMINOPHEN 5; 325 MG/1; MG/1
1 TABLET ORAL EVERY 6 HOURS PRN
Status: DISCONTINUED | OUTPATIENT
Start: 2020-05-22 | End: 2020-05-23 | Stop reason: HOSPADM

## 2020-05-22 RX ORDER — CYANOCOBALAMIN 1000 UG/ML
1000 INJECTION, SOLUTION INTRAMUSCULAR; SUBCUTANEOUS DAILY
Status: DISCONTINUED | OUTPATIENT
Start: 2020-05-22 | End: 2020-05-23 | Stop reason: HOSPADM

## 2020-05-22 RX ADMIN — OXYCODONE HYDROCHLORIDE AND ACETAMINOPHEN 500 MG: 500 TABLET ORAL at 09:16

## 2020-05-22 RX ADMIN — CARVEDILOL 3.12 MG: 3.12 TABLET, FILM COATED ORAL at 18:44

## 2020-05-22 RX ADMIN — MULTIVITAMIN TABLET 1 TABLET: TABLET at 09:16

## 2020-05-22 RX ADMIN — CYANOCOBALAMIN 1000 MCG: 1000 INJECTION, SOLUTION INTRAMUSCULAR; SUBCUTANEOUS at 10:45

## 2020-05-22 RX ADMIN — SACUBITRIL AND VALSARTAN 1 TABLET: 24; 26 TABLET, FILM COATED ORAL at 00:54

## 2020-05-22 RX ADMIN — SERTRALINE HYDROCHLORIDE 50 MG: 50 TABLET ORAL at 09:18

## 2020-05-22 RX ADMIN — SACUBITRIL AND VALSARTAN 1 TABLET: 24; 26 TABLET, FILM COATED ORAL at 21:19

## 2020-05-22 RX ADMIN — ACETAMINOPHEN 650 MG: 325 TABLET, FILM COATED ORAL at 13:08

## 2020-05-22 RX ADMIN — TORSEMIDE 20 MG: 20 TABLET ORAL at 09:18

## 2020-05-22 RX ADMIN — Medication 100 MG: at 09:16

## 2020-05-22 RX ADMIN — HYDROCODONE BITARTRATE AND ACETAMINOPHEN 1 TABLET: 5; 325 TABLET ORAL at 01:16

## 2020-05-22 RX ADMIN — ACETAMINOPHEN 650 MG: 325 TABLET, FILM COATED ORAL at 18:44

## 2020-05-22 RX ADMIN — CLOPIDOGREL BISULFATE 75 MG: 75 TABLET ORAL at 09:18

## 2020-05-22 RX ADMIN — HEPARIN SODIUM 5000 UNITS: 5000 INJECTION INTRAVENOUS; SUBCUTANEOUS at 21:22

## 2020-05-22 RX ADMIN — CARVEDILOL 3.12 MG: 3.12 TABLET, FILM COATED ORAL at 09:17

## 2020-05-22 RX ADMIN — FERROUS SULFATE TAB 325 MG (65 MG ELEMENTAL FE) 325 MG: 325 (65 FE) TAB at 09:16

## 2020-05-22 RX ADMIN — HYDROCODONE BITARTRATE AND ACETAMINOPHEN 1 TABLET: 5; 325 TABLET ORAL at 21:22

## 2020-05-22 RX ADMIN — SULFUR HEXAFLUORIDE 2 ML: KIT at 09:00

## 2020-05-22 RX ADMIN — FAMOTIDINE 20 MG: 20 TABLET, FILM COATED ORAL at 09:18

## 2020-05-22 RX ADMIN — SODIUM CHLORIDE, PRESERVATIVE FREE 10 ML: 5 INJECTION INTRAVENOUS at 09:19

## 2020-05-22 RX ADMIN — HEPARIN SODIUM 5000 UNITS: 5000 INJECTION INTRAVENOUS; SUBCUTANEOUS at 10:45

## 2020-05-22 RX ADMIN — FOLIC ACID 1 MG: 1 TABLET ORAL at 09:18

## 2020-05-22 RX ADMIN — SACUBITRIL AND VALSARTAN 1 TABLET: 24; 26 TABLET, FILM COATED ORAL at 09:18

## 2020-05-22 RX ADMIN — ATORVASTATIN CALCIUM 40 MG: 40 TABLET, FILM COATED ORAL at 09:16

## 2020-05-22 RX ADMIN — SODIUM CHLORIDE, PRESERVATIVE FREE 10 ML: 5 INJECTION INTRAVENOUS at 21:23

## 2020-05-22 NOTE — PROGRESS NOTES
Discharge Planning Assessment  [facility]     Patient Name: Tad Moon  MRN: [id number]  Today's Date: 5/22/2020    Admit Date: 5/21/2020    Discharge Needs Assessment     Row Name 05/22/20 1054       Living Environment    Lives With  spouse    Current Living Arrangements  home/apartment/condo    Living Arrangement Comments  Has a stair lift.  Spouse states he is very independent. Drives. Lives in a condo.       Discharge Needs Assessment    Equipment Currently Used at Home  none    Equipment Needed After Discharge  none    Discharge Coordination/Progress  No HH or outpt services involved in the pts care currently.        Discharge Plan     Row Name 05/22/20 1056       Plan    Plan  Home at DC    Patient/Family in Agreement with Plan  yes    Plan Comments  I spoke with the pt and his spouse by phone. No DC needs  at this time per pt and family.    Row Name 05/22/20 0905       Plan    Final Discharge Disposition Code  01 - home or self-care        Destination      Coordination has not been started for this encounter.      Durable Medical Equipment      Coordination has not been started for this encounter.      Dialysis/Infusion      Coordination has not been started for this encounter.      Home Medical Care      Coordination has not been started for this encounter.      Therapy      Coordination has not been started for this encounter.      Community Resources      Coordination has not been started for this encounter.        Expected Discharge Date and Time     Expected Discharge Date Expected Discharge Time    May 24, 2020         Demographic Summary    No documentation.       Functional Status     Row Name 05/22/20 1054       Functional Status    Usual Activity Tolerance  good       Functional Status, IADL    Medications  independent    Meal Preparation  independent    Housekeeping  independent    Laundry  independent    Shopping  independent        Psychosocial    No documentation.       Abuse/Neglect    No  documentation.       Legal    No documentation.       Substance Abuse    No documentation.       Patient Forms    No documentation.           Keren Guerrero RN

## 2020-05-22 NOTE — PLAN OF CARE
Problem: Fall Risk (Adult)  Goal: Identify Related Risk Factors and Signs and Symptoms  Description  Related risk factors and signs and symptoms are identified upon initiation of Human Response Clinical Practice Guideline (CPG).  Outcome: Ongoing (interventions implemented as appropriate)  Note:   Patient admitted for observation and evaluation by cardiology. Porter given once PRN for pain, patient rested overnight. No complaints at this time, will continue to monitor.

## 2020-05-22 NOTE — ED PROVIDER NOTES
Subjective   Tad Moon is a pleasant 89 y.o. male with a history of MI, CAD, HTN, HLD, CHF, aortic stenosis, low kidney function, BOOP, and GERD. His past surgical history includes cardiac catheterization, coronary angioplasty with stent placement, CABG, and aortic valve replacement. He reports that he still lives independently and drives a car. He presents to the ED with complaints of syncopal episode this evening. He states that he was out working in the yard and may have over exerted himself. The patient reports that when he went back inside, he was standing at the sink when he became lightheaded and had a syncopal episode. He reports that he woke up on the floor several minutes later and a hematoma to the back of his head. He denies any recent fever, chills, rhinorrhea, cough, shortness of breath, or palpitations. The patient states that he has had a syncopal episode in the past about 4-5 years ago. His cardiologist is Dr. Cobian. His PCP is Dr. Hyde. There are no other acute complaints at this time.      History provided by:  Patient  Syncope   Episode history:  Single  Most recent episode:  Today  Timing:  Constant  Progression:  Resolved  Chronicity:  New  Witnessed: no    Relieved by:  None tried  Worsened by:  Nothing  Ineffective treatments:  None tried  Associated symptoms: no fever, no palpitations and no shortness of breath    Risk factors: coronary artery disease        Review of Systems   Constitutional: Negative for chills and fever.   HENT: Negative for rhinorrhea.    Respiratory: Negative for cough and shortness of breath.    Cardiovascular: Positive for syncope. Negative for palpitations.   Neurological: Positive for syncope and light-headedness.   All other systems reviewed and are negative.      Past Medical History:   Diagnosis Date   • Aortic stenosis    • Arthritis    • BOOP (bronchiolitis obliterans with organizing pneumonia) (CMS/[facility]) 2014   • Chest pain 2007    angioplasty to  RCA   • CHF (congestive heart failure) (CMS/[facility])    • CLL (chronic lymphocytic leukemia) (CMS/[facility])     15 years    • Coronary artery disease    • GERD (gastroesophageal reflux disease)    • Herpes zoster 2012   • History of tobacco abuse    • Hyperlipidemia    • Hypertension    • Low kidney function     very recently and did kidney function test and said decreased function so being watched to get all fluid off    • MI (myocardial infarction) (CMS/[facility])     mid 90s very mild - few years after bypass surgery    • Non-STEMI (non-ST elevated myocardial infarction) (CMS/[facility]) 10/17/2017    stenting of occluded grafts to left circumflex and RCA   • Skin cancer     basal and squamous from various location    • SOBOE (shortness of breath on exertion)    • Uses hearing aid     bilat ears      Interpretation Summary     · Estimated EF = 20%.  · Mild aortic valve regurgitation is present.  · Moderate mitral valve regurgitation is present          Allergies   Allergen Reactions   • Hctz [Hydrochlorothiazide] Other (See Comments)     hyponatremia       Past Surgical History:   Procedure Laterality Date   • ABDOMINAL HERNIA REPAIR      x 2   • AORTIC VALVE REPAIR/REPLACEMENT N/A 4/26/2018    Procedure: Transcatheter Aortic Valve Replacement, LANDY;  Surgeon: Lv Purdy MD;  Location:  [facility] HYBRID OR 15;  Service: Cardiothoracic   • AORTIC VALVE REPAIR/REPLACEMENT N/A 4/26/2018    Procedure: Transcatheter Aortic Valve Replacement;  Surgeon: Juan M Sood MD;  Location:  [facility] HYBRID OR 15;  Service: Cardiology   • APPENDECTOMY     • CARDIAC CATHETERIZATION N/A 10/10/2017    Procedure: Left Heart Cath;  Surgeon: Juan M Sood MD;  Location:  [facility] CATH INVASIVE LOCATION;  Service:    • CARDIAC CATHETERIZATION N/A 4/6/2018    Procedure: Left Heart Cath;  Surgeon: Lv Cobian MD;  Location:  [facility] CATH INVASIVE LOCATION;  Service: Cardiovascular   • CHOLECYSTECTOMY     • COLONOSCOPY     • CORONARY ANGIOPLASTY WITH STENT  PLACEMENT      07 one placed,  and in 2017 had another stent placed and one repaired -   so pt thinks 3 stents in place    • CORONARY ARTERY BYPASS GRAFT      4 bypass in 92    • REPLACEMENT TOTAL KNEE Left    • WISDOM TOOTH EXTRACTION         Family History   Problem Relation Age of Onset   • Stroke Mother    • Heart disease Father    • Heart disease Brother    • Coronary artery disease Brother        Social History     Socioeconomic History   • Marital status:      Spouse name: Not on file   • Number of children: 3   • Years of education: Not on file   • Highest education level: Not on file   Occupational History   • Occupation: Retired      Comment: insurance agency    Tobacco Use   • Smoking status: Former Smoker     Packs/day: 0.25     Types: Cigarettes     Last attempt to quit: 1975     Years since quittin.4   • Smokeless tobacco: Never Used   • Tobacco comment: quit 45 years ago- smoked since 17 yo    Substance and Sexual Activity   • Alcohol use: Yes     Alcohol/week: 1.0 - 2.0 standard drinks     Types: 1 - 2 Glasses of wine per week     Comment: occas   • Drug use: No   • Sexual activity: Defer   Social History Narrative    Lives with Wife Julia in [place], [place]     Caffeine:  3 servings per day           Objective   Physical Exam   Constitutional: He is oriented to person, place, and time. He appears well-developed and well-nourished. No distress.   A;ert and oriented. No acute distress.   HENT:   Head: Normocephalic.   Nose: Nose normal.   Left cephalohematoma. No active bleeding.   Eyes: Pupils are equal, round, and reactive to light. Conjunctivae and EOM are normal. No scleral icterus.   PERRL. EOMI.   Neck: Normal range of motion. Neck supple.   Cardiovascular: Normal rate and regular rhythm.   Murmur heard.   Systolic murmur is present with a grade of 1/6.  1/6 murmur left upper chest.   Pulmonary/Chest: Effort normal. No respiratory distress.   Abdominal: Soft. He exhibits no  distension. There is no tenderness.   Musculoskeletal: Normal range of motion. He exhibits tenderness. He exhibits no deformity.   C and T spine with mild tenderness to palpation. No step off or crepitus. Extremities are normal.   Neurological: He is alert and oriented to person, place, and time. He has normal strength. No sensory deficit.   Face symmetric, voice strong, tongue midline. Vision, hearing, speech are all preserved.    Skin: Skin is warm and dry.   Psychiatric: He has a normal mood and affect. His behavior is normal.   Nursing note and vitals reviewed.      Procedures           ED Course      Recent Results (from the past 24 hour(s))   Comprehensive Metabolic Panel    Collection Time: 05/21/20  8:24 PM   Result Value Ref Range    Glucose 109 (H) 65 - 99 mg/dL    BUN 38 (H) 8 - 23 mg/dL    Creatinine 1.86 (H) 0.76 - 1.27 mg/dL    Sodium 144 136 - 145 mmol/L    Potassium 4.2 3.5 - 5.2 mmol/L    Chloride 104 98 - 107 mmol/L    CO2 25.0 22.0 - 29.0 mmol/L    Calcium 8.8 8.2 - 9.6 mg/dL    Total Protein 7.0 6.0 - 8.5 g/dL    Albumin 4.00 3.50 - 5.20 g/dL    ALT (SGPT) 12 1 - 41 U/L    AST (SGOT) 20 1 - 40 U/L    Alkaline Phosphatase 123 (H) 39 - 117 U/L    Total Bilirubin 0.3 0.2 - 1.2 mg/dL    eGFR Non African Amer 34 (L) >60 mL/min/1.73    Globulin 3.0 gm/dL    A/G Ratio 1.3 g/dL    BUN/Creatinine Ratio 20.4 7.0 - 25.0    Anion Gap 15.0 5.0 - 15.0 mmol/L   Magnesium    Collection Time: 05/21/20  8:24 PM   Result Value Ref Range    Magnesium 1.9 1.7 - 2.3 mg/dL   Troponin    Collection Time: 05/21/20  8:24 PM   Result Value Ref Range    Troponin T 0.020 0.000 - 0.030 ng/mL   Light Blue Top    Collection Time: 05/21/20  8:24 PM   Result Value Ref Range    Extra Tube hold for add-on    Green Top (Gel)    Collection Time: 05/21/20  8:24 PM   Result Value Ref Range    Extra Tube Hold for add-ons.    Lavender Top    Collection Time: 05/21/20  8:24 PM   Result Value Ref Range    Extra Tube hold for add-on    Gold  Top - SST    Collection Time: 05/21/20  8:24 PM   Result Value Ref Range    Extra Tube Hold for add-ons.    CBC Auto Differential    Collection Time: 05/21/20  8:24 PM   Result Value Ref Range    WBC 9.24 3.40 - 10.80 10*3/mm3    RBC 3.37 (L) 4.14 - 5.80 10*6/mm3    Hemoglobin 10.4 (L) 13.0 - 17.7 g/dL    Hematocrit 32.9 (L) 37.5 - 51.0 %    MCV 97.6 (H) 79.0 - 97.0 fL    MCH 30.9 26.6 - 33.0 pg    MCHC 31.6 31.5 - 35.7 g/dL    RDW 14.4 12.3 - 15.4 %    RDW-SD 51.2 37.0 - 54.0 fl    MPV 10.6 6.0 - 12.0 fL    Platelets 204 140 - 450 10*3/mm3    Neutrophil % 38.4 (L) 42.7 - 76.0 %    Lymphocyte % 51.8 (H) 19.6 - 45.3 %    Monocyte % 5.7 5.0 - 12.0 %    Eosinophil % 2.8 0.3 - 6.2 %    Basophil % 0.8 0.0 - 1.5 %    Immature Grans % 0.5 0.0 - 0.5 %    Neutrophils, Absolute 3.54 1.70 - 7.00 10*3/mm3    Lymphocytes, Absolute 4.79 (H) 0.70 - 3.10 10*3/mm3    Monocytes, Absolute 0.53 0.10 - 0.90 10*3/mm3    Eosinophils, Absolute 0.26 0.00 - 0.40 10*3/mm3    Basophils, Absolute 0.07 0.00 - 0.20 10*3/mm3    Immature Grans, Absolute 0.05 0.00 - 0.05 10*3/mm3    nRBC 0.0 0.0 - 0.2 /100 WBC   Scan Slide    Collection Time: 05/21/20  8:24 PM   Result Value Ref Range    RBC Morphology Normal Normal    WBC Morphology Normal Normal    Platelet Morphology Normal Normal   Ferritin    Collection Time: 05/21/20  8:24 PM   Result Value Ref Range    Ferritin 223.30 30.00 - 400.00 ng/mL   Iron Profile    Collection Time: 05/21/20  8:24 PM   Result Value Ref Range    Iron 35 (L) 59 - 158 mcg/dL    Iron Saturation 11 (L) 20 - 50 %    Transferrin 218 200 - 360 mg/dL    TIBC 325 298 - 536 mcg/dL   Reticulocytes    Collection Time: 05/21/20  8:24 PM   Result Value Ref Range    Reticulocyte % 0.92 0.70 - 1.90 %    Reticulocyte Absolute 0.0306 0.0200 - 0.1300 10*6/mm3     Note: In addition to lab results from this visit, the labs listed above may include labs taken at another facility or during a different encounter within the last 24 hours.  "Please correlate lab times with ED admission and discharge times for further clarification of the services performed during this visit.    CT Cervical Spine Without Contrast   Final Result   Multilevel degenerative disease. No acute fracture or subluxation.      Signer Name: Tad Delacruz MD    Signed: 5/21/2020 11:34 PM    Workstation Name: [facility]     Radiology [facility]      CT Thoracic Spine Without Contrast   Final Result      1. Acute comminuted but not significantly displaced fracture involving the vertebral body of T9. No other acute fractures are seen.   2. 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   3. Atelectasis, pneumonia, or round atelectasis in the left lower lobe.      Signer Name: Tad Delacruz MD    Signed: 5/21/2020 11:30 PM    Workstation Name: [facility]     Radiology [facility]      CT Head Without Contrast   Final Result   Impression:   1. No clearly acute intracranial pathology demonstrated.   2. Generalized cerebral atrophy and nonspecific periventricular hypodensities which are thought to represent white matter microvascular change.   3. No significant interval intracranial change from prior study.      Signer Name: Hema Bauman MD    Signed: 5/21/2020 8:51 PM    Workstation Name: [id number]     [facility]      CT Chest Without Contrast    (Results Pending)     Vitals:    05/21/20 2230 05/21/20 2237 05/21/20 2330 05/22/20 0100   BP:   154/84    BP Location:   Right arm    Patient Position:   Sitting    Pulse: 80 75 81 74   Resp:   16    Temp:   97.8 °F (36.6 °C)    TempSrc:   Oral    SpO2: 95% 94% 98%    Weight:   76.2 kg (168 lb)    Height:   174 cm (68.5\")      Medications   sodium chloride 0.9 % flush 10 mL (has no administration in time range)   thiamine (VITAMIN B-1) tablet 100 mg (has no administration in time range)     And   multivitamin (THERAGRAN) tablet 1 tablet (has no " administration in time range)     And   folic acid (FOLVITE) tablet 1 mg (has no administration in time range)   atorvastatin (LIPITOR) tablet 40 mg (has no administration in time range)   carvedilol (COREG) tablet 3.125 mg (has no administration in time range)   clopidogrel (PLAVIX) tablet 75 mg (has no administration in time range)   famotidine (PEPCID) tablet 20 mg (has no administration in time range)   ferrous sulfate tablet 325 mg (has no administration in time range)   sacubitril-valsartan (ENTRESTO) 24-26 MG tablet 1 tablet (1 tablet Oral Given 5/22/20 0054)   sertraline (ZOLOFT) tablet 50 mg (has no administration in time range)   torsemide (DEMADEX) tablet 20 mg (has no administration in time range)   vitamin C (ASCORBIC ACID) tablet 500 mg (has no administration in time range)   sodium chloride 0.9 % flush 10 mL (has no administration in time range)   sodium chloride 0.9 % flush 10 mL (has no administration in time range)   heparin (porcine) 5000 UNIT/ML injection 5,000 Units (5,000 Units Subcutaneous Not Given 5/22/20 0027)   acetaminophen (TYLENOL) tablet 650 mg (has no administration in time range)     Or   acetaminophen (TYLENOL) 160 MG/5ML solution 650 mg (has no administration in time range)     Or   acetaminophen (TYLENOL) suppository 650 mg (has no administration in time range)   HYDROcodone-acetaminophen (NORCO) 5-325 MG per tablet 1 tablet (1 tablet Oral Given 5/22/20 0116)     ECG/EMG Results (last 24 hours)     Procedure Component Value Units Date/Time    ECG 12 Lead [[id number]] Collected:  05/21/20 2012     Updated:  05/21/20 2154    Narrative:       Test Reason : Syncope triage protocol  Blood Pressure : **/** mmHG  Vent. Rate : 064 BPM     Atrial Rate : 064 BPM     P-R Int : 320 ms          QRS Dur : 170 ms      QT Int : 478 ms       P-R-T Axes : 000 -58 042 degrees     QTc Int : 493 ms    Sinus rhythm with 1st degree AV block with occasional premature ventricular  complexes  Left axis  deviation  Right bundle branch block  Left ventricular hypertrophy with repolarization abnormality  Inferior infarct , age undetermined  Abnormal ECG  When compared with ECG of 06-SEP-2018 20:54,  Sinus rhythm has replaced Wide QRS rhythm  Confirmed by GINI SHERIDAN MD ([id number]) on 5/21/2020 9:54:28 PM    Referred By:  ED MD           Confirmed By:GINI SHERIDAN MD        ECG 12 Lead   Final Result   Test Reason : Syncope triage protocol   Blood Pressure : **/** mmHG   Vent. Rate : 064 BPM     Atrial Rate : 064 BPM      P-R Int : 320 ms          QRS Dur : 170 ms       QT Int : 478 ms       P-R-T Axes : 000 -58 042 degrees      QTc Int : 493 ms      Sinus rhythm with 1st degree AV block with occasional premature    ventricular   complexes   Left axis deviation   Right bundle branch block   Left ventricular hypertrophy with repolarization abnormality   Inferior infarct , age undetermined   Abnormal ECG   When compared with ECG of 06-SEP-2018 20:54,   Sinus rhythm has replaced Wide QRS rhythm   Confirmed by GINI SHERIDAN MD ([id number]) on 5/21/2020 9:54:28 PM      Referred By:  ED MD           Confirmed By:GINI SHERIDAN MD            COVID-19 RISK SCREEN     1. Has the patient had close contact without PPE with a lab confirmed COVID-19 (+) person or a person under investigation (PUI) for COVID-19 infection?  -- No     2. Has the patient had respiratory symptoms, worsened/new cough and/or SOA, unexplained fever, or sudden loss of smell and/or taste in the past 7 days? --  No    3. Does the patient have baseline higher exposure risk such as working in healthcare field or currently residing in healthcare facility?  --  No               MDM  Number of Diagnoses or Management Options  Acute upper back pain:   Chronic anemia:   Chronic anticoagulation:   Chronic renal insufficiency, stage 3 (moderate) (CMS/[facility]):   Injury of head, initial encounter:   Syncope, unspecified syncope type:   Diagnosis management comments:       I  reviewed all available studies the bedside with the patient.  His head CT is reassuring.  Cervical spine CT shows degenerative changes but he has a T9 fracture.  This is noted on the CT ordered after the patient been admitted.  This is not displaced.  But likely the cause of his pain.    This gentleman certainly had a significant syncopal event and has a history of a low ejection fraction and has a bundle branch block and worried that he had a cardiac cause of his syncope.  I think he needs to be admitted for serial exams and perhaps cardiology consultation.  His pain is fairly minimal with his compression fracture but he is pretty tough may need consultation regarding this as well.    I spoke with the hospitalist to admit the patient.    All are agreeable with the plan       Amount and/or Complexity of Data Reviewed  Clinical lab tests: reviewed  Tests in the radiology section of CPT®: reviewed  Tests in the medicine section of CPT®: reviewed  Decide to obtain previous medical records or to obtain history from someone other than the patient: yes        Final diagnoses:   Syncope, unspecified syncope type   Injury of head, initial encounter   Acute upper back pain   Chronic renal insufficiency, stage 3 (moderate) (CMS/[facility])   Chronic anemia   Chronic anticoagulation   Thoracic compression fracture, closed, initial encounter (CMS/[facility])            Africa Yun  05/21/20 2216       Hua Martinez MD  05/22/20 0127       Hua Martinez MD  05/22/20 0128

## 2020-05-22 NOTE — THERAPY EVALUATION
Patient Name: Tad Moon  : 1926    MRN: [id number]                              Today's Date: 2020       Admit Date: 2020    Visit Dx:     ICD-10-CM ICD-9-CM   1. Syncope, unspecified syncope type R55 780.2   2. Injury of head, initial encounter S09.90XA 959.01   3. Acute upper back pain M54.9 724.5     338.19   4. Chronic renal insufficiency, stage 3 (moderate) (CMS/HCC) N18.3 585.3   5. Chronic anemia D64.9 285.9   6. Chronic anticoagulation Z79.01 V58.61   7. Thoracic compression fracture, closed, initial encounter (CMS/HCC) S22.000A 805.2     Patient Active Problem List   Diagnosis   • Coronary artery disease involving coronary bypass graft of native heart without angina pectoris   • Hyperlipidemia LDL goal <100   • History of tobacco abuse   • CLL (chronic lymphocytic leukemia) (CMS/[facility])   • GERD (gastroesophageal reflux disease)   • Nonrheumatic aortic valve stenosis s/p TAVR   • Chronic systolic congestive heart failure (CMS/HCC)   • CKD (chronic kidney disease) stage 3, GFR 30-59 ml/min (CMS/HCC)   • Aortic valve stenosis   • Fatigue   • Ischemic cardiomyopathy   • Chronic combined systolic and diastolic CHF (congestive heart failure) (CMS/HCC)   • Angina pectoris (CMS/HCC)   • Atherosclerosis of coronary artery   • Essential hypertension   • Heart failure, chronic, with acute decompensation (CMS/HCC)   • Generalized ischemic myocardial dysfunction   • Peripheral vascular disease (CMS/HCC)   • Nonrheumatic mitral valve regurgitation   • Major depressive disorder with single episode, in partial remission (CMS/HCC)   • Syncope   • NEDRA (acute kidney injury) (CMS/HCC)   • Normocytic anemia   • Alcohol use   • Closed fracture of ninth thoracic vertebra (CMS/[facility])     Past Medical History:   Diagnosis Date   • Aortic stenosis    • Arthritis    • BOOP (bronchiolitis obliterans with organizing pneumonia) (CMS/[facility])    • Chest pain 2007    angioplasty to RCA   • CHF (congestive heart  failure) (CMS/[facility])    • CLL (chronic lymphocytic leukemia) (CMS/[facility])     15 years    • Coronary artery disease    • GERD (gastroesophageal reflux disease)    • Herpes zoster 2012   • History of tobacco abuse    • Hyperlipidemia    • Hypertension    • Low kidney function     very recently and did kidney function test and said decreased function so being watched to get all fluid off    • MI (myocardial infarction) (CMS/[facility])     mid 90s very mild - few years after bypass surgery    • Non-STEMI (non-ST elevated myocardial infarction) (CMS/[facility]) 10/17/2017    stenting of occluded grafts to left circumflex and RCA   • Skin cancer     basal and squamous from various location    • SOBOE (shortness of breath on exertion)    • Uses hearing aid     bilat ears      Past Surgical History:   Procedure Laterality Date   • ABDOMINAL HERNIA REPAIR      x 2   • AORTIC VALVE REPAIR/REPLACEMENT N/A 4/26/2018    Procedure: Transcatheter Aortic Valve Replacement, LANDY;  Surgeon: Lv Purdy MD;  Location:  [facility]BRID OR 15;  Service: Cardiothoracic   • AORTIC VALVE REPAIR/REPLACEMENT N/A 4/26/2018    Procedure: Transcatheter Aortic Valve Replacement;  Surgeon: Juan M Sood MD;  Location:  [facility]BRID OR 15;  Service: Cardiology   • APPENDECTOMY     • CARDIAC CATHETERIZATION N/A 10/10/2017    Procedure: Left Heart Cath;  Surgeon: Juan M Sood MD;  Location:  [facility] CATH INVASIVE LOCATION;  Service:    • CARDIAC CATHETERIZATION N/A 4/6/2018    Procedure: Left Heart Cath;  Surgeon: Lv Cobian MD;  Location:  [facility] CATH INVASIVE LOCATION;  Service: Cardiovascular   • CHOLECYSTECTOMY     • COLONOSCOPY     • CORONARY ANGIOPLASTY WITH STENT PLACEMENT      07 one placed,  and in 2017 had another stent placed and one repaired -   so pt thinks 3 stents in place    • CORONARY ARTERY BYPASS GRAFT      4 bypass in 92    • REPLACEMENT TOTAL KNEE Left 2014   • WISDOM TOOTH EXTRACTION       General Information     Row Name 05/22/20 6008           PT Evaluation Time/Intention    Document Type  evaluation  -MARY     Mode of Treatment  physical therapy  -MARY     Row Name 05/22/20 1437          General Information    Patient Profile Reviewed?  yes  -MARY     Prior Level of Function  independent:;gait;transfer;bed mobility;ADL's  -MARY     Existing Precautions/Restrictions  fall  -MARY     Barriers to Rehab  none identified  -MARY     Row Name 05/22/20 1437          Relationship/Environment    Lives With  spouse  -MARY     Row Name 05/22/20 1437          Resource/Environmental Concerns    Current Living Arrangements  home/apartment/condo  -MARY     Row Name 05/22/20 1437          Cognitive Assessment/Intervention- PT/OT    Orientation Status (Cognition)  oriented x 4  -MARY     Row Name 05/22/20 1437          Safety Issues, Functional Mobility    Safety Issues Affecting Function (Mobility)  safety precautions follow-through/compliance;safety precaution awareness  -MARY     Impairments Affecting Function (Mobility)  balance;endurance/activity tolerance;strength;shortness of breath;pain  -MARY       User Key  (r) = Recorded By, (t) = Taken By, (c) = Cosigned By    Initials Name Provider Type    MARY Heaven Morales, PT Physical Therapist        Mobility     Row Name 05/22/20 1438          Bed Mobility Assessment/Treatment    Bed Mobility Assessment/Treatment  rolling left;rolling right;scooting/bridging;supine-sit;sit-supine  -MARY     Rolling Left [place] (Bed Mobility)  independent  -MARY     Rolling Right [place] (Bed Mobility)  independent  -MARY     Scooting/Bridging [place] (Bed Mobility)  independent  -MARY     Supine-Sit [place] (Bed Mobility)  independent  -MARY     Sit-Supine [place] (Bed Mobility)  independent  -MARY     Assistive Device (Bed Mobility)  head of bed elevated  -     Row Name 05/22/20 1438          Bed-Chair Transfer    Bed-Chair [place] (Transfers)  contact guard  -MARY     Assistive Device (Bed-Chair Transfers)  walker, front-wheeled   -MARY     Row Name 05/22/20 1438          Sit-Stand Transfer    Sit-Stand [place] (Transfers)  contact guard  -MARY     Assistive Device (Sit-Stand Transfers)  walker, front-wheeled  -MARY     Row Name 05/22/20 1438          Gait/Stairs Assessment/Training    Gait/Stairs Assessment/Training  gait/ambulation independence  -MARY     [place] Level (Gait)  contact guard  -MARY     Assistive Device (Gait)  walker, front-wheeled  -MARY     Distance in Feet (Gait)  350  -MARY     Pattern (Gait)  step-through  -MARY     Comment (Gait/Stairs)  patient had no LOB with rolling walker  -MARY       User Key  (r) = Recorded By, (t) = Taken By, (c) = Cosigned By    Initials Name Provider Type    MARY Heaven Morales, PT Physical Therapist        Obj/Interventions     Row Name 05/22/20 1439          General ROM    GENERAL ROM COMMENTS  no ROM deficits in bilat UE's and bilat LE's patient had limited neck rotation to the right and left and limited side bending due to pain from fall  -MARY     Row Name 05/22/20 1439          MMT (Manual Muscle Testing)    General MMT Comments  generalized weakness 4/5  -MARY     Row Name 05/22/20 1439          Therapeutic Exercise    Upper Extremity Range of Motion (Therapeutic Exercise)  shoulder flexion/extension, bilateral;shoulder abduction/adduction, bilateral;elbow flexion/extension, bilateral  -MARY     Lower Extremity Range of Motion (Therapeutic Exercise)  hip flexion/extension, bilateral;knee flexion/extension, bilateral;ankle dorsiflexion/plantar flexion, bilateral  -MARY     Exercise Type (Therapeutic Exercise)  AROM (active range of motion)  -MARY     Position (Therapeutic Exercise)  seated  -MARY     Sets/Reps (Therapeutic Exercise)  1/10  -MARY     Expected Outcome (Therapeutic Exercise)  facilitate normal movement patterns;improve functional tolerance, self-care activity;improve functional stability  -MARY     Comment (Therapeutic Exercise)  patient also did gentle neck ROM in all planes  -MARY     Row Name  05/22/20 1439          Static Sitting Balance    Level of [place] (Unsupported Sitting, Static Balance)  independent  -MARY     Sitting Position (Unsupported Sitting, Static Balance)  sitting on edge of bed  -MARY     Row Name 05/22/20 1439          Dynamic Sitting Balance    Level of [place], Reaches Outside Midline (Sitting, Dynamic Balance)  independent  -MARY     Sitting Position, Reaches Outside Midline (Sitting, Dynamic Balance)  sitting on edge of bed  -MARY     Row Name 05/22/20 1439          Static Standing Balance    Level of [place] (Supported Standing, Static Balance)  contact guard assist  -MARY     Assistive Device Utilized (Supported Standing, Static Balance)  walker, rolling  -MARY     Row Name 05/22/20 1439          Dynamic Standing Balance    Level of [place], Reaches Outside Midline (Standing, Dynamic Balance)  contact guard assist  -MARY     Assistive Device Utilized (Supported Standing, Dynamic Balance)  walker, rolling  -MARY       User Key  (r) = Recorded By, (t) = Taken By, (c) = Cosigned By    Initials Name Provider Type    Heaven Garcia A, PT Physical Therapist        Goals/Plan     [facility] Name 05/22/20 1445          Bed Mobility Goal 1 (PT)    Activity/Assistive Device (Bed Mobility Goal 1, PT)  sit to supine/supine to sit  -MARY     [place] Level/Cues Needed (Bed Mobility Goal 1, PT)  independent  -MARY     Time Frame (Bed Mobility Goal 1, PT)  1 day  -MARY     Progress/Outcomes (Bed Mobility Goal 1, PT)  goal met  -[facility] Name 05/22/20 1445          Transfer Goal 1 (PT)    Activity/Assistive Device (Transfer Goal 1, PT)  sit-to-stand/stand-to-sit  -MARY     [place] Level/Cues Needed (Transfer Goal 1, PT)  independent  -MARY     Time Frame (Transfer Goal 1, PT)  long term goal (LTG);10 days  -MARY     Progress/Outcome (Transfer Goal 1, PT)  goal ongoing  -[facility] Name 05/22/20 1445          Gait Training Goal 1 (PT)    Activity/Assistive Device (Gait Training Goal 1, PT)  gait  (walking locomotion)  -MARY     [place] Level (Gait Training Goal 1, PT)  independent  -MARY     Distance (Gait Goal 1, PT)  400  -MARY     Time Frame (Gait Training Goal 1, PT)  long term goal (LTG);10 days  -MARY     Progress/Outcome (Gait Training Goal 1, PT)  goal ongoing  -MARY     Row Name 05/22/20 1445          Patient Education Goal (PT)    Activity (Patient Education Goal, PT)  HEP  -MARY     [place]/Cues/Accuracy (Memory Goal 2, PT)  verbalizes understanding  -MARY     Time Frame (Patient Education Goal, PT)  long term goal (LTG);10 days  -MARY     Progress/Outcome (Patient Education Goal, PT)  goal ongoing  -MARY       User Key  (r) = Recorded By, (t) = Taken By, (c) = Cosigned By    Initials Name Provider Type    Heaven Garcia, PT Physical Therapist        Clinical Impression     Row Name 05/22/20 1441          Pain Assessment    Additional Documentation  Pain Scale: Numbers Pre/Post-Treatment (Group)  -MARY     Row Name 05/22/20 1441          Pain Scale: Numbers Pre/Post-Treatment    Pain Scale: Numbers, Pretreatment  3/10  -MARY     Pain Scale: Numbers, Post-Treatment  5/10  -MARY     Pain Location  neck  -MARY     Pre/Post Treatment Pain Comment  patient felt some relief with ice  -MARY     Pain Intervention(s)  Cold pack;Repositioned;Ambulation/increased activity  -MARY     Row Name 05/22/20 1440          Plan of Care Review    Plan of Care Reviewed With  patient  -MARY     Progress  improving  -MARY     Outcome Summary  PT eval is completed patient is independent with all bed mobility he transfers with CGA and is able to ambulate with walker with CGA for 350 ft. applied ice pack to patient's neck for pain reduction. Anticipate patient to be able to go home with family assist at D/C  -MARY     Row Name 05/22/20 1441          Physical Therapy Clinical Impression    Patient/Family Goals Statement (PT Clinical Impression)  return home and return to PLOF  -MARY     Criteria for Skilled Interventions Met (PT Clinical  Impression)  yes;treatment indicated  -MARY     Rehab Potential (PT Clinical Summary)  good, to achieve stated therapy goals  -MARY     Row Name 05/22/20 1441          Vital Signs    Pre Systolic BP Rehab  120  -MARY     Pre Treatment Diastolic BP  68  -MARY     Post Systolic BP Rehab  125  -MARY     Post Treatment Diastolic BP  70  -MARY     Pre Patient Position  Supine  -MARY     Intra Patient Position  Standing  -MARY     Post Patient Position  Supine  -MARY     Row Name 05/22/20 1441          Positioning and Restraints    Pre-Treatment Position  in bed  -MARY     Post Treatment Position  bed  -MARY     In Bed  notified nsg;supine;call light within reach;encouraged to call for assist  -MARY       User Key  (r) = Recorded By, (t) = Taken By, (c) = Cosigned By    Initials Name Provider Type    Heaven Garcia PT Physical Therapist        Outcome Measures     Row Name 05/22/20 1446          How much help from another person do you currently need...    Turning from your back to your side while in flat bed without using bedrails?  4  -MARY     Moving from lying on back to sitting on the side of a flat bed without bedrails?  4  -MARY     Moving to and from a bed to a chair (including a wheelchair)?  3  -MARY     Standing up from a chair using your arms (e.g., wheelchair, bedside chair)?  4  -MARY     Climbing 3-5 steps with a railing?  3  -MARY     To walk in hospital room?  3  -MARY     AM-PAC 6 Clicks Score (PT)  21  -MARY     Row Name 05/22/20 1446          Functional Assessment    Outcome Measure Options  AM-PAC 6 Clicks Basic Mobility (PT)  -MARY       User Key  (r) = Recorded By, (t) = Taken By, (c) = Cosigned By    Initials Name Provider Type    Heaven Garcia PT Physical Therapist        Physical Therapy Education                 Title: PT OT SLP Therapies (In Progress)     Topic: Physical Therapy (In Progress)     Point: Mobility training (In Progress)     Description:   Instruct learner(s) on safety and technique for assisting  patient out of bed, chair or wheelchair.  Instruct in the proper use of assistive devices, such as walker, crutches, cane or brace.              Patient Friendly Description:   It's important to get you on your feet again, but we need to do so in a way that is safe for you. Falling has serious consequences, and your personal safety is the most important thing of all.        When it's time to get out of bed, one of us or a family member will sit next to you on the bed to give you support.     If your doctor or nurse tells you to use a walker, crutches, a cane, or a brace, be sure you use it every time you get out of bed, even if you think you don't need it.    Learning Progress Summary           Patient Acceptance, E, NR by MARY at 5/22/2020 1330                   Point: Home exercise program (In Progress)     Description:   Instruct learner(s) on appropriate technique for monitoring, assisting and/or progressing patient with therapeutic exercises and activities.              Learning Progress Summary           Patient Acceptance, E, NR by MARY at 5/22/2020 1330                   Point: Body mechanics (In Progress)     Description:   Instruct learner(s) on proper positioning and spine alignment for patient and/or caregiver during mobility tasks and/or exercises.              Learning Progress Summary           Patient Acceptance, E, NR by MARY at 5/22/2020 1330                   Point: Precautions (In Progress)     Description:   Instruct learner(s) on prescribed precautions during mobility and gait tasks              Learning Progress Summary           Patient Acceptance, E, NR by MARY at 5/22/2020 1330                               User Key     Initials Effective Dates Name Provider Type Discipline    MARY 06/19/15 -  Heaven Morales, PT Physical Therapist PT              PT Recommendation and Plan  Planned Therapy Interventions (PT Eval): balance training, gait training, home exercise program, transfer training,  strengthening  Outcome Summary/Treatment Plan (PT)  Anticipated Equipment Needs at Discharge (PT): front wheeled walker  Anticipated Discharge Disposition (PT): home with assist  Plan of Care Reviewed With: patient  Progress: improving  Outcome Summary: PT eval is completed patient is independent with all bed mobility he transfers with CGA and is able to ambulate with walker with CGA for 350 ft. applied ice pack to patient's neck for pain reduction. Anticipate patient to be able to go home with family assist at D/C     Time Calculation:   PT Charges     Row Name 05/22/20 1330             Time Calculation    Start Time  1330  -MARY      PT Received On  05/22/20  -MARY      PT Goal Re-Cert Due Date  06/01/20  -MARY        User Key  (r) = Recorded By, (t) = Taken By, (c) = Cosigned By    Initials Name Provider Type    Heaven Garcia, PT Physical Therapist        Therapy Charges for Today     Code Description Service Date Service Provider Modifiers Qty    27244714404 HC PT EVAL MOD COMPLEXITY 4 5/22/2020 Heaven Morales PT GP 1          PT G-Codes  Outcome Measure Options: AM-PAC 6 Clicks Basic Mobility (PT)  AM-PAC 6 Clicks Score (PT): 21    Heaven Morales PT  5/22/2020

## 2020-05-22 NOTE — PLAN OF CARE
Problem: Patient Care Overview  Goal: Plan of Care Review  Outcome: Ongoing (interventions implemented as appropriate)  Flowsheets (Taken 5/22/2020 1447)  Progress: improving  Plan of Care Reviewed With: patient  Outcome Summary: PT eval is completed patient is independent with all bed mobility he transfers with CGA and is able to ambulate with walker with CGA for 350 ft. applied ice pack to patient's neck for pain reduction. Anticipate patient to be able to go home with family assist at D/C

## 2020-05-22 NOTE — CONSULTS
[facility] at [facility]  Cardiovascular Consultation Note    Reason for consultation: Syncope    History of Present Illness:  89-year-old white male with a history of ischemic cardiomyopathy having had prior bypass surgery as well as stenting.  He underwent a TAVR in 2018 for severe attic stenosis.  The patient underwent orthostatic evaluation this morning and a drop in his pressure of 30 mmHg.  The patient states he has been in his usual state of health.  He saw Dr. Cobian in January and the patient was having no angina or dyspnea.  The patient has been on Entresto states he is feeling quite well.  The patient states yesterday he was outside doing some yard work came into the house ate took a nap.  He went back outside finished the yard work.  He then was at the sink washing his hands when he had sudden syncope.  He did not feel nauseated short of breath have chest pain or palpitations prior but just had rojas syncope.  The patient's wife is not here but he states that she did not see anything to him about any seizure-like activity.  The patient denies any tightness having squeezing pressure chest jaw throat arms.  He denies any significant dyspnea.  He definitely denies any orthostatic symptoms.  He denies blood in his stool.  Been told that he has anemia with low iron and is on iron supplementation.    Cardiac risk factors: #1 age #2 hypertension #3 male sex #4 hyperlipidemia #5 documented prior vascular disease    Past medical and surgical history, social and family history reviewed in EMR.    REVIEW OF SYSTEMS:     Past Medical History:   Diagnosis Date   • Aortic stenosis    • Arthritis    • BOOP (bronchiolitis obliterans with organizing pneumonia) (CMS/[facility]) 2014   • Chest pain 2007    angioplasty to RCA   • CHF (congestive heart failure) (CMS/HCC)    • CLL (chronic lymphocytic leukemia) (CMS/[facility])     15 years    • Coronary artery disease    • GERD (gastroesophageal reflux disease)     • Herpes zoster 2012   • History of tobacco abuse    • Hyperlipidemia    • Hypertension    • Low kidney function     very recently and did kidney function test and said decreased function so being watched to get all fluid off    • MI (myocardial infarction) (CMS/[facility])     mid 90s very mild - few years after bypass surgery    • Non-STEMI (non-ST elevated myocardial infarction) (CMS/[facility]) 10/17/2017    stenting of occluded grafts to left circumflex and RCA   • Skin cancer     basal and squamous from various location    • SOBOE (shortness of breath on exertion)    • Uses hearing aid     bilat ears      Past Surgical History:   Procedure Laterality Date   • ABDOMINAL HERNIA REPAIR      x 2   • AORTIC VALVE REPAIR/REPLACEMENT N/A 4/26/2018    Procedure: Transcatheter Aortic Valve Replacement, LANDY;  Surgeon: Lv Purdy MD;  Location:  [facility] HYBRID OR 15;  Service: Cardiothoracic   • AORTIC VALVE REPAIR/REPLACEMENT N/A 4/26/2018    Procedure: Transcatheter Aortic Valve Replacement;  Surgeon: Juan M Sood MD;  Location:  [facility] HYBRID OR 15;  Service: Cardiology   • APPENDECTOMY     • CARDIAC CATHETERIZATION N/A 10/10/2017    Procedure: Left Heart Cath;  Surgeon: Juan M Sood MD;  Location:  [facility] CATH INVASIVE LOCATION;  Service:    • CARDIAC CATHETERIZATION N/A 4/6/2018    Procedure: Left Heart Cath;  Surgeon: Lv Cobian MD;  Location: [facility] CATH INVASIVE LOCATION;  Service: Cardiovascular   • CHOLECYSTECTOMY     • COLONOSCOPY     • CORONARY ANGIOPLASTY WITH STENT PLACEMENT      07 one placed,  and in 2017 had another stent placed and one repaired -   so pt thinks 3 stents in place    • CORONARY ARTERY BYPASS GRAFT      4 bypass in 92    • REPLACEMENT TOTAL KNEE Left 2014   • WISDOM TOOTH EXTRACTION       Social History     Socioeconomic History   • Marital status:      Spouse name: Not on file   • Number of children: 3   • Years of education: Not on file   • Highest education level: Not on file    Occupational History   • Occupation: Retired      Comment: insurance agency    Tobacco Use   • Smoking status: Former Smoker     Packs/day: 0.25     Types: Cigarettes     Last attempt to quit: 1975     Years since quittin.4   • Smokeless tobacco: Never Used   • Tobacco comment: quit 45 years ago- smoked since 17 yo    Substance and Sexual Activity   • Alcohol use: Yes     Alcohol/week: 1.0 - 2.0 standard drinks     Types: 1 - 2 Glasses of wine per week     Comment: occas   • Drug use: No   • Sexual activity: Defer   Social History Narrative    Lives with Wife Julia in [place], [place]     Caffeine:  3 servings per day     Family History   Problem Relation Age of Onset   • Stroke Mother    • Heart disease Father    • Heart disease Brother    • Coronary artery disease Brother        H&P ROS reviewed and pertinent CV ROS as noted in HPI.    Cardiac: No angina.  Does have a history of bypass surgery and stents.  Had a TAVR in 2018.  Denies palpitations but had sudden syncope without warning  Respiratory: Does have occasional dyspnea, no orthopnea and occasionally wheezes  Lower Extremities: No lesions  GI: No nausea vomiting diarrhea bright blood per rectum or melena  Neuro: No prior history of stroke TIA or neurologic event.  Apparently there was no seizure-like activity after the syncopal episode  Hematology: Positive iron deficiency anemia but no malignancy      Physical Exam: General well-developed well-nourished white male appears to be a good historian current heart rate 79 dyspneic not tachypneic       HEENT: No JVP, oropharynx is dry, dentition is good.  No icterus and no bruit       Respiratory: Equal bilateral symmetrical expansion with crackles at the bases bilaterally       Cardiovascular: Regular rate and rhythm with ectopics and a grade 2-3 over systolic ejection murmur right upper sternal border and no edema to palpation       GI: Positive bowel sounds nontender to palpation       Lower Extremities:  No lesions have some mild stasis abnormalities       Neuro: Facial expressions are symmetrical, moves all 4 extremities, handgrip strength equal bilaterally 3/5       Skin: Warm and dry no edema to palpation       Psych: Pleasant affect oriented x3    Results Review: Troponins are negative creatinine was 1.82 on admission today is 1.72.  Was 1.39 in March 2019.  EKG shows sinus bradycardia first-degree AV block right bundle branch block review of telemetry shows no significant pauses ventricular arrhythmias or bradycardia           Vital Sign Min/Max for last 24 hours  Temp  Min: 97.7 °F (36.5 °C)  Max: 98.6 °F (37 °C)   BP  Min: 98/57  Max: 154/84   Pulse  Min: 56  Max: 81   Resp  Min: 16  Max: 16   SpO2  Min: 93 %  Max: 100 %   No data recorded      Intake/Output Summary (Last 24 hours) at 5/22/2020 0934  Last data filed at 5/22/2020 0800  Gross per 24 hour   Intake --   Output 225 ml   Net -225 ml             Current Facility-Administered Medications:   •  acetaminophen (TYLENOL) tablet 650 mg, 650 mg, Oral, Q4H PRN **OR** acetaminophen (TYLENOL) 160 MG/5ML solution 650 mg, 650 mg, Oral, Q4H PRN **OR** acetaminophen (TYLENOL) suppository 650 mg, 650 mg, Rectal, Q4H PRN, Alexei, Tierney G, DO  •  atorvastatin (LIPITOR) tablet 40 mg, 40 mg, Oral, Daily, Alexei, Tierney G, DO, 40 mg at 05/22/20 0916  •  carvedilol (COREG) tablet 3.125 mg, 3.125 mg, Oral, BID With Meals, Alexei, Tierney G, DO, 3.125 mg at 05/22/20 0917  •  clopidogrel (PLAVIX) tablet 75 mg, 75 mg, Oral, Daily, Alexei, Tierney G, DO, 75 mg at 05/22/20 0918  •  cyanocobalamin injection 1,000 mcg, 1,000 mcg, Intramuscular, Daily, Lamonte Melendez MD  •  famotidine (PEPCID) tablet 20 mg, 20 mg, Oral, Daily, Alexei, Tierney G, DO, 20 mg at 05/22/20 0918  •  ferrous sulfate tablet 325 mg, 325 mg, Oral, Daily With Breakfast, Alexei, Tierney G, DO, 325 mg at 05/22/20 0916  •  thiamine (VITAMIN B-1) tablet 100 mg, 100 mg, Oral, Daily, 100 mg at 05/22/20 0916 **AND**  multivitamin (THERAGRAN) tablet 1 tablet, 1 tablet, Oral, Daily, 1 tablet at 05/22/20 0916 **AND** folic acid (FOLVITE) tablet 1 mg, 1 mg, Oral, Daily, Alexei, Tierney G, DO, 1 mg at 05/22/20 0918  •  heparin (porcine) 5000 UNIT/ML injection 5,000 Units, 5,000 Units, Subcutaneous, Q8H, Alexei, Tierney G, DO  •  HYDROcodone-acetaminophen (NORCO) 5-325 MG per tablet 1 tablet, 1 tablet, Oral, Q6H PRN, Alexei, Tierney G, DO, 1 tablet at 05/22/20 0116  •  sacubitril-valsartan (ENTRESTO) 24-26 MG tablet 1 tablet, 1 tablet, Oral, BID, Alexei, Tierney G, DO, 1 tablet at 05/22/20 0918  •  sertraline (ZOLOFT) tablet 50 mg, 50 mg, Oral, Daily, Alexei, Tierney G, DO, 50 mg at 05/22/20 0918  •  sodium chloride 0.9 % flush 10 mL, 10 mL, Intravenous, PRN, Alexei, Tierney G, DO  •  sodium chloride 0.9 % flush 10 mL, 10 mL, Intravenous, Q12H, Alexei, Tierney G, DO, 10 mL at 05/22/20 0919  •  sodium chloride 0.9 % flush 10 mL, 10 mL, Intravenous, PRN, Alexei, Tierney G, DO  •  torsemide (DEMADEX) tablet 20 mg, 20 mg, Oral, Every Other Day, Alexei, Tierney G, DO, 20 mg at 05/22/20 0918  •  vitamin C (ASCORBIC ACID) tablet 500 mg, 500 mg, Oral, Daily With Breakfast, Alexei, Tierney G, DO, 500 mg at 05/22/20 0916    Lab Review:   Results from last 7 days   Lab Units 05/22/20 0532 05/21/20 2024   WBC 10*3/mm3 9.84 9.24   HEMOGLOBIN g/dL 8.6* 10.4*   PLATELETS 10*3/mm3 193 204     Results from last 7 days   Lab Units 05/22/20 0532 05/21/20  2024   SODIUM mmol/L 141 144   POTASSIUM mmol/L 3.7 4.2   CO2 mmol/L 25.0 25.0   BUN mg/dL 36* 38*   CREATININE mg/dL 1.72* 1.86*   MAGNESIUM mg/dL  --  1.9   GLUCOSE mg/dL 112* 109*     Estimated Creatinine Clearance: 29.3 mL/min (A) (by C-G formula based on SCr of 1.72 mg/dL (H)).        .lipd  Lab Results   Component Value Date    TRIG 117 09/26/2019    HDL 54 09/26/2019    AST 20 05/21/2020    ALT 12 05/21/2020       Radiology Reports:  Imaging Results (Last 72 Hours)     Procedure Component Value Units Date/Time     CT Chest Without Contrast [[id number]] Collected:  05/22/20 0157     Updated:  05/22/20 0159    Narrative:       CT Chest WO    INDICATION:   Syncope and collapse yesterday. Abnormal thoracic spine CT showing pulmonary consolidations.    TECHNIQUE:   CT of the thorax without IV contrast. Coronal and sagittal reconstructions were obtained.  Radiation dose reduction techniques included automated exposure control or exposure modulation based on body size. Count of known CT and cardiac nuc med studies  performed in previous 12 months: 3.     COMPARISON:   3/29/2018    FINDINGS:  Heart is enlarged. Patient is status post TAVR and CABG. No pericardial effusion. Right paratracheal adenopathy measures 1.6 cm, stable to slightly improved. AP window adenopathy measures 1.5 cm, also stable to slightly improved. This is presumably  benign. There is trace right pleural fluid or pleural thickening. There is a small loculated left pleural effusion that appears chronic. There is some associated pleural thickening. Lung window images are degraded by respiratory motion. There is some  fibrosis in the lungs, predominantly at the periphery. This overall appears worse in the right lung relative to the left, but similar to prior. There is some confluent consolidation in the left lower lobe with some associated calcification. This may  reflect some chronic atelectasis or rounded atelectasis. Pneumonia is a differential consideration. Consider follow-up chest CT in 3 months. Imaging features are atypical or uncommonly reported for COVID-19 pneumonia. Alternative diagnoses should be  considered. Again seen is the T9 fracture evaluated with thoracic spine CT 5/21/2020. Upper abdomen shows cholecystectomy and atherosclerotic disease.      Impression:         1. Cardiomegaly with atherosclerotic disease. Patient status post TAVR and CABG.  2. Chronic small left pleural effusion with trace right pleural fluid or pleural thickening.  3.  Fibrosis in both lungs, similar to the prior study.  4. Left lower lobe consolidation may also be chronic, possibly due to chronic atelectasis or round atelectasis. Pneumonia is not excluded. Consider follow-up chest CT in 3 months.  5. Known T9 vertebral body fracture.  6. Stable mediastinal adenopathy, benign.    Signer Name: Tad Delacruz MD   Signed: 5/22/2020 1:57 AM   Workstation Name: [facility]    Radiology [facility]    CT Cervical Spine Without Contrast [[id number]] Collected:  05/21/20 2334     Updated:  05/21/20 2336    Narrative:       CT Spine Cervical WO    INDICATION:   Neck pain after fall today.    TECHNIQUE:   CT of the cervical spine without IV contrast. Coronal and sagittal reconstructions were obtained.  Radiation dose reduction techniques included automated exposure control or exposure modulation based on body size. Count of known CT and cardiac nuc med  studies performed in previous 12 months: 1.     COMPARISON:  None available.    FINDINGS:  There is acquired fusion of the right C2 and C3 facets. Cervical alignment is normal. Chronic endplate depressions are noted at multiple levels throughout the cervical spine. No acute cervical spine fracture is identified. There is multilevel  degenerative disc bulging with facet arthropathy. Note is also made of atherosclerotic disease in the carotid and vertebral arteries. Paraspinal soft tissues are within normal limits.      Impression:       Multilevel degenerative disease. No acute fracture or subluxation.    Signer Name: Tad Delacruz MD   Signed: 5/21/2020 11:34 PM   Workstation Name: [facility]    Radiology [facility]    CT Thoracic Spine Without Contrast [[id number]] Collected:  05/21/20 2330     Updated:  05/21/20 2332    Narrative:       CT Spine Thoracic WO    INDICATION:    Mid back pain after fall today.    TECHNIQUE:   CT of the thoracic spine without IV contrast. Coronal and sagittal reconstructions were  obtained.  Radiation dose reduction techniques included automated exposure control or exposure modulation based on body size. Count of known CT and cardiac nuc med  studies performed in previous 12 months: 1.     COMPARISON:    None available.    FINDINGS:  Heart is enlarged. Patient is status post TAVR and CABG. There is a small left pleural effusion that has a chronic appearance. Images overall are degraded by respiratory motion. There is some fibrosis in both lungs. There is alveolar disease in the left  lower lobe which could reflect atelectasis, round atelectasis, or even pneumonia in the appropriate clinical setting. Imaging features are atypical or uncommonly reported for COVID-19 pneumonia. Alternative diagnoses should be considered.    There is a fracture through the vertebral body at T9 which is comminuted and obliquely oriented. This is essentially nondisplaced. It extends from the superior to the inferior endplate through the mid vertebral body. It also extends through the lateral  cortices on both sides. No other definite acute fractures are identified. Alignment on the sagittal images is normal. No significant paraspinal hematoma is seen.      Impression:         1. Acute comminuted but not significantly displaced fracture involving the vertebral body of T9. No other acute fractures are seen.  2. 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  3. Atelectasis, pneumonia, or round atelectasis in the left lower lobe.    Signer Name: Tad Delacruz MD   Signed: 5/21/2020 11:30 PM   Workstation Name: [email or web address]    [facility]    CT Head Without Contrast [[id number]] Collected:  05/21/20 2051     Updated:  05/21/20 2054    Narrative:       CT Head WO    HISTORY:   Fall today. On blood thinners    TECHNIQUE:   Axial unenhanced head CT. Radiation dose reduction techniques included automated exposure control or exposure modulation based on body size.  Count of known CT and cardiac nuc med studies performed in previous 12 months: 0.     Time of scan: 8:36 PM    COMPARISON:   November 5, 2016    FINDINGS:   The ventricles are generous in size. Cortical sulci are correspondingly prominent. No extraaxial fluid collections are seen.    Prominent subcutaneous hematoma in the left occipital area.    No parenchymal or subarachnoid hemorrhage is present. Periventricular hypodensities are demonstrated which are nonspecific but likely represent white matter microvascular change in a patient of this age. No CT evidence of mass or acute infarct.    The mastoid air cells are clear. The visualized paranasal sinuses are clear.  Orbital structures demonstrate no acute findings.      Impression:       Impression:  1. No clearly acute intracranial pathology demonstrated.  2. Generalized cerebral atrophy and nonspecific periventricular hypodensities which are thought to represent white matter microvascular change.  3. No significant interval intracranial change from prior study.    Signer Name: Hema Bauman MD   Signed: 5/21/2020 8:51 PM   Workstation Name: [email or web address]    [facility]          Assessment/Plan: 1 syncope-this patient was orthostatic by vital sign evaluation today but clearly at home he is having no symptoms of orthostasis.  The concerning aspect of this is there was no warning when he had a syncopal episode.  He certainly has underlying substrate for ventricular arrhythmias.  He also has first-degree AV block as well as right bundle branch block and could be having bradycardia arrhythmias.  Here the monitors show no abnormalities.  The patient will need to wear a bit recorder upon discharge and this some abnormalities come out while hospitalized  2 ischemic cardiomyopathy-check a BNP on the patient.  His BUN and creatinine are higher than it was back in March of last year as well as September of last year.  Probably have to cut back on his  diuretic dose unless his BNP is significantly elevated  3 status post TAVR  Echo results pending  4 iron deficiency anemia with a drop of hemoglobin to 8.6 from admission.  He may benefit from IV iron as opposed to oral.      Tad Sheehan MD  05/22/20  09:34

## 2020-05-22 NOTE — H&P
[facility] Medicine Services  HISTORY AND PHYSICAL    Patient Name: Tad Moon  : 1926  MRN: [id number]  Primary Care Physician: Pramod Hyde MD  Date of admission: 2020      Subjective   Subjective     Chief Complaint:  Syncope    HPI:  Tad Moon is a 89 y.o. male with a PMH significant for CAD status post CABG , stent , systolic CHF with an EF of 20% (follows with [facility]), history of aortic stenosis status post TAVR 2018, HTN, HLD, CLL presents to the ED due to a syncopal episode.  Patient states that he had been out working in his yard earlier today.  He came back inside and took a 2-hour nap.  He went back out into his yard working a little more came back into his home to wash his hands.  He suddenly felt lightheaded and had a syncopal episode.  Daughter at bedside states that the patient fell back, breaking his fall on his wife but ultimately hitting his head on the tile.  Patiet had loss of consciousness for less than 1 minute.  Patient denies confusion after the spell.  He denies shortness of breath, cough, chest pain, palpitations, fever, recent illness, nausea, vomiting, diarrhea, dysuria, known exposure to COVID-19.  Patient reports a similar episode several years ago where etiology was unknown.  Patient reports injury to the back of his head and residual neck pain.      Review of Systems   Constitutional: Negative.    HENT: Negative.    Eyes: Negative.    Respiratory: Negative.    Cardiovascular: Negative.    Gastrointestinal: Negative.    Endocrine: Negative.    Genitourinary: Negative.    Musculoskeletal: Negative.    Skin: Positive for wound.   Allergic/Immunologic: Negative.    Neurological: Positive for syncope and light-headedness.   Hematological: Negative.    Psychiatric/Behavioral: Negative.         All other systems reviewed and are negative.     Personal History     Past Medical History:   Diagnosis Date   • Aortic stenosis      • Arthritis    • BOOP (bronchiolitis obliterans with organizing pneumonia) (CMS/[facility]) 2014   • Chest pain 2007    angioplasty to RCA   • CHF (congestive heart failure) (CMS/[facility])    • CLL (chronic lymphocytic leukemia) (CMS/[facility])     15 years    • Coronary artery disease    • GERD (gastroesophageal reflux disease)    • Herpes zoster 2012   • History of tobacco abuse    • Hyperlipidemia    • Hypertension    • Low kidney function     very recently and did kidney function test and said decreased function so being watched to get all fluid off    • MI (myocardial infarction) (CMS/[facility])     mid 90s very mild - few years after bypass surgery    • Non-STEMI (non-ST elevated myocardial infarction) (CMS/[facility]) 10/17/2017    stenting of occluded grafts to left circumflex and RCA   • Skin cancer     basal and squamous from various location    • SOBOE (shortness of breath on exertion)    • Uses hearing aid     bilat ears        Past Surgical History:   Procedure Laterality Date   • ABDOMINAL HERNIA REPAIR      x 2   • AORTIC VALVE REPAIR/REPLACEMENT N/A 4/26/2018    Procedure: Transcatheter Aortic Valve Replacement, LANDY;  Surgeon: Lv Purdy MD;  Location:  [facility];  Service: Cardiothoracic   • AORTIC VALVE REPAIR/REPLACEMENT N/A 4/26/2018    Procedure: Transcatheter Aortic Valve Replacement;  Surgeon: Juan M Sood MD;  Location:  [facility];  Service: Cardiology   • APPENDECTOMY     • CARDIAC CATHETERIZATION N/A 10/10/2017    Procedure: Left Heart Cath;  Surgeon: Juan M Sood MD;  Location:  [facility] CATH INVASIVE LOCATION;  Service:    • CARDIAC CATHETERIZATION N/A 4/6/2018    Procedure: Left Heart Cath;  Surgeon: Lv Cobian MD;  Location:  [facility] CATH INVASIVE LOCATION;  Service: Cardiovascular   • CHOLECYSTECTOMY     • COLONOSCOPY     • CORONARY ANGIOPLASTY WITH STENT PLACEMENT      07 one placed,  and in 2017 had another stent placed and one repaired -   so pt thinks 3 stents in place    • CORONARY  ARTERY BYPASS GRAFT      4 bypass in 92    • REPLACEMENT TOTAL KNEE Left 2014   • WISDOM TOOTH EXTRACTION         Family History: family history includes Coronary artery disease in his brother; Heart disease in his brother and father; Stroke in his mother. Otherwise pertinent FHx was reviewed and unremarkable.     Social History:  reports that he quit smoking about 45 years ago. His smoking use included cigarettes. He smoked 0.25 packs per day. He has never used smokeless tobacco. He reports that he drinks about 1.0 - 2.0 standard drinks of alcohol per week. He reports that he does not use drugs.  Social History     Social History Narrative    Lives with Wife Julia in [place], [place]     Caffeine:  3 servings per day       Medications:  Available home medication information reviewed.  Medications Prior to Admission   Medication Sig Dispense Refill Last Dose   • atorvastatin (LIPITOR) 80 MG tablet Take 0.5 tablets by mouth Daily. 90 tablet 3 Taking   • carvedilol (COREG) 3.125 MG tablet Take 1 tablet by mouth Every 12 (Twelve) Hours. (Patient taking differently: Take 3.125 mg by mouth Every 12 (Twelve) Hours. Hold for SBP < 110, HR < 60) 60 tablet 11 Taking   • clopidogrel (PLAVIX) 75 MG tablet Take 1 tablet by mouth daily. 90 tablet 3 Taking   • famotidine (PEPCID) 20 MG tablet Take 20 mg by mouth Daily.   Taking   • ferrous sulfate 325 (65 FE) MG tablet Take 325 mg by mouth Daily With Breakfast.      • sacubitril-valsartan (ENTRESTO) 24-26 MG tablet Take 1 tablet by mouth 2 (Two) Times a Day. 60 tablet 1 Taking   • sertraline (ZOLOFT) 100 MG tablet Take 1/2 a day      • torsemide (DEMADEX) 20 MG tablet Take 1 tablet by mouth Every Other Day. 15 tablet 1 Taking   • vitamin C (ASCORBIC ACID) 500 MG tablet Take 500 mg by mouth Daily.      • aspirin 81 MG tablet Take 1 tablet by mouth daily. 90 tablet 3 Taking   • cholecalciferol (VITAMIN D3) 1000 UNITS tablet Take 1,000 Units by mouth Daily.   Taking   • nitroglycerin  (NITROSTAT) 0.4 MG SL tablet Place 1 tablet under the tongue Every 5 (Five) Minutes As Needed for Chest Pain. 25 tablet 6 Taking       Allergies   Allergen Reactions   • Hctz [Hydrochlorothiazide] Other (See Comments)     hyponatremia       Objective   Objective     Vital Signs:   Temp:  [97.8 °F (36.6 °C)-98.5 °F (36.9 °C)] 97.8 °F (36.6 °C)  Heart Rate:  [67-81] 81  Resp:  [16] 16  BP: (114-154)/() 154/84        Physical Exam   Constitutional: Awake, alert  Eyes: PERRLA, sclerae anicteric, no conjunctival injection  HENT: NC left-sided scalp hematoma, no active bleeding, mucous membranes moist  Neck: Supple, no thyromegaly, no lymphadenopathy, trachea midline  Respiratory: Mild inspiratory crackles throughout all lung fields  Cardiovascular: RRR, no murmurs, rubs, or gallops, palpable pedal pulses bilaterally  Gastrointestinal: Positive bowel sounds, soft, nontender, nondistended  Musculoskeletal: No bilateral ankle edema, no clubbing or cyanosis to extremities  Psychiatric: Appropriate affect, cooperative  Neurologic: Oriented x 3, strength symmetric in all extremities, Cranial Nerves grossly intact to confrontation, speech clear  Skin: No rashes      Results Reviewed:  I have personally reviewed current lab and radiology data.    Results from last 7 days   Lab Units 05/21/20 2024   WBC 10*3/mm3 9.24   HEMOGLOBIN g/dL 10.4*   HEMATOCRIT % 32.9*   PLATELETS 10*3/mm3 204     Results from last 7 days   Lab Units 05/21/20 2024   SODIUM mmol/L 144   POTASSIUM mmol/L 4.2   CHLORIDE mmol/L 104   CO2 mmol/L 25.0   BUN mg/dL 38*   CREATININE mg/dL 1.86*   GLUCOSE mg/dL 109*   CALCIUM mg/dL 8.8   ALT (SGPT) U/L 12   AST (SGOT) U/L 20   TROPONIN T ng/mL 0.020     Estimated Creatinine Clearance: 26.7 mL/min (A) (by C-G formula based on SCr of 1.86 mg/dL (H)).  Brief Urine Lab Results     None        Imaging Results (Last 24 Hours)     Procedure Component Value Units Date/Time    CT Cervical Spine Without Contrast  [[id number]] Collected:  05/21/20 2334     Updated:  05/21/20 2336    Narrative:       CT Spine Cervical WO    INDICATION:   Neck pain after fall today.    TECHNIQUE:   CT of the cervical spine without IV contrast. Coronal and sagittal reconstructions were obtained.  Radiation dose reduction techniques included automated exposure control or exposure modulation based on body size. Count of known CT and cardiac nuc med  studies performed in previous 12 months: 1.     COMPARISON:  None available.    FINDINGS:  There is acquired fusion of the right C2 and C3 facets. Cervical alignment is normal. Chronic endplate depressions are noted at multiple levels throughout the cervical spine. No acute cervical spine fracture is identified. There is multilevel  degenerative disc bulging with facet arthropathy. Note is also made of atherosclerotic disease in the carotid and vertebral arteries. Paraspinal soft tissues are within normal limits.      Impression:       Multilevel degenerative disease. No acute fracture or subluxation.    Signer Name: Tad Delacruz MD   Signed: 5/21/2020 11:34 PM   Workstation Name: [facility]    [facility]    CT Thoracic Spine Without Contrast [[id number]] Collected:  05/21/20 2330     Updated:  05/21/20 2332    Narrative:       CT Spine Thoracic WO    INDICATION:    Mid back pain after fall today.    TECHNIQUE:   CT of the thoracic spine without IV contrast. Coronal and sagittal reconstructions were obtained.  Radiation dose reduction techniques included automated exposure control or exposure modulation based on body size. Count of known CT and cardiac nuc med  studies performed in previous 12 months: 1.     COMPARISON:    None available.    FINDINGS:  Heart is enlarged. Patient is status post TAVR and CABG. There is a small left pleural effusion that has a chronic appearance. Images overall are degraded by respiratory motion. There is some fibrosis in both lungs. There is  alveolar disease in the left  lower lobe which could reflect atelectasis, round atelectasis, or even pneumonia in the appropriate clinical setting. Imaging features are atypical or uncommonly reported for COVID-19 pneumonia. Alternative diagnoses should be considered.    There is a fracture through the vertebral body at T9 which is comminuted and obliquely oriented. This is essentially nondisplaced. It extends from the superior to the inferior endplate through the mid vertebral body. It also extends through the lateral  cortices on both sides. No other definite acute fractures are identified. Alignment on the sagittal images is normal. No significant paraspinal hematoma is seen.      Impression:         1. Acute comminuted but not significantly displaced fracture involving the vertebral body of T9. No other acute fractures are seen.  2. 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  3. Atelectasis, pneumonia, or round atelectasis in the left lower lobe.    Signer Name: Tad Delacruz MD   Signed: 5/21/2020 11:30 PM   Workstation Name: [email or web address]    [facility]    CT Head Without Contrast [[id number]] Collected:  05/21/20 2051     Updated:  05/21/20 2054    Narrative:       CT Head WO    HISTORY:   Fall today. On blood thinners    TECHNIQUE:   Axial unenhanced head CT. Radiation dose reduction techniques included automated exposure control or exposure modulation based on body size. Count of known CT and cardiac nuc med studies performed in previous 12 months: 0.     Time of scan: 8:36 PM    COMPARISON:   November 5, 2016    FINDINGS:   The ventricles are generous in size. Cortical sulci are correspondingly prominent. No extraaxial fluid collections are seen.    Prominent subcutaneous hematoma in the left occipital area.    No parenchymal or subarachnoid hemorrhage is present. Periventricular hypodensities are demonstrated which are nonspecific but likely  represent white matter microvascular change in a patient of this age. No CT evidence of mass or acute infarct.    The mastoid air cells are clear. The visualized paranasal sinuses are clear.  Orbital structures demonstrate no acute findings.      Impression:       Impression:  1. No clearly acute intracranial pathology demonstrated.  2. Generalized cerebral atrophy and nonspecific periventricular hypodensities which are thought to represent white matter microvascular change.  3. No significant interval intracranial change from prior study.    Signer Name: Hema Bauman MD   Signed: 5/21/2020 8:51 PM   Workstation Name: [id number]    [facility] [place]        Results for orders placed during the hospital encounter of 01/27/20   Adult Transthoracic Echo Complete W/ Cont if Necessary Per Protocol    Narrative · Estimated EF = 20%.  · Mild aortic valve regurgitation is present.  · Moderate mitral valve regurgitation is present          Assessment/Plan   Assessment & Plan     Active Hospital Problems    Diagnosis POA   • **Syncope [R55] Yes   • Closed fracture of ninth thoracic vertebra (CMS/[facility]) [S22.079A] Unknown   • NEDRA (acute kidney injury) (CMS/[facility]) [N17.9] Unknown   • Normocytic anemia [D64.9] Unknown   • Alcohol use [Z72.89] Unknown   • Major depressive disorder with single episode, in partial remission (CMS/[facility]) [F32.4] Yes   • Essential hypertension [I10] Yes   • CKD (chronic kidney disease) stage 3, GFR 30-59 ml/min (CMS/[facility]) [N18.3] Yes   • Nonrheumatic aortic valve stenosis s/p TAVR [I35.0] Yes     · Echo (11/6/16): LVEF 35%. Moderate aortic stenosis with mean gradient 26 mmHg     • Chronic systolic congestive heart failure (CMS/[facility]) [I50.22] Yes     · Echo (11-6-16):  LVEF 35%. Moderate (possibly severe) aortic stenosis     • Coronary artery disease involving coronary bypass graft of native heart without angina pectoris [I25.810] Yes     · CABG in [place] (1992): LIMA to LAD, SVG to  PDA, SVG to distal RCA, SVG to OM1.   · Cardiac catheterization for NSTEMI (2007):  3.5 x 16 Taxus to SVG to RCA (Dr. Sheikh).   · Cardiac catheterization (2012):  medical management, incomplete database.   · Cardiac catheterization (8/15/2014):  LVEF 40% with inferior wall akinesis. Severe distal left main and proximal LAD stenosis with a patent LIMA graft to LAD. An 80% proximal left circumflex heavily calcified stenosis tortuous segment with occluded vein graft. Chronically occluded RCA and saphenous vein graft to RCA with 3+ collaterals.   · Cardiac catheterization (10/10/17): Severe native three-vessel CAD. 1/3 graft patent (LIMA to LAD). SVG to circumflex and right coronary arteries occluded successful PCI of the left main/proximal circumflex using ESTER. LVEF 25-30 percent. At least moderate aortic stenosis.       • Hyperlipidemia LDL goal <100 [E78.5] Yes   • CLL (chronic lymphocytic leukemia) (CMS/[facility]) [C91.10] Yes     This is a 89-year-old male patient with a PMH significant for CAD status post CABG 1992, stent 2017, systolic CHF with an EF of 20%, history of aortic stenosis status post TAVR 4/2018, HTN, HLD, CLL presents to the ED due to a syncopal episode.    Syncope  - Cardiogenic versus neurogenic versus seizure  -Follows with Dr. Cobian, consult for a.m.  -Monitor on telemetry  -Trend troponin overnight  -EKG tonight with RBBB, .  EKG in a.m.  -Echo in a.m.  -Orthostatic vitals  - Some low suspicion of seizure in the past.  Consider EEG, neurology consult  - UA  -Fall precautions, PT consult    Acute T9 compression fracture  - Pain control  -Consider neurosurgery consult  - Imaging of T-spine with concerns for questionable pneumonia.  We will get dedicated CT chest and make adjustments to plan based on findings.    NEDRA on CKD  -Mild increase from baseline of 1.66  - Encourage oral intake  - Continue home meds  -A.m. labs  - We will hold off on fluids unless patient has positive orthostatics  due to CHF and physical exam  -Bladder scan    Normocytic anemia  -Anemia studies  -A.m. labs    Systolic CHF  -EF 20%  -Follows with [facility]  -Continue with home Entresto, spironolactone, Bumex    CAD status post CABG/stent, HTN/HLD  -Continue with home Coreg, Plavix, atorvastatin    Depression  -Continue with home Zoloft    CLL  -Lymphocytic predominance in differential    Alcohol use  -Patient reports 1-2 drinks most days  - CIWA  -Vitamins  - Fall/seizure precautions    Normocytic anemia  -Continue home iron, vitamin C  -Anemia studies      DVT prophylaxis: Heparin    Admission Communication  Due to current limited visitation policies, an attempt will be made to update patient's identified best point-of-contact(s) at admission to discuss plan of care.   Contact:    Relation:    Time of communication:    Notes (if applicable): Daughter in room during exam         CODE STATUS:    Code Status and Medical Interventions:   Ordered at: 05/21/20 8305     Level Of Support Discussed With:    Patient     Code Status:    CPR     Medical Interventions (Level of Support Prior to Arrest):    Full       Admission Status:  I believe this patient meets OBSERVATION status, however if further evaluation or treatment plans warrant, status may change.  Based upon current information, I predict patient's care encounter to be less than or equal to 2 midnights.    Electronically signed by Tierney Linda DO, 05/21/20, 10:30 PM.    Addendum    CT chest obtained after initial documentation indeterminate and without definitive consolidation.  Patient without cough, fever, shortness of breath, no leukocytosis, afebrile.  Clinically patient has no signs or symptoms of pneumonia.  We will not initiate antibiotics for now.

## 2020-05-22 NOTE — PROGRESS NOTES
[facility] Medicine Services  PROGRESS NOTE    Patient Name: Tad Moon  : 1926  MRN: [id number]    Date of Admission: 2020  Primary Care Physician: Pramod Hyde MD    Subjective   Subjective     CC:  Fall/back pain    HPI:  Notes fall/back pain, soreness. Denies dizziness/lightheadedness. NO f/c. NO dyspnea. No cough or congestion.    Review of Systems   Constitutional: Positive for activity change and fatigue.   HENT: Negative.    Respiratory: Negative.    Cardiovascular: Negative.    Gastrointestinal: Negative.    Genitourinary: Negative.    Musculoskeletal: Positive for arthralgias, back pain, gait problem and neck pain.   Skin: Negative.    Psychiatric/Behavioral: Negative.          Objective   Objective     Vital Signs:   Temp:  [97.7 °F (36.5 °C)-98.6 °F (37 °C)] 97.7 °F (36.5 °C)  Heart Rate:  [56-81] 67  Resp:  [16] 16  BP: ()/() 131/63        Physical Exam:  NAD, alert and oriented x 3  OP clear, MMM  PERRL  Neck supple  No LAD  RRR  CTAB  +BS, ND, NT ,soft  No c/c/e  No rashes  POLLARD    Results Reviewed:  Results from last 7 days   Lab Units 20  0532 20   WBC 10*3/mm3 9.84 9.24   HEMOGLOBIN g/dL 8.6* 10.4*   HEMATOCRIT % 27.3* 32.9*   PLATELETS 10*3/mm3 193 204     Results from last 7 days   Lab Units 20  0532 20  0121 20   SODIUM mmol/L 141  --  144   POTASSIUM mmol/L 3.7  --  4.2   CHLORIDE mmol/L 104  --  104   CO2 mmol/L 25.0  --  25.0   BUN mg/dL 36*  --  38*   CREATININE mg/dL 1.72*  --  1.86*   GLUCOSE mg/dL 112*  --  109*   CALCIUM mg/dL 8.1*  --  8.8   ALT (SGPT) U/L  --   --  12   AST (SGOT) U/L  --   --  20   TROPONIN T ng/mL 0.026 0.017 0.020     Estimated Creatinine Clearance: 29.3 mL/min (A) (by C-G formula based on SCr of 1.72 mg/dL (H)).    Microbiology Results Abnormal     None          Imaging Results (Last 24 Hours)     Procedure Component Value Units Date/Time    CT Chest Without  Contrast [[id number]] Collected:  05/22/20 0157     Updated:  05/22/20 0159    Narrative:       CT Chest WO    INDICATION:   Syncope and collapse yesterday. Abnormal thoracic spine CT showing pulmonary consolidations.    TECHNIQUE:   CT of the thorax without IV contrast. Coronal and sagittal reconstructions were obtained.  Radiation dose reduction techniques included automated exposure control or exposure modulation based on body size. Count of known CT and cardiac nuc med studies  performed in previous 12 months: 3.     COMPARISON:   3/29/2018    FINDINGS:  Heart is enlarged. Patient is status post TAVR and CABG. No pericardial effusion. Right paratracheal adenopathy measures 1.6 cm, stable to slightly improved. AP window adenopathy measures 1.5 cm, also stable to slightly improved. This is presumably  benign. There is trace right pleural fluid or pleural thickening. There is a small loculated left pleural effusion that appears chronic. There is some associated pleural thickening. Lung window images are degraded by respiratory motion. There is some  fibrosis in the lungs, predominantly at the periphery. This overall appears worse in the right lung relative to the left, but similar to prior. There is some confluent consolidation in the left lower lobe with some associated calcification. This may  reflect some chronic atelectasis or rounded atelectasis. Pneumonia is a differential consideration. Consider follow-up chest CT in 3 months. Imaging features are atypical or uncommonly reported for COVID-19 pneumonia. Alternative diagnoses should be  considered. Again seen is the T9 fracture evaluated with thoracic spine CT 5/21/2020. Upper abdomen shows cholecystectomy and atherosclerotic disease.      Impression:         1. Cardiomegaly with atherosclerotic disease. Patient status post TAVR and CABG.  2. Chronic small left pleural effusion with trace right pleural fluid or pleural thickening.  3. Fibrosis in both lungs,  similar to the prior study.  4. Left lower lobe consolidation may also be chronic, possibly due to chronic atelectasis or round atelectasis. Pneumonia is not excluded. Consider follow-up chest CT in 3 months.  5. Known T9 vertebral body fracture.  6. Stable mediastinal adenopathy, benign.    Signer Name: Tad Delacruz MD   Signed: 5/22/2020 1:57 AM   Workstation Name: [facility]    Radiology [facility]    CT Cervical Spine Without Contrast [[id number]] Collected:  05/21/20 2334     Updated:  05/21/20 2336    Narrative:       CT Spine Cervical WO    INDICATION:   Neck pain after fall today.    TECHNIQUE:   CT of the cervical spine without IV contrast. Coronal and sagittal reconstructions were obtained.  Radiation dose reduction techniques included automated exposure control or exposure modulation based on body size. Count of known CT and cardiac nuc med  studies performed in previous 12 months: 1.     COMPARISON:  None available.    FINDINGS:  There is acquired fusion of the right C2 and C3 facets. Cervical alignment is normal. Chronic endplate depressions are noted at multiple levels throughout the cervical spine. No acute cervical spine fracture is identified. There is multilevel  degenerative disc bulging with facet arthropathy. Note is also made of atherosclerotic disease in the carotid and vertebral arteries. Paraspinal soft tissues are within normal limits.      Impression:       Multilevel degenerative disease. No acute fracture or subluxation.    Signer Name: Tad Delacruz MD   Signed: 5/21/2020 11:34 PM   Workstation Name: [facility]    CT Thoracic Spine Without Contrast [[id number]] Collected:  05/21/20 2330     Updated:  05/21/20 2332    Narrative:       CT Spine Thoracic WO    INDICATION:    Mid back pain after fall today.    TECHNIQUE:   CT of the thoracic spine without IV contrast. Coronal and sagittal reconstructions were obtained.  Radiation dose  reduction techniques included automated exposure control or exposure modulation based on body size. Count of known CT and cardiac nuc med  studies performed in previous 12 months: 1.     COMPARISON:    None available.    FINDINGS:  Heart is enlarged. Patient is status post TAVR and CABG. There is a small left pleural effusion that has a chronic appearance. Images overall are degraded by respiratory motion. There is some fibrosis in both lungs. There is alveolar disease in the left  lower lobe which could reflect atelectasis, round atelectasis, or even pneumonia in the appropriate clinical setting. Imaging features are atypical or uncommonly reported for COVID-19 pneumonia. Alternative diagnoses should be considered.    There is a fracture through the vertebral body at T9 which is comminuted and obliquely oriented. This is essentially nondisplaced. It extends from the superior to the inferior endplate through the mid vertebral body. It also extends through the lateral  cortices on both sides. No other definite acute fractures are identified. Alignment on the sagittal images is normal. No significant paraspinal hematoma is seen.      Impression:         1. Acute comminuted but not significantly displaced fracture involving the vertebral body of T9. No other acute fractures are seen.  2. 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  3. Atelectasis, pneumonia, or round atelectasis in the left lower lobe.    Signer Name: Tad Delacruz MD   Signed: 5/21/2020 11:30 PM   Workstation Name: [email or web address]    [facility]    CT Head Without Contrast [[id number]] Collected:  05/21/20 2051     Updated:  05/21/20 2054    Narrative:       CT Head WO    HISTORY:   Fall today. On blood thinners    TECHNIQUE:   Axial unenhanced head CT. Radiation dose reduction techniques included automated exposure control or exposure modulation based on body size. Count of known CT and  cardiac nuc med studies performed in previous 12 months: 0.     Time of scan: 8:36 PM    COMPARISON:   November 5, 2016    FINDINGS:   The ventricles are generous in size. Cortical sulci are correspondingly prominent. No extraaxial fluid collections are seen.    Prominent subcutaneous hematoma in the left occipital area.    No parenchymal or subarachnoid hemorrhage is present. Periventricular hypodensities are demonstrated which are nonspecific but likely represent white matter microvascular change in a patient of this age. No CT evidence of mass or acute infarct.    The mastoid air cells are clear. The visualized paranasal sinuses are clear.  Orbital structures demonstrate no acute findings.      Impression:       Impression:  1. No clearly acute intracranial pathology demonstrated.  2. Generalized cerebral atrophy and nonspecific periventricular hypodensities which are thought to represent white matter microvascular change.  3. No significant interval intracranial change from prior study.    Signer Name: Hema Bauman MD   Signed: 5/21/2020 8:51 PM   Workstation Name: [id number]    [facility]          Results for orders placed during the hospital encounter of 01/27/20   Adult Transthoracic Echo Complete W/ Cont if Necessary Per Protocol    Narrative · Estimated EF = 20%.  · Mild aortic valve regurgitation is present.  · Moderate mitral valve regurgitation is present          I have reviewed the medications:  Scheduled Meds:  atorvastatin 40 mg Oral Daily   carvedilol 3.125 mg Oral BID With Meals   clopidogrel 75 mg Oral Daily   cyanocobalamin 1,000 mcg Intramuscular Daily   famotidine 20 mg Oral Daily   ferrous sulfate 325 mg Oral Daily With Breakfast   vitamin B-1 100 mg Oral Daily   And      multivitamin 1 tablet Oral Daily   And      folic acid 1 mg Oral Daily   heparin (porcine) 5,000 Units Subcutaneous Q8H   sacubitril-valsartan 1 tablet Oral BID   sertraline 50 mg Oral Daily      sodium chloride 10 mL Intravenous Q12H   torsemide 20 mg Oral Every Other Day   vitamin C 500 mg Oral Daily With Breakfast     Continuous Infusions:   PRN Meds:.•  acetaminophen **OR** acetaminophen **OR** acetaminophen  •  HYDROcodone-acetaminophen  •  sodium chloride  •  sodium chloride    Assessment/Plan   Assessment & Plan     Active Hospital Problems    Diagnosis  POA   • **Syncope [R55]  Yes   • Closed fracture of ninth thoracic vertebra (CMS/Formerly [facility]) [S22.079A]  Unknown   • NEDRA (acute kidney injury) (CMS/Formerly [facility]) [N17.9]  Unknown   • Normocytic anemia [D64.9]  Unknown   • Alcohol use [Z72.89]  Unknown   • Major depressive disorder with single episode, in partial remission (CMS/Formerly [facility]) [F32.4]  Yes   • Essential hypertension [I10]  Yes   • CKD (chronic kidney disease) stage 3, GFR 30-59 ml/min (CMS/Formerly [facility]) [N18.3]  Yes   • Nonrheumatic aortic valve stenosis s/p TAVR [I35.0]  Yes   • Chronic systolic congestive heart failure (CMS/Formerly [facility]) [I50.22]  Yes   • Coronary artery disease involving coronary bypass graft of native heart without angina pectoris [I25.810]  Yes   • Hyperlipidemia LDL goal <100 [E78.5]  Yes   • CLL (chronic lymphocytic leukemia) (CMS/Formerly [facility]) [C91.10]  Yes      Resolved Hospital Problems   No resolved problems to display.        Brief Hospital Course to date:  Tad Moon is a 89 y.o. male here with fall, probable orthostasis, with mild NEDRA and T9 compression fx    Orthostasis/syncope  --medication adjustment per cardiology  --likely can tolerated a higher BP    NEDRA/CKD  --monitor    Fall/T9 fx  --PT/OT    Hx of TAVR    Hx of CAD/CABG    Hx of depression    DVT Prophylaxis:  DOUGLAS                        Disposition: I expect the patient to be discharged TBD.    CODE STATUS:   Code Status and Medical Interventions:   Ordered at: 05/21/20 4243     Level Of Support Discussed With:    Patient     Code Status:    CPR     Medical Interventions (Level of Support Prior to Arrest):    Full         Electronically signed  by Lamonte Melendez MD, 05/22/20, 09:55.

## 2020-05-23 ENCOUNTER — READMISSION MANAGEMENT (OUTPATIENT)
Dept: CALL CENTER | Facility: HOSPITAL | Age: 85
End: 2020-05-23

## 2020-05-23 VITALS
SYSTOLIC BLOOD PRESSURE: 148 MMHG | HEIGHT: 69 IN | DIASTOLIC BLOOD PRESSURE: 84 MMHG | OXYGEN SATURATION: 96 % | TEMPERATURE: 97.5 F | WEIGHT: 172.9 LBS | BODY MASS INDEX: 25.61 KG/M2 | HEART RATE: 64 BPM | RESPIRATION RATE: 17 BRPM

## 2020-05-23 LAB
ANION GAP SERPL CALCULATED.3IONS-SCNC: 15 MMOL/L (ref 5–15)
BUN BLD-MCNC: 31 MG/DL (ref 8–23)
BUN/CREAT SERPL: 19.7 (ref 7–25)
CALCIUM SPEC-SCNC: 8.7 MG/DL (ref 8.2–9.6)
CHLORIDE SERPL-SCNC: 100 MMOL/L (ref 98–107)
CO2 SERPL-SCNC: 23 MMOL/L (ref 22–29)
CREAT BLD-MCNC: 1.57 MG/DL (ref 0.76–1.27)
DEPRECATED RDW RBC AUTO: 51.1 FL (ref 37–54)
ERYTHROCYTE [DISTWIDTH] IN BLOOD BY AUTOMATED COUNT: 14.5 % (ref 12.3–15.4)
GFR SERPL CREATININE-BSD FRML MDRD: 41 ML/MIN/1.73
GLUCOSE BLD-MCNC: 104 MG/DL (ref 65–99)
HCT VFR BLD AUTO: 30.5 % (ref 37.5–51)
HGB BLD-MCNC: 9.7 G/DL (ref 13–17.7)
MCH RBC QN AUTO: 31.1 PG (ref 26.6–33)
MCHC RBC AUTO-ENTMCNC: 31.8 G/DL (ref 31.5–35.7)
MCV RBC AUTO: 97.8 FL (ref 79–97)
PLATELET # BLD AUTO: 186 10*3/MM3 (ref 140–450)
PMV BLD AUTO: 10.8 FL (ref 6–12)
POTASSIUM BLD-SCNC: 3.4 MMOL/L (ref 3.5–5.2)
RBC # BLD AUTO: 3.12 10*6/MM3 (ref 4.14–5.8)
SODIUM BLD-SCNC: 138 MMOL/L (ref 136–145)
WBC NRBC COR # BLD: 10.12 10*3/MM3 (ref 3.4–10.8)

## 2020-05-23 PROCEDURE — 85027 COMPLETE CBC AUTOMATED: CPT | Performed by: HOSPITALIST

## 2020-05-23 PROCEDURE — 25010000002 HEPARIN (PORCINE) PER 1000 UNITS: Performed by: INTERNAL MEDICINE

## 2020-05-23 PROCEDURE — 80048 BASIC METABOLIC PNL TOTAL CA: CPT | Performed by: HOSPITALIST

## 2020-05-23 PROCEDURE — 96372 THER/PROPH/DIAG INJ SC/IM: CPT

## 2020-05-23 PROCEDURE — 99214 OFFICE O/P EST MOD 30 MIN: CPT | Performed by: INTERNAL MEDICINE

## 2020-05-23 PROCEDURE — 25010000002 FUROSEMIDE PER 20 MG: Performed by: INTERNAL MEDICINE

## 2020-05-23 PROCEDURE — G0378 HOSPITAL OBSERVATION PER HR: HCPCS

## 2020-05-23 PROCEDURE — 96374 THER/PROPH/DIAG INJ IV PUSH: CPT

## 2020-05-23 PROCEDURE — 99217 PR OBSERVATION CARE DISCHARGE MANAGEMENT: CPT | Performed by: INTERNAL MEDICINE

## 2020-05-23 PROCEDURE — 25010000002 CYANOCOBALAMIN PER 1000 MCG: Performed by: HOSPITALIST

## 2020-05-23 RX ORDER — POTASSIUM CHLORIDE 1.5 G/1.77G
40 POWDER, FOR SOLUTION ORAL AS NEEDED
Status: DISCONTINUED | OUTPATIENT
Start: 2020-05-23 | End: 2020-05-23 | Stop reason: HOSPADM

## 2020-05-23 RX ORDER — POTASSIUM CHLORIDE 750 MG/1
40 CAPSULE, EXTENDED RELEASE ORAL AS NEEDED
Status: DISCONTINUED | OUTPATIENT
Start: 2020-05-23 | End: 2020-05-23 | Stop reason: HOSPADM

## 2020-05-23 RX ORDER — HYDROCODONE BITARTRATE AND ACETAMINOPHEN 5; 325 MG/1; MG/1
1 TABLET ORAL EVERY 6 HOURS PRN
Qty: 12 TABLET | Refills: 0 | Status: SHIPPED | OUTPATIENT
Start: 2020-05-23 | End: 2020-01-01

## 2020-05-23 RX ORDER — POTASSIUM CHLORIDE 7.45 MG/ML
10 INJECTION INTRAVENOUS
Status: DISCONTINUED | OUTPATIENT
Start: 2020-05-23 | End: 2020-05-23 | Stop reason: HOSPADM

## 2020-05-23 RX ORDER — FUROSEMIDE 10 MG/ML
40 INJECTION INTRAMUSCULAR; INTRAVENOUS ONCE
Status: COMPLETED | OUTPATIENT
Start: 2020-05-23 | End: 2020-05-23

## 2020-05-23 RX ADMIN — CYANOCOBALAMIN 1000 MCG: 1000 INJECTION, SOLUTION INTRAMUSCULAR; SUBCUTANEOUS at 08:59

## 2020-05-23 RX ADMIN — SACUBITRIL AND VALSARTAN 1 TABLET: 24; 26 TABLET, FILM COATED ORAL at 08:59

## 2020-05-23 RX ADMIN — CARVEDILOL 3.12 MG: 3.12 TABLET, FILM COATED ORAL at 08:58

## 2020-05-23 RX ADMIN — FERROUS SULFATE TAB 325 MG (65 MG ELEMENTAL FE) 325 MG: 325 (65 FE) TAB at 08:58

## 2020-05-23 RX ADMIN — Medication 100 MG: at 08:58

## 2020-05-23 RX ADMIN — FUROSEMIDE 40 MG: 10 INJECTION, SOLUTION INTRAMUSCULAR; INTRAVENOUS at 08:59

## 2020-05-23 RX ADMIN — FOLIC ACID 1 MG: 1 TABLET ORAL at 08:58

## 2020-05-23 RX ADMIN — ATORVASTATIN CALCIUM 40 MG: 40 TABLET, FILM COATED ORAL at 08:58

## 2020-05-23 RX ADMIN — OXYCODONE HYDROCHLORIDE AND ACETAMINOPHEN 500 MG: 500 TABLET ORAL at 08:59

## 2020-05-23 RX ADMIN — FAMOTIDINE 20 MG: 20 TABLET, FILM COATED ORAL at 08:58

## 2020-05-23 RX ADMIN — SODIUM CHLORIDE, PRESERVATIVE FREE 10 ML: 5 INJECTION INTRAVENOUS at 09:02

## 2020-05-23 RX ADMIN — SERTRALINE HYDROCHLORIDE 50 MG: 50 TABLET ORAL at 08:58

## 2020-05-23 RX ADMIN — MULTIVITAMIN TABLET 1 TABLET: TABLET at 08:58

## 2020-05-23 RX ADMIN — HEPARIN SODIUM 5000 UNITS: 5000 INJECTION INTRAVENOUS; SUBCUTANEOUS at 13:16

## 2020-05-23 RX ADMIN — ACETAMINOPHEN 650 MG: 325 TABLET, FILM COATED ORAL at 05:08

## 2020-05-23 RX ADMIN — CLOPIDOGREL BISULFATE 75 MG: 75 TABLET ORAL at 08:59

## 2020-05-23 RX ADMIN — HEPARIN SODIUM 5000 UNITS: 5000 INJECTION INTRAVENOUS; SUBCUTANEOUS at 05:08

## 2020-05-23 RX ADMIN — POTASSIUM CHLORIDE 40 MEQ: 1.5 POWDER, FOR SOLUTION ORAL at 13:23

## 2020-05-23 NOTE — PROGRESS NOTES
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  He has a headache from where he fell and hit his head yesterday.  He also complains that he is got fluid retention.  Also complains that he is short of breath.    Past medical, surgical, social and family history reviewed in the patient's electronic medical record.    Objective     Vital Sign Min/Max for last 24 hours  Temp  Min: 97.6 °F (36.4 °C)  Max: 98.2 °F (36.8 °C)   BP  Min: 98/57  Max: 156/71   Pulse  Min: 58  Max: 80   Resp  Min: 16  Max: 18   SpO2  Min: 94 %  Max: 98 %   No data recorded      Intake/Output Summary (Last 24 hours) at 5/23/2020 0700  Last data filed at 5/23/2020 0600  Gross per 24 hour   Intake 660 ml   Output 525 ml   Net 135 ml             Current Facility-Administered Medications:   •  acetaminophen (TYLENOL) tablet 650 mg, 650 mg, Oral, Q4H PRN, 650 mg at 05/23/20 0508 **OR** acetaminophen (TYLENOL) 160 MG/5ML solution 650 mg, 650 mg, Oral, Q4H PRN **OR** acetaminophen (TYLENOL) suppository 650 mg, 650 mg, Rectal, Q4H PRN, Alexei, Tierney G, DO  •  atorvastatin (LIPITOR) tablet 40 mg, 40 mg, Oral, Daily, Alexei, Tierney G, DO, 40 mg at 05/22/20 0916  •  carvedilol (COREG) tablet 3.125 mg, 3.125 mg, Oral, BID With Meals, Alexei, Tierney G, DO, 3.125 mg at 05/22/20 1844  •  clopidogrel (PLAVIX) tablet 75 mg, 75 mg, Oral, Daily, Alexei, Tierney G, DO, 75 mg at 05/22/20 0918  •  cyanocobalamin injection 1,000 mcg, 1,000 mcg, Intramuscular, Daily, Lamonte Melendez MD, 1,000 mcg at 05/22/20 1045  •  famotidine (PEPCID) tablet 20 mg, 20 mg, Oral, Daily, Tierney Linda DO, 20 mg at 05/22/20 0918  •  ferrous sulfate tablet 325 mg, 325 mg, Oral, Daily With Breakfast, Tierney Linda DO, 325 mg at 05/22/20  0916  •  thiamine (VITAMIN B-1) tablet 100 mg, 100 mg, Oral, Daily, 100 mg at 05/22/20 0916 **AND** multivitamin (THERAGRAN) tablet 1 tablet, 1 tablet, Oral, Daily, 1 tablet at 05/22/20 0916 **AND** folic acid (FOLVITE) tablet 1 mg, 1 mg, Oral, Daily, Alexei, Tierney G, DO, 1 mg at 05/22/20 0918  •  heparin (porcine) 5000 UNIT/ML injection 5,000 Units, 5,000 Units, Subcutaneous, Q8H, Alexei, Tierney G, DO, 5,000 Units at 05/23/20 0508  •  HYDROcodone-acetaminophen (NORCO) 5-325 MG per tablet 1 tablet, 1 tablet, Oral, Q6H PRN, Alexei, Tierney G, DO, 1 tablet at 05/22/20 2122  •  sacubitril-valsartan (ENTRESTO) 24-26 MG tablet 1 tablet, 1 tablet, Oral, BID, Alexei, Tierney G, DO, 1 tablet at 05/22/20 2119  •  sertraline (ZOLOFT) tablet 50 mg, 50 mg, Oral, Daily, Alexei, Tierney G, DO, 50 mg at 05/22/20 0918  •  sodium chloride 0.9 % flush 10 mL, 10 mL, Intravenous, PRN, Alexei, Tierney G, DO  •  sodium chloride 0.9 % flush 10 mL, 10 mL, Intravenous, Q12H, Alexei, Tierney G, DO, 10 mL at 05/22/20 2123  •  sodium chloride 0.9 % flush 10 mL, 10 mL, Intravenous, PRN, Alexei, Tierney G, DO  •  torsemide (DEMADEX) tablet 20 mg, 20 mg, Oral, Every Other Day, Alexei, Tierney G, DO, 20 mg at 05/22/20 0918  •  vitamin C (ASCORBIC ACID) tablet 500 mg, 500 mg, Oral, Daily With Breakfast, Alexei, Tierney G, DO, 500 mg at 05/22/20 0916    Physical Exam: General well-developed elderly male in bed without resting dyspnea or tachypnea with current heart rate 80        HEENT: Positive JVP, tongue is midline no icterus       Respiratory: Equal bilateral symmetrical expansion with crackles bilaterally       Cardiovascular: Regular rate and rhythm with systolic murmur no edema to palpation              Lower Extremities: No lesions       Neuro: Facial expressions are symmetrical moves all 4 extremities       Skin: Warm and dry with no edema to palpation       Psych: Pleasant affect    Results Review: BNP yesterday was 6009 and 51 hemoglobin is now 9.7 it was  8.6 yesterday.  Creatinine is 1.57 down from 1.72.  Potassium is low at 3.4    Radiology Results:  Imaging Results (Last 72 Hours)     Procedure Component Value Units Date/Time    CT Chest Without Contrast [[id number]] Collected:  05/22/20 0157     Updated:  05/22/20 0159    Narrative:       CT Chest WO    INDICATION:   Syncope and collapse yesterday. Abnormal thoracic spine CT showing pulmonary consolidations.    TECHNIQUE:   CT of the thorax without IV contrast. Coronal and sagittal reconstructions were obtained.  Radiation dose reduction techniques included automated exposure control or exposure modulation based on body size. Count of known CT and cardiac nuc med studies  performed in previous 12 months: 3.     COMPARISON:   3/29/2018    FINDINGS:  Heart is enlarged. Patient is status post TAVR and CABG. No pericardial effusion. Right paratracheal adenopathy measures 1.6 cm, stable to slightly improved. AP window adenopathy measures 1.5 cm, also stable to slightly improved. This is presumably  benign. There is trace right pleural fluid or pleural thickening. There is a small loculated left pleural effusion that appears chronic. There is some associated pleural thickening. Lung window images are degraded by respiratory motion. There is some  fibrosis in the lungs, predominantly at the periphery. This overall appears worse in the right lung relative to the left, but similar to prior. There is some confluent consolidation in the left lower lobe with some associated calcification. This may  reflect some chronic atelectasis or rounded atelectasis. Pneumonia is a differential consideration. Consider follow-up chest CT in 3 months. Imaging features are atypical or uncommonly reported for COVID-19 pneumonia. Alternative diagnoses should be  considered. Again seen is the T9 fracture evaluated with thoracic spine CT 5/21/2020. Upper abdomen shows cholecystectomy and atherosclerotic disease.      Impression:         1.  Cardiomegaly with atherosclerotic disease. Patient status post TAVR and CABG.  2. Chronic small left pleural effusion with trace right pleural fluid or pleural thickening.  3. Fibrosis in both lungs, similar to the prior study.  4. Left lower lobe consolidation may also be chronic, possibly due to chronic atelectasis or round atelectasis. Pneumonia is not excluded. Consider follow-up chest CT in 3 months.  5. Known T9 vertebral body fracture.  6. Stable mediastinal adenopathy, benign.    Signer Name: Tad Delacruz MD   Signed: 5/22/2020 1:57 AM   Workstation Name: [facility]    CT Cervical Spine Without Contrast [[id number]] Collected:  05/21/20 2334     Updated:  05/21/20 2336    Narrative:       CT Spine Cervical WO    INDICATION:   Neck pain after fall today.    TECHNIQUE:   CT of the cervical spine without IV contrast. Coronal and sagittal reconstructions were obtained.  Radiation dose reduction techniques included automated exposure control or exposure modulation based on body size. Count of known CT and cardiac nuc med  studies performed in previous 12 months: 1.     COMPARISON:  None available.    FINDINGS:  There is acquired fusion of the right C2 and C3 facets. Cervical alignment is normal. Chronic endplate depressions are noted at multiple levels throughout the cervical spine. No acute cervical spine fracture is identified. There is multilevel  degenerative disc bulging with facet arthropathy. Note is also made of atherosclerotic disease in the carotid and vertebral arteries. Paraspinal soft tissues are within normal limits.      Impression:       Multilevel degenerative disease. No acute fracture or subluxation.    Signer Name: Tad Delacruz MD   Signed: 5/21/2020 11:34 PM   Workstation Name: [facility]    CT Thoracic Spine Without Contrast [[id number]] Collected:  05/21/20 2330     Updated:  05/21/20 2332    Narrative:        CT Spine Thoracic WO    INDICATION:    Mid back pain after fall today.    TECHNIQUE:   CT of the thoracic spine without IV contrast. Coronal and sagittal reconstructions were obtained.  Radiation dose reduction techniques included automated exposure control or exposure modulation based on body size. Count of known CT and cardiac nuc med  studies performed in previous 12 months: 1.     COMPARISON:    None available.    FINDINGS:  Heart is enlarged. Patient is status post TAVR and CABG. There is a small left pleural effusion that has a chronic appearance. Images overall are degraded by respiratory motion. There is some fibrosis in both lungs. There is alveolar disease in the left  lower lobe which could reflect atelectasis, round atelectasis, or even pneumonia in the appropriate clinical setting. Imaging features are atypical or uncommonly reported for COVID-19 pneumonia. Alternative diagnoses should be considered.    There is a fracture through the vertebral body at T9 which is comminuted and obliquely oriented. This is essentially nondisplaced. It extends from the superior to the inferior endplate through the mid vertebral body. It also extends through the lateral  cortices on both sides. No other definite acute fractures are identified. Alignment on the sagittal images is normal. No significant paraspinal hematoma is seen.      Impression:         1. Acute comminuted but not significantly displaced fracture involving the vertebral body of T9. No other acute fractures are seen.  2. 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  3. Atelectasis, pneumonia, or round atelectasis in the left lower lobe.    Signer Name: Tad Delacruz MD   Signed: 5/21/2020 11:30 PM   Workstation Name: [email or web address]    [facility]    CT Head Without Contrast [[id number]] Collected:  05/21/20 2051     Updated:  05/21/20 2054    Narrative:       CT Head WO    HISTORY:   Fall today.  On blood thinners    TECHNIQUE:   Axial unenhanced head CT. Radiation dose reduction techniques included automated exposure control or exposure modulation based on body size. Count of known CT and cardiac nuc med studies performed in previous 12 months: 0.     Time of scan: 8:36 PM    COMPARISON:   November 5, 2016    FINDINGS:   The ventricles are generous in size. Cortical sulci are correspondingly prominent. No extraaxial fluid collections are seen.    Prominent subcutaneous hematoma in the left occipital area.    No parenchymal or subarachnoid hemorrhage is present. Periventricular hypodensities are demonstrated which are nonspecific but likely represent white matter microvascular change in a patient of this age. No CT evidence of mass or acute infarct.    The mastoid air cells are clear. The visualized paranasal sinuses are clear.  Orbital structures demonstrate no acute findings.      Impression:       Impression:  1. No clearly acute intracranial pathology demonstrated.  2. Generalized cerebral atrophy and nonspecific periventricular hypodensities which are thought to represent white matter microvascular change.  3. No significant interval intracranial change from prior study.    Signer Name: Hema Bauman MD   Signed: 5/21/2020 8:51 PM   Workstation [facility]: [facility]    [facility]          EKG: Sinus rhythm right bundle branch block first-degree AV block    ECHO: Depressed EF 30% with normal functioning TAVR    Tele: There is significant ventricular bigeminy.  Occasional 3-5 beats of nonsustained VT    Assessment   Assessment/Plan: 1 syncope with depressed EF and conduction system disease.  I reviewed the patient's telemetry he has frequent PVCs in a pattern of bigeminy and occasional 3-5 beats of nonsustained VT.  I discussed this case yesterday with our EP service and with his depressed EF and sudden syncope with all this ventricular ectopy is reasonable to put in an ICD.  However  he is 89 years old.  Also with his conduction system disease with sudden rojas syncope not unreasonable also to place a pacemaker.  The patient wants to go home.  I explained to him that what ever happened to him could happen again.  However currently he has CHF.  Going give him IV Lasix.  Will discuss  the case with the [facility] people.  2 NEDRA on CKI improved however there may reverse when we restart his diuretics  3 ischemic cardiomyopathy-restart diuretics.  Possible if the patient diuresis well today and his breathing is better and he qualifies for [facility] he can go home but more than likely to be better to go home tomorrow    Tad Sheehan MD  05/23/20  07:00

## 2020-05-23 NOTE — OUTREACH NOTE
Prep Survey      Responses   [facility] facility patient discharged from?  [place]   Is LACE score < 7 ?  No   Eligibility  [facility]   Date of Admission  05/21/20   Date of Discharge  05/23/20   Discharge Disposition  Home or Self Care   Discharge diagnosis  SyncopeClosed fracture of ninth thoracic vertebra    COVID-19 Test Status  Not tested   Does the patient have one of the following disease processes/diagnoses(primary or secondary)?  Other   Does the patient have Home health ordered?  No   Is there a DME ordered?  No   Prep survey completed?  Yes          Milagro Rivera RN

## 2020-05-23 NOTE — DISCHARGE SUMMARY
[facility] Medicine Services  DISCHARGE SUMMARY    Patient Name: Tad Moon  : 1926  MRN: [id number]    Date of Admission: 2020  8:11 PM  Date of Discharge:  2020  Primary Care Physician: Pramod Hyde MD    Consults     Date and Time Order Name Status Description    2020 2349 Inpatient Cardiology Consult Completed           Hospital Course     Presenting Problem:   Syncope, unspecified syncope type [R55]    Active Hospital Problems    Diagnosis  POA   • **Syncope [R55]  Yes   • Closed fracture of ninth thoracic vertebra (CMS/[facility]) [S22.079A]  Unknown   • NEDRA (acute kidney injury) (CMS/[facility]) [N17.9]  Unknown   • Normocytic anemia [D64.9]  Unknown   • Alcohol use [Z72.89]  Unknown   • Major depressive disorder with single episode, in partial remission (CMS/[facility]) [F32.4]  Yes   • Essential hypertension [I10]  Yes   • CKD (chronic kidney disease) stage 3, GFR 30-59 ml/min (CMS/[facility]) [N18.3]  Yes   • Nonrheumatic aortic valve stenosis s/p TAVR [I35.0]  Yes   • Chronic systolic congestive heart failure (CMS/[facility]) [I50.22]  Yes   • Coronary artery disease involving coronary bypass graft of native heart without angina pectoris [I25.810]  Yes   • Hyperlipidemia LDL goal <100 [E78.5]  Yes   • CLL (chronic lymphocytic leukemia) (CMS/[facility]) [C91.10]  Yes      Resolved Hospital Problems   No resolved problems to display.          Hospital Course:  Tad Moon is a 89 y.o. male  with PMH of CAD s/p CABG, TAVR, CKD, and ICM/CHF with prior EF of 20% presented with fall, probable orthostasis, with mild NEDRA and T9 compression fx.    Orthostasis/syncope  --concerning for possible arrhythmia.  Medication adjustments as per Cards.  Plan is to go home with LifeVest today and outpatient PPM/ICD placement later this week.     Acute decompensated systolic HF/ICM  --gave IV Lasix today per Cards, continue home dose diuretics upon d/c  --strict Is/Os, daily weights  --LifeVest set  up today, plan for PPM/ICD later this week  --repeat TTE this admission with EF 30%    NEDRA/CKD  --Cr improving    Fall/T9 fx  --PT/OT, continue pain medications upon d/c    Hx of TAVR    Hx of CAD/CABG    Hx of depression          Discharge Follow Up Recommendations for outpatient labs/diagnostics:   1. With PCP in one week  2. With Cardiology as per their recs    Day of Discharge     HPI:   Doing well this am. Very insistent that he goes home.     Review of Systems  Gen- No fevers, chills  CV- No chest pain, palpitations  Resp- No cough, dyspnea  GI- No N/V/D, abd pain      Vital Signs:   Temp:  [97.5 °F (36.4 °C)-98.2 °F (36.8 °C)] 97.5 °F (36.4 °C)  Heart Rate:  [58-87] 83  Resp:  [16-18] 17  BP: (131-156)/() 148/84     Physical Exam:  Constitutional: No acute distress, awake, alert  HENT: NCAT, mucous membranes moist  Respiratory: Clear to auscultation bilaterally, respiratory effort normal   Cardiovascular: RRR, no murmurs, rubs, or gallops, palpable pedal pulses bilaterally  Gastrointestinal: Positive bowel sounds, soft, nontender, nondistended  Musculoskeletal: No bilateral ankle edema  Psychiatric: Appropriate affect, cooperative  Neurologic: Oriented x 3, strength symmetric in all extremities, Cranial Nerves grossly intact to confrontation, speech clear  Skin: No rashes    Pertinent  and/or Most Recent Results     Results from last 7 days   Lab Units 05/23/20  0459 05/22/20  0532 05/21/20 2024   WBC 10*3/mm3 10.12 9.84 9.24   HEMOGLOBIN g/dL 9.7* 8.6* 10.4*   HEMATOCRIT % 30.5* 27.3* 32.9*   PLATELETS 10*3/mm3 186 193 204   SODIUM mmol/L 138 141 144   POTASSIUM mmol/L 3.4* 3.7 4.2   CHLORIDE mmol/L 100 104 104   CO2 mmol/L 23.0 25.0 25.0   BUN mg/dL 31* 36* 38*   CREATININE mg/dL 1.57* 1.72* 1.86*   GLUCOSE mg/dL 104* 112* 109*   CALCIUM mg/dL 8.7 8.1* 8.8     Results from last 7 days   Lab Units 05/21/20 2024   BILIRUBIN mg/dL 0.3   ALK PHOS U/L 123*   ALT (SGPT) U/L 12   AST (SGOT) U/L 20               Invalid input(s): TG, LDLCALC, LDLREALC  Results from last 7 days   Lab Units 05/22/20  1055 05/22/20  0532 05/22/20  0121 05/21/20 2024   PROBNP pg/mL 6,951.0*  --   --   --    TROPONIN T ng/mL  --  0.026 0.017 0.020       Brief Urine Lab Results  (Last result in the past 365 days)      Color   Clarity   Blood   Leuk Est   Nitrite   Protein   CREAT   Urine HCG        05/22/20 0820             86.7       05/22/20 0820 Yellow Clear Negative Negative Negative Negative               Microbiology Results Abnormal     None          Imaging Results (All)     Procedure Component Value Units Date/Time    CT Chest Without Contrast [[id number]] Collected:  05/22/20 0157     Updated:  05/22/20 0159    Narrative:       CT Chest WO    INDICATION:   Syncope and collapse yesterday. Abnormal thoracic spine CT showing pulmonary consolidations.    TECHNIQUE:   CT of the thorax without IV contrast. Coronal and sagittal reconstructions were obtained.  Radiation dose reduction techniques included automated exposure control or exposure modulation based on body size. Count of known CT and cardiac nuc med studies  performed in previous 12 months: 3.     COMPARISON:   3/29/2018    FINDINGS:  Heart is enlarged. Patient is status post TAVR and CABG. No pericardial effusion. Right paratracheal adenopathy measures 1.6 cm, stable to slightly improved. AP window adenopathy measures 1.5 cm, also stable to slightly improved. This is presumably  benign. There is trace right pleural fluid or pleural thickening. There is a small loculated left pleural effusion that appears chronic. There is some associated pleural thickening. Lung window images are degraded by respiratory motion. There is some  fibrosis in the lungs, predominantly at the periphery. This overall appears worse in the right lung relative to the left, but similar to prior. There is some confluent consolidation in the left lower lobe with some associated calcification. This  may  reflect some chronic atelectasis or rounded atelectasis. Pneumonia is a differential consideration. Consider follow-up chest CT in 3 months. Imaging features are atypical or uncommonly reported for COVID-19 pneumonia. Alternative diagnoses should be  considered. Again seen is the T9 fracture evaluated with thoracic spine CT 5/21/2020. Upper abdomen shows cholecystectomy and atherosclerotic disease.      Impression:         1. Cardiomegaly with atherosclerotic disease. Patient status post TAVR and CABG.  2. Chronic small left pleural effusion with trace right pleural fluid or pleural thickening.  3. Fibrosis in both lungs, similar to the prior study.  4. Left lower lobe consolidation may also be chronic, possibly due to chronic atelectasis or round atelectasis. Pneumonia is not excluded. Consider follow-up chest CT in 3 months.  5. Known T9 vertebral body fracture.  6. Stable mediastinal adenopathy, benign.    Signer Name: Tad Delacruz MD   Signed: 5/22/2020 1:57 AM   Workstation Name: [email or web address]    [facility]    CT Cervical Spine Without Contrast [[id number]] Collected:  05/21/20 2334     Updated:  05/21/20 2336    Narrative:       CT Spine Cervical WO    INDICATION:   Neck pain after fall today.    TECHNIQUE:   CT of the cervical spine without IV contrast. Coronal and sagittal reconstructions were obtained.  Radiation dose reduction techniques included automated exposure control or exposure modulation based on body size. Count of known CT and cardiac nuc med  studies performed in previous 12 months: 1.     COMPARISON:  None available.    FINDINGS:  There is acquired fusion of the right C2 and C3 facets. Cervical alignment is normal. Chronic endplate depressions are noted at multiple levels throughout the cervical spine. No acute cervical spine fracture is identified. There is multilevel  degenerative disc bulging with facet arthropathy. Note is also made of atherosclerotic disease in  the carotid and vertebral arteries. Paraspinal soft tissues are within normal limits.      Impression:       Multilevel degenerative disease. No acute fracture or subluxation.    Signer Name: Tad Delacruz MD   Signed: 5/21/2020 11:34 PM   Workstation Name: [email or web address]    [facility] [place]    CT Thoracic Spine Without Contrast [[id number]] Collected:  05/21/20 2330     Updated:  05/21/20 2332    Narrative:       CT Spine Thoracic WO    INDICATION:    Mid back pain after fall today.    TECHNIQUE:   CT of the thoracic spine without IV contrast. Coronal and sagittal reconstructions were obtained.  Radiation dose reduction techniques included automated exposure control or exposure modulation based on body size. Count of known CT and cardiac nuc med  studies performed in previous 12 months: 1.     COMPARISON:    None available.    FINDINGS:  Heart is enlarged. Patient is status post TAVR and CABG. There is a small left pleural effusion that has a chronic appearance. Images overall are degraded by respiratory motion. There is some fibrosis in both lungs. There is alveolar disease in the left  lower lobe which could reflect atelectasis, round atelectasis, or even pneumonia in the appropriate clinical setting. Imaging features are atypical or uncommonly reported for COVID-19 pneumonia. Alternative diagnoses should be considered.    There is a fracture through the vertebral body at T9 which is comminuted and obliquely oriented. This is essentially nondisplaced. It extends from the superior to the inferior endplate through the mid vertebral body. It also extends through the lateral  cortices on both sides. No other definite acute fractures are identified. Alignment on the sagittal images is normal. No significant paraspinal hematoma is seen.      Impression:         1. Acute comminuted but not significantly displaced fracture involving the vertebral body of T9. No other acute fractures are seen.  2. Chronic  fibrosis in both lungs with a chronic-appearing small left pleural effusion.  3. Atelectasis, pneumonia, or round atelectasis in the left lower lobe.    Signer Name: Tad Delacruz MD   Signed: 5/21/2020 11:30 PM   Workstation Name: [email or web address]    [facility]    CT Head Without Contrast [[id number]] Collected:  05/21/20 2051     Updated:  05/21/20 2054    Narrative:       CT Head WO    HISTORY:   Fall today. On blood thinners    TECHNIQUE:   Axial unenhanced head CT. Radiation dose reduction techniques included automated exposure control or exposure modulation based on body size. Count of known CT and cardiac nuc med studies performed in previous 12 months: 0.     Time of scan: 8:36 PM    COMPARISON:   November 5, 2016    FINDINGS:   The ventricles are generous in size. Cortical sulci are correspondingly prominent. No extraaxial fluid collections are seen.    Prominent subcutaneous hematoma in the left occipital area.    No parenchymal or subarachnoid hemorrhage is present. Periventricular hypodensities are demonstrated which are nonspecific but likely represent white matter microvascular change in a patient of this age. No CT evidence of mass or acute infarct.    The mastoid air cells are clear. The visualized paranasal sinuses are clear.  Orbital structures demonstrate no acute findings.      Impression:       Impression:  1. No clearly acute intracranial pathology demonstrated.  2. Generalized cerebral atrophy and nonspecific periventricular hypodensities which are thought to represent white matter microvascular change.  3. No significant interval intracranial change from prior study.    Signer Name: Hema Bauman MD   Signed: 5/21/2020 8:51 PM   Workstation Name: [id number]    [facility]          Results for orders placed during the hospital encounter of 03/26/18   Duplex Carotid Ultrasound CAR    Narrative · Right internal carotid artery stenosis of 0-49%.  ·  Left internal carotid artery stenosis of 0-49%.          Results for orders placed during the hospital encounter of 03/26/18   Duplex Carotid Ultrasound CAR    Narrative · Right internal carotid artery stenosis of 0-49%.  · Left internal carotid artery stenosis of 0-49%.          Results for orders placed during the hospital encounter of 05/21/20   Transthoracic Echo Complete With Contrast if Necessary Per Protocol    Narrative · Estimated EF = 30%.  · Normal functioning TAVR  · Moderate mitral valve regurgitation is present          Plan for Follow-up of Pending Labs/Results:     Discharge Details        Discharge Medications      New Medications      Instructions Start Date   HYDROcodone-acetaminophen 5-325 MG per tablet  Commonly known as:  NORCO   1 tablet, Oral, Every 6 Hours PRN         Changes to Medications      Instructions Start Date   carvedilol 3.125 MG tablet  Commonly known as:  COREG  What changed:  additional instructions   3.125 mg, Oral, Every 12 Hours Scheduled         Continue These Medications      Instructions Start Date   aspirin 81 MG tablet   81 mg, Oral, Daily      atorvastatin 80 MG tablet  Commonly known as:  LIPITOR   40 mg, Oral, Daily      cholecalciferol 25 MCG (1000 UT) tablet  Commonly known as:  VITAMIN D3   1,000 Units, Oral, Daily      clopidogrel 75 MG tablet  Commonly known as:  Plavix   75 mg, Oral, Daily      famotidine 20 MG tablet  Commonly known as:  PEPCID   20 mg, Oral, Daily      ferrous sulfate 325 (65 FE) MG tablet   325 mg, Oral, Daily With Breakfast      nitroglycerin 0.4 MG SL tablet  Commonly known as:  Nitrostat   0.4 mg, Sublingual, Every 5 Minutes PRN      sacubitril-valsartan 24-26 MG tablet  Commonly known as:  ENTRESTO   1 tablet, Oral, 2 Times Daily      sertraline 100 MG tablet  Commonly known as:  ZOLOFT   Take 1/2 a day      torsemide 20 MG tablet  Commonly known as:  Demadex   20 mg, Oral, Every Other Day      vitamin C 500 MG tablet  Commonly known as:   ASCORBIC ACID   500 mg, Oral, Daily             Allergies   Allergen Reactions   • Hctz [Hydrochlorothiazide] Other (See Comments)     hyponatremia         Discharge Disposition:  Home or Self Care    Diet:  Hospital:  Diet Order   Procedures   • Diet Regular; Cardiac       Activity:      Restrictions or Other Recommendations:         CODE STATUS:    Code Status and Medical Interventions:   Ordered at: 05/21/20 9956     Level Of Support Discussed With:    Patient     Code Status:    CPR     Medical Interventions (Level of Support Prior to Arrest):    Full       Future Appointments   Date Time Provider Department Center   8/3/2020  1:30 PM Alaina Yang APRN [facility] None   11/5/2020 12:45 PM Pramod Hyde MD [facility]           Time Spent on Discharge:  35 minutes    Electronically signed by Aga Brown MD, 05/23/20, 1:17 PM.

## 2020-05-23 NOTE — PLAN OF CARE
Patient alert and oriented x4. Neuro intact. VSS. Tolerates po intake; Adequate UOP. He is weak but ambulates with standby assist. No syncope overnight. No changes. Lortab given po at bedtime for generalized soreness from his fall. He did not sleep very well; awake multiple times. Plan is to return home with his family when medically ready.

## 2020-05-26 ENCOUNTER — APPOINTMENT (OUTPATIENT)
Dept: PREADMISSION TESTING | Facility: HOSPITAL | Age: 85
End: 2020-05-26

## 2020-05-26 ENCOUNTER — TRANSITIONAL CARE MANAGEMENT TELEPHONE ENCOUNTER (OUTPATIENT)
Dept: CALL CENTER | Facility: HOSPITAL | Age: 85
End: 2020-05-26

## 2020-05-26 ENCOUNTER — PREP FOR SURGERY (OUTPATIENT)
Dept: OTHER | Facility: HOSPITAL | Age: 85
End: 2020-05-26

## 2020-05-26 DIAGNOSIS — I25.5 ISCHEMIC CARDIOMYOPATHY: Primary | ICD-10-CM

## 2020-05-26 DIAGNOSIS — I25.5 ISCHEMIC CARDIOMYOPATHY: ICD-10-CM

## 2020-05-26 LAB
ANION GAP SERPL CALCULATED.3IONS-SCNC: 13 MMOL/L (ref 5–15)
BUN BLD-MCNC: 28 MG/DL (ref 8–23)
BUN/CREAT SERPL: 17.2 (ref 7–25)
CALCIUM SPEC-SCNC: 8.9 MG/DL (ref 8.2–9.6)
CHLORIDE SERPL-SCNC: 101 MMOL/L (ref 98–107)
CO2 SERPL-SCNC: 27 MMOL/L (ref 22–29)
CREAT BLD-MCNC: 1.63 MG/DL (ref 0.76–1.27)
DEPRECATED RDW RBC AUTO: 52.3 FL (ref 37–54)
ERYTHROCYTE [DISTWIDTH] IN BLOOD BY AUTOMATED COUNT: 14.6 % (ref 12.3–15.4)
GFR SERPL CREATININE-BSD FRML MDRD: 40 ML/MIN/1.73
GLUCOSE BLD-MCNC: 119 MG/DL (ref 65–99)
HCT VFR BLD AUTO: 30.8 % (ref 37.5–51)
HGB BLD-MCNC: 9.7 G/DL (ref 13–17.7)
INR PPP: 1.06 (ref 0.85–1.16)
MCH RBC QN AUTO: 31 PG (ref 26.6–33)
MCHC RBC AUTO-ENTMCNC: 31.5 G/DL (ref 31.5–35.7)
MCV RBC AUTO: 98.4 FL (ref 79–97)
PLATELET # BLD AUTO: 211 10*3/MM3 (ref 140–450)
PMV BLD AUTO: 10.8 FL (ref 6–12)
POTASSIUM BLD-SCNC: 4.9 MMOL/L (ref 3.5–5.2)
PROTHROMBIN TIME: 13.5 SECONDS (ref 11.5–14)
RBC # BLD AUTO: 3.13 10*6/MM3 (ref 4.14–5.8)
SARS-COV-2 RNA RESP QL NAA+PROBE: NOT DETECTED
SODIUM BLD-SCNC: 141 MMOL/L (ref 136–145)
WBC NRBC COR # BLD: 10.5 10*3/MM3 (ref 3.4–10.8)

## 2020-05-26 PROCEDURE — 36415 COLL VENOUS BLD VENIPUNCTURE: CPT

## 2020-05-26 PROCEDURE — 80048 BASIC METABOLIC PNL TOTAL CA: CPT | Performed by: NURSE PRACTITIONER

## 2020-05-26 PROCEDURE — 87635 SARS-COV-2 COVID-19 AMP PRB: CPT | Performed by: INTERNAL MEDICINE

## 2020-05-26 PROCEDURE — 85027 COMPLETE CBC AUTOMATED: CPT | Performed by: NURSE PRACTITIONER

## 2020-05-26 PROCEDURE — 85610 PROTHROMBIN TIME: CPT | Performed by: NURSE PRACTITIONER

## 2020-05-26 PROCEDURE — C9803 HOPD COVID-19 SPEC COLLECT: HCPCS

## 2020-05-26 RX ORDER — ONDANSETRON 2 MG/ML
4 INJECTION INTRAMUSCULAR; INTRAVENOUS EVERY 6 HOURS PRN
Status: CANCELLED | OUTPATIENT
Start: 2020-05-26

## 2020-05-26 RX ORDER — SODIUM CHLORIDE 9 MG/ML
1 INJECTION, SOLUTION INTRAVENOUS CONTINUOUS
Status: CANCELLED | OUTPATIENT
Start: 2020-05-26 | End: 2020-05-26

## 2020-05-26 RX ORDER — NITROGLYCERIN 0.4 MG/1
0.4 TABLET SUBLINGUAL
Status: CANCELLED | OUTPATIENT
Start: 2020-05-26

## 2020-05-26 RX ORDER — CEFAZOLIN SODIUM 2 G/100ML
2 INJECTION, SOLUTION INTRAVENOUS ONCE
Status: CANCELLED | OUTPATIENT
Start: 2020-05-26 | End: 2020-05-26

## 2020-05-26 RX ORDER — ACETAMINOPHEN 325 MG/1
650 TABLET ORAL EVERY 4 HOURS PRN
Status: CANCELLED | OUTPATIENT
Start: 2020-05-26

## 2020-05-26 RX ORDER — SODIUM CHLORIDE 0.9 % (FLUSH) 0.9 %
3 SYRINGE (ML) INJECTION EVERY 12 HOURS SCHEDULED
Status: CANCELLED | OUTPATIENT
Start: 2020-05-26

## 2020-05-26 RX ORDER — SODIUM CHLORIDE 0.9 % (FLUSH) 0.9 %
10 SYRINGE (ML) INJECTION AS NEEDED
Status: CANCELLED | OUTPATIENT
Start: 2020-05-26

## 2020-05-26 NOTE — OUTREACH NOTE
"Call Center TCM Note      Responses   [facility] patient discharged from?  [place]   COVID-19 Test Status  Not tested   Does the patient have one of the following disease processes/diagnoses(primary or secondary)?  Other   TCM attempt successful?  Yes   Call start time  1222   Call end time  1238   Discharge diagnosis  Syncope,Closed fracture of ninth thoracic vertebra    Is patient permission given to speak with other caregiver?  Yes   List who call center can speak with  Julia, spouse   Person spoke with today (if not patient) and relationship  Spoke to patient and spouse   Meds reviewed with patient/caregiver?  Yes   Is the patient having any side effects they believe may be caused by any medication additions or changes?  No   Does the patient have all medications ordered at discharge?  Yes   Is the patient taking all medications as directed (includes completed medication regime)?  Yes   Does the patient have a primary care provider?   Yes   Does the patient have an appointment with their PCP within 7 days of discharge?  No   Comments regarding PCP  PCP Dr Hyde. Patient has My Chart, but states unable to do video visit appts. He states that he either needs telephone appt or in person appt.    What is preventing the patient from scheduling follow up appointments within 7 days of discharge?  -- [Patient having PPM/ICD implant tomorrow. ]   Has the patient kept scheduled appointments due by today?  Yes   Comments  Patient reports going to hospital today for \"swab test\". Patient noted with appt for pre-admission testing today per EHR.    Has home health visited the patient within 72 hours of discharge?  N/A   Pulse Ox monitoring  None   Psychosocial issues?  No   Comments  Patient wearing life vest. Patient monitoring daily weight. Reports that weight has been 170# each day since discharge.    Did the patient receive a copy of their discharge instructions?  Yes   Nursing interventions  Reviewed " instructions with patient   What is the patient's perception of their health status since discharge?  Same   Is the patient/caregiver able to teach back signs and symptoms related to disease process for when to call PCP?  Yes   Is the patient/caregiver able to teach back signs and symptoms related to disease process for when to call 911?  Yes   Is the patient/caregiver able to teach back the hierarchy of who to call/visit for symptoms/problems? PCP, Specialist, Home health nurse, Urgent Care, ED, 911  Yes   TCM call completed?  Yes          Sally Greenberg RN    5/26/2020, 12:38

## 2020-05-26 NOTE — DISCHARGE INSTRUCTIONS

## 2020-05-27 ENCOUNTER — HOSPITAL ENCOUNTER (OUTPATIENT)
Facility: HOSPITAL | Age: 85
Discharge: HOME | End: 2020-05-27
Attending: INTERNAL MEDICINE | Admitting: INTERNAL MEDICINE

## 2020-05-27 ENCOUNTER — TELEPHONE (OUTPATIENT)
Dept: INTERNAL MEDICINE | Facility: CLINIC | Age: 85
End: 2020-05-27

## 2020-05-27 VITALS
OXYGEN SATURATION: 97 % | HEIGHT: 68 IN | BODY MASS INDEX: 25.96 KG/M2 | HEART RATE: 78 BPM | WEIGHT: 171.31 LBS | RESPIRATION RATE: 16 BRPM | SYSTOLIC BLOOD PRESSURE: 145 MMHG | TEMPERATURE: 97.7 F | DIASTOLIC BLOOD PRESSURE: 94 MMHG

## 2020-05-27 DIAGNOSIS — I25.5 ISCHEMIC CARDIOMYOPATHY: ICD-10-CM

## 2020-05-27 PROCEDURE — 99205 OFFICE O/P NEW HI 60 MIN: CPT | Performed by: INTERNAL MEDICINE

## 2020-05-27 RX ORDER — SODIUM CHLORIDE 9 MG/ML
1 INJECTION, SOLUTION INTRAVENOUS CONTINUOUS
Status: DISCONTINUED | OUTPATIENT
Start: 2020-05-27 | End: 2020-05-27 | Stop reason: HOSPADM

## 2020-05-27 RX ORDER — SODIUM CHLORIDE 0.9 % (FLUSH) 0.9 %
10 SYRINGE (ML) INJECTION AS NEEDED
Status: DISCONTINUED | OUTPATIENT
Start: 2020-05-27 | End: 2020-05-27 | Stop reason: HOSPADM

## 2020-05-27 RX ORDER — ACETAMINOPHEN 325 MG/1
650 TABLET ORAL EVERY 4 HOURS PRN
Status: DISCONTINUED | OUTPATIENT
Start: 2020-05-27 | End: 2020-05-27 | Stop reason: HOSPADM

## 2020-05-27 RX ORDER — CEFAZOLIN SODIUM 2 G/100ML
2 INJECTION, SOLUTION INTRAVENOUS ONCE
Status: DISCONTINUED | OUTPATIENT
Start: 2020-05-27 | End: 2020-05-27 | Stop reason: HOSPADM

## 2020-05-27 RX ORDER — NITROGLYCERIN 0.4 MG/1
0.4 TABLET SUBLINGUAL
Status: DISCONTINUED | OUTPATIENT
Start: 2020-05-27 | End: 2020-05-27 | Stop reason: HOSPADM

## 2020-05-27 RX ORDER — ONDANSETRON 2 MG/ML
4 INJECTION INTRAMUSCULAR; INTRAVENOUS EVERY 6 HOURS PRN
Status: DISCONTINUED | OUTPATIENT
Start: 2020-05-27 | End: 2020-05-27 | Stop reason: HOSPADM

## 2020-05-27 RX ORDER — SODIUM CHLORIDE 0.9 % (FLUSH) 0.9 %
3 SYRINGE (ML) INJECTION EVERY 12 HOURS SCHEDULED
Status: DISCONTINUED | OUTPATIENT
Start: 2020-05-27 | End: 2020-05-27 | Stop reason: HOSPADM

## 2020-05-27 RX ADMIN — SODIUM CHLORIDE 1 ML/KG/HR: 9 INJECTION, SOLUTION INTRAVENOUS at 11:57

## 2020-05-27 NOTE — TELEPHONE ENCOUNTER
Per staff message I called pt to make a hospital fu appt w/Dr Hyde.  Pt scheduled a telephone appt for 6/5 at 11:30AM.  He stated he would arrange to come in if that's what Dr Hyde preferred.    He wanted Dr Hyde to know that he did not have the surgery as a discussion with Dr Colin evolved into no surgery being performed.

## 2020-05-27 NOTE — PROGRESS NOTES
Pt made a telephone appt for 6/5 and said he would come in the office if Dr Hyde would rather he do that.

## 2020-06-01 NOTE — TELEPHONE ENCOUNTER
Pt called because he is having some back pin and would like a medication for it. Pt stated that it has been bothering him since he had an heart attack.     Pt does have an up coming appt but would like a prescription prior to appt     For questions call pt at   [phone]    Pharmacy: [facility] [place]  PH: [phone]  FAX: [phone]

## 2020-06-01 NOTE — TELEPHONE ENCOUNTER
It looks like he had a thoracic vertebral compression when he was in the hospital last month.  Has he tried tylenol?  That would be the safest thing.  He can take 1000 mg tylenol twice a day.

## 2020-06-01 NOTE — OUTREACH NOTE
"Medical Week 2 Survey      Responses   [facility] patient discharged from?  [place]   COVID-19 Test Status  Not tested   Does the patient have one of the following disease processes/diagnoses(primary or secondary)?  Other   Week 2 attempt successful?  Yes   Call start time  1553   Discharge diagnosis  Syncope,Closed fracture of ninth thoracic vertebra    Call end time  1604   Meds reviewed with patient/caregiver?  Yes   Is the patient taking all medications as directed (includes completed medication regime)?  Yes   Medication comments  Tylenol for pain per PCP. Xanax for sleep at nightime   Has the patient kept scheduled appointments due by today?  Yes   Comments  Has a telephone visit on Friday with his PCP   Pulse Ox monitoring  None   Comments  Pt sent Life vest back and does not wish to wear this. Does not wish to have ICD placed either. Just wants to \"live out my life\".   What is the patient's perception of their health status since discharge?  Same   Is the patient/caregiver able to teach back signs and symptoms related to disease process for when to call PCP?  Yes   Is the patient/caregiver able to teach back signs and symptoms related to disease process for when to call 911?  Yes   Is the patient/caregiver able to teach back the hierarchy of who to call/visit for symptoms/problems? PCP, Specialist, Home health nurse, Urgent Care, ED, 911  Yes   Additional teach back comments  Enc pt to touch base with his Cardiologist about procedure. Enc ice or alternating heat for his back pain.   Week 2 Call Completed?  Yes          Denise Donald RN  "

## 2020-06-05 NOTE — PROGRESS NOTES
[facility]     Tad Moon  11/30/1926   [id number]      Patient Care Team:  Pramod Hyde MD as PCP - General  Pramod Hyde MD as PCP - [facility]  Lv Cobian MD as Consulting Physician (Cardiology)    Chief Complaint:: No chief complaint on file.       HPI  ***    Chronic Conditions:  ***    Patient Active Problem List   Diagnosis   • Coronary artery disease involving coronary bypass graft of native heart without angina pectoris   • Hyperlipidemia LDL goal <100   • History of tobacco abuse   • CLL (chronic lymphocytic leukemia) (CMS/[facility])   • GERD (gastroesophageal reflux disease)   • Nonrheumatic aortic valve stenosis s/p TAVR   • Chronic systolic congestive heart failure (CMS/[facility])   • CKD (chronic kidney disease) stage 3, GFR 30-59 ml/min (CMS/[facility])   • Aortic valve stenosis   • Fatigue   • Ischemic cardiomyopathy   • Chronic combined systolic and diastolic CHF (congestive heart failure) (CMS/[facility])   • Angina pectoris (CMS/[facility])   • Atherosclerosis of coronary artery   • Essential hypertension   • Heart failure, chronic, with acute decompensation (CMS/[facility])   • Generalized ischemic myocardial dysfunction   • Peripheral vascular disease (CMS/[facility])   • Nonrheumatic mitral valve regurgitation   • Major depressive disorder with single episode, in partial remission (CMS/[facility])   • Syncope   • NEDRA (acute kidney injury) (CMS/[facility])   • Normocytic anemia   • Alcohol use   • Closed fracture of ninth thoracic vertebra (CMS/[facility])        Past Medical History:   Diagnosis Date   • Anemia    • Aortic stenosis    • Arthritis    • BOOP (bronchiolitis obliterans with organizing pneumonia) (CMS/[facility]) 2014   • Chest pain 2007    angioplasty to RCA   • CHF (congestive heart failure) (CMS/[facility])    • CLL (chronic lymphocytic leukemia) (CMS/[facility])     15 years    • Coronary artery disease    • GERD (gastroesophageal reflux disease)    • Herpes zoster 2012   • History of tobacco abuse    •  Hyperlipidemia    • Hypertension    • Low kidney function     very recently and did kidney function test and said decreased function so being watched to get all fluid off    • MI (myocardial infarction) (CMS/[facility])     mid 90s very mild - few years after bypass surgery    • Non-STEMI (non-ST elevated myocardial infarction) (CMS/[facility]) 10/17/2017    stenting of occluded grafts to left circumflex and RCA   • Skin cancer     basal and squamous from various location    • SOBOE (shortness of breath on exertion)    • Syncopal episodes    • Uses hearing aid     bilat ears        Past Surgical History:   Procedure Laterality Date   • ABDOMINAL HERNIA REPAIR      x 2   • AORTIC VALVE REPAIR/REPLACEMENT N/A 4/26/2018    Procedure: Transcatheter Aortic Valve Replacement, LANDY;  Surgeon: Lv Purdy MD;  Location:  [facility];  Service: Cardiothoracic   • AORTIC VALVE REPAIR/REPLACEMENT N/A 4/26/2018    Procedure: Transcatheter Aortic Valve Replacement;  Surgeon: Juan M Sood MD;  Location:  [facility];  Service: Cardiology   • APPENDECTOMY     • CARDIAC CATHETERIZATION N/A 10/10/2017    Procedure: Left Heart Cath;  Surgeon: Juan M Sood MD;  Location:  [facility];  Service:    • CARDIAC CATHETERIZATION N/A 4/6/2018    Procedure: Left Heart Cath;  Surgeon: Lv Cobian MD;  Location:  [facility];  Service: Cardiovascular   • CHOLECYSTECTOMY     • COLONOSCOPY     • CORONARY ANGIOPLASTY WITH STENT PLACEMENT      07 one placed,  and in 2017 had another stent placed and one repaired -   so pt thinks 3 stents in place    • CORONARY ARTERY BYPASS GRAFT      4 bypass in 92    • REPLACEMENT TOTAL KNEE Left 2014   • WISDOM TOOTH EXTRACTION         Family History   Problem Relation Age of Onset   • Stroke Mother    • Heart disease Father    • Heart disease Brother    • Coronary artery disease Brother        Social History     Socioeconomic History   • Marital status:       Spouse name: Not on file   • Number of children: 3   • Years of education: Not on file   • Highest education level: Not on file   Occupational History   • Occupation: Retired      Comment: insurance agency    Tobacco Use   • Smoking status: Former Smoker     Packs/day: 0.25     Types: Cigarettes     Last attempt to quit: 1975     Years since quittin.4   • Smokeless tobacco: Never Used   • Tobacco comment: quit 45 years ago- smoked since 19 yo    Substance and Sexual Activity   • Alcohol use: Yes     Alcohol/week: 1.0 - 2.0 standard drinks     Types: 1 - 2 Glasses of wine per week     Comment: occas   • Drug use: No   • Sexual activity: Defer   Social History Narrative    Lives with Wife Julia in [place], [place]     Caffeine:  3 servings per day       Allergies   Allergen Reactions   • Hctz [Hydrochlorothiazide] Other (See Comments)     hyponatremia         Current Outpatient Medications:   •  aspirin 81 MG tablet, Take 1 tablet by mouth daily., Disp: 90 tablet, Rfl: 3  •  atorvastatin (LIPITOR) 80 MG tablet, Take 0.5 tablets by mouth Daily., Disp: 90 tablet, Rfl: 3  •  clopidogrel (PLAVIX) 75 MG tablet, Take 1 tablet by mouth daily. (Patient taking differently: Take 75 mg by mouth Every Night.), Disp: 90 tablet, Rfl: 3  •  famotidine (PEPCID) 20 MG tablet, Take 20 mg by mouth Daily., Disp: , Rfl:   •  ferrous sulfate 325 (65 FE) MG tablet, Take 325 mg by mouth Every Other Day., Disp: , Rfl:   •  nitroglycerin (NITROSTAT) 0.4 MG SL tablet, Place 1 tablet under the tongue Every 5 (Five) Minutes As Needed for Chest Pain., Disp: 25 tablet, Rfl: 6  •  sacubitril-valsartan (ENTRESTO) 24-26 MG tablet, Take 1 tablet by mouth 2 (Two) Times a Day., Disp: 60 tablet, Rfl: 1  •  sertraline (ZOLOFT) 100 MG tablet, Take 1/2 a day (Patient taking differently: Take 50 mg by mouth Every Night. Take 1/2 a day), Disp: , Rfl:   •  torsemide (DEMADEX) 20 MG tablet, Take 1 tablet by mouth Every Other Day., Disp: 15 tablet, Rfl: 1  •   vitamin C (ASCORBIC ACID) 500 MG tablet, Take 500 mg by mouth Every Other Day., Disp: , Rfl:     Review of Systems     Vital Signs  There were no vitals filed for this visit.    Physical Exam   Procedures    ACE III MINI             Assessment/Plan:    There are no diagnoses linked to this encounter.      Plan of care reviewed with patient at the conclusion of today's visit. Education was provided regarding diagnosis, management, and any prescribed or recommended OTC medications.Patient verbalizes understanding of and agreement with management plan.         Pramod Hyde MD

## 2020-06-05 NOTE — PROGRESS NOTES
Transitional Care Follow Up Visit  Subjective     Tad Moon is a 89 y.o. male who presents for a transitional care management visit.    Within 48 business hours after discharge our office contacted him via telephone to coordinate his care and needs.      I reviewed and discussed the details of that call along with the discharge summary, hospital problems, inpatient lab results, inpatient diagnostic studies, and consultation reports with Tad.     Current outpatient and discharge medications have been reconciled for the patient.  Reviewed by: Pramod Hyde MD      Date of TCM Phone Call 5/23/2020   [facility]   Date of Admission 5/21/2020   Date of Discharge 5/23/2020   Discharge Disposition Home or Self Care     Risk for Readmission (LACE) Score: 8 (5/23/2020  6:00 AM)     You have chosen to receive care through a telephone visit. Do you consent to use a telephone visit for your medical care today? Yes      History of Present Illness   Course During Hospital Stay: Mr. Moon was admitted to the hospital on May 21 after syncopal episode.  He is seen in follow-up today via telephone visit.  He had been working outside rolling up a hose.  He says it was a hot day and when he came inside he passed out.  Because of the fall he sustained 1/9 thoracic vertebral fracture.  Because of his extensive history of heart disease including an ejection fraction of 20% on a previous echocardiogram, arrhythmia was suspected and he was placed on a life vest with follow-up with cardiology.  He was subsequently admitted for the defibrillator on the 27th, but after discussion with the cardiologist decided to defer the procedure.  His main complaint today is back pain.  He takes 1000 mg of Tylenol twice a day and uses heat both of which help.  He has had no fever, cough, shortness of breath or chest pain.  He has had no dizziness or further syncope.  He was found to be anemic recently and is now taking iron and his  hemoglobin was back up to a baseline of about 10 when last checked.  His blood pressure today was 126/65, pulse 81, weight 165, oxygen saturation on room air 95%.  He denies edema, orthopnea.  His appetite is good.     The following portions of the patient's history were reviewed and updated as appropriate: allergies, current medications, past family history, past medical history, past social history, past surgical history and problem list.    Review of Systems   Constitutional: Negative for chills, fatigue and fever.   HENT: Negative for congestion, ear pain and sinus pressure.    Respiratory: Negative for cough, chest tightness, shortness of breath and wheezing.    Cardiovascular: Negative for chest pain, palpitations and leg swelling.   Gastrointestinal: Negative for abdominal pain, blood in stool and constipation.   Skin: Negative for color change.   Allergic/Immunologic: Negative for environmental allergies.   Neurological: Negative for dizziness, speech difficulty and headaches.   Psychiatric/Behavioral: Negative for confusion. The patient is not nervous/anxious.        Objective   Physical Exam   Constitutional: He is oriented to person, place, and time. No distress.   Pulmonary/Chest: No respiratory distress.   Neurological: He is alert and oriented to person, place, and time.   Psychiatric: He has a normal mood and affect. Judgment and thought content normal.       Assessment/Plan   Diagnoses and all orders for this visit:    Syncope, unspecified syncope type    Chronic combined systolic and diastolic CHF (congestive heart failure) (CMS/[facility])    Essential hypertension    Closed fracture of ninth thoracic vertebra with routine healing, unspecified fracture morphology, subsequent encounter    Plan    Recent episode of syncope in a patient with severe underlying heart failure.  However the episode was is likely to be due to vasovagal syncope aggravated by anemia as it was an arrhythmia so concur with his  difference of the defibrillator.  No further work-up is indicated at this time.  He will continue iron every other day.    Heart failure appears to be compensated.  Repeat echocardiogram showed an ejection fraction of 30% which is improved.  He will continue torsemide and Entresto.    Blood pressure is controlled on Entresto.    He will continue using Tylenol and heat as needed for his compression fracture.  I advised him that it typically takes 6 to 8 weeks to completely heal.  He will call if needed and otherwise follow-up in the fall.    This visit has been rescheduled as a phone visit to comply with patient safety concerns in accordance with CDC recommendations. Total time of discussion was 12 minutes.

## 2020-06-16 NOTE — OUTREACH NOTE
Medical Week 4 Survey      Responses   [facility] patient discharged from?  [place]   COVID-19 Test Status  Not tested   Does the patient have one of the following disease processes/diagnoses(primary or secondary)?  Other   Week 4 attempt successful?  No          Yumiko Ambriz RN

## 2020-07-23 PROBLEM — I44.2 COMPLETE HEART BLOCK (HCC): Status: ACTIVE | Noted: 2020-01-01

## 2020-07-23 NOTE — OP NOTE
Cardiac Electrophysiology Procedure Note       [facility]    PROCEDURE: PERMANENT LEADLESS CARDIAC PACEMAKER    OPERATION PERFORMED:     Implantation of Medtronic Micra  AV model leadless cardiac pacemaker ( LCP ), with [id number] serial number.      ATTENDING SURGEON: Gilbert Colin DO     MODERATE SEDATION FOR PROCEDURE:    Moderate sedation was given during this procedure.    I supervised and directed Cierra CONTRERAS to administer this sedation.  This staff member also monitored the patient's hemodynamic and respiratory status and response to these medications.  Please see the full detailed procedure report generated by the electrophysiology laboratory staff.  The patient tolerated moderate sedation well.  There were no complications regarding sedation.  The total dose of fentanyl was 0 mcg and the total dose of midazolam was 0 mg.  The total dose of Brevital was 30 mg.  First sedation was administered at 1601 and continued through 1631.    ESTIMATED BLOOD LOSS: less than 20cc    RADIATION EXPOSURE: 2 minutes and 46 milliGray.    COMPLICATIONS: None.    TIME OUT: Time out was completed with verification of the correct patient identity, procedure to be performed, procedure site and implanted equipment.    INDICATION FOR PROCEDURE:  Briefly, Tad Moon is a 89 y.o. year old male with a history of symptomatic complete heart block.     PROCEDURE AND FINDINGS:  The patient was brought to the electrophysiology laboratory in a post absorptive state.  Informed consent was given by the patient prior to the procedure and confirmed.  Intravenous prophylactic antibiotics were administered prior to the procedure.  After the groin was prepped and draped in the usual sterile fashion and after adequate anesthesia was given, the skin was infiltrated with 1% lidocaine and 0.5% bupivicaine 50/50 mixture.   Venous access was obtained with vascular ultrasound probe guidance and a Seldinger  technique.  A J tip wire was advanced to the superior vena cava under fluoroscopic guidance.  The 27 [place] LCP delivery sheath was then delivered to right atrium.  5000 units of heparin was given intravenously.  The delivery sheath was then advanced to the septal aspect of the right ventricle under fluoroscopic guidance using multiple projections including DOE, LEONARDO and AP.   The LCP was then deployed.  Adequate pacing sensing and impedance values were obtained.  We documented that at least 2 of the tines were attached to septal trabeculae.  Tug testing demonstrated that the device remained stable and in correct position.  The device was once again tested and we documented stable pacing sensing and impedance parameters.  The retention cord was then detached and the delivery sheath was withdrawn from the heart.  The sheath was then removed from the body and the femoral vein was secured with excellent hemostasis with the aid of a figure of eight suture.  Manual pressures was applied.    MEASURED DEVICE DATA:                     sensin.1 mV                   impedance:            790 Ohms                   Threshold:             0.25 Volts at 0.24 ms                   Mode:                      VDD  Lower Rate:                60 pulses per minute  Upper Tracking Rate:         120 pulses per minute    CONCLUSION:  Successful implantation of a leadless cardiac pacemaker system.      RECOMMENDATION:    Patient is to follow up with our clinic per protocol.  FOR THE PATIENT    Avoid activities that involve heavy lifting or rough contact that could result in blows to your implant site and to allow your groin puncture site time to heal.    Avoid hot tubs or pools until groin puncture site is completely healed.    No driving for 24 hours post-operatively after device implant.    Call your doctor if you have any swelling, redness or discharge around your incision, notice anything unusual or unexpected, or  you develop a fever that does not go away in two or three days.    Carry your Medical Device ID Card with you at all times.    Please call our office () with any questions about the device.        Gilbert Colin DO, FACC, RS  Cardiac Electrophysiologist  [facility]

## 2020-07-23 NOTE — ED PROVIDER NOTES
Subjective   Tad Moon is a pleasant, unfortunate 89 y.o. male who presents to the ED with c/o syncope. The patient had a syncopal episode today and EMS was contacted to transport him to the ED for evaluation. Upon arriving at the scene EMS found that he was bradycardic with a heart rate in the 20's. EMS proceeded to start externally pacing him along with IV Levophed, which resulted in his heart back elevating and his systolic blood pressure going to 105. The patient currently feels fine except for being shocked by the external pacer. He has a past medical history of CAD, hypertension, hyperlipidemia, GERD, CHF, aortic stenosis, myocardial infarction, low kidney function, skin cancer, and anemia. His surgical history includes cholecystectomy, CABG, coronary angioplasty with stent, cardiac catheterization, appendectomy, and aortic valve replacement. There are no other acute complaints at this time.      History provided by:  Patient and EMS personnel  Syncope   Episode history:  Single  Most recent episode:  Today  Duration:  5 minutes  Timing:  Rare  Progression:  Improving  Chronicity:  New  Context: normal activity    Witnessed: no    Relieved by:  None tried  Worsened by:  Nothing  Ineffective treatments:  None tried  Associated symptoms: weakness    Associated symptoms: no nausea and no vomiting    Risk factors: coronary artery disease    Risk factors: no congenital heart disease and no seizures        Review of Systems   Cardiovascular: Positive for syncope.        The patient was bradycardic.   Gastrointestinal: Negative for nausea and vomiting.   Neurological: Positive for syncope and weakness.   All other systems reviewed and are negative.      Past Medical History:   Diagnosis Date   • Anemia    • Aortic stenosis    • Arthritis    • BOOP (bronchiolitis obliterans with organizing pneumonia) (CMS/[facility]) 2014   • Chest pain 2007    angioplasty to RCA   • CHF (congestive heart failure) (CMS/[facility])    • CLL  (chronic lymphocytic leukemia) (CMS/[facility])     15 years    • Coronary artery disease    • GERD (gastroesophageal reflux disease)    • Herpes zoster    • History of tobacco abuse    • Hyperlipidemia    • Hypertension    • Low kidney function     very recently and did kidney function test and said decreased function so being watched to get all fluid off    • MI (myocardial infarction) (CMS/[facility])     mid 90s very mild - few years after bypass surgery    • Non-STEMI (non-ST elevated myocardial infarction) (CMS/[facility]) 10/17/2017    stenting of occluded grafts to left circumflex and RCA   • Skin cancer     basal and squamous from various location    • SOBOE (shortness of breath on exertion)    • Syncopal episodes    • Uses hearing aid     bilat ears    PATIENT NAME  Tad Moon    :  [id number]        AGE: 89 y.o.     ADMISSION DATE: 2020      Subjective       CHIEF COMPLAINT: Fatigue, dyspnea, syncope     PROBLEM LIST:  1. Coronary artery disease:  a. CABG in , [place], [place]: LIMA to LAD, SVG to PDA, SVG to distal RCA, SVG to OM1.   b. NSTEMI,  with 3.5 x 16 Taxus to SVG to RCA (Dr. Sheikh).   c. [facility], , medical management, incomplete database.   d. [facility], 08/15/2014, functional class III angina/unstable: EF 40% with inferior wall akinesis. Severe distal left main and proximal LAD stenosis with a patent LIMA graft to LAD. An 80% proximal LCx heavily calcified stenosis tortuous segment with occluded vein graft. Chronically occluded RCA and SVG to RCA with 3+ collaterals.    e. [facility], 10/10/2017: 3-vessel CAD of the native arteries. Patent LIMA graft to the LAD, occluded vein grafts to the LCx and the RCA. Successful PTCA/stenting of the distal left main, proximal LCx and mid LCx with placement of overlapping 2.5 x 38 and 3.0 x 23 mm ESTER's reducing severe multiple 80% stenosis to no significant residual disease. EF 25-30%. 21 mm gradient across the aortic valve consistent with known AS.  f. [facility],  04/06/2018, pre-TAVR: Severe proximal LAD stenosis with widely patent LIMA. 50% hazy ISR of distal LM/ostial LCx with normal IFR. Occluded RCA and vein graft.  2. Chronic systolic heart failure  a. EF 40% by LV gram, 08/15/2014.  b. Echo, 10/06/2016: EF 35%.   c. Echo, 03/26/2018: EF 20%.   d. LANDY, 04/06/2018: EF 20%.   3. Echocardiogram, 06/04/2018: EF 20%.Aortic stenosis:  a. Echo, 10/06/2016: EF 35%. Mod-severe AS, mean gradient 25 mmHg, MARII 0.9 cm2. Mild AI. Moderate MR, mild TR.  b. Echo, 03/26/2018: EF 20%. Severe AS with mean gradient 29 mmHg, max 47 mmHg, MARII 0.91 cm2. Moderate MR.  c. Stress echo, 03/28/2018: Resting mean gradient 25 mmHg, post dobutamine peak aortic valve velocity 419 cm/s, peak gradient 70 mmHg and mean gradient 38 mmHg consistent with severe aortic stenosis.   d. LANDY, 04/06/2018: EF 20%. Severe AS, mean PG 26.1, max 60 mmHg, MARII 0.97 cm2. Mild-to-mod AI. Mod-to-severe MR.  e. TAVR, 04/26/2018: 26 mm Pozo Lydia 3 tissue valve.  f. Echocardiogram, 06/04/2018: EF 20%. Mild MR. TAVR valve present with normal function.  g. Echocardiogram, 05/30/2019: EF 15%. Normal functioning TAVR. Severe MR.  h. Echocardiogram, 01/27/2020: EF 20%. Mild AI. Moderate MR.  4. Hypertension.   5. Dyslipidemia.   6. History of tobacco use.   7. GERD.   8. Seizure disorder  9. Chronic lymphocytic leukemia.  10. Surgical history:   a. Left total knee replacement.   b. Remote cholecystectomy.   c. Appendectomy.   d. Abdominal hernia repair x2     HISTORY OF PRESENT ILLNESS: Mr. Tad Moon is a 89-year-old white male who presents today for elective placement of a cardiac defibrillator.  He has a long past medical history of ischemic cardiomyopathy, prior MI, chronic systolic congestive heart failure, and EF of 30% despite ongoing guideline directed medical therapy for greater than 90 days.  He is noted to have prior CABG, multiple stents, and TAVR in the past.  Upon presentation today he continues to  complain of fatigue, shortness of breath, and dyspnea upon minimal exertion that is relieved with rest.  Last week it is noted that he was admitted for episode of syncope with documented episode of nonsustained VT during admission.  Repeat echocardiogram at that time revealed EF of 30%.  It was recommended that the patient undergo implantation of a ICD for the primary prevention of sudden cardiac death.  He denies recent signs of infection with fever, chills, or sweats.  He denies recent sick contacts.  Negative COVID-19 screening noted.  Mr. Moon presents today for elective placement of single-chamber implantable cardiac defibrillator for the primary prevention of sudden cardiac death related to his significant past medical history of ischemic cardiomyopathy, prior MI, chronic systolic congestive heart failure, and EF of 30% despite ongoing guideline directed medical therapy for greater than 90 days.  A shared decision making encounter occurred utilizing a [facility] shared decision-making tool outlining device implant, generator changes, and end-of-life decision-making.  All risk, benefits, and alternatives to the procedure were outlined reviewed in detail.  After much discussion with the patient and daughter at bedside today with Dr. Colin he wishes to defer placement of ICD today and give the procedure more thought related to his advanced age and multiple comorbidities.  Patient will be discharged home today with no changes to his medical therapy.     Electronically signed by JUNA Montana, 05/27/20, 12:09 PM.    Allergies   Allergen Reactions   • Hctz [Hydrochlorothiazide] Other (See Comments)     hyponatremia       Past Surgical History:   Procedure Laterality Date   • ABDOMINAL HERNIA REPAIR      x 2   • AORTIC VALVE REPAIR/REPLACEMENT N/A 4/26/2018    Procedure: Transcatheter Aortic Valve Replacement, [facility]Y;  Surgeon: Lv Purdy MD;  Location: [place] [address];  Service:  Cardiothoracic   • AORTIC VALVE REPAIR/REPLACEMENT N/A 2018    Procedure: Transcatheter Aortic Valve Replacement;  Surgeon: Juan M Sood MD;  Location:  [facility];  Service: Cardiology   • APPENDECTOMY     • CARDIAC CATHETERIZATION N/A 10/10/2017    Procedure: Left Heart Cath;  Surgeon: Juan M Sood MD;  Location:  [facility];  Service:    • CARDIAC CATHETERIZATION N/A 2018    Procedure: Left Heart Cath;  Surgeon: Lv Cobian MD;  Location:  [facility];  Service: Cardiovascular   • CHOLECYSTECTOMY     • COLONOSCOPY     • CORONARY ANGIOPLASTY WITH STENT PLACEMENT      07 one placed,  and in 2017 had another stent placed and one repaired -   so pt thinks 3 stents in place    • CORONARY ARTERY BYPASS GRAFT      4 bypass in     • REPLACEMENT TOTAL KNEE Left    • WISDOM TOOTH EXTRACTION         Family History   Problem Relation Age of Onset   • Stroke Mother    • Heart disease Father    • Heart disease Brother    • Coronary artery disease Brother        Social History     Socioeconomic History   • Marital status:      Spouse name: Not on file   • Number of children: 3   • Years of education: Not on file   • Highest education level: Not on file   Occupational History   • Occupation: Retired      Comment: insurance agency    Tobacco Use   • Smoking status: Former Smoker     Packs/day: 0.25     Types: Cigarettes     Last attempt to quit: 1975     Years since quittin.5   • Smokeless tobacco: Never Used   • Tobacco comment: quit 45 years ago- smoked since 17 yo    Substance and Sexual Activity   • Alcohol use: Yes     Alcohol/week: 1.0 - 2.0 standard drinks     Types: 1 - 2 Glasses of wine per week     Comment: occas   • Drug use: No   • Sexual activity: Defer   Social History Narrative    Lives with Wife Julia in [place], [place]     Caffeine:  3 servings per day           Objective   Physical Exam   Constitutional: He is oriented to person, place, and  time. He appears well-developed and well-nourished. He appears distressed.   The patient is mildly distress due to external pacemaker but he is answering all questions appropriately.   HENT:   Head: Normocephalic and atraumatic.   Uvula midline, normal oropharynx and nasal pharynx.   Eyes: Conjunctivae are normal. No scleral icterus.   Neck: Normal range of motion. Neck supple.   No adenopathy, JVD, bruits or thyromegaly.   Cardiovascular: Normal rate, regular rhythm and normal heart sounds.   No murmur heard.  External pacemaker pads in place. He is getting paced at 70 beats per minute.   Pulmonary/Chest: Effort normal. No respiratory distress. He has decreased breath sounds.   Decreased breath sounds bilaterally.   Abdominal: Soft. There is no tenderness.   Positive bowel sounds, soft, non tender. No organomegaly or hepatosplenomegaly. Flanks are without masses or rashes.   Musculoskeletal: Normal range of motion. He exhibits no edema.   No rash, synovitis or edema. Axilla is without rashes or masses.   Neurological: He is alert and oriented to person, place, and time.   Face symmetric, voices strong, tongue midline.  Vision, hearing and speech all preserved.  DTRs are normal.  Negative for sensory deficit.  Moderate generalized weakness.   Skin: Skin is warm and dry.   Psychiatric: He has a normal mood and affect. His behavior is normal.   Nursing note and vitals reviewed.      Critical Care  Performed by: Hua Martinez MD  Authorized by: Hua Martinze MD     Critical care provider statement:     Critical care time (minutes):  50    Critical care was necessary to treat or prevent imminent or life-threatening deterioration of the following conditions:  Circulatory failure    Critical care was time spent personally by me on the following activities:  Development of treatment plan with patient or surrogate, discussions with consultants, evaluation of patient's response to treatment, examination of patient,  obtaining history from patient or surrogate, review of old charts, transcutaneous pacing, re-evaluation of patient's condition, pulse oximetry, ordering and review of radiographic studies, ordering and review of laboratory studies and ordering and performing treatments and interventions               ED Course  ED Course as of Jul 23 1634 Thu Jul 23, 2020   1318 Cardiology department has been contacted for an emergent pacemaker placement.    [BS]      ED Course User Index  [BS] Keyshawn Santos      Recent Results (from the past 24 hour(s))   Comprehensive Metabolic Panel    Collection Time: 07/23/20  1:38 PM   Result Value Ref Range    Glucose 111 (H) 65 - 99 mg/dL    BUN 27 (H) 8 - 23 mg/dL    Creatinine 2.07 (H) 0.76 - 1.27 mg/dL    Sodium 137 136 - 145 mmol/L    Potassium 4.3 3.5 - 5.2 mmol/L    Chloride 102 98 - 107 mmol/L    CO2 22.0 22.0 - 29.0 mmol/L    Calcium 8.5 8.2 - 9.6 mg/dL    Total Protein 6.7 6.0 - 8.5 g/dL    Albumin 3.50 3.50 - 5.20 g/dL    ALT (SGPT) 18 1 - 41 U/L    AST (SGOT) 24 1 - 40 U/L    Alkaline Phosphatase 121 (H) 39 - 117 U/L    Total Bilirubin 0.6 0.0 - 1.2 mg/dL    eGFR Non African Amer 30 (L) >60 mL/min/1.73    Globulin 3.2 gm/dL    A/G Ratio 1.1 g/dL    BUN/Creatinine Ratio 13.0 7.0 - 25.0    Anion Gap 13.0 5.0 - 15.0 mmol/L   Magnesium    Collection Time: 07/23/20  1:38 PM   Result Value Ref Range    Magnesium 1.8 1.7 - 2.3 mg/dL   Troponin    Collection Time: 07/23/20  1:38 PM   Result Value Ref Range    Troponin T 0.021 0.000 - 0.030 ng/mL   Light Blue Top    Collection Time: 07/23/20  1:38 PM   Result Value Ref Range    Extra Tube hold for add-on    Green Top (Gel)    Collection Time: 07/23/20  1:38 PM   Result Value Ref Range    Extra Tube Hold for add-ons.    Lavender Top    Collection Time: 07/23/20  1:38 PM   Result Value Ref Range    Extra Tube     Gold Top - SST    Collection Time: 07/23/20  1:38 PM   Result Value Ref Range    Extra Tube Hold for add-ons.    CBC Auto  Differential    Collection Time: 07/23/20  1:38 PM   Result Value Ref Range    WBC 8.34 3.40 - 10.80 10*3/mm3    RBC 3.27 (L) 4.14 - 5.80 10*6/mm3    Hemoglobin 10.1 (L) 13.0 - 17.7 g/dL    Hematocrit 32.4 (L) 37.5 - 51.0 %    MCV 99.1 (H) 79.0 - 97.0 fL    MCH 30.9 26.6 - 33.0 pg    MCHC 31.2 (L) 31.5 - 35.7 g/dL    RDW 13.7 12.3 - 15.4 %    RDW-SD 49.8 37.0 - 54.0 fl    MPV 10.4 6.0 - 12.0 fL    Platelets 301 140 - 450 10*3/mm3    Neutrophil % 56.6 42.7 - 76.0 %    Lymphocyte % 34.8 19.6 - 45.3 %    Monocyte % 6.6 5.0 - 12.0 %    Eosinophil % 1.0 0.3 - 6.2 %    Basophil % 0.6 0.0 - 1.5 %    Immature Grans % 0.4 0.0 - 0.5 %    Neutrophils, Absolute 4.73 1.70 - 7.00 10*3/mm3    Lymphocytes, Absolute 2.90 0.70 - 3.10 10*3/mm3    Monocytes, Absolute 0.55 0.10 - 0.90 10*3/mm3    Eosinophils, Absolute 0.08 0.00 - 0.40 10*3/mm3    Basophils, Absolute 0.05 0.00 - 0.20 10*3/mm3    Immature Grans, Absolute 0.03 0.00 - 0.05 10*3/mm3    nRBC 0.0 0.0 - 0.2 /100 WBC   Protime-INR    Collection Time: 07/23/20  1:38 PM   Result Value Ref Range    Protime 15.4 (H) 11.5 - 14.0 Seconds    INR 1.25 (H) 0.85 - 1.16   TSH    Collection Time: 07/23/20  1:38 PM   Result Value Ref Range    TSH 3.750 0.270 - 4.200 uIU/mL   COVID-19,CEPHEID,[facility] IN-HOUSE(OR EMERGENT/ADD-ON),NP SWAB IN TRANSPORT MEDIA 3-4 HR TAT - Swab, Nasopharynx    Collection Time: 07/23/20  1:38 PM   Result Value Ref Range    COVID19 Not Detected Not Detected - Ref. Range     Note: In addition to lab results from this visit, the labs listed above may include labs taken at another facility or during a different encounter within the last 24 hours. Please correlate lab times with ED admission and discharge times for further clarification of the services performed during this visit.    No orders to display     Vitals:    07/23/20 1357 07/23/20 1437 07/23/20 1525 07/23/20 1621   BP: 92/50 102/52 123/66 126/59   BP Location:       Patient Position:       Pulse:  80 80 51    Resp:  12 8 14   Temp:       TempSrc:       SpO2:  99% 99% 98%   Weight:       Height:         Medications   sodium chloride 0.9 % flush 10 mL ( Intravenous MAR Hold 7/23/20 1403)   norepinephrine (LEVOPHED) 8 mg/250 mL (32 mcg/mL) in sodium chloride 0.9% infusion (premix) (0 mcg/kg/min × 77.1 kg Intravenous Stopped 7/23/20 1421)   sodium chloride 0.9 % flush 10 mL (has no administration in time range)   sodium chloride 0.9 % flush 10 mL (has no administration in time range)   acetaminophen (TYLENOL) tablet 650 mg (has no administration in time range)   ondansetron (ZOFRAN) tablet 4 mg (has no administration in time range)   sodium chloride 0.9 % flush 3 mL (has no administration in time range)   sodium chloride 0.9 % flush 10 mL (has no administration in time range)   acetaminophen (TYLENOL) tablet 650 mg (has no administration in time range)   nitroglycerin (NITROSTAT) SL tablet 0.4 mg (has no administration in time range)   ondansetron (ZOFRAN) injection 4 mg (has no administration in time range)   ceFAZolin in dextrose (ANCEF) IVPB solution 2 g (has no administration in time range)   carvedilol (COREG) tablet 6.25 mg (has no administration in time range)   clopidogrel (PLAVIX) tablet 75 mg (has no administration in time range)   aspirin EC tablet 81 mg (has no administration in time range)   ferrous sulfate tablet 325 mg (has no administration in time range)   nitroglycerin (NITROSTAT) SL tablet 0.4 mg (has no administration in time range)   sacubitril-valsartan (ENTRESTO) 24-26 MG tablet 1 tablet (has no administration in time range)   midazolam (VERSED) injection 0.5 mg (0.5 mg Intravenous Given 7/23/20 1327)   fentaNYL citrate (PF) (SUBLIMAZE) injection 25 mcg (25 mcg Intravenous Given 7/23/20 1342)   ceFAZolin in dextrose (ANCEF) IVPB solution 2 g (2 g Intravenous New Bag 7/23/20 1535)   sodium chloride 0.9 % infusion (10,000 mL Intravenous New Bag 7/23/20 1535)     ECG/EMG Results (last 24 hours)     **  No results found for the last 24 hours. **        ECG 12 Lead    (Results Pending)                                              MDM  Number of Diagnoses or Management Options  Acute renal failure superimposed on stage 4 chronic kidney disease, unspecified acute renal failure type (CMS/HCC):   Chronic anemia:   Complete heart block (CMS/HCC):   Hypotension, unspecified hypotension type:   Diagnosis management comments:       I have reviewed all available studies.  I spoke with the patient's daughter at the bedside.  This is a gentleman with ischemic cardiomyopathy who recently declined by the [facility] who presents with complete heart block and cardiogenic shock with hypotension.    He is required external pacing as well as IV Levophed to maintain his blood pressure and heart rate.  Sexually stabilized on this but due to the pain of the external pacing I have given him IV Versed and fentanyl.    I spoke with Dr. Cobian, on-call cardiology, and he is down to see the patient will take him emergently to the Cath Lab for temporary pacemaker placement until decision can be made regarding permanent pacemaker placement.    All are agreeable with the plan       Amount and/or Complexity of Data Reviewed  Decide to obtain previous medical records or to obtain history from someone other than the patient: yes        Final diagnoses:   Complete heart block (CMS/HCC)   Acute renal failure superimposed on stage 4 chronic kidney disease, unspecified acute renal failure type (CMS/HCC)   Chronic anemia   Hypotension, unspecified hypotension type       Documentation assistance provided by Keyshawn Santos acting as scribe for    Hua Martinez MD Schuh, Bo M  07/23/20 4030       Hua Martinez MD  07/23/20 [id number]       Hua Martinez MD  08/14/20 [id number]

## 2020-07-23 NOTE — INTERVAL H&P NOTE
H&P updated. The patient was examined and the following changes are noted:  The patient is sedated from TVP.      I spoke with the daughter at length about the options of a pacemaker versus not receiving a pacemaker.  She indicates that her father is strongly in favor of receiving a pacemaker and that is why he came to the hospital.     We discussed a transvenous atrio-biventricular pacemaker and the risk benefit profile at age 89.  We discussed alternatively the option of a leadless Micra AV pacemaker.  We discussed that he has LV systolic dysfunction and that his LVEF is 30% but has no heart failure symptoms ( confirmed by daughter and Dr. Cobian ).  We reviewed the risk benefit profile of the Micra AV and that it is favorable in my opinion and in Dr. Cobian's opinion to that of a atrio-biventricular pacemaker.      The daughter wishes us to proceed with a leadless Micra AV.      The patient's daughter was able to give written informed consent after revisiting the key portions of the risk versus benefit profile of the procedure.  This discussion was framed by our lengthy conversations (please see our detailed notes).  She verbalized strong understanding of this discussion and a strong desire to proceed with the procedure.  Please note that this detailed informed consent process utilized mutual and shared decision making process between all parties involved.

## 2020-07-23 NOTE — H&P
[facility]   History and physical    Referring Provider: Dr. Hua Martinez    Patient Care Team:  Pramod Hyde MD as PCP - General  Pramod Hyde MD as PCP - Claims Attributed  Lv Cobian MD as Consulting Physician (Cardiology)       PROBLEM LIST:  1. Coronary artery disease:  a. CABG in 1992, [place], [place]: LIMA to LAD, SVG to PDA, SVG to distal RCA, SVG to OM1.   b. NSTEMI, 2007 with 3.5 x 16 Taxus to SVG to RCA (Dr. Sheikh).   c. [facility], 2012, medical management, incomplete database.   d. [facility], 08/15/2014, functional class III angina/unstable: EF 40% with inferior wall akinesis. Severe distal left main and proximal LAD stenosis with a patent LIMA graft to LAD. An 80% proximal LCx heavily calcified stenosis tortuous segment with occluded vein graft. Chronically occluded RCA and SVG to RCA with 3+ collaterals.    e. [facility], 10/10/2017: 3-vessel CAD of the native arteries. Patent LIMA graft to the LAD, occluded vein grafts to the LCx and the RCA. Successful PTCA/stenting of the distal left main, proximal LCx and mid LCx with placement of overlapping 2.5 x 38 and 3.0 x 23 mm ESTER's reducing severe multiple 80% stenosis to no significant residual disease. EF 25-30%. 21 mm gradient across the aortic valve consistent with known AS.  f. [facility], 04/06/2018, pre-TAVR: Severe proximal LAD stenosis with widely patent LIMA. 50% hazy ISR of distal LM/ostial LCx with normal IFR. Occluded RCA and vein graft.  2. Chronic systolic heart failure  a. EF 40% by LV gram, 08/15/2014.  b. Echo, 10/06/2016: EF 35%.   c. Echo, 03/26/2018: EF 20%.   d. [facility], 04/06/2018: EF 20%.   e. Echocardiogram, 06/04/2018: EF 20%.Aortic stenosis:  f. Echo, 10/06/2016: EF 35%. Mod-severe AS, mean gradient 25 mmHg, MARII 0.9 cm2. Mild AI. Moderate MR, mild TR.  g. Echo, 03/26/2018: EF 20%. Severe AS with mean gradient 29 mmHg, max 47 mmHg, MARII 0.91 cm2. Moderate MR.  h. Stress echo, 03/28/2018: Resting mean  gradient 25 mmHg, post dobutamine peak aortic valve velocity 419 cm/s, peak gradient 70 mmHg and mean gradient 38 mmHg consistent with severe aortic stenosis.   i. [facility], 04/06/2018: EF 20%. Severe AS, mean PG 26.1, max 60 mmHg, MARII 0.97 cm2. Mild-to-mod AI. Mod-to-severe MR.  j. TAVR, 04/26/2018: 26 mm Pozo Lydia 3 tissue valve.  k. Echocardiogram, 06/04/2018: EF 20%. Mild MR. TAVR valve present with normal function.  l. Echocardiogram, 05/30/2019: EF 15%. Normal functioning TAVR. Severe MR.  m. Echocardiogram, 01/27/2020: EF 20%. Mild AI. Moderate MR.  3. Syncope with CHB  a. 7/23/2020 admission.   4. Hypertension.   5. Dyslipidemia.   6. History of tobacco use.   7. GERD.   8. Seizure disorder  9. Chronic lymphocytic leukemia.  10. Surgical history:   a. Left total knee replacement.   b. Remote cholecystectomy.   c. Appendectomy.   d. Abdominal hernia repair x2        Allergies   Allergen Reactions   • Hctz [Hydrochlorothiazide] Other (See Comments)     hyponatremia           Current Facility-Administered Medications:   •  norepinephrine (LEVOPHED) 8 mg/250 mL (32 mcg/mL) in sodium chloride 0.9% infusion (premix), 0.02-0.3 mcg/kg/min, Intravenous, Titrated, Hua Martinez MD, Last Rate: 5.78 mL/hr at 07/23/20 1352, 0.04 mcg/kg/min at 07/23/20 1352  •  sodium chloride 0.9 % flush 10 mL, 10 mL, Intravenous, PRN, Hua Martinez MD, 10 mL at 07/23/20 1344    Current Outpatient Medications:   •  aspirin 81 MG tablet, Take 1 tablet by mouth daily., Disp: 90 tablet, Rfl: 3  •  atorvastatin (LIPITOR) 80 MG tablet, Take 0.5 tablets by mouth Daily., Disp: 90 tablet, Rfl: 3  •  clopidogrel (PLAVIX) 75 MG tablet, Take 1 tablet by mouth daily. (Patient taking differently: Take 75 mg by mouth Every Night.), Disp: 90 tablet, Rfl: 3  •  famotidine (PEPCID) 20 MG tablet, Take 20 mg by mouth Daily., Disp: , Rfl:   •  ferrous sulfate 325 (65 FE) MG tablet, Take 325 mg by mouth Every Other Day., Disp: , Rfl:   •   nitroglycerin (NITROSTAT) 0.4 MG SL tablet, Place 1 tablet under the tongue Every 5 (Five) Minutes As Needed for Chest Pain., Disp: 25 tablet, Rfl: 6  •  sacubitril-valsartan (ENTRESTO) 24-26 MG tablet, Take 1 tablet by mouth 2 (Two) Times a Day., Disp: 60 tablet, Rfl: 1  •  sertraline (ZOLOFT) 100 MG tablet, Take 1/2 a day (Patient taking differently: Take 50 mg by mouth Every Night. Take 1/2 a day), Disp: , Rfl:   •  torsemide (DEMADEX) 20 MG tablet, Take 1 tablet by mouth Every Other Day., Disp: 15 tablet, Rfl: 1  •  vitamin C (ASCORBIC ACID) 500 MG tablet, Take 500 mg by mouth Every Other Day., Disp: , Rfl:       norepinephrine 0.02-0.3 mcg/kg/min Last Rate: 0.04 mcg/kg/min (07/23/20 1352)         (Not in a hospital admission)      Subjective .   History of present illness:      Patient is a 89-year-old  male who we are asked to see emergently secondary to severe bradycardia with syncope.  Patient has previous history of TAVR, ischemic cardiomyopathy, and coronary artery disease.  He was most recently seen in the hospital at the end of May secondary to a syncopal episode.  It was felt that this was possibly related to nonsustained VT.  He was set up as an outpatient for possible ICD implant.  However, whenever patient arrived for ICD implant it was discussed with he and electrophysiology and opted to defer implant.  Since being discharged home patient has had reportedly 3 syncopal episodes.  His neighbor who is an anesthesiologist has been called to their house for these events.  She notes that at these times his heart rate has been in the 20s to 30s.  It would eventually recover.  However, today he had a significant episode.  Had some associated nausea and vomiting.  He was then brought via EMS to the emergency department for further evaluation.  Here he was noted to have a heart rate in the 20s to 30s.  He was started on transcutaneous pacing.  It was then deemed patient would be candidate for  transvenous pacer and transition to permanent pacemaker.      Social History     Socioeconomic History   • Marital status:      Spouse name: Not on file   • Number of children: 3   • Years of education: Not on file   • Highest education level: Not on file   Occupational History   • Occupation: Retired      Comment: insurance agency    Tobacco Use   • Smoking status: Former Smoker     Packs/day: 0.25     Types: Cigarettes     Last attempt to quit: 1975     Years since quittin.5   • Smokeless tobacco: Never Used   • Tobacco comment: quit 45 years ago- smoked since 17 yo    Substance and Sexual Activity   • Alcohol use: Yes     Alcohol/week: 1.0 - 2.0 standard drinks     Types: 1 - 2 Glasses of wine per week     Comment: occas   • Drug use: No   • Sexual activity: Defer   Social History Narrative    Lives with Wife Julia in [place], [place]     Caffeine:  3 servings per day     Family History   Problem Relation Age of Onset   • Stroke Mother    • Heart disease Father    • Heart disease Brother    • Coronary artery disease Brother          Review of Systems:  Review of Systems   Constitution: Positive for malaise/fatigue. Negative for fever.   HENT: Negative for nosebleeds.    Eyes: Negative for redness and visual disturbance.   Cardiovascular: Positive for dyspnea on exertion. Negative for orthopnea, palpitations and paroxysmal nocturnal dyspnea.   Respiratory: Positive for shortness of breath. Negative for cough, snoring, sputum production and wheezing.    Hematologic/Lymphatic: Negative for bleeding problem.   Skin: Negative for flushing, itching and rash.   Musculoskeletal: Positive for arthritis and joint pain. Negative for falls and muscle cramps.   Gastrointestinal: Positive for nausea and vomiting. Negative for abdominal pain, diarrhea and heartburn.   Genitourinary: Negative for hematuria.   Neurological: Negative for excessive daytime sleepiness, dizziness, headaches, tremors and weakness.  "  Psychiatric/Behavioral: Negative for substance abuse. The patient is not nervous/anxious.           Objective   Vitals:  BP 90/51   Pulse 70   Temp 97.7 °F (36.5 °C) (Oral)   Resp 18   Ht 172.7 cm (68\")   Wt 77.1 kg (170 lb)   SpO2 97%   BMI 25.85 kg/m²        Physical Exam   Constitutional: He is oriented to person, place, and time. He appears well-developed and well-nourished. He appears distressed.   Some discomfort with TC pacing   HENT:   Head: Normocephalic and atraumatic.   Eyes: Right eye exhibits no discharge. Left eye exhibits no discharge.   Neck: No JVD present. No tracheal deviation present.   Cardiovascular: Regular rhythm and intact distal pulses. Bradycardia present. Exam reveals no friction rub.   Murmur heard.  Pulmonary/Chest: Effort normal and breath sounds normal. No respiratory distress.   Abdominal: Soft. Bowel sounds are normal. There is no tenderness.   Musculoskeletal: He exhibits no edema or deformity.   Neurological: He is alert and oriented to person, place, and time.   Skin: Skin is warm and dry.     I have examined the patient and agree with the above         Results Review:  I reviewed the patient's new clinical results.  Results from last 7 days   Lab Units 07/23/20  1338   WBC 10*3/mm3 8.34   HEMOGLOBIN g/dL 10.1*   HEMATOCRIT % 32.4*   PLATELETS 10*3/mm3 301           Invalid input(s): LABALBU, PROT          Lab Results   Lab Value Date/Time    TROPONINT 0.026 05/22/2020 0532    TROPONINT 0.017 05/22/2020 0121    TROPONINT 0.020 05/21/2020 2024                       Tele:  TC pacing    EKG: none performed.      Assessment/Plan     1. Syncope with CHB  2. ICM   3. VHD with previous TAVR in 2018      Plan:    1. Patient will be brought emergently to cardiac catheterization lab for transvenous pacer placement.  Case has been discussed with electrophysiology and will plan for permanent pacemaker implant later today.  This is been discussed with the patient and family.  They " verbalized understanding and wished to proceed.      JUAN Pate obtained past medical, family history, social history, review of systems and functioned as a scribe for the remainder of the dictation for Dr. Cobian.    I have seen and examined the patient, I have reviewed the note, discussed the case with the advance practice clinician, made necessary changes and I agree with the final note.    Lv Cobian MD  07/23/20  14:27        Dictated utilizing Dragon dictation

## 2020-07-24 NOTE — PROGRESS NOTES
"      Cardiac Electrophysiology Inpatient Follow Up Note         [facility]    Progress Note      CC: Complete heart block    Subjective     Mr. Moon is seen in EP follow-up today status post leadless permanent pacemaker implant yesterday for complete heart block.  Upon evaluation patient is resting comfortably in bed without complaints.  Patient has been up at bedside and voiding without difficulty but has not been ambulating.  Patient's bilateral groin sutures are removed without hematoma, ecchymosis, or drainage noted.        REVIEW OF SYSTEMS  ROS no change from admission H&P except for: above    Objective   VITAL SIGNS  /64 (BP Location: Left arm, Patient Position: Lying)   Pulse 62   Temp 97.5 °F (36.4 °C) (Oral)   Resp 18   Ht 172.7 cm (68\")   Wt 77.1 kg (170 lb)   SpO2 95%   BMI 25.85 kg/m²     Pulse Ox: SpO2  Av.4 %  Min: 76 %  Max: 100 %  Supplemental O2: O2 Flow Rate (L/min): 2 L/min     Admit Weight  Weight: 77.1 kg (170 lb)  Last 3 Weights    Body mass index is 25.85 kg/m².    INTAKE/OUTPUT  No intake/output data recorded.  No intake or output data in the 24 hours ending 20 0754    PHYSICAL EXAM  General appearance: awake, alert, oriented, moves all extremities  Lungs: no rhonchi, wheezes, rales, + wheezing BLL  Heart: S1-S2, RRR  Abdomen: positive bowel sounds, no bruits, no masses  Extremities: warm and dry, no cyanosis, no clubbing    LABS  Results from last 7 days   Lab Units 20  0652 20  1338   WBC 10*3/mm3 9.65 8.34   HEMOGLOBIN g/dL 8.5* 10.1*   HEMATOCRIT % 28.6* 32.4*   MCV fL 101.1* 99.1*   PLATELETS 10*3/mm3 237 301         Results from last 7 days   Lab Units 20  1338   POTASSIUM mmol/L 4.3   CHLORIDE mmol/L 102   CO2 mmol/L 22.0   BUN mg/dL 27*   CREATININE mg/dL 2.07*   GLUCOSE mg/dL 111*   CALCIUM mg/dL 8.5   MAGNESIUM mg/dL 1.8     Results from last 7 days   Lab Units 20  1338   PROTIME Seconds 15.4*   "   INR  1.25*         Results from last 7 days   Lab Units 07/23/20  1338   MAGNESIUM mg/dL 1.8     CURRENT MEDICATIONS    aspirin 81 mg Oral Daily   carvedilol 6.25 mg Oral BID With Meals   ceFAZolin 2 g Intravenous Once   clopidogrel 75 mg Oral Daily   ferrous sulfate 325 mg Oral Every Other Day   sacubitril-valsartan 1 tablet Oral BID   sodium chloride 10 mL Intravenous Q12H   sodium chloride 3 mL Intravenous Q12H     CONTINUOUS INFUSIONS    norepinephrine 0.02-0.3 mcg/kg/min Last Rate: Stopped (07/23/20 1421)           TELEMETRY: Paced at 60 bpm    Assessment & Plan     Mr. Moon is a 89-year-old white male seen in EP follow-up today status post Medtronic Micra AV leadless permanent pacemaker implant for symptomatic complete heart block.  Patient has not been out of bed after his procedure yesterday.  We will have patient ambulate with nurse prior to discharge.  If patient is is stable with ambulation he will be ready for discharge home today with follow-up in 3 months with Dr. Colin in office with Medtronic device check.  All activity restrictions and wound care instructions are reviewed with patient.  All medications were reviewed and outlined on discharge paperwork.  Patient takes guideline directed medical therapy with Coreg, Entresto, and reported torsemide not listed on home medication list.  Patient does have wheezing in bilateral lower lobes.  Will administer 1 dose of IV Lasix this a.m. prior to discharge and patient encouraged to continue home medication therapy for CHF with recent EF 30%.  Patient noted to have scheduled follow-up appointment on 8/3/2020 with Dr. Cobian and he is encouraged to follow-up at that time to assess his heart failure.  Patient instructed to resume Eliquis on Sunday status post leadless device implant.  Patient verbalized complete understanding of the above instruction and education and will be ready for discharge home later today.      Electronically signed by Job TAYLOR  JUAN Hamilton, 07/24/20, 7:55 AM.

## 2020-07-24 NOTE — OUTREACH NOTE
Prep Survey      Responses   [facility] facility patient discharged from?  [place]   Is LACE score < 7 ?  Yes   Eligibility  [facility]   Date of Admission  07/23/20   Date of Discharge  07/24/20   Discharge Disposition  Home or Self Care   Discharge diagnosis  Complete heart block-Temprorary pacemaker    COVID-19 Test Status  Negative   Does the patient have one of the following disease processes/diagnoses(primary or secondary)?  Other   Does the patient have Home health ordered?  No   Is there a DME ordered?  No   Prep survey completed?  Yes          Carlene Colunga RN

## 2020-07-24 NOTE — PROGRESS NOTES
Discharge Planning Assessment  [facility]     Patient Name: Tad Moon  MRN: [id number]  Today's Date: 7/24/2020    Admit Date: 7/23/2020    Discharge Needs Assessment     Row Name 07/24/20 0913       Living Environment    Lives With  spouse    Current Living Arrangements  home/apartment/condo    Primary Care Provided by  self    Provides Primary Care For  no one    Family Caregiver if Needed  spouse    Quality of Family Relationships  other (see comments)    Able to Return to Prior Arrangements  yes       Resource/Environmental Concerns    Resource/Environmental Concerns  none       Transition Planning    Patient/Family Anticipates Transition to  home with family    Patient/Family Anticipated Services at Transition      Transportation Anticipated  family or friend will provide       Discharge Needs Assessment    Readmission Within the Last 30 Days  no previous admission in last 30 days    Concerns to be Addressed  discharge planning    Equipment Currently Used at Home  none    Anticipated Changes Related to Illness  none    Equipment Needed After Discharge  none        Discharge Plan     Row Name 07/24/20 0913       Plan    Plan  Home    Patient/Family in Agreement with Plan  yes    Plan Comments  Spoke with patient in room to initiate discharge planning.  He lives with his wife in [facility].  He is independent with ADL's.  He has no DME at home.  He has had home health in the past, but can't remember the name of the provider.  Mr. Moon does not have RX coverage, but [facility]  His plan is to return home at discharge.  He denies any needs at this time.  CM will continue to follow.  Marina Alexis RN [phone]    Final Discharge Disposition Code  01 - home or self-care        Destination      Coordination has not been started for this encounter.      Durable Medical Equipment      Coordination has not been started for this encounter.      Dialysis/Infusion       Coordination has not been started for this encounter.      Home Medical Care      Coordination has not been started for this encounter.      Therapy      Coordination has not been started for this encounter.      Community Resources      Coordination has not been started for this encounter.        Expected Discharge Date and Time     Expected Discharge Date Expected Discharge Time    Jul 24, 2020         Demographic Summary     Row Name 07/24/20 0912       General Information    Admission Type  observation    Arrived From  other (see comments) [facility]    Referral Source  admission list    Reason for Consult  discharge planning    Preferred Language  English     Used During This Interaction  no    General Information Comments  PCP- Pramod Hyde        Functional Status     Row Name 07/24/20 0912       Functional Status    Usual Activity Tolerance  moderate    Current Activity Tolerance  moderate       Functional Status, IADL    Medications  independent    Meal Preparation  independent    Housekeeping  independent    Laundry  independent    Shopping  independent        Psychosocial    No documentation.       Abuse/Neglect    No documentation.       Legal     Row Name 07/24/20 0912       Financial/Legal    Finance Comments  Verified with patient that he has Medicare/[place].  No issues obtaining medications.        Substance Abuse    No documentation.       Patient Forms    No documentation.           Marina Alexis RN

## 2020-07-24 NOTE — PLAN OF CARE
Problem: Patient Care Overview  Goal: Plan of Care Review  Outcome: Ongoing (interventions implemented as appropriate)  Note:   Patient transferred from [facility] after heart cath and pacemaker transplant. Patient is v-paced and VS remained stable. Patient requires 2LNC while sleeping but is ok on room air while awake. Right and left groin sites clean dry soft and intact. Bedrest over at 0030. Coccyx is red and nonblanchable between folds, z guard and waffle mattress applied and woc consult ordered per protocol. Will continue to monitor and provide care.

## 2020-07-24 NOTE — NURSING NOTE
Consulted to assess patients coccyx for a possible POA wound area:     At this time bottom and coccyx is intact and blanching. No issues seen.   Dry flow pads in place.     Just needs good general skin care and barrier cream PRN for moisture management.   Heels intact.     Nothing needed from WOC nurse.   Will sign off.     Thanks

## 2020-07-27 NOTE — OUTREACH NOTE
"Call Center TCM Note      Responses   [facility] patient discharged from?  [place]   COVID-19 Test Status  Negative   Does the patient have one of the following disease processes/diagnoses(primary or secondary)?  Other   TCM attempt successful?  Yes   Call start time  0913   Call end time  0919   Discharge diagnosis  Complete heart block-Temprorary pacemaker    Person spoke with today (if not patient) and relationship  Patient   Meds reviewed with patient/caregiver?  Yes   Is the patient having any side effects they believe may be caused by any medication additions or changes?  No   Does the patient have all medications ordered at discharge?  N/A   Is the patient taking all medications as directed (includes completed medication regime)?  Yes   Comments regarding appointments  Appt with Parvin Quintana in heart and vascular clinic on 7/29/20   Does the patient have a primary care provider?   Yes   Does the patient have an appointment with their PCP within 7 days of discharge?  Yes   Comments regarding PCP  7/28/20   Has the patient kept scheduled appointments due by today?  N/A   Pulse Ox monitoring  Intermittent   Pulse Ox device source  Patient   Psychosocial issues?  No   Did the patient receive a copy of their discharge instructions?  Yes   Nursing interventions  Reviewed instructions with patient   What is the patient's perception of their health status since discharge?  Improving [Pt reports, \"I'm still weak but in not in any pain.\"]   Is the patient/caregiver able to teach back signs and symptoms related to disease process for when to call PCP?  Yes   Is the patient/caregiver able to teach back signs and symptoms related to disease process for when to call 911?  Yes   Is the patient/caregiver able to teach back the hierarchy of who to call/visit for symptoms/problems? PCP, Specialist, Home health nurse, Urgent Care, ED, 911  Yes   If the patient is a current smoker, are they able to teach back resources " for cessation?  -- [Nonsmoker]   TCM call completed?  Yes          Digna Pedraza RN    7/27/2020, 09:19

## 2020-07-28 NOTE — PROGRESS NOTES
Transitional Care Follow Up Visit  Subjective     Tad Moon is a 89 y.o. male who presents for a transitional care management visit.    Within 48 business hours after discharge our office contacted him via telephone to coordinate his care and needs.      I reviewed and discussed the details of that call along with the discharge summary, hospital problems, inpatient lab results, inpatient diagnostic studies, and consultation reports with Tad.     Current outpatient and discharge medications have been reconciled for the patient.  Reviewed by: Pramod Hyde MD      Date of TCM Phone Call 7/24/2020   [facility]   Date of Admission 7/23/2020   Date of Discharge 7/24/2020   Discharge Disposition Home or Self Care     Risk for Readmission (LACE) Score: 8 (7/24/2020  6:00 AM)      History of Present Illness   Course During Hospital Stay: Mr. Moon was admitted to the hospital on July 23 with severe symptomatic bradycardia.  He had had at least one syncopal episode and other presyncopal episodes.  He was found to have complete heart block on ECG and was taken emergently to the Cath Lab for a transvenous pacemaker placement.  This was performed without complication.  He has had no further syncope or presyncope.  However he continues to feel tired.     The following portions of the patient's history were reviewed and updated as appropriate: allergies, current medications, past family history, past medical history, past social history, past surgical history and problem list.    Review of Systems   Constitutional: Positive for fatigue. Negative for chills and fever.   HENT: Negative for congestion, ear pain and sinus pressure.    Respiratory: Positive for cough, shortness of breath and wheezing. Negative for chest tightness.    Cardiovascular: Negative for chest pain and palpitations.   Gastrointestinal: Negative for abdominal pain, blood in stool and constipation.   Skin: Negative for color change.    Allergic/Immunologic: Negative for environmental allergies.   Neurological: Negative for dizziness, speech difficulty and headaches.   Psychiatric/Behavioral: Negative for confusion. The patient is nervous/anxious.        Objective   Physical Exam   Constitutional: He is oriented to person, place, and time. He appears well-developed and well-nourished.   Elderly   Neck: Normal range of motion. No JVD present.   Cardiovascular: Normal rate, regular rhythm and normal heart sounds.   Pulmonary/Chest: Effort normal and breath sounds normal. No respiratory distress.   Musculoskeletal: He exhibits no edema.   Neurological: He is alert and oriented to person, place, and time.       Assessment/Plan   Tad was seen today for transitional care management and herpes zoster.    Diagnoses and all orders for this visit:    Complete heart block (CMS/HCC)    Chronic combined systolic and diastolic CHF (congestive heart failure) (CMS/HCC)    Plan    Syncope secondary to complete heart block now resolved after placement of permanent pacemaker.  His fatigue persists.  I explained to him that unless his persistent fatigue was due to bradycardia, which it was not, the pacemaker would not help with that.

## 2020-07-29 NOTE — PROGRESS NOTES
[facility]  [facility]  HF Clinic    Encounter Date:07/29/2020     Tad Moon  [address] [facility] [address]    11/30/1926    Pramod Hyde MD    Tad Moon is a 89 y.o. male.      Subjective:     Chief Complaint:  Congestive Heart Failure and Follow-up       HPI :  Mr. Moon comes in to the [facility] today for follow up after recent hospitalization r/t syncope and complete heart block.  A PPM was placed per Dr. Colin on 7/23/20.  Other pertinent cardiac history includes CAD s/p CABG and PCI, AS s/p TAVR, and chronic systolic heart failure managed with medical therapy.    Since hospital DC, he states he continues to have ELAM and orthopnea about the same as before the PM implantation.  He states his weight at home is steady 164-167 lbs.      Renal function had been steady 1.6-1.8 creatinine until the hospitalization for the PPM on 7/23.  Mr. Moon follows with the HF clinic @ [facility].      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   • Chest pain 2007    angioplasty to RCA   • CHF (congestive heart failure) (CMS/HCC)    • CLL (chronic lymphocytic leukemia) (CMS/HCC)     15 years    • Coronary artery disease    • GERD (gastroesophageal reflux disease)    • Herpes zoster 2012   • History of tobacco abuse    • Hyperlipidemia    • Hypertension    • Low kidney function     very recently and did kidney function test and said decreased function so being watched to get all fluid off    • MI (myocardial infarction) (CMS/HCC)     mid 90s very mild - few years after bypass surgery    • Non-STEMI (non-ST elevated myocardial infarction) (CMS/HCC) 10/17/2017    stenting of occluded grafts to left circumflex and RCA   • Skin cancer     basal and squamous from various location    • SOBOE (shortness of breath on exertion)    • Status post coronary artery bypass grafting     • Syncopal episodes    • Uses hearing aid     bilat ears        Past Surgical History:   Procedure Laterality Date   • ABDOMINAL HERNIA REPAIR      x 2   • AORTIC VALVE REPAIR/REPLACEMENT N/A 4/26/2018    Procedure: Transcatheter Aortic Valve Replacement, LANDY;  Surgeon: Lv Purdy MD;  Location: [facility] HYBRID OR 15;  Service: Cardiothoracic   • AORTIC VALVE REPAIR/REPLACEMENT N/A 4/26/2018    Procedure: Transcatheter Aortic Valve Replacement;  Surgeon: Juan M Sood MD;  Location:  [facility] HYBRID OR 15;  Service: Cardiology   • APPENDECTOMY     • CARDIAC CATHETERIZATION N/A 10/10/2017    Procedure: Left Heart Cath;  Surgeon: Juan M Sood MD;  Location:  [facility] CATH INVASIVE LOCATION;  Service:    • CARDIAC CATHETERIZATION N/A 4/6/2018    Procedure: Left Heart Cath;  Surgeon: Lv Cobian MD;  Location:  [facility] CATH INVASIVE LOCATION;  Service: Cardiovascular   • CARDIAC ELECTROPHYSIOLOGY PROCEDURE N/A 7/23/2020    Procedure: Pacemaker SC new;  Surgeon: Gilbert Colin DO;  Location:  [facility] EP INVASIVE LOCATION;  Service: Cardiology;  Laterality: N/A;   • CARDIAC ELECTROPHYSIOLOGY PROCEDURE N/A 7/23/2020    Procedure: TEMPORARY PACEMAKER;  Surgeon: Lv Cobian MD;  Location:  [facility] CATH INVASIVE LOCATION;  Service: Cardiovascular;  Laterality: N/A;   • CHOLECYSTECTOMY     • COLONOSCOPY     • CORONARY ANGIOPLASTY WITH STENT PLACEMENT      07 one placed,  and in 2017 had another stent placed and one repaired -   so pt thinks 3 stents in place    • CORONARY ARTERY BYPASS GRAFT      4 bypass in 92    • REPLACEMENT TOTAL KNEE Left 2014   • WISDOM TOOTH EXTRACTION         Allergies   Allergen Reactions   • Hctz [Hydrochlorothiazide] Other (See Comments)     hyponatremia         Current Outpatient Medications:   •  apixaban (ELIQUIS) 2.5 MG tablet tablet, Take 1 tablet by mouth Every 12 (Twelve) Hours., Disp: 60 tablet, Rfl: 11  •  aspirin 81 MG tablet, Take 1 tablet by mouth daily., Disp: 90 tablet, Rfl: 3  •   atorvastatin (LIPITOR) 80 MG tablet, Take 0.5 tablets by mouth Daily., Disp: 90 tablet, Rfl: 3  •  carvedilol (COREG) 6.25 MG tablet, Take 6.25 mg by mouth 2 (Two) Times a Day With Meals., Disp: , Rfl:   •  famotidine (PEPCID) 20 MG tablet, Take 20 mg by mouth Daily As Needed., Disp: , Rfl:   •  ferrous sulfate 325 (65 FE) MG tablet, Take 325 mg by mouth Every Other Day., Disp: , Rfl:   •  nitroglycerin (NITROSTAT) 0.4 MG SL tablet, Place 1 tablet under the tongue Every 5 (Five) Minutes As Needed for Chest Pain., Disp: 25 tablet, Rfl: 6  •  sertraline (ZOLOFT) 100 MG tablet, Take 1/2 a day (Patient taking differently: Take 50 mg by mouth Every Night.), Disp: , Rfl:   •  torsemide (DEMADEX) 20 MG tablet, Take 20 mg by mouth 2 (Two) Times a Day., Disp: , Rfl:   •  vitamin C (ASCORBIC ACID) 500 MG tablet, Take 500 mg by mouth Every Other Day., Disp: , Rfl:   •  clopidogrel (PLAVIX) 75 MG tablet, Take 1 tablet by mouth daily. (Patient taking differently: Take 75 mg by mouth Every Night.), Disp: 90 tablet, Rfl: 3  •  sacubitril-valsartan (ENTRESTO) 49-51 MG tablet, Take 1 tablet by mouth 2 (Two) Times a Day. For heart, Disp: 60 tablet, Rfl: 3    The following portions of the patient's history were reviewed and updated as appropriate in Epic:  Problem list, allergies, current medications, past medical and surgical history, past social and family history.     Review of Systems   Constitution: Positive for malaise/fatigue.   HENT: Positive for congestion.    Eyes: Negative.    Cardiovascular: Positive for dyspnea on exertion, leg swelling, orthopnea and paroxysmal nocturnal dyspnea. Negative for chest pain, near-syncope, palpitations and syncope.        No syncope or pre-syncope since discharge 7/24   Respiratory: Positive for shortness of breath.    Endocrine: Negative.    Hematologic/Lymphatic: Does not bruise/bleed easily.   Skin: Negative.    Musculoskeletal: Positive for arthritis.   Gastrointestinal: Negative.   "  Genitourinary: Negative.    Neurological: Positive for weakness. Negative for dizziness and light-headedness.   Psychiatric/Behavioral: The patient has insomnia.         R/t orthopnea   Allergic/Immunologic: Negative.        Objective:     Vitals:    07/29/20 0958   BP: 147/68   BP Location: Left arm   Patient Position: Sitting   Cuff Size: Adult   Pulse: 86   Resp: 18   Temp: 98.2 °F (36.8 °C)   TempSrc: Temporal   SpO2: 98%   Weight: 78.9 kg (174 lb)   Height: 172.7 cm (67.99\")         Physical Exam   Constitutional: He is oriented to person, place, and time. He appears well-developed and well-nourished. No distress.   HENT:   Head: Normocephalic and atraumatic.   Eyes: Pupils are equal, round, and reactive to light.   Neck: Neck supple. No JVD present.   Cardiovascular: Normal rate and intact distal pulses.   Murmur heard.  systolic murmur II/VI   Pulmonary/Chest: Effort normal and breath sounds normal. No respiratory distress. He has no wheezes. He has no rales.   Musculoskeletal: Normal range of motion. He exhibits edema.   Trace bilateral ankle/pedal edema   Neurological: He is alert and oriented to person, place, and time. No cranial nerve deficit.   Skin: Skin is warm and dry.   Psychiatric: He has a normal mood and affect. His behavior is normal. Thought content normal.   Vitals reviewed.      Lab and Diagnostic Review: recent cardiology notes    Assessment and Plan:     1. Complete heart block (CMS/HCC)  - s/p leadless PPM 7/23/20  - Wound check 7/31  - EP clinic follow up 10/26    2. Chronic combined systolic and diastolic CHF (congestive heart failure) (CMS/HCC)  - NYHA class III  - Trial of increased dose Entresto 49-51 mg BID  - written Rx given to patient.  [facility] arranges his refills  - labs per [facility] HF clinic, BEVERLY Garcia    3. Nonrheumatic aortic valve stenosis s/p TAVR  - Echo updated 5/22/20  - LVEF 30% with normal function prosthesis.  Moderate MR noted as well    4. Coronary artery " disease involving coronary bypass graft of native heart without angina pectoris  - ASA, statin, BB  - Cardiology follow up 8/3/20    No further follow up @ [facility] HF Clinic unless requested by  Cardiology as he as good follow up @ [facility] as well.

## 2020-07-31 NOTE — PROGRESS NOTES
Leadless Pacemaker.  Groin check. Normal groin.  No signs or symptoms of infection noted.  Denies any pain or tenderness at site.

## 2020-08-03 NOTE — PROGRESS NOTES
Subjective:     Encounter Date:08/03/2020    Primary Care Physician: Pramod Hyde MD      Patient ID: Tad Moon is a 89 y.o. male.    Chief Complaint:Cardiomyopathy and Coronary Artery Disease    PROBLEM LIST:  1. Coronary artery disease:  a. CABG in 1992, [place], [place]: LIMA to LAD, SVG to PDA, SVG to distal RCA, SVG to OM1.   b. NSTEMI, 2007 with 3.5 x 16 Taxus to SVG to RCA (Dr. Sheikh).   c. [facility], 2012, medical management, incomplete database.   d. [facility], 08/15/2014, functional class III angina/unstable: EF 40% with inferior wall akinesis. Severe distal left main and proximal LAD stenosis with a patent LIMA graft to LAD. An 80% proximal LCx heavily calcified stenosis tortuous segment with occluded vein graft. Chronically occluded RCA and SVG to RCA with 3+ collaterals.    e. [facility], 10/10/2017: 3-vessel CAD of the native arteries. Patent LIMA graft to the LAD, occluded vein grafts to the LCx and the RCA. Successful PTCA/stenting of the distal left main, proximal LCx and mid LCx with placement of overlapping 2.5 x 38 and 3.0 x 23 mm ESTER's reducing severe multiple 80% stenosis to no significant residual disease. EF 25-30%. 21 mm gradient across the aortic valve consistent with known AS.  f. [facility], 04/06/2018, pre-TAVR: Severe proximal LAD stenosis with widely patent LIMA. 50% hazy ISR of distal LM/ostial LCx with normal IFR. Occluded RCA and vein graft.  2. Chronic systolic heart failure  a. EF 40% by LV gram, 08/15/2014.  b. Echo, 10/06/2016: EF 35%.   c. Echo, 03/26/2018: EF 20%.   d. LANDY, 04/06/2018: EF 20%.   e. Echocardiogram, 06/04/2018: EF 20%.Aortic stenosis:  f. Echo, 10/06/2016: EF 35%. Mod-severe AS, mean gradient 25 mmHg, MARII 0.9 cm2. Mild AI. Moderate MR, mild TR.  g. Echo, 03/26/2018: EF 20%. Severe AS with mean gradient 29 mmHg, max 47 mmHg, MARII 0.91 cm2. Moderate MR.  h. Stress echo, 03/28/2018: Resting mean gradient 25 mmHg, post dobutamine peak aortic valve velocity 419 cm/s, peak  gradient 70 mmHg and mean gradient 38 mmHg consistent with severe aortic stenosis.   i. LANDY, 04/06/2018: EF 20%. Severe AS, mean PG 26.1, max 60 mmHg, MARII 0.97 cm2. Mild-to-mod AI. Mod-to-severe MR.  j. TAVR, 04/26/2018: 26 mm Pozo Lydia 3 tissue valve.  k. Echocardiogram, 06/04/2018: EF 20%. Mild MR. TAVR valve present with normal function.  l. Echocardiogram, 05/30/2019: EF 15%. Normal functioning TAVR. Severe MR.  m. Echocardiogram, 01/27/2020: EF 20%. Mild AI. Moderate MR.  3. Syncope with CHB  a. 7/23/2020 admission.   b. 7/23/2020 Leadless PPM implant Dr. Colin.  4. Hypertension.   5. Dyslipidemia.   6. History of tobacco use.   7. GERD.   8. Seizure disorder  9. Chronic lymphocytic leukemia.  10. Surgical history:   a. Left total knee replacement.   b. Remote cholecystectomy.   c. Appendectomy.   d. Abdominal hernia repair x2      Allergies   Allergen Reactions   • Hctz [Hydrochlorothiazide] Other (See Comments)     hyponatremia         Current Outpatient Medications:   •  apixaban (ELIQUIS) 2.5 MG tablet tablet, Take 1 tablet by mouth Every 12 (Twelve) Hours., Disp: 60 tablet, Rfl: 11  •  aspirin 81 MG tablet, Take 1 tablet by mouth daily., Disp: 90 tablet, Rfl: 3  •  atorvastatin (LIPITOR) 80 MG tablet, Take 0.5 tablets by mouth Daily., Disp: 90 tablet, Rfl: 3  •  carvedilol (COREG) 6.25 MG tablet, Take 6.25 mg by mouth 2 (Two) Times a Day With Meals., Disp: , Rfl:   •  clopidogrel (PLAVIX) 75 MG tablet, Take 1 tablet by mouth daily. (Patient taking differently: Take 75 mg by mouth Every Night.), Disp: 90 tablet, Rfl: 3  •  famotidine (PEPCID) 20 MG tablet, Take 20 mg by mouth Daily As Needed., Disp: , Rfl:   •  ferrous sulfate 325 (65 FE) MG tablet, Take 325 mg by mouth Every Other Day., Disp: , Rfl:   •  nitroglycerin (NITROSTAT) 0.4 MG SL tablet, Place 1 tablet under the tongue Every 5 (Five) Minutes As Needed for Chest Pain., Disp: 25 tablet, Rfl: 6  •  sacubitril-valsartan (ENTRESTO) 49-51 MG  tablet, Take 1 tablet by mouth 2 (Two) Times a Day. For heart, Disp: 60 tablet, Rfl: 3  •  sertraline (ZOLOFT) 100 MG tablet, Take 1/2 a day (Patient taking differently: Take 50 mg by mouth Every Night.), Disp: , Rfl:   •  torsemide (DEMADEX) 20 MG tablet, Take 20 mg by mouth Daily., Disp: , Rfl:         History of Present Illness    Patient is a 89-year-old  male who is being seen today for hospital follow-up post admission for heart block and subsequent leadless pacemaker implant.  Since being discharged home complains of shortness of breath with physical activity.  He notes that he has been pushing fluids per the instructions of his wife.  Complains of some lower extremity edema as well as dyspnea.  Notes that he is taking torsemide twice daily.  During his admission his creatinine level was noted to be increased to 2.  This has not been rechecked since that time.  Has had no further syncope.  Has questions today about his remote monitoring device.    The following portions of the patient's history were reviewed and updated as appropriate: allergies, current medications, past family history, past medical history, past social history, past surgical history and problem list.      Social History     Tobacco Use   • Smoking status: Former Smoker     Packs/day: 0.25     Years: 30.00     Pack years: 7.50     Types: Cigarettes     Last attempt to quit: 1975     Years since quittin.6   • Smokeless tobacco: Never Used   • Tobacco comment: quit 45 years ago- smoked since 17 yo    Substance Use Topics   • Alcohol use: Yes     Alcohol/week: 1.0 - 2.0 standard drinks     Types: 1 - 2 Glasses of wine per week     Comment: occas   • Drug use: No         Review of Systems   Constitution: Positive for night sweats.   HENT: Positive for tinnitus.    Cardiovascular: Positive for dyspnea on exertion. Negative for chest pain, leg swelling, palpitations and syncope.   Respiratory: Positive for shortness of breath.   "  Endocrine: Positive for polydipsia.   Hematologic/Lymphatic: Negative for bleeding problem. Does not bruise/bleed easily.   Skin: Negative for rash.   Musculoskeletal: Positive for arthritis, gout, joint pain and neck pain. Negative for muscle weakness and myalgias.   Gastrointestinal: Negative for heartburn, nausea and vomiting.   Neurological: Negative for dizziness, light-headedness, loss of balance and numbness.          Objective:   /68 (BP Location: Right arm, Patient Position: Sitting)   Pulse 83   Ht 174 cm (68.5\")   Wt 79.2 kg (174 lb 9.6 oz)   SpO2 97%   BMI 26.16 kg/m²         Physical Exam   Constitutional: He is oriented to person, place, and time. He appears well-developed and well-nourished. No distress.   Neck: No JVD present. No tracheal deviation present.   Cardiovascular: Normal rate, regular rhythm and normal heart sounds. Exam reveals no friction rub.   No murmur heard.  Pulmonary/Chest: Effort normal. No respiratory distress.   Left greater than right decreased bases with some crackles.   Abdominal: Soft. Bowel sounds are normal. There is no tenderness.   Musculoskeletal: He exhibits edema (Trace to 1+ lower extremity edema). He exhibits no deformity.   Neurological: He is alert and oriented to person, place, and time.   Skin: Skin is warm and dry.       Procedures    Device check:  Normal functioning leadless pacemaker.  Rate 60/105.  AV conduction 96.6%.  Battery voltage 8 years.  Underlying rhythm is sinus with long MA.  No events noted.      Assessment:   Assessment/Plan      Tad was seen today for cardiomyopathy and coronary artery disease.    Diagnoses and all orders for this visit:    Acute on chronic systolic CHF (congestive heart failure) (CMS/HCC).  Last documented EF of 30%.  On Entresto and torsemide.  Admits to increased fluid intake recently.  -     CBC (No Diff)  -     Basic Metabolic Panel  -     XR Chest 2 View    Complete heart block (CMS/HCC), status post " "recent leadless pacemaker.  Normal function by device check today.    Coronary artery disease involving coronary bypass graft of native heart without angina pectoris.  Stable.      Plan:  1. Continue current cardiac medications.  2. Check CBC, BMP today given recent hospitalization, complaints of shortness of breath, renal insufficiency and chronic diuretic usage.  3. Check chest x-ray today given abnormal exam findings and complaints of dyspnea.  4. Patient was educated by device strips today regarding use of his home monitoring device.  5. Educated patient regarding fluid restriction.  Discussed that he should not \"push\" fluids.  6. Follow-up in 1 month's time or sooner if needed.       Alaina MARTINEZ     Dictated utilizing Dragon dictation  "

## 2020-08-05 NOTE — TELEPHONE ENCOUNTER
----- Message from JUAN Pate sent at 8/4/2020  4:22 PM EDT -----  Please let patient know that his chest xray shows small left pleural effusion. Would again decrease volume intake as discussed in the office. Continue current medications.

## 2020-08-05 NOTE — TELEPHONE ENCOUNTER
Left VM advising of below.      ----- Message from JUAN Pate sent at 8/4/2020  4:19 PM EDT -----  Please let patient know that his renal function is stable. Continue medications. Decrease fluid intake as discussed in the office.

## 2020-08-06 NOTE — TELEPHONE ENCOUNTER
"Patient called requesting to be seen to \"have some fluid taken off\" due to shortness of breath, appt made with heart and valve for 9:45 am 8/7/20.  Patient notified of date/time.   "

## 2020-08-06 NOTE — PROGRESS NOTES
[facility]  Heart and Valve Center      08/07/2020         Tad Moon  [address] [address]  [unfilled]    11/30/1926    Pramod Hyde MD    Tad Moon is a 89 y.o. male.      Subjective:     Chief Complaint:  Edema and Follow-up       HPI   89-year-old male with history of chronic systolic heart failure, coronary artery disease, complete heart block status post PPM, hypertension and CLL who presents today for evaluation of worsening shortness of breath.  Patient recently admitted for symptomatic complete heart block and underwent Medtronic leadless permanent pacemaker implant 7/23.  He was given 1 dose of IV Lasix prior to discharge. He was seen in our clinic on 7/29 and his Entresto was increased to 49/51, which she has not started yet because [facility] is [facility].  He was seen by cardiology on 8/3 had a chest x-ray showing small left pleural effusion.  Labs show stable creatinine of 1.58.  He reports a 10 pound weight gain over the past month.  He reports worsening orthopnea and dyspnea on exertion.  Last night he had to sleep sitting up.  He believes that his torsemide was also reduced to 20 mg daily last month.  [facility]    Patient Active Problem List   Diagnosis   • Coronary artery disease involving coronary bypass graft of native heart without angina pectoris   • Hyperlipidemia LDL goal <100   • History of tobacco abuse   • CLL (chronic lymphocytic leukemia) (CMS/HCC)   • GERD (gastroesophageal reflux disease)   • Nonrheumatic aortic valve stenosis s/p TAVR   • Chronic systolic congestive heart failure (CMS/HCC)   • CKD (chronic kidney disease) stage 3, GFR 30-59 ml/min (CMS/HCC)   • Aortic valve stenosis   • Fatigue   • Ischemic cardiomyopathy   • Chronic combined systolic and diastolic CHF (congestive heart failure) (CMS/HCC)   • Angina pectoris (CMS/HCC)   • Coronary artery disease   •  Essential hypertension   • Heart failure, chronic, with acute decompensation (CMS/[facility])   • Generalized ischemic myocardial dysfunction   • Peripheral vascular disease (CMS/[facility])   • Nonrheumatic mitral valve regurgitation   • Major depressive disorder with single episode, in partial remission (CMS/[facility])   • Syncope   • NEDRA (acute kidney injury) (CMS/[facility])   • Normocytic anemia   • Alcohol use   • Closed fracture of ninth thoracic vertebra (CMS/[facility])   • Complete heart block (CMS/[facility])       Past Medical History:   Diagnosis Date   • Anemia    • Aortic stenosis    • Arthritis    • BOOP (bronchiolitis obliterans with organizing pneumonia) (CMS/[facility]) 2014   • Chest pain 2007    angioplasty to RCA   • CHF (congestive heart failure) (CMS/[facility])    • CLL (chronic lymphocytic leukemia) (CMS/[facility])     15 years    • Coronary artery disease    • GERD (gastroesophageal reflux disease)    • Herpes zoster 2012   • History of tobacco abuse    • Hyperlipidemia    • Hypertension    • Low kidney function     very recently and did kidney function test and said decreased function so being watched to get all fluid off    • MI (myocardial infarction) (CMS/[facility])     mid 90s very mild - few years after bypass surgery    • Non-STEMI (non-ST elevated myocardial infarction) (CMS/[facility]) 10/17/2017    stenting of occluded grafts to left circumflex and RCA   • Skin cancer     basal and squamous from various location    • SOBOE (shortness of breath on exertion)    • Status post coronary artery bypass grafting    • Syncopal episodes    • Uses hearing aid     bilat ears        Past Surgical History:   Procedure Laterality Date   • ABDOMINAL HERNIA REPAIR      x 2   • AORTIC VALVE REPAIR/REPLACEMENT N/A 4/26/2018    Procedure: Transcatheter Aortic Valve Replacement, LANDY;  Surgeon: Lv Purdy MD;  Location: [place] [address];  Service: Cardiothoracic   • AORTIC VALVE REPAIR/REPLACEMENT N/A 4/26/2018    Procedure: Transcatheter Aortic Valve Replacement;   Surgeon: Juan M Sood MD;  Location:  [facility];  Service: Cardiology   • APPENDECTOMY     • CARDIAC CATHETERIZATION N/A 10/10/2017    Procedure: Left Heart Cath;  Surgeon: Juan M Sood MD;  Location:  [facility];  Service:    • CARDIAC CATHETERIZATION N/A 2018    Procedure: Left Heart Cath;  Surgeon: Lv Cobian MD;  Location:  [facility];  Service: Cardiovascular   • CARDIAC ELECTROPHYSIOLOGY PROCEDURE N/A 2020    Procedure: Pacemaker SC new;  Surgeon: Gilbert Colin DO;  Location:  [facility];  Service: Cardiology;  Laterality: N/A;   • CARDIAC ELECTROPHYSIOLOGY PROCEDURE N/A 2020    Procedure: TEMPORARY PACEMAKER;  Surgeon: Lv Cobian MD;  Location:  [facility];  Service: Cardiovascular;  Laterality: N/A;   • CHOLECYSTECTOMY     • COLONOSCOPY     • CORONARY ANGIOPLASTY WITH STENT PLACEMENT      07 one placed,  and in 2017 had another stent placed and one repaired -   so pt thinks 3 stents in place    • CORONARY ARTERY BYPASS GRAFT      4 bypass in 92    • REPLACEMENT TOTAL KNEE Left    • WISDOM TOOTH EXTRACTION         Family History   Problem Relation Age of Onset   • Stroke Mother    • Heart disease Father    • Heart disease Brother    • Coronary artery disease Brother        Social History     Socioeconomic History   • Marital status:      Spouse name: Not on file   • Number of children: 3   • Years of education: Not on file   • Highest education level: Not on file   Occupational History   • Occupation: Retired      Comment: insurance agency    Tobacco Use   • Smoking status: Former Smoker     Packs/day: 0.25     Years: 30.00     Pack years: 7.50     Types: Cigarettes     Last attempt to quit: 1975     Years since quittin.6   • Smokeless tobacco: Never Used   • Tobacco comment: quit 45 years ago- smoked since 19 yo    Substance and Sexual Activity   • Alcohol use: Yes     Alcohol/week: 1.0  - 2.0 standard drinks     Types: 1 - 2 Glasses of wine per week     Comment: occas   • Drug use: No   • Sexual activity: Defer   Social History Narrative    Lives with Wife Julia in [place], [place]     Caffeine:  3 servings per day       Allergies   Allergen Reactions   • Hctz [Hydrochlorothiazide] Other (See Comments)     hyponatremia         Current Outpatient Medications:   •  apixaban (ELIQUIS) 2.5 MG tablet tablet, Take 1 tablet by mouth Every 12 (Twelve) Hours., Disp: 60 tablet, Rfl: 11  •  aspirin 81 MG tablet, Take 1 tablet by mouth daily., Disp: 90 tablet, Rfl: 3  •  atorvastatin (LIPITOR) 80 MG tablet, Take 0.5 tablets by mouth Daily., Disp: 90 tablet, Rfl: 3  •  carvedilol (COREG) 6.25 MG tablet, Take 6.25 mg by mouth 2 (Two) Times a Day With Meals., Disp: , Rfl:   •  clopidogrel (PLAVIX) 75 MG tablet, Take 1 tablet by mouth daily. (Patient taking differently: Take 75 mg by mouth Every Night.), Disp: 90 tablet, Rfl: 3  •  famotidine (PEPCID) 20 MG tablet, Take 20 mg by mouth Daily As Needed., Disp: , Rfl:   •  ferrous sulfate 325 (65 FE) MG tablet, Take 325 mg by mouth Every Other Day., Disp: , Rfl:   •  nitroglycerin (NITROSTAT) 0.4 MG SL tablet, Place 1 tablet under the tongue Every 5 (Five) Minutes As Needed for Chest Pain., Disp: 25 tablet, Rfl: 6  •  sacubitril-valsartan (ENTRESTO) 49-51 MG tablet, Take 1 tablet by mouth 2 (Two) Times a Day. For heart, Disp: 60 tablet, Rfl: 3  •  sertraline (ZOLOFT) 100 MG tablet, Take 1/2 a day (Patient taking differently: Take 50 mg by mouth Every Night.), Disp: , Rfl:   •  torsemide (DEMADEX) 20 MG tablet, Take 2 tablets by mouth Daily. Until Monday and then resume 20mg daily, Disp: , Rfl:     The following portions of the patient's history were reviewed today and updated as appropriate: allergies, current medications, past family history, past medical history, past social history, past surgical history and problem list     Review of Systems   Constitution:  "Positive for malaise/fatigue and weight gain. Negative for chills and fever.   HENT: Negative.    Eyes: Negative.    Cardiovascular: Positive for dyspnea on exertion, leg swelling and orthopnea. Negative for chest pain, claudication, cyanosis, irregular heartbeat, near-syncope, palpitations, paroxysmal nocturnal dyspnea and syncope.   Respiratory: Positive for shortness of breath and sleep disturbances due to breathing. Negative for cough and snoring.    Endocrine: Negative.    Hematologic/Lymphatic: Does not bruise/bleed easily.   Skin: Negative for poor wound healing.   Musculoskeletal: Negative.    Gastrointestinal: Negative for abdominal pain, heartburn, hematemesis, melena, nausea and vomiting.   Genitourinary: Negative.  Negative for hematuria.   Neurological: Positive for weakness.   Psychiatric/Behavioral: Negative.    Allergic/Immunologic: Negative.        Objective:     Vitals:    08/07/20 0932   BP: 148/70   BP Location: Left arm   Patient Position: Sitting   Cuff Size: Adult   Pulse: 81   Resp: 20   Temp: 98 °F (36.7 °C)   TempSrc: Temporal   SpO2: 100%   Weight: 79.5 kg (175 lb 4 oz)   Height: 174 cm (68.5\")       Body mass index is 26.26 kg/m².    Physical Exam   Constitutional: He is oriented to person, place, and time. He appears well-developed and well-nourished. No distress.   HENT:   Head: Normocephalic.   Eyes: Pupils are equal, round, and reactive to light. Conjunctivae are normal.   Neck: Neck supple. No JVD present. No thyromegaly present.   Cardiovascular: Normal rate, regular rhythm and intact distal pulses. Exam reveals no gallop and no friction rub.   Murmur heard.  Pulmonary/Chest: Effort normal. No respiratory distress. He has no wheezes. He has rales. He exhibits no tenderness.   Abdominal: Soft. Bowel sounds are normal.   Musculoskeletal: Normal range of motion. He exhibits edema (2+ BLE).   Neurological: He is alert and oriented to person, place, and time.   Skin: Skin is warm and " dry.   Psychiatric: He has a normal mood and affect. His behavior is normal. Thought content normal.   Vitals reviewed.      Lab and Diagnostic Review:  Echo 5/21/20  · Estimated EF = 30%.  · Normal functioning TAVR  · Moderate mitral valve regurgitation is present    Results for orders placed or performed in visit on 08/03/20   CBC (No Diff)   Result Value Ref Range    WBC 9.63 3.40 - 10.80 10*3/mm3    RBC 3.50 (L) 4.14 - 5.80 10*6/mm3    Hemoglobin 10.4 (L) 13.0 - 17.7 g/dL    Hematocrit 33.2 (L) 37.5 - 51.0 %    MCV 94.9 79.0 - 97.0 fL    MCH 29.7 26.6 - 33.0 pg    MCHC 31.3 (L) 31.5 - 35.7 g/dL    RDW 13.2 12.3 - 15.4 %    RDW-SD 45.2 37.0 - 54.0 fl    MPV 11.3 6.0 - 12.0 fL    Platelets 304 140 - 450 10*3/mm3   Basic Metabolic Panel   Result Value Ref Range    Glucose 98 65 - 99 mg/dL    BUN 23 8 - 23 mg/dL    Creatinine 1.58 (H) 0.76 - 1.27 mg/dL    Sodium 136 136 - 145 mmol/L    Potassium 4.3 3.5 - 5.2 mmol/L    Chloride 99 98 - 107 mmol/L    CO2 25.8 22.0 - 29.0 mmol/L    Calcium 9.5 8.2 - 9.6 mg/dL    eGFR Non African Amer 41 (L) >60 mL/min/1.73    BUN/Creatinine Ratio 14.6 7.0 - 25.0    Anion Gap 11.2 5.0 - 15.0 mmol/L         Assessment and Plan:   1. Acute on chronic systolic CHF (congestive heart failure) (CMS/[facility])  IV diuresis today in office. Patient received 40mg lasix today through a butterfly in the LAC over slow IV push. During IV diuresis, vitals were monitored and stable. Please see IV diuresis record for those vitals. Patient voided 280 ml in the office prior to discharge from the office. Butterfly was d/c'd and area was free of erythema, ecchymosis, or drainage.   Increase torsemide to 40 mg for Saturday and Sunday.  If symptoms improved return to 20 mg dose Monday.  If ongoing symptoms have advised him to call on Monday  Continue coreg, plans to increase Entresto [facility]  - Basic Metabolic Panel next week    2. Complete heart block (CMS/HCC)  S/p PPM 7/23    3.  Essential hypertension  Well controlled    4. Coronary artery disease involving coronary bypass graft of native heart without angina pectoris  Continue DAPT, statin, BB    RV in one week      It has been a pleasure to participate in the care of this patient.  Patient was instructed to call the Heart and Valve Center with any questions, concerns, or worsening symptoms.    *Please note that portions of this note were completed with a voice recognition program. Efforts were made to edit the dictations, but occasionally words are mistranscribed.

## 2020-08-07 NOTE — PATIENT INSTRUCTIONS
Increase torsemide to 40mg (2 pills) on Saturday, Sunday and then resume 20mg daily.  Call Monday if no improvement  
[FreeTextEntry3] : All medical record entries made by the [facility] were at my, Dr. Fredy Brenner, direction and personally dictated by me on 01/17/2019. I have reviewed the chart and agree that the record accurately reflects my personal performance of the history, physical exam, assessment and plan. I have also personally directed, reviewed, and agreed with the chart.

## 2020-08-10 NOTE — TELEPHONE ENCOUNTER
[facility] Telephone Note for:    Tad Moon, 11/30/1926  Home Phone [phone]   Work Phone [phone]   Mobile [phone]       Reason for Call:  Patient states that he has not improved since he had IV diuresis on 8/7/2020.    Symptoms:  Edema in the feet and ankles and is still experiencing SOB. No weight loss.    Onset::  Ongoing since before last visit    Anything Tried?:  Lasix and IV diuresis

## 2020-08-10 NOTE — TELEPHONE ENCOUNTER
This is Parvin's patient. Will you please add him to Parvin's schedule tomorrow around 11:30 per her request.

## 2020-08-12 NOTE — PROGRESS NOTES
[facility]  Heart and Valve Center  HF Clinic    Encounter Date:08/12/2020     Tad Moon  [address] [facility] [address]    11/30/1926    Pramod Hyde MD    Tad Moon is a 89 y.o. male.      Subjective:     Chief Complaint:  HF follow up       HPI :  Mr. Moon comes in to the HF Clinic to follow up on recent exacerbation SHF.  S/P hospitalization for CHB at which time leadless PPM was implanted.  LVEF chronically 20-25%.  Since heart block onset and treatment, he has developed ELAM, weight gain, orthopnea, and edema.     Last week on 8/7 he was given an IV dose 40 mg lasix in clinic and torsemide doubled.  One week scheduled follow up was planned, but he called after five days to say that he was still symptomatic with abdominal fullness, orthopnea, and significant ELAM.      Past Medical History:   Diagnosis Date   • Anemia    • Aortic stenosis    • Arthritis    • BOOP (bronchiolitis obliterans with organizing pneumonia) (CMS/[facility]) 2014   • Chest pain 2007    angioplasty to RCA   • CHF (congestive heart failure) (CMS/[facility])    • CLL (chronic lymphocytic leukemia) (CMS/[facility])     15 years    • Coronary artery disease    • GERD (gastroesophageal reflux disease)    • Herpes zoster 2012   • History of tobacco abuse    • Hyperlipidemia    • Hypertension    • Low kidney function     very recently and did kidney function test and said decreased function so being watched to get all fluid off    • MI (myocardial infarction) (CMS/[facility])     mid 90s very mild - few years after bypass surgery    • Non-STEMI (non-ST elevated myocardial infarction) (CMS/[facility]) 10/17/2017    stenting of occluded grafts to left circumflex and RCA   • Skin cancer     basal and squamous from various location    • SOBOE (shortness of breath on exertion)    • Status post coronary artery bypass grafting    • Syncopal episodes    • Uses hearing aid     bilat ears      Past Surgical History:   Procedure Laterality Date   •  ABDOMINAL HERNIA REPAIR      x 2   • AORTIC VALVE REPAIR/REPLACEMENT N/A 4/26/2018    Procedure: Transcatheter Aortic Valve Replacement, LANDY;  Surgeon: Lv Purdy MD;  Location: [facility];  Service: Cardiothoracic   • AORTIC VALVE REPAIR/REPLACEMENT N/A 4/26/2018    Procedure: Transcatheter Aortic Valve Replacement;  Surgeon: Juan M Sood MD;  Location:  [facility];  Service: Cardiology   • APPENDECTOMY     • CARDIAC CATHETERIZATION N/A 10/10/2017    Procedure: Left Heart Cath;  Surgeon: Juan M Sood MD;  Location:  [facility];  Service:    • CARDIAC CATHETERIZATION N/A 4/6/2018    Procedure: Left Heart Cath;  Surgeon: Lv Cobian MD;  Location:  [facility];  Service: Cardiovascular   • CARDIAC ELECTROPHYSIOLOGY PROCEDURE N/A 7/23/2020    Procedure: Pacemaker SC new;  Surgeon: Gilbert Colin DO;  Location:  [facility];  Service: Cardiology;  Laterality: N/A;   • CARDIAC ELECTROPHYSIOLOGY PROCEDURE N/A 7/23/2020    Procedure: TEMPORARY PACEMAKER;  Surgeon: Lv Cobian MD;  Location:  [facility];  Service: Cardiovascular;  Laterality: N/A;   • CHOLECYSTECTOMY     • COLONOSCOPY     • CORONARY ANGIOPLASTY WITH STENT PLACEMENT      07 one placed,  and in 2017 had another stent placed and one repaired -   so pt thinks 3 stents in place    • CORONARY ARTERY BYPASS GRAFT      4 bypass in 92    • REPLACEMENT TOTAL KNEE Left 2014   • WISDOM TOOTH EXTRACTION         Allergies   Allergen Reactions   • Hctz [Hydrochlorothiazide] Other (See Comments)     hyponatremia         Current Outpatient Medications:   •  apixaban (ELIQUIS) 2.5 MG tablet tablet, Take 1 tablet by mouth Every 12 (Twelve) Hours., Disp: 60 tablet, Rfl: 11  •  aspirin 81 MG tablet, Take 1 tablet by mouth daily., Disp: 90 tablet, Rfl: 3  •  atorvastatin (LIPITOR) 80 MG tablet, Take 0.5 tablets by mouth Daily., Disp: 90 tablet, Rfl: 3  •  carvedilol (COREG)  6.25 MG tablet, Take 6.25 mg by mouth 2 (Two) Times a Day With Meals., Disp: , Rfl:   •  clopidogrel (PLAVIX) 75 MG tablet, Take 1 tablet by mouth daily. (Patient taking differently: Take 75 mg by mouth Every Night.), Disp: 90 tablet, Rfl: 3  •  famotidine (PEPCID) 20 MG tablet, Take 20 mg by mouth Daily As Needed., Disp: , Rfl:   •  ferrous sulfate 325 (65 FE) MG tablet, Take 325 mg by mouth Every Other Day., Disp: , Rfl:   •  nitroglycerin (NITROSTAT) 0.4 MG SL tablet, Place 1 tablet under the tongue Every 5 (Five) Minutes As Needed for Chest Pain., Disp: 25 tablet, Rfl: 6  •  sacubitril-valsartan (ENTRESTO) 49-51 MG tablet, Take 1 tablet by mouth 2 (Two) Times a Day. For heart, Disp: 60 tablet, Rfl: 3  •  sertraline (ZOLOFT) 100 MG tablet, Take 1/2 a day (Patient taking differently: Take 50 mg by mouth Every Night.), Disp: , Rfl:   •  torsemide (DEMADEX) 20 MG tablet, Take 2 tablets by mouth Daily. Until Monday and then resume 20mg daily, Disp: , Rfl:     Current Facility-Administered Medications:   •  furosemide (LASIX) injection 80 mg, 80 mg, Intravenous, Once, Parvin Quintana, JUAN    The following portions of the patient's history were reviewed and updated as appropriate in Epic:  Problem list, allergies, current medications, past medical and surgical history, past social and family history.     Review of Systems   Constitution: Positive for malaise/fatigue.   HENT: Negative.    Eyes: Negative.    Cardiovascular: Positive for dyspnea on exertion, leg swelling, orthopnea and paroxysmal nocturnal dyspnea. Negative for chest pain, irregular heartbeat and syncope.   Respiratory: Positive for cough, shortness of breath and sleep disturbances due to breathing. Negative for sputum production and wheezing.    Endocrine: Negative.    Hematologic/Lymphatic: Bruises/bleeds easily.   Skin: Negative.    Musculoskeletal: Positive for arthritis.   Gastrointestinal: Positive for bloating.   Genitourinary: Positive for  "frequency.   Neurological: Negative.    Psychiatric/Behavioral: Negative.    Allergic/Immunologic: Negative.        Objective:     Vitals:    08/12/20 0823   BP: 138/73   BP Location: Left arm   Patient Position: Sitting   Cuff Size: Adult   Pulse: 85   Resp: 20   Temp: 97.7 °F (36.5 °C)   TempSrc: Temporal   SpO2: 95%   Weight: 79 kg (174 lb 2 oz)   Height: 174 cm (68.5\")         Physical Exam   Constitutional: He is oriented to person, place, and time. He appears well-developed. No distress.   HENT:   Head: Normocephalic and atraumatic.   Eyes: Pupils are equal, round, and reactive to light. No scleral icterus.   Neck: Neck supple. No JVD present.   Cardiovascular: Normal rate, regular rhythm and intact distal pulses. Exam reveals no gallop and no friction rub.   Murmur heard.  Systolic murmur I/VI   Pulmonary/Chest: Effort normal. No respiratory distress. He has no wheezes. He has no rales. He exhibits no tenderness.   Diminished breath sounds in bases L>R   Abdominal: Soft. He exhibits distension. There is no tenderness.   Musculoskeletal: Normal range of motion. He exhibits edema. He exhibits no deformity.   +1 ankle/ pedal edema   Neurological: He is alert and oriented to person, place, and time. No cranial nerve deficit.   Speech clear/ appropriate   Skin: Skin is warm and dry.   Psychiatric: He has a normal mood and affect. His behavior is normal. Thought content normal.   Vitals reviewed.      Lab and Diagnostic Review:  Basic Metabolic Panel        Component  Ref Range & Units 08:41  (8/12/20) 9d ago  (8/3/20) 2wk ago  (7/24/20) 2wk ago  (7/23/20) 2mo ago  (5/26/20) 2mo ago  (5/23/20)   Glucose  65 - 99 mg/dL 129High   98  98  111High   119High   104High     BUN  8 - 23 mg/dL 27High   23  31High   27High   28High   31High     Creatinine  0.76 - 1.27 mg/dL 1.69High   1.58High   2.03High   2.07High   1.63High   1.57High     Sodium  136 - 145 mmol/L 136  136  138  137  141  138    Potassium  3.5 - 5.2 mmol/L " 3.7  4.3  4.5  4.3  4.9  3.4Low     Chloride  98 - 107 mmol/L 96Low   99  103  102  101  100    CO2  22.0 - 29.0 mmol/L 26.0  25.8  25.0  22.0  27.0  23.0    Calcium  8.2 - 9.6 mg/dL 8.6  9.5  8.3  8.5  8.9  8.7    eGFR Non African Amer  >60 mL/min/1.73 38Low   41Low   31Low   30Low   40Low   41Low               Assessment and Plan:     1. Acute on chronic systolic CHF (congestive heart failure) (CMS/HCC)  - Remains symptomatic with NYHA class III dyspnea/ PND/ orthopnea  - Basic Metabolic Panel reviewed today prior to administration of lasix IV  - XR Chest 2 View; Future  - furosemide (LASIX) injection 80 mg with PO Kdur 20 meq in clinic  - Patient monitored in clinic following IV lasix and remained hemodynamically stable  - Most recent LVEF 30% per echo 5/22/20  - Continue Torsemide 20 mg two daily   - Entresto titrated to 49-51 mg BID just in the past two weeks  - Continue Coreg 6.25 BID  - Follow up with Cardiology 8/31/20    2. Complete heart block (CMS/HCC)  - s/p Medtronic PPM  - Follow up with EP 10/26/20  - One inpatient EKG noted underlying Afib and Eliquis was added July  - Will follow up with primary cardiology to review safest DAPT/ NOAC combination    3. CKD (chronic kidney disease) stage 3, GFR 30-59 ml/min (CMS/[facility])  - Monitoring closely/ stable with additional diuretics    4.  CAD  - ASA, Statin, BB, plavix  - no chest pain  - Following up with Cardiology 8/31/20    5.  Mitral valve regurgitation, moderate  - Adds to SHF symptoms

## 2020-08-13 NOTE — TELEPHONE ENCOUNTER
Telephone follow up with patient re:  CXR.  Advised some improvement as compared to film 8/3.  Less pulmonary vascularity notation and small left pleural effusion unchanged.      Mr. Moon states he feels his breathing is some better and he slept fairly well.  Advised to continue on with the torsemide two tabs daily for a few more days until he feels back to baseline.  Continue to weigh daily and report weight gain 3 lbs overnight or 5 lbs in a week.      Keep plans for Cardiology follow up on 8/31.  Call C PRN.      Parvin MARTINEZ

## 2020-08-13 NOTE — TELEPHONE ENCOUNTER
----- Message from JUAN Pate sent at 8/13/2020  1:59 PM EDT -----  Please call patient and have him discontinue Plavix. Should just be on Eliquis and low dose ASA  ----- Message -----  From: Parvin Quintana APRN  Sent: 8/13/2020   1:27 PM EDT  To: JUAN Pate, Lv Cobian MD    Question about NOAC + DAPT for Mr. Moon:    He had historically been on ASA, plavix since before he underwent TAVR.  He got scheduled to follow up in Heart/ Valve following PPM implant hospitalization.  We noticed Eliquis 2.5 mg BID was added.  I see an EKG on 7/23 that was read as afib.  His discharge AVS contained the new Rx for Eliquis.      He is scheduled follow up with you on 8/31.  I just wanted to bring this to your attention in case you wanted to change his meds.     Have a good rest of the week!  Parvin Quintana

## 2020-08-26 NOTE — PROGRESS NOTES
"[facility]  [facility]  HF Clinic    Encounter Date:08/26/2020     Tad Moon  [address] [facility] [address]    11/30/1926    Pramod Hyde MD    Tad Moon is a 89 y.o. male.      Subjective:     Chief Complaint:  Edema and Follow-up       HPI :  Mr. Moon called the HF Clinic today requesting to be seen for \"fluid\".  He states that yesterday he could not make it into grocery store without gasping and last night, he was up all night feeling short of air:\" I thought I was dying\".    Generally, [place] [facility], and ride the mechanized cart for his shopping then load everything back in car/ drive home.  Yesterday he states he did not think he could make it.  He was gasping just walking in to store.    Prior to yesterday, he had been feeling pretty good after diuresis a couple weeks ago.  He states compliance with torsemide 40 mg daily, coreg 6.25 mg BID and Entresto 49/51 mg BID.  He denies chest pain.    Past Medical History:   Diagnosis Date   • Anemia    • Aortic stenosis    • Arthritis    • BOOP (bronchiolitis obliterans with organizing pneumonia) (CMS/[facility], located [facility]) 2014   • Chest pain 2007    angioplasty to RCA   • CHF (congestive heart failure) (CMS/[facility], located [facility])    • CLL (chronic lymphocytic leukemia) (CMS/[facility], located [facility])     15 years    • Coronary artery disease    • GERD (gastroesophageal reflux disease)    • Herpes zoster 2012   • History of tobacco abuse    • Hyperlipidemia    • Hypertension    • Low kidney function     very recently and did kidney function test and said decreased function so being watched to get all fluid off    • MI (myocardial infarction) (CMS/HCC)     mid 90s very mild - few years after bypass surgery    • Non-STEMI (non-ST elevated myocardial infarction) (CMS/[facility], located [facility]) 10/17/2017    stenting of occluded grafts to left circumflex and RCA   • Skin cancer     basal and squamous from various location    • SOBOE (shortness of breath on " exertion)    • Status post coronary artery bypass grafting    • Syncopal episodes    • Uses hearing aid     bilat ears      Past Surgical History:   Procedure Laterality Date   • ABDOMINAL HERNIA REPAIR      x 2   • AORTIC VALVE REPAIR/REPLACEMENT N/A 4/26/2018    Procedure: Transcatheter Aortic Valve Replacement, LANDY;  Surgeon: Lv Purdy MD;  Location:  [facility];  Service: Cardiothoracic   • AORTIC VALVE REPAIR/REPLACEMENT N/A 4/26/2018    Procedure: Transcatheter Aortic Valve Replacement;  Surgeon: Juan M Sood MD;  Location:  [facility];  Service: Cardiology   • APPENDECTOMY     • CARDIAC CATHETERIZATION N/A 10/10/2017    Procedure: Left Heart Cath;  Surgeon: Juan M Sood MD;  Location:  [facility];  Service:    • CARDIAC CATHETERIZATION N/A 4/6/2018    Procedure: Left Heart Cath;  Surgeon: Lv Cobian MD;  Location:  [facility];  Service: Cardiovascular   • CARDIAC ELECTROPHYSIOLOGY PROCEDURE N/A 7/23/2020    Procedure: Pacemaker SC new;  Surgeon: Gilbert Colin DO;  Location:  [facility];  Service: Cardiology;  Laterality: N/A;   • CARDIAC ELECTROPHYSIOLOGY PROCEDURE N/A 7/23/2020    Procedure: TEMPORARY PACEMAKER;  Surgeon: Lv Cobian MD;  Location:  [facility];  Service: Cardiovascular;  Laterality: N/A;   • CHOLECYSTECTOMY     • COLONOSCOPY     • CORONARY ANGIOPLASTY WITH STENT PLACEMENT      07 one placed,  and in 2017 had another stent placed and one repaired -   so pt thinks 3 stents in place    • CORONARY ARTERY BYPASS GRAFT      4 bypass in 92    • REPLACEMENT TOTAL KNEE Left 2014   • WISDOM TOOTH EXTRACTION         Allergies   Allergen Reactions   • Hctz [Hydrochlorothiazide] Other (See Comments)     hyponatremia         Current Outpatient Medications:   •  apixaban (ELIQUIS) 2.5 MG tablet tablet, Take 1 tablet by mouth Every 12 (Twelve) Hours., Disp: 60 tablet, Rfl: 11  •  aspirin 81 MG tablet,  Take 1 tablet by mouth daily., Disp: 90 tablet, Rfl: 3  •  atorvastatin (LIPITOR) 80 MG tablet, Take 0.5 tablets by mouth Daily., Disp: 90 tablet, Rfl: 3  •  carvedilol (COREG) 6.25 MG tablet, Take 6.25 mg by mouth 2 (Two) Times a Day With Meals., Disp: , Rfl:   •  famotidine (PEPCID) 20 MG tablet, Take 20 mg by mouth Daily As Needed., Disp: , Rfl:   •  ferrous sulfate 325 (65 FE) MG tablet, Take 325 mg by mouth Every Other Day., Disp: , Rfl:   •  nitroglycerin (NITROSTAT) 0.4 MG SL tablet, Place 1 tablet under the tongue Every 5 (Five) Minutes As Needed for Chest Pain., Disp: 25 tablet, Rfl: 6  •  sacubitril-valsartan (ENTRESTO) 49-51 MG tablet, Take 1 tablet by mouth 2 (Two) Times a Day. For heart, Disp: 60 tablet, Rfl: 3  •  sertraline (ZOLOFT) 100 MG tablet, Take 1/2 a day (Patient taking differently: Take 50 mg by mouth Every Night.), Disp: , Rfl:   •  torsemide (DEMADEX) 20 MG tablet, Take 2 tablets by mouth Daily. Until Monday and then resume 20mg daily, Disp: , Rfl:     Current Facility-Administered Medications:   •  furosemide (LASIX) injection 60 mg, 60 mg, Intravenous, Once, Parvin Quintana, JUAN    The following portions of the patient's history were reviewed and updated as appropriate in Epic:  Problem list, allergies, current medications, past medical and surgical history, past social and family history.     Review of Systems   Constitution: Positive for weight gain.   HENT: Positive for hearing loss.    Eyes: Negative.    Cardiovascular: Positive for dyspnea on exertion, orthopnea and paroxysmal nocturnal dyspnea. Negative for chest pain.   Respiratory: Positive for sleep disturbances due to breathing.    Endocrine: Negative.    Hematologic/Lymphatic: Bruises/bleeds easily.   Skin: Positive for dry skin and suspicious lesions.        Following with dermatology   Musculoskeletal: Positive for arthritis.   Gastrointestinal: Negative.    Genitourinary: Negative.    Neurological: Negative.   "  Psychiatric/Behavioral: The patient has insomnia.    Allergic/Immunologic: Negative.        Objective:     Vitals:    08/26/20 1128   BP: 134/68   BP Location: Left arm   Patient Position: Sitting   Cuff Size: Adult   Pulse: 77   Resp: 19   Temp: 97.8 °F (36.6 °C)   TempSrc: Temporal   SpO2: 98%   Weight: 78.1 kg (172 lb 2 oz)   Height: 174 cm (68.5\")         Physical Exam   Constitutional: He is oriented to person, place, and time. He appears well-developed and well-nourished. No distress.   HENT:   Head: Normocephalic and atraumatic.   Eyes: Pupils are equal, round, and reactive to light. No scleral icterus.   Neck: Neck supple.   Cardiovascular: Normal rate, regular rhythm and intact distal pulses. Exam reveals no gallop and no friction rub.   Murmur heard.  II/VI systolic murmur   Pulmonary/Chest: Effort normal. No respiratory distress. He has rales.   Rales RLL and LLL   Musculoskeletal: Normal range of motion. He exhibits edema.   +1 pedal/ ankle edema   Neurological: He is alert and oriented to person, place, and time. No cranial nerve deficit.   Skin: Skin is warm and dry.   Psychiatric: He has a normal mood and affect. His behavior is normal. Thought content normal.       Lab and Diagnostic Review:    Assessment and Plan:     1. Acute on chronic systolic CHF (congestive heart failure) (CMS/[facility])  - Hesitant to add metolazone or aldactone due to age/ potential over diuresis  - IV lasix 60 mg in clinic today.  Patient monitored after administration and remained hemodynamically stable  - Basic Metabolic Panel and BNP today pending  - re-visit with Cardiology on Monday for further recommendations    2. Coronary artery disease s/p remote CABG and   - ASA, statin, BB  - denies angina    3. CHB s/p PPM  - per chart review, it appears Eliquis was added during hospital stay 7/23 as one EKG noted afib  - CHADS VASC = 5    4. CKD (chronic kidney disease) stage 3, GFR 30-59 ml/min (CMS/[facility])  - monitor closely due to " prior NEDRA

## 2020-08-31 NOTE — PROGRESS NOTES
Subjective:     Encounter Date:08/31/2020    Primary Care Physician: Pramod Hyde MD      Patient ID: Tad Moon is a 89 y.o. male.    Chief Complaint:Acute on chronic systolic chf and Cardiomyopathy    PROBLEM LIST:  1. Coronary artery disease:  a. CABG in 1992, [place], [place]: LIMA to LAD, SVG to PDA, SVG to distal RCA, SVG to OM1.   b. NSTEMI, 2007 with 3.5 x 16 Taxus to SVG to RCA (Dr. Sheikh).   c. [facility], 2012, medical management, incomplete database.   d. [facility], 08/15/2014, functional class III angina/unstable: EF 40% with inferior wall akinesis. Severe distal left main and proximal LAD stenosis with a patent LIMA graft to LAD. An 80% proximal LCx heavily calcified stenosis tortuous segment with occluded vein graft. Chronically occluded RCA and SVG to RCA with 3+ collaterals.    e. [facility], 10/10/2017: 3-vessel CAD of the native arteries. Patent LIMA graft to the LAD, occluded vein grafts to the LCx and the RCA. Successful PTCA/stenting of the distal left main, proximal LCx and mid LCx with placement of overlapping 2.5 x 38 and 3.0 x 23 mm ESTER's reducing severe multiple 80% stenosis to no significant residual disease. EF 25-30%. 21 mm gradient across the aortic valve consistent with known AS.  f. [facility], 04/06/2018, pre-TAVR: Severe proximal LAD stenosis with widely patent LIMA. 50% hazy ISR of distal LM/ostial LCx with normal IFR. Occluded RCA and vein graft.  2. Chronic systolic heart failure  a. EF 40% by LV gram, 08/15/2014.  b. Echo, 10/06/2016: EF 35%.   c. Echo, 03/26/2018: EF 20%.   d. L[facility]Y, 04/06/2018: EF 20%.   e. Echocardiogram, 06/04/2018: EF 20%.Aortic stenosis:  f. Echo, 10/06/2016: EF 35%. Mod-severe AS, mean gradient 25 mmHg, MARII 0.9 cm2. Mild AI. Moderate MR, mild TR.  g. Echo, 03/26/2018: EF 20%. Severe AS with mean gradient 29 mmHg, max 47 mmHg, MARII 0.91 cm2. Moderate MR.  h. Stress echo, 03/28/2018: Resting mean gradient 25 mmHg, post dobutamine peak aortic valve velocity 419 cm/s,  peak gradient 70 mmHg and mean gradient 38 mmHg consistent with severe aortic stenosis.   i. [facility], 04/06/2018: EF 20%. Severe AS, mean PG 26.1, max 60 mmHg, MARII 0.97 cm2. Mild-to-mod AI. Mod-to-severe MR.  j. TAVR, 04/26/2018: 26 mm Pozo Lydia 3 tissue valve.  k. Echocardiogram, 06/04/2018: EF 20%. Mild MR. TAVR valve present with normal function.  l. Echocardiogram, 05/30/2019: EF 15%. Normal functioning TAVR. Severe MR.  m. Echocardiogram, 01/27/2020: EF 20%. Mild AI. Moderate MR.  3. Syncope with CHB  a. 7/23/2020 admission.   b. 7/23/2020 Leadless PPM implant Dr. Colin.  4. Hypertension.   5. Dyslipidemia.   6. History of tobacco use.   7. GERD.   8. Seizure disorder  9. Chronic lymphocytic leukemia.  10. Surgical history:   a. Left total knee replacement.   b. Remote cholecystectomy.   c. Appendectomy.   d. Abdominal hernia repair x2      Allergies   Allergen Reactions   • Hctz [Hydrochlorothiazide] Other (See Comments)     hyponatremia         Current Outpatient Medications:   •  apixaban (ELIQUIS) 2.5 MG tablet tablet, Take 1 tablet by mouth Every 12 (Twelve) Hours., Disp: 60 tablet, Rfl: 11  •  aspirin 81 MG tablet, Take 1 tablet by mouth daily., Disp: 90 tablet, Rfl: 3  •  atorvastatin (LIPITOR) 80 MG tablet, Take 0.5 tablets by mouth Daily., Disp: 90 tablet, Rfl: 3  •  carvedilol (COREG) 6.25 MG tablet, Take 6.25 mg by mouth 2 (Two) Times a Day With Meals., Disp: , Rfl:   •  famotidine (PEPCID) 20 MG tablet, Take 20 mg by mouth Daily As Needed., Disp: , Rfl:   •  ferrous sulfate 325 (65 FE) MG tablet, Take 325 mg by mouth Every Other Day., Disp: , Rfl:   •  nitroglycerin (NITROSTAT) 0.4 MG SL tablet, Place 1 tablet under the tongue Every 5 (Five) Minutes As Needed for Chest Pain., Disp: 25 tablet, Rfl: 6  •  sacubitril-valsartan (ENTRESTO) 49-51 MG tablet, Take 1 tablet by mouth 2 (Two) Times a Day. For heart, Disp: 60 tablet, Rfl: 3  •  sertraline (ZOLOFT) 100 MG tablet, Take 1/2 a day (Patient taking  differently: Take 50 mg by mouth Every Night.), Disp: , Rfl:   •  torsemide (DEMADEX) 20 MG tablet, Take 2 tablets by mouth Daily. Until Monday and then resume 20mg daily, Disp: , Rfl:   No current facility-administered medications for this visit.         History of Present Illness    Patient is a 89-year-old  male who is being seen today for further follow-up of chronic systolic congestive heart failure.  Since his admission in July and having his pacemaker implanted he has complained of continued shortness of breath.  He has had no further syncope, near syncope.  He has some chronic lower extremity edema.  Last week his shortness of breath became significantly worsened.  He was seen by the heart and valve center and treated with IV diuretics there.  He then was told to increase his torsemide to twice daily until today.  Patient feels that today overall he still has some shortness of breath but feels that he is able to ambulate better.  Still has some shortness of breath no syncope, near-syncope, or chest pain.    The following portions of the patient's history were reviewed and updated as appropriate: allergies, current medications, past family history, past medical history, past social history, past surgical history and problem list.      Social History     Tobacco Use   • Smoking status: Former Smoker     Packs/day: 0.25     Years: 30.00     Pack years: 7.50     Types: Cigarettes     Last attempt to quit: 1975     Years since quittin.6   • Smokeless tobacco: Never Used   • Tobacco comment: quit 45 years ago- smoked since 19 yo    Substance Use Topics   • Alcohol use: Yes     Alcohol/week: 1.0 - 2.0 standard drinks     Types: 1 - 2 Glasses of wine per week     Comment: occas   • Drug use: No         Review of Systems   Constitution: Negative.   Cardiovascular: Negative for chest pain, dyspnea on exertion, leg swelling, palpitations and syncope.   Respiratory: Positive for shortness of breath.   "  Hematologic/Lymphatic: Negative for bleeding problem. Bruises/bleeds easily.   Skin: Negative for rash.   Musculoskeletal: Negative for muscle weakness and myalgias.   Gastrointestinal: Negative for heartburn, nausea and vomiting.   Genitourinary: Positive for frequency.   Neurological: Negative for dizziness, light-headedness, loss of balance and numbness.          Objective:   /68 (BP Location: Left arm, Patient Position: Sitting)   Pulse 80   Temp 97.3 °F (36.3 °C)   Ht 174 cm (68.5\")   Wt 76.6 kg (168 lb 12.8 oz)   SpO2 94%   BMI 25.29 kg/m²         Physical Exam   Constitutional: He is oriented to person, place, and time. He appears well-developed and well-nourished.   Neck: No JVD present. No tracheal deviation present.   Cardiovascular: Normal rate, regular rhythm and normal heart sounds. Exam reveals no friction rub.   No murmur heard.  Pulmonary/Chest: Effort normal. No respiratory distress.   Decreased bases bilaterally   Abdominal: Soft. Bowel sounds are normal. There is no tenderness.   Musculoskeletal: He exhibits no edema or deformity.   Neurological: He is alert and oriented to person, place, and time.   Skin: Skin is warm and dry.       Procedures  Device check:  Leadless pacemaker with normal function.  V pacing 1.4%.  Underlying rhythm sinus rhythm.  Battery voltage greater than 8 years.        Assessment:   Assessment/Plan      Tad was seen today for acute on chronic systolic chf and cardiomyopathy.    Diagnoses and all orders for this visit:    Chronic systolic (congestive) heart failure (CMS/HCC).  Currently appears euvolemic.  Still with mild dyspnea.  -     Basic Metabolic Panel  -     BNP    Ischemic cardiomyopathy, most recent EF 30% by echocardiogram in May.  On Entresto and beta-blocker.    Coronary artery disease involving coronary bypass graft of native heart without angina pectoris.  Stable.  On aspirin.    Complete heart block (CMS/HCC), status post recent leadless " pacemaker.  Normal functioning device.      Plan:  1. Given recent increase of diuretics will check BMP and BNP today.  2. If renal function stable will consider keeping torsemide twice daily.  3. Pending lab results may consider adding Aldactone.  4. Suspect that the patient's dyspnea is multifactorial related to advanced age, deconditioning, cardiomyopathy, and chronic systolic heart failure.  5. Encouraged patient to gradually increase activity and get out for more cardiovascular activity such as walking.  6. Keep scheduled follow-up with electrophysiology in October.  7. We will have the patient follow-up with Dr. Cobian in 3 months time or sooner if needed.       Alaina MARTINEZ     Dictated utilizing Dragon dictation

## 2020-09-02 NOTE — TELEPHONE ENCOUNTER
Patient advised of below, understandng verbalized.     ----- Message from JUAN Pate sent at 9/2/2020 12:54 PM EDT -----  Please call patient and have him resume his twice daily dose of torsemide that he was taking prior to Monday.

## 2020-10-04 NOTE — ED PROVIDER NOTES
Subjective   Mr. Moon presents with shortness of breath.  He relates worsening orthopnea as well as dyspnea on exertion.  He tells me overall he has felt short of breath since having his pacemaker placed in July of this year.  His breathing however has worsened significantly over the last week.  He denies fevers or chills.  He denies cough.  He denies pain in his chest.  He has history of coronary artery disease and does have remote bypass surgery.  He had aortic valve replacement 2 years ago.  He had a pacemaker placed in July of this year.  His ejection fraction is 20% even after his valve repair.  He notes increasing edema.  He has been [facility].  [facility] and he told him to go up to 2 doses of his torsemide daily.      History provided by:  Patient  Shortness of Breath  Severity:  Severe  Onset quality:  Gradual  Timing:  Constant  Progression:  Worsening  Chronicity:  Recurrent  Context comment:  Chronic combined cardiomyopathy  Relieved by:  Rest  Worsened by:  Activity (Supine position, he has been sleeping on a chair)  Associated symptoms: no abdominal pain, no chest pain, no cough, no fever and no vomiting        Review of Systems   Constitutional: Negative for chills and fever.   HENT: Negative for congestion and rhinorrhea.    Respiratory: Positive for shortness of breath. Negative for cough.    Cardiovascular: Positive for leg swelling. Negative for chest pain.   Gastrointestinal: Negative for abdominal pain, nausea and vomiting.   Genitourinary: Negative for difficulty urinating.   Psychiatric/Behavioral: Negative for confusion.       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   • Chest pain 2007    angioplasty to RCA   • CHF (congestive heart failure) (CMS/HCC)    • CLL (chronic lymphocytic leukemia) (CMS/HCC)     15 years    • Coronary  artery disease    • GERD (gastroesophageal reflux disease)    • Herpes zoster 2012   • History of tobacco abuse    • Hyperlipidemia    • Hypertension    • Low kidney function     very recently and did kidney function test and said decreased function so being watched to get all fluid off    • MI (myocardial infarction) (CMS/[facility])     mid 90s very mild - few years after bypass surgery    • Non-STEMI (non-ST elevated myocardial infarction) (CMS/[facility]) 10/17/2017    stenting of occluded grafts to left circumflex and RCA   • Skin cancer     basal and squamous from various location    • SOBOE (shortness of breath on exertion)    • Status post coronary artery bypass grafting    • Syncopal episodes    • Uses hearing aid     bilat ears        Allergies   Allergen Reactions   • Hctz [Hydrochlorothiazide] Other (See Comments)     hyponatremia       Past Surgical History:   Procedure Laterality Date   • ABDOMINAL HERNIA REPAIR      x 2   • AORTIC VALVE REPAIR/REPLACEMENT N/A 4/26/2018    Procedure: Transcatheter Aortic Valve Replacement, LANDY;  Surgeon: Lv Purdy MD;  Location:  [facility];  Service: Cardiothoracic   • AORTIC VALVE REPAIR/REPLACEMENT N/A 4/26/2018    Procedure: Transcatheter Aortic Valve Replacement;  Surgeon: Juan M Sood MD;  Location:  [facility];  Service: Cardiology   • APPENDECTOMY     • CARDIAC CATHETERIZATION N/A 10/10/2017    Procedure: Left Heart Cath;  Surgeon: Juan M Sood MD;  Location:  [facility];  Service:    • CARDIAC CATHETERIZATION N/A 4/6/2018    Procedure: Left Heart Cath;  Surgeon: Lv Cobian MD;  Location:  [facility] INVAS[facility];  Service: Cardiovascular   • CARDIAC ELECTROPHYSIOLOGY PROCEDURE N/A 7/23/2020    Procedure: Pacemaker SC new;  Surgeon: Gilbert Colin DO;  Location:  [facility] INVASIVE LOCATION;  Service: Cardiology;  Laterality: N/A;   • CARDIAC ELECTROPHYSIOLOGY PROCEDURE N/A 7/23/2020    Procedure: TEMPORARY PACEMAKER;   Surgeon: Lv Cobian MD;  Location: [facility] CATH INVASIVE LOCATION;  Service: Cardiovascular;  Laterality: N/A;   • CARDIAC PACEMAKER PLACEMENT     • CHOLECYSTECTOMY     • COLONOSCOPY     • CORONARY ANGIOPLASTY WITH STENT PLACEMENT      07 one placed,  and in 2017 had another stent placed and one repaired -   so pt thinks 3 stents in place    • CORONARY ARTERY BYPASS GRAFT      4 bypass in 92    • REPLACEMENT TOTAL KNEE Left    • WISDOM TOOTH EXTRACTION         Family History   Problem Relation Age of Onset   • Stroke Mother    • Heart disease Father    • Heart disease Brother    • Coronary artery disease Brother        Social History     Socioeconomic History   • Marital status:      Spouse name: Not on file   • Number of children: 3   • Years of education: Not on file   • Highest education level: Not on file   Occupational History   • Occupation: Retired      Comment: insurance agency    Tobacco Use   • Smoking status: Former Smoker     Packs/day: 0.25     Years: 30.00     Pack years: 7.50     Types: Cigarettes     Quit date:      Years since quittin.7   • Smokeless tobacco: Never Used   • Tobacco comment: quit 45 years ago- smoked since 17 yo    Substance and Sexual Activity   • Alcohol use: Yes     Alcohol/week: 1.0 - 2.0 standard drinks     Types: 1 - 2 Glasses of wine per week     Comment: occas   • Drug use: No   • Sexual activity: Defer   Social History Narrative    Lives with Wife Julia in [place], [place]     Caffeine:  3 servings per day           Objective   Physical Exam  Vitals signs and nursing note reviewed.   Constitutional:       General: He is not in acute distress.     Appearance: He is well-developed.   HENT:      Head: Normocephalic and atraumatic.   Neck:      Musculoskeletal: Normal range of motion and neck supple.      Vascular: JVD present.   Cardiovascular:      Rate and Rhythm: Normal rate. Rhythm irregular.      Heart sounds: No murmur. No friction rub.   Pulmonary:       Effort: Pulmonary effort is normal. No respiratory distress.      Breath sounds: Examination of the right-lower field reveals rales. Examination of the left-lower field reveals rales. Rales present. No decreased breath sounds or wheezing.   Abdominal:      General: Bowel sounds are normal.      Palpations: Abdomen is soft.      Tenderness: There is no abdominal tenderness. There is no guarding.   Musculoskeletal: Normal range of motion.      Right lower leg: He exhibits no tenderness. Edema present.      Left lower leg: He exhibits no tenderness. Edema present.   Skin:     General: Skin is warm and dry.      Capillary Refill: Capillary refill takes less than 2 seconds.      Coloration: Skin is not pale.      Findings: No rash.   Neurological:      General: No focal deficit present.      Mental Status: He is alert.   Psychiatric:         Mood and Affect: Mood normal. Mood is not anxious.         Behavior: Behavior is not agitated.         Procedures           ED Course  ED Course as of Oct 04 1433   Sun Oct 04, 2020   1341 Mr. Moon tells me he feels back to normal.  I had talked to him about my plan to admit him to the hospital for further diuresis and he tells me he does not want to do that.  He has an appointment in about a week with Dr. Cobian.  [facility] and his torsemide was increased to 2 pills a day.  We will have him continue at that dose until he follows up with Dr. Cobian.  I do not see atrial fibrillation mentioned is a diagnosis on his chart however Mr. Moon tells me that someone has mentioned that to him in the past.  I suspect that is why he was recently placed on Eliquis.    [DT]      ED Course User Index  [DT] Tad Thompson MD                                           MDM  Number of Diagnoses or Management Options  Acute combined systolic and diastolic congestive heart failure (CMS/HCC): new and requires workup  Atrial fibrillation, unspecified  type (CMS/HCC):      Amount and/or Complexity of Data Reviewed  Clinical lab tests: ordered and reviewed  Tests in the radiology section of CPT®: ordered and reviewed  Decide to obtain previous medical records or to obtain history from someone other than the patient: yes  Review and summarize past medical records: yes  Independent visualization of images, tracings, or specimens: yes    Patient Progress  Patient progress: improved      Final diagnoses:   Acute combined systolic and diastolic congestive heart failure (CMS/HCC)   Atrial fibrillation, unspecified type (CMS/HCC)            Tad Thompson MD  10/04/20 1433       Tad Thompson MD  10/04/20 1437

## 2020-10-07 PROBLEM — I50.9 CONGESTIVE HEART FAILURE (HCC): Status: ACTIVE | Noted: 2020-01-01

## 2020-10-07 PROBLEM — L97.929 NON-PRESSURE ULCER OF LEFT LOWER EXTREMITY (HCC): Status: ACTIVE | Noted: 2020-01-01

## 2020-10-07 NOTE — TELEPHONE ENCOUNTER
Looked at patient's records he needs to be admitted most likely. If he does not want this see if the [facility] can see him to give IV diuretics but I think he needs to be admitted and go back to the ER

## 2020-10-07 NOTE — ED PROVIDER NOTES
Subjective   Patient presents the emergency department for difficulty breathing for the last several days.  He does have a history of CHF.  Tells me he is managed by Dr. Cobian.  He was here on Sunday for similar and felt better after diuretics and wanted to go home instead of being admitted to the hospital.  Over the last several days the patient has gotten more short of breath again.  He did speak to his cardiology group and they recommended going to the emergency department for admission.  He denies chest pain or fever.  He denies any COVID risk factors.  He does take Eliquis.  He denies any abdominal pain headache or fever.      Shortness of Breath  Severity:  Moderate  Onset quality:  Gradual  Duration:  1 week  Timing:  Constant  Progression:  Worsening  Chronicity:  New  Context: activity    Relieved by:  Rest, oxygen and sitting up  Worsened by:  Activity  Associated symptoms: no abdominal pain, no chest pain, no cough, no diaphoresis, no fever, no sore throat, no vomiting and no wheezing        Review of Systems   Constitutional: Negative for chills, diaphoresis and fever.   HENT: Negative for congestion and sore throat.    Respiratory: Positive for shortness of breath. Negative for cough, choking, chest tightness and wheezing.    Cardiovascular: Negative for chest pain and leg swelling.   Gastrointestinal: Negative for abdominal distention, abdominal pain, anal bleeding, blood in stool, constipation, diarrhea, nausea and vomiting.   Genitourinary: Negative for difficulty urinating, dysuria, flank pain, frequency, hematuria and urgency.   All other systems reviewed and are negative.      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   • Chest pain 2007    angioplasty to RCA   • CHF (congestive heart failure) (CMS/HCC)    • CLL (chronic lymphocytic leukemia) (CMS/HCC)     15 years    •  Coronary artery disease    • GERD (gastroesophageal reflux disease)    • Herpes zoster 2012   • History of tobacco abuse    • Hyperlipidemia    • Hypertension    • Low kidney function     very recently and did kidney function test and said decreased function so being watched to get all fluid off    • MI (myocardial infarction) (CMS/[facility])     mid 90s very mild - few years after bypass surgery    • Non-STEMI (non-ST elevated myocardial infarction) (CMS/[facility]) 10/17/2017    stenting of occluded grafts to left circumflex and RCA   • Skin cancer     basal and squamous from various location    • SOBOE (shortness of breath on exertion)    • Status post coronary artery bypass grafting    • Syncopal episodes    • Uses hearing aid     bilat ears        Allergies   Allergen Reactions   • Hctz [Hydrochlorothiazide] Other (See Comments)     hyponatremia       Past Surgical History:   Procedure Laterality Date   • ABDOMINAL HERNIA REPAIR      x 2   • AORTIC VALVE REPAIR/REPLACEMENT N/A 4/26/2018    Procedure: Transcatheter Aortic Valve Replacement, LANDY;  Surgeon: Lv Purdy MD;  Location:  [facility];  Service: Cardiothoracic   • AORTIC VALVE REPAIR/REPLACEMENT N/A 4/26/2018    Procedure: Transcatheter Aortic Valve Replacement;  Surgeon: Juan M Sood MD;  Location:  [facility];  Service: Cardiology   • APPENDECTOMY     • CARDIAC CATHETERIZATION N/A 10/10/2017    Procedure: Left Heart Cath;  Surgeon: Juan M Sood MD;  Location:  [facility];  Service:    • CARDIAC CATHETERIZATION N/A 4/6/2018    Procedure: Left Heart Cath;  Surgeon: Lv Cobian MD;  Location:  [facility];  Service: Cardiovascular   • CARDIAC ELECTROPHYSIOLOGY PROCEDURE N/A 7/23/2020    Procedure: Pacemaker SC new;  Surgeon: Gilbert Colin DO;  Location:  [facility];  Service: Cardiology;  Laterality: N/A;   • CARDIAC ELECTROPHYSIOLOGY PROCEDURE N/A 7/23/2020    Procedure: TEMPORARY  PACEMAKER;  Surgeon: Lv Cobian MD;  Location: [facility] CATH INVASIVE LOCATION;  Service: Cardiovascular;  Laterality: N/A;   • CARDIAC PACEMAKER PLACEMENT     • CHOLECYSTECTOMY     • COLONOSCOPY     • CORONARY ANGIOPLASTY WITH STENT PLACEMENT      07 one placed,  and in 2017 had another stent placed and one repaired -   so pt thinks 3 stents in place    • CORONARY ARTERY BYPASS GRAFT      4 bypass in 92    • REPLACEMENT TOTAL KNEE Left    • WISDOM TOOTH EXTRACTION         Family History   Problem Relation Age of Onset   • Stroke Mother    • Heart disease Father    • Heart disease Brother    • Coronary artery disease Brother        Social History     Socioeconomic History   • Marital status:      Spouse name: Not on file   • Number of children: 3   • Years of education: Not on file   • Highest education level: Not on file   Occupational History   • Occupation: Retired      Comment: insurance agency    Tobacco Use   • Smoking status: Former Smoker     Packs/day: 0.25     Years: 30.00     Pack years: 7.50     Types: Cigarettes     Quit date:      Years since quittin.7   • Smokeless tobacco: Never Used   • Tobacco comment: quit 45 years ago- smoked since 17 yo    Substance and Sexual Activity   • Alcohol use: Yes     Alcohol/week: 1.0 - 2.0 standard drinks     Types: 1 - 2 Glasses of wine per week     Comment: occas   • Drug use: No   • Sexual activity: Defer   Social History Narrative    Lives with Wife Julia in [place], [place]     Caffeine:  3 servings per day           Objective   Physical Exam  Constitutional:       Appearance: He is ill-appearing.   HENT:      Head: Normocephalic and atraumatic.      Right Ear: External ear normal.      Left Ear: External ear normal.      Nose: Nose normal.   Eyes:      Conjunctiva/sclera: Conjunctivae normal.      Pupils: Pupils are equal, round, and reactive to light.   Neck:      Musculoskeletal: Normal range of motion and neck supple.   Cardiovascular:       Rate and Rhythm: Normal rate and regular rhythm.      Heart sounds: Normal heart sounds.   Pulmonary:      Effort: Pulmonary effort is normal.      Breath sounds: Decreased breath sounds present.   Abdominal:      General: Bowel sounds are normal.      Palpations: Abdomen is soft.   Musculoskeletal: Normal range of motion.      Right lower leg: Edema present.      Left lower leg: Edema present.   Skin:     General: Skin is warm and dry.   Neurological:      Mental Status: He is alert and oriented to person, place, and time.   Psychiatric:         Behavior: Behavior normal.         Judgment: Judgment normal.         Procedures           ED Course  ED Course as of Oct 07 1843   Wed Oct 07, 2020   1700 Hospitalist paged    [JM]   [id number] Hospitalist paged    [JM]   [id number] I spoke to the hospitalist who will admit.    [SOLA]      ED Course User Index  [SOLA] Fransico Cortez APRN                                           [facility]    Final diagnoses:   Congestive heart failure, unspecified HF chronicity, unspecified heart failure type (CMS/HCC)   Failure of outpatient treatment   Referred by primary care physician            Fransico Cortez APRN  10/07/20 4100

## 2020-10-07 NOTE — TELEPHONE ENCOUNTER
Patient called to request sooner appt, he thought he had one this month but it's not until dec 14.  He is having significant difficulty breathing and was seen in ER 10/4/20.  Review of note states they wanted to admit him but he declined saying he had an appt with cardiology in about a week.  He sounds like he is having difficulty breathing while talking.  Please advise

## 2020-10-07 NOTE — H&P
[facility] Medicine Services  Clinical Decision Unit (CDU)  HISTORY & PHYSICAL    Patient Name: Tad Moon  : 1926  MRN: [id number]  Primary Care Physician: Pramod Hyde MD  Date of admission: 10/7/2020  3:58 PM      Subjective   Subjective     Chief Complaint:  Shortness of breath and edema    HPI:  Tad Moon is a 89 y.o. male with history of CAD s/p CABG/stents, systolic heart failure, syncope, hypertension, dyslipidemia, GERD, seizure disorder, CLL, presents to the ED with complaints of shortness of breath.  Patient reports that he has had ongoing shortness of breath for the past month.  He was given lasix twice this month at the heart and valve clinic, which did help with his breathing.  On  he was seen in the ED with complaints of shortness of breath and edema, was given a dose of IV Lasix, reported that he felt much better and went home.  Patient states that yesterday he developed worsening shortness of breath, abdominal distention, and increased lower extremity edema.  Reports that he is having a difficult time walking to his mailbox secondary to difficulty breathing.  Has had a 5 pound weight gain in the last 4 days.  At baseline he sleeps on 2 pillows but last night had to get up and sleep in the recliner chair.  He denies fever, chills, chest pain, cough, nausea, vomiting, abdominal pain, dysuria, or any other complaints at this time.  Of note patient has ulceration on his left shin.  He reports having surgery with Dr. Wang at [facility], who has been managing his wound care with daily dressing changes at home.  Chest x-ray shows increased pulmonary vascularity bilaterally, enlargement of the heart, small left pleural effusion, and mild increased markings at the left lung base.  Patient called his cardiologist office today and was referred to the ED for possible admission.  He was given Lasix 40 mg IV x1 dose in the ED, and is  being admitted to the hospital medicine service for further evaluation and management.    Review of Systems   Constitutional: Positive for activity change. Negative for appetite change, chills, diaphoresis, fatigue and fever.   HENT: Negative.    Eyes: Negative.    Respiratory: Positive for shortness of breath. Negative for cough and wheezing.    Cardiovascular: Positive for leg swelling. Negative for chest pain and palpitations.   Gastrointestinal: Positive for abdominal distention. Negative for constipation, diarrhea, nausea and vomiting.   Endocrine: Negative.    Genitourinary: Negative.    Musculoskeletal: Negative.    Skin: Positive for wound. Negative for color change, pallor and rash.   Allergic/Immunologic: Negative.    Neurological: Negative.    Hematological: Negative.    Psychiatric/Behavioral: Negative.           Personal History     Past Medical History:   Diagnosis Date   • Anemia    • Aortic stenosis    • Arthritis    • BOOP (bronchiolitis obliterans with organizing pneumonia) (CMS/[facility]) 2014   • Chest pain 2007    angioplasty to RCA   • CHF (congestive heart failure) (CMS/[facility])    • CLL (chronic lymphocytic leukemia) (CMS/[facility])     15 years    • Coronary artery disease    • GERD (gastroesophageal reflux disease)    • Herpes zoster 2012   • History of tobacco abuse    • Hyperlipidemia    • Hypertension    • Low kidney function     very recently and did kidney function test and said decreased function so being watched to get all fluid off    • MI (myocardial infarction) (CMS/[facility])     mid 90s very mild - few years after bypass surgery    • Non-STEMI (non-ST elevated myocardial infarction) (CMS/[facility]) 10/17/2017    stenting of occluded grafts to left circumflex and RCA   • Skin cancer     basal and squamous from various location    • SOBOE (shortness of breath on exertion)    • Status post coronary artery bypass grafting    • Syncopal episodes    • Uses hearing aid     bilat ears        Past Surgical History:    Procedure Laterality Date   • ABDOMINAL HERNIA REPAIR      x 2   • AORTIC VALVE REPAIR/REPLACEMENT N/A 4/26/2018    Procedure: Transcatheter Aortic Valve Replacement, LANDY;  Surgeon: Lv Purdy MD;  Location:  [facility];  Service: Cardiothoracic   • AORTIC VALVE REPAIR/REPLACEMENT N/A 4/26/2018    Procedure: Transcatheter Aortic Valve Replacement;  Surgeon: Juan M Sood MD;  Location:  [facility];  Service: Cardiology   • APPENDECTOMY     • CARDIAC CATHETERIZATION N/A 10/10/2017    Procedure: Left Heart Cath;  Surgeon: Juan M Sood MD;  Location:  [facility];  Service:    • CARDIAC CATHETERIZATION N/A 4/6/2018    Procedure: Left Heart Cath;  Surgeon: Lv Cobian MD;  Location:  [facility];  Service: Cardiovascular   • CARDIAC ELECTROPHYSIOLOGY PROCEDURE N/A 7/23/2020    Procedure: Pacemaker SC new;  Surgeon: Gilbert Colin DO;  Location:  [facility];  Service: Cardiology;  Laterality: N/A;   • CARDIAC ELECTROPHYSIOLOGY PROCEDURE N/A 7/23/2020    Procedure: TEMPORARY PACEMAKER;  Surgeon: Lv Cobian MD;  Location:  [facility];  Service: Cardiovascular;  Laterality: N/A;   • CARDIAC PACEMAKER PLACEMENT     • CHOLECYSTECTOMY     • COLONOSCOPY     • CORONARY ANGIOPLASTY WITH STENT PLACEMENT      07 one placed,  and in 2017 had another stent placed and one repaired -   so pt thinks 3 stents in place    • CORONARY ARTERY BYPASS GRAFT      4 bypass in 92    • REPLACEMENT TOTAL KNEE Left 2014   • WISDOM TOOTH EXTRACTION         Family History: family history includes Coronary artery disease in his brother; Heart disease in his brother and father; Stroke in his mother. Otherwise pertinent FHx was reviewed and unremarkable.     Social History:  reports that he quit smoking about 45 years ago. His smoking use included cigarettes. He has a 7.50 pack-year smoking history. He has never used smokeless tobacco. He reports  current alcohol use of about 1.0 - 2.0 standard drinks of alcohol per week. He reports that he does not use drugs.  Social History     Social History Narrative    Lives with Wife Julia in [place], [place]     Caffeine:  3 servings per day       Medications:  Available home medication information reviewed.  Medications Prior to Admission   Medication Sig Dispense Refill Last Dose   • apixaban (ELIQUIS) 2.5 MG tablet tablet Take 1 tablet by mouth Every 12 (Twelve) Hours. 60 tablet 11    • aspirin 81 MG tablet Take 1 tablet by mouth daily. 90 tablet 3    • atorvastatin (LIPITOR) 80 MG tablet Take 0.5 tablets by mouth Daily. 90 tablet 3    • carvedilol (COREG) 6.25 MG tablet Take 6.25 mg by mouth 2 (Two) Times a Day With Meals.      • famotidine (PEPCID) 20 MG tablet Take 20 mg by mouth Daily As Needed.      • ferrous sulfate 325 (65 FE) MG tablet Take 325 mg by mouth Every Other Day.      • nitroglycerin (NITROSTAT) 0.4 MG SL tablet Place 1 tablet under the tongue Every 5 (Five) Minutes As Needed for Chest Pain. 25 tablet 6    • sacubitril-valsartan (ENTRESTO) 49-51 MG tablet Take 1 tablet by mouth 2 (Two) Times a Day. For heart 60 tablet 3    • sertraline (ZOLOFT) 100 MG tablet Take 1/2 a day (Patient taking differently: Take 50 mg by mouth Every Night.)      • torsemide (DEMADEX) 20 MG tablet Take 2 tablets by mouth Daily. Until Monday and then resume 20mg daily          Allergies   Allergen Reactions   • Hctz [Hydrochlorothiazide] Other (See Comments)     hyponatremia       Objective   Objective     Vital Signs:   Temp:  [97.8 °F (36.6 °C)-98.2 °F (36.8 °C)] 97.8 °F (36.6 °C)  Heart Rate:  [74-87] 79  Resp:  [16] 16  BP: (128-142)/(73-85) 129/73        Physical Exam  Constitutional:       Appearance: He is not ill-appearing, toxic-appearing or diaphoretic.   HENT:      Head: Normocephalic.      Nose: Nose normal.      Mouth/Throat:      Mouth: Mucous membranes are moist.   Eyes:      Pupils: Pupils are equal, round,  and reactive to light.   Neck:      Musculoskeletal: Normal range of motion and neck supple.   Cardiovascular:      Rate and Rhythm: Normal rate and regular rhythm.      Pulses: Normal pulses.      Heart sounds: Normal heart sounds. No murmur. No friction rub. No gallop.    Pulmonary:      Effort: Respiratory distress (speaks in 2-3 word sentences) present.      Breath sounds: Rales (bilaterally) present. No wheezing or rhonchi.   Abdominal:      General: Bowel sounds are normal.      Palpations: Abdomen is soft.      Tenderness: There is no abdominal tenderness. There is no guarding or rebound.   Musculoskeletal: Normal range of motion.         General: Swelling (1+ BLE) present. No tenderness.   Skin:     General: Skin is warm and dry.      Coloration: Skin is not pale.      Findings: No erythema or rash.      Comments: 4 cm ulceration on left shin.     Neurological:      General: No focal deficit present.      Mental Status: He is alert and oriented to person, place, and time.      Cranial Nerves: No cranial nerve deficit.      Sensory: No sensory deficit.   Psychiatric:         Mood and Affect: Mood normal.         Behavior: Behavior normal.         Thought Content: Thought content normal.         Judgment: Judgment normal.            Results Reviewed:  Labs:  Troponin 0.028, proBNP 10,970, glucose 115, sodium 142, potassium 3.8, BUN 29, creatinine 1.77, WBC 8.14, hemoglobin 9.5, hematocrit 30.7, platelets 286    Xr Chest 1 View    Result Date: 10/7/2020  [                  ] stable with increased pulmonary vascularity bilaterally, enlargement of the heart, small left pleural effusion and mild increased markings at the left lung base.  D:  10/07/2020 E:  10/07/2020            Assessment/Plan   Assessment / Plan     Active Hospital Problems    Diagnosis POA   • **Congestive heart failure (CMS/HCC) [I50.9] Yes   • Non-pressure ulcer of left lower extremity (CMS/HCC) [L97.929] Yes   • Peripheral vascular disease  (CMS/[facility]) [I73.9] Yes   • Coronary artery disease [I25.10] Yes   • Essential hypertension [I10] Yes   • CKD (chronic kidney disease) stage 3, GFR 30-59 ml/min (CMS/[facility]) [N18.30] Yes   • Hyperlipidemia LDL goal <100 [E78.5] Yes       Tad Moon is a 89 y.o. male with history of CAD s/p CABG/stents, systolic heart failure, syncope, hypertension, dyslipidemia, GERD, seizure disorder, CLL, presents to the ED with complaints of shortness of breath.  Patient reports that he has had ongoing shortness of breath for the past month.  He was given lasix twice this month at the heart and valve clinic, which did help with his breathing.  On Sunday he was seen in the ED with complaints of shortness of breath and edema, was given a dose of IV Lasix, reported that he felt much better and went home.  Returns to the ED today with worsening dyspnea and edema.     Plan:  A/C systolic HF  CAD s/p CABG/stents  CHB s/p PPM  HTN  HLD  PVD  Remote history of afib  --Chest x-ray shows increased pulmonary vascularity bilaterally, enlargement of the heart, small left pleural effusion, and mild increased markings at the left lung base   --was given Lasix 40 mg IV x 1 dose in the ED  --will give Lasix 40 mg IV x 1 dose in the am and defer further diuresis to cardiology  --consult cardiology in the am  --echo in the am  --consult Heart Failure Navigator  --strict I&O's  --daily weights  --bladder scan q shift  --continue entresto, coreg, aspirin, statin  --continue torsemide  --continue Eliquis  --am labs    CKD  --stable  --baseline creatinine 1.5-2.0  --creatinine 1.77 today  --will monitor closely since patient receiving diuretics     Ulceration left shin  --surgery with Dr. Wang at [facility] at the end of September  --Dr. Wang managing dressing changes/wound care--instructions at bedside    CODE STATUS:    Code Status and Medical Interventions:   Ordered at: 10/07/20 2021     Level Of Support Discussed With:     Patient     Code Status:    CPR     Medical Interventions (Level of Support Prior to Arrest):    Full       Admission Status:   I believe this patient meets OBSERVATION status, however if further evaluation or treatment plans warrant, status may change.  Based upon current information, I predict patient's care encounter to be less than or equal to 2 midnights.       Discharge Blueprint (criteria for discharge):   1. Improvement in SOA  2. Cleared by cardiology for discharge  3. Labs stable  4. Tolerating activity    Adelina Crow, APRN  10/07/20

## 2020-10-07 NOTE — TELEPHONE ENCOUNTER
Advised patient review of records suggest he needs admission to hospital for IV diuretics.  He is agreeable and will proceed to ER.

## 2020-10-08 NOTE — PLAN OF CARE
Goal Outcome Evaluation:  Patient admitted secondary to acute on chronic CHF exacerbation. He denies SOA and has maintained adequate SpO2 on room air overnight. Slight change noted in orthostatic pressure noted from sitting to standing with a decrease of 13 points systolic. He is urinating in the urinal without problem. A&Ox 4 with marked difficulty hearing with hearing aids present. Open area to the LLE s/p excision of basal cell carcinoma with acetic acid solution and mupirocin tx instructions at bedside per dermatology. 1+ BLE non-pitting edema noted.

## 2020-10-08 NOTE — CONSULTS
[facility] at [facility]   Consult Note    Referring Provider: Adelina MARTINEZ    Reason for Consultation: CHF    Patient Care Team:  Pramod Hyde MD as PCP - General  Pramod Hyde MD as PCP - Claims Attributed  Lv Cobian MD as Consulting Physician (Cardiology)     PROBLEM LIST:  1. Coronary artery disease:  a. CABG in 1992, [place], [place]: LIMA to LAD, SVG to PDA, SVG to distal RCA, SVG to OM1.   b. NSTEMI, 2007 with 3.5 x 16 Taxus to SVG to RCA (Dr. Sheikh).   c. [facility], 2012, medical management, incomplete database.   d. [facility], 08/15/2014, functional class III angina/unstable: EF 40% with inferior wall akinesis. Severe distal left main and proximal LAD stenosis with a patent LIMA graft to LAD. An 80% proximal LCx heavily calcified stenosis tortuous segment with occluded vein graft. Chronically occluded RCA and SVG to RCA with 3+ collaterals.    e. [facility], 10/10/2017: 3-vessel CAD of the native arteries. Patent LIMA graft to the LAD, occluded vein grafts to the LCx and the RCA. Successful PTCA/stenting of the distal left main, proximal LCx and mid LCx with placement of overlapping 2.5 x 38 and 3.0 x 23 mm ESTER's reducing severe multiple 80% stenosis to no significant residual disease. EF 25-30%. 21 mm gradient across the aortic valve consistent with known AS.  f. [facility], 04/06/2018, pre-TAVR: Severe proximal LAD stenosis with widely patent LIMA. 50% hazy ISR of distal LM/ostial LCx with normal IFR. Occluded RCA and vein graft.  2. Chronic systolic heart failure  a. EF 40% by LV gram, 08/15/2014.  b. Echo, 10/06/2016: EF 35%.   c. Echo, 03/26/2018: EF 20%.   d. [facility], 04/06/2018: EF 20%.   e. Echocardiogram, 06/04/2018: EF 20%.Aortic stenosis:  f. Echo, 10/06/2016: EF 35%. Mod-severe AS, mean gradient 25 mmHg, MARII 0.9 cm2. Mild AI. Moderate MR, mild TR.  g. Echo, 03/26/2018: EF 20%. Severe AS with mean gradient 29 mmHg, max 47 mmHg, MARII 0.91 cm2. Moderate MR.  h. Stress echo,  03/28/2018: Resting mean gradient 25 mmHg, post dobutamine peak aortic valve velocity 419 cm/s, peak gradient 70 mmHg and mean gradient 38 mmHg consistent with severe aortic stenosis.   i. [facility], 04/06/2018: EF 20%. Severe AS, mean PG 26.1, max 60 mmHg, MARII 0.97 cm2. Mild-to-mod AI. Mod-to-severe MR.  j. TAVR, 04/26/2018: 26 mm Pozo Lydia 3 tissue valve.  k. Echocardiogram, 06/04/2018: EF 20%. Mild MR. TAVR valve present with normal function.  l. Echocardiogram, 05/30/2019: EF 15%. Normal functioning TAVR. Severe MR.  m. Echocardiogram, 01/27/2020: EF 20%. Mild AI. Moderate MR.  3. Syncope with CHB  a. 7/23/2020 admission.   b. 7/23/2020 Leadless PPM implant Dr. Colin.  4. Hypertension.   5. Dyslipidemia.   6. History of tobacco use.   7. GERD.   8. Seizure disorder  9. Chronic lymphocytic leukemia.  10. Surgical history:   a. Left total knee replacement.   b. Remote cholecystectomy.   c. Appendectomy.   d. Abdominal hernia repair x2  e. Mohs procedure      Allergies   Allergen Reactions   • Hctz [Hydrochlorothiazide] Other (See Comments)     hyponatremia           Current Facility-Administered Medications:   •  apixaban (ELIQUIS) tablet 2.5 mg, 2.5 mg, Oral, Q12H, Adelina Crow, APRN, 2.5 mg at 10/08/20 0824  •  aspirin EC tablet 81 mg, 81 mg, Oral, Daily, Adelina Crow APRN, 81 mg at 10/08/20 0824  •  atorvastatin (LIPITOR) tablet 40 mg, 40 mg, Oral, Daily, Adelina Crow, APRN, 40 mg at 10/08/20 0825  •  carvedilol (COREG) tablet 6.25 mg, 6.25 mg, Oral, BID With Meals, Adelina Crow, APRN, 6.25 mg at 10/08/20 0825  •  ferrous sulfate tablet 325 mg, 325 mg, Oral, Every Other Day, Adelina Crow APRN, 325 mg at 10/08/20 0824  •  furosemide (LASIX) injection 40 mg, 40 mg, Intravenous, Q12H, Janina Sweeney, , 40 mg at 10/08/20 0824  •  mupirocin (BACTROBAN) 2 % ointment, , Topical, Q12H, Eve Cline, DO  •  nitroglycerin (NITROSTAT) SL tablet 0.4 mg, 0.4 mg, Sublingual,  Q5 Min PRN, Adelina Crow, APRN  •  Pharmacy Consult - [facility], , Does not apply, Daily, Will Cota, PharmD  •  sacubitril-valsartan (ENTRESTO) 49-51 MG tablet 1 tablet, 1 tablet, Oral, BID, Adelina Crow, APRN, 1 tablet at 10/08/20 0824  •  sertraline (ZOLOFT) tablet 50 mg, 50 mg, Oral, Nightly, Adelina Crow W, APRN, 50 mg at 10/07/20 2113  •  sodium chloride 0.9 % flush 10 mL, 10 mL, Intravenous, PRN, Adelina Crow, APRN  •  sodium chloride 0.9 % flush 10 mL, 10 mL, Intravenous, Q12H, Adelina Crow, APRN, 10 mL at 10/08/20 0824  •  sodium chloride 0.9 % flush 10 mL, 10 mL, Intravenous, PRN, Adelina Crow, APRN         Medications Prior to Admission   Medication Sig Dispense Refill Last Dose   • apixaban (ELIQUIS) 2.5 MG tablet tablet Take 1 tablet by mouth Every 12 (Twelve) Hours. 60 tablet 11    • aspirin 81 MG tablet Take 1 tablet by mouth daily. 90 tablet 3    • atorvastatin (LIPITOR) 80 MG tablet Take 0.5 tablets by mouth Daily. 90 tablet 3    • carvedilol (COREG) 6.25 MG tablet Take 6.25 mg by mouth 2 (Two) Times a Day With Meals.      • famotidine (PEPCID) 20 MG tablet Take 20 mg by mouth Daily As Needed.      • ferrous sulfate 325 (65 FE) MG tablet Take 325 mg by mouth Every Other Day.      • nitroglycerin (NITROSTAT) 0.4 MG SL tablet Place 1 tablet under the tongue Every 5 (Five) Minutes As Needed for Chest Pain. 25 tablet 6    • sacubitril-valsartan (ENTRESTO) 49-51 MG tablet Take 1 tablet by mouth 2 (Two) Times a Day. For heart 60 tablet 3    • sertraline (ZOLOFT) 100 MG tablet Take 1/2 a day (Patient taking differently: Take 50 mg by mouth Every Night.)      • torsemide (DEMADEX) 20 MG tablet Take 2 tablets by mouth Daily. Until Monday and then resume 20mg daily            Subjective .   History of present illness:    Patient is a 89 year old  male who we are asked to see today for further management of acute on chronic systolic CHF.  Patient has  previous history of coronary artery disease, recent pacemaker implant and chronic systolic congestive heart failure with previous TAVR valve.  Over the course of the last week or so he had had some increased shortness of breath as well as significant weight gain.  He initially presented to the emergency department a few days ago and at that time admission was felt reasonable to patient wished to go home after receiving IV diuretics.  Since that time he has had recurrence of his shortness of breath, abdominal distention, and lower extremity edema.  He contacted our office yesterday and was advised to re-present to the emergency department for further evaluation.  Currently is feeling somewhat better status post IV diuretics.  No significant chest pain.  Denies any excessive salt intake.  States he was recently told by healthcare provider to drink more fluids.  Patient reports his typical dry weight is around 165 pounds.  He is gained roughly 5 pounds prior to admission.  Patient currently ambulating in room without oxygen.  Wishes to go home soon/tomorrow      Social History     Socioeconomic History   • Marital status:      Spouse name: Not on file   • Number of children: 3   • Years of education: Not on file   • Highest education level: Not on file   Occupational History   • Occupation: Retired      Comment: insurance agency    Tobacco Use   • Smoking status: Former Smoker     Packs/day: 0.25     Years: 30.00     Pack years: 7.50     Types: Cigarettes     Quit date:      Years since quittin.8   • Smokeless tobacco: Never Used   • Tobacco comment: quit 45 years ago- smoked since 19 yo    Substance and Sexual Activity   • Alcohol use: Yes     Alcohol/week: 1.0 - 2.0 standard drinks     Types: 1 - 2 Glasses of wine per week     Comment: occas   • Drug use: No   • Sexual activity: Defer   Social History Narrative    Lives with Wife Julia in [place], [place]     Caffeine:  3 servings per day     Family  "History   Problem Relation Age of Onset   • Stroke Mother    • Heart disease Father    • Heart disease Brother    • Coronary artery disease Brother          Review of Systems:  Review of Systems   Constitution: Positive for malaise/fatigue and weight gain. Negative for fever.   HENT: Negative for nosebleeds.    Eyes: Negative for redness and visual disturbance.   Cardiovascular: Positive for dyspnea on exertion and leg swelling. Negative for orthopnea, palpitations and paroxysmal nocturnal dyspnea.   Respiratory: Positive for shortness of breath and snoring. Negative for cough, sputum production and wheezing.    Hematologic/Lymphatic: Negative for bleeding problem.   Skin: Negative for flushing, itching and rash.   Musculoskeletal: Positive for arthritis and joint pain. Negative for falls and muscle cramps.   Gastrointestinal: Negative for abdominal pain, diarrhea, heartburn, nausea and vomiting.   Genitourinary: Negative for hematuria.   Neurological: Negative for excessive daytime sleepiness, dizziness, headaches, tremors and weakness.   Psychiatric/Behavioral: Negative for substance abuse. The patient is not nervous/anxious.               Objective   Vitals:  /73   Pulse 73   Temp 96.7 °F (35.9 °C) (Oral)   Resp 18   Ht 172.7 cm (68\")   Wt 79.1 kg (174 lb 4.7 oz)   SpO2 95%   BMI 26.50 kg/m²      Intake/Output Summary (Last 24 hours) at 10/8/2020 1311  Last data filed at 10/8/2020 1000  Gross per 24 hour   Intake 1020 ml   Output 1450 ml   Net -430 ml       Vitals signs reviewed.   Constitutional:       Appearance: Well-developed and not in distress.   HENT:    Mouth/Throat:      Pharynx: Oropharynx is clear.   Neck:      Vascular: No JVD.      Trachea: No tracheal deviation.   Pulmonary:      Effort: Pulmonary effort is normal.      Breath sounds: Examination of the right-lower field reveals rales. Examination of the left-lower field reveals rales. Bilateral to the midchest Rales present. "   Cardiovascular:      Normal rate. Regular rhythm.   Edema:     Peripheral edema present.     Pretibial: bilateral trace edema of the pretibial area.     Ankle: bilateral 1+ edema of the ankle.  Abdominal:      General: Bowel sounds are normal. There is distension.      Tenderness: There is no abdominal tenderness.   Musculoskeletal:         General: No deformity.   Skin:     General: Skin is warm and dry.   Neurological:      Mental Status: Alert and oriented to person, place, and time.              Results Review:  I reviewed the patient's new clinical results.  Results from last 7 days   Lab Units 10/07/20  1550   WBC 10*3/mm3 8.14   HEMOGLOBIN g/dL 9.5*   HEMATOCRIT % 30.7*   PLATELETS 10*3/mm3 286     Results from last 7 days   Lab Units 10/08/20  0423 10/07/20  1550   SODIUM mmol/L 140 142   POTASSIUM mmol/L 3.8 3.8   CHLORIDE mmol/L 100 101   CO2 mmol/L 28.0 29.0   BUN mg/dL 29* 29*   CREATININE mg/dL 1.74* 1.77*   CALCIUM mg/dL 8.7 9.0   BILIRUBIN mg/dL  --  0.4   ALK PHOS U/L  --  142*   ALT (SGPT) U/L  --  14   AST (SGOT) U/L  --  17   GLUCOSE mg/dL 103* 115*     Results from last 7 days   Lab Units 10/08/20  0423   SODIUM mmol/L 140   POTASSIUM mmol/L 3.8   CHLORIDE mmol/L 100   CO2 mmol/L 28.0   BUN mg/dL 29*   CREATININE mg/dL 1.74*   GLUCOSE mg/dL 103*   CALCIUM mg/dL 8.7         Lab Results   Lab Value Date/Time    TROPONINT 0.028 10/07/2020 1550    TROPONINT 0.027 10/04/2020 1307    TROPONINT 0.034 (C) 10/04/2020 1047    TROPONINT 0.021 07/23/2020 1338    TROPONINT 0.026 05/22/2020 0532    TROPONINT 0.017 05/22/2020 0121    TROPONINT 0.020 05/21/2020 2024         Results from last 7 days   Lab Units 10/08/20  0423   CHOLESTEROL mg/dL 118   TRIGLYCERIDES mg/dL 66   HDL CHOL mg/dL 43   LDL CHOL mg/dL 62     Results from last 7 days   Lab Units 10/07/20  1550   PROBNP pg/mL 10,970.0*           Tele:  SR with occ PVC's    EKG: SR with RBBB with NS ST and TW changes. LVH      Assessment/Plan     1. Acute  on chronic systolic congestive heart failure.  2. Valvular heart disease with previous TAVR  3. Sick sinus syndrome with recent leadless pacemaker implant.  4. Coronary artery disease, no current angina.  5. Hypertension, controlled.  6. Dyslipidemia, on statin.      Plan:    1. We will discontinue Nitropaste from patient's chest.  2. Check magnesium and TSH today given frequent ventricular ectopy.  3. May consider interrogating device to determine PVC burden which may need further medical therapy. Will reevaluate once heart failure improved.  4. Continue IV diuretics at this time.  Patient still volume overloaded.  But is nearing baseline  5. At discharge we will add as needed diuretics.  6. Discussed with patient decreased sodium and volume intake regarding his cardiac condition.  7. Follow renal function.  8. Discussed with patient currently our primary objective is regarding his quality of life, he verbalized understanding.        JUAN Pate   Dictated utilizing Dragon dictation  I have seen and examined the patient, I have reviewed the note, discussed the case with the advance practice clinician, made necessary changes and I agree with the final note.    Lv Cobian MD  10/08/20  18:10 EDT

## 2020-10-08 NOTE — PROGRESS NOTES
Discharge Planning Assessment  [facility]     Patient Name: Tad Moon  MRN: [id number]  Today's Date: 10/8/2020    Admit Date: 10/7/2020    Discharge Needs Assessment     Row Name 10/08/20 1022       Living Environment    Lives With  spouse    Current Living Arrangements  home/apartment/condo    Living Arrangement Comments  Lives in a condo. Has a stair lift. He drives prn.       Discharge Needs Assessment    Equipment Currently Used at Home  none    Equipment Needed After Discharge  none    Discharge Coordination/Progress  Has a borrowed walker and cane he doesnt use. Denies current use of HH or outpt services.        Discharge Plan     Row Name 10/08/20 1024       Plan    Plan  Home at DC    Patient/Family in Agreement with Plan  yes    Plan Comments  I spoke with the pt. States his spouse dresses his leg wound. He denies any DC needs at this time.    Final Discharge Disposition Code  01 - home or self-care    Row Name 10/08/20 0825       Plan    Final Discharge Disposition Code  01 - home or self-care        Continued Care and Services - Admitted Since 10/7/2020    Coordination has not been started for this encounter.       Expected Discharge Date and Time     Expected Discharge Date Expected Discharge Time    Oct 10, 2020         Demographic Summary    No documentation.       Functional Status     Row Name 10/08/20 1022       Functional Status    Usual Activity Tolerance  good       Functional Status, IADL    Medications  independent    Meal Preparation  independent    Housekeeping  independent    Laundry  independent    Shopping  independent        Psychosocial    No documentation.       Abuse/Neglect    No documentation.       Legal    No documentation.       Substance Abuse    No documentation.       Patient Forms    No documentation.           Keren Guerrero RN

## 2020-10-08 NOTE — NURSING NOTE
Cardiology Navigator Note      Patient Name:  Tad Moon  :  1926  DOS:  10/08/20    Active Hospital Problems    Diagnosis   • **Congestive heart failure (CMS/HCC)   • Non-pressure ulcer of left lower extremity (CMS/HCC)   • Peripheral vascular disease (CMS/HCC)   • Coronary artery disease   • Essential hypertension   • CKD (chronic kidney disease) stage 3, GFR 30-59 ml/min (CMS/HCC)   • Hyperlipidemia LDL goal <100       Consult has been received.  Medical records have been reviewed.  Patient is an existing patient of [facility].  Patient to be scheduled follow up 3-5 days post discharge.    Echo Results: May 2020  · Estimated EF = 30%.  · Normal functioning TAVR  · Moderate mitral valve regurgitation is present      Heart Failure Quality Measures    ACE I, ARB, ARNI (if EF <40%)     Yes  Entresto    Evidence-based Beta Blocker (EF<40%)    (Bisoprolol, Carvedilol, Metoprolol Succinate)  Yes  Carvedilol    Aldosterone Antagonist (EF <40%)  If no contraindications:  NEDRA / CKD / Renal Stenosis

## 2020-10-08 NOTE — PLAN OF CARE
Problem: Fall Injury Risk  Goal: Absence of Fall and Fall-Related Injury  Outcome: Ongoing, Progressing  Intervention: Identify and Manage Contributors to Fall Injury Risk  Recent Flowsheet Documentation  Taken 10/8/2020 1600 by Pita Florence RN  Self-Care Promotion:   independence encouraged   BADL personal objects within reach   BADL personal routines maintained   safe use of adaptive equipment encouraged  Taken 10/8/2020 1400 by Pita Florence RN  Self-Care Promotion:   independence encouraged   BADL personal objects within reach   BADL personal routines maintained   safe use of adaptive equipment encouraged  Taken 10/8/2020 1200 by Pita Florence RN  Self-Care Promotion:   independence encouraged   BADL personal objects within reach   BADL personal routines maintained   safe use of adaptive equipment encouraged  Taken 10/8/2020 1000 by Pita Florence RN  Self-Care Promotion:   independence encouraged   BADL personal objects within reach   BADL personal routines maintained   safe use of adaptive equipment encouraged  Taken 10/8/2020 0800 by Pita Florence RN  Medication Review/Management: medications reviewed  Self-Care Promotion:   independence encouraged   BADL personal objects within reach   BADL personal routines maintained   safe use of adaptive equipment encouraged  Intervention: Promote Injury-Free Environment  Recent Flowsheet Documentation  Taken 10/8/2020 1600 by Pita Florence, RN  Safety Promotion/Fall Prevention:   activity supervised   assistive device/personal items within reach   clutter free environment maintained   room organization consistent   safety round/check completed   nonskid shoes/slippers when out of bed  Taken 10/8/2020 1400 by Pita Florence, RN  Safety Promotion/Fall Prevention:   activity supervised   assistive device/personal items within reach   clutter free environment maintained   fall prevention program maintained   room organization consistent   safety round/check  completed   nonskid shoes/slippers when out of bed  Taken 10/8/2020 1200 by Pita Florence, RN  Safety Promotion/Fall Prevention:   activity supervised   assistive device/personal items within reach   clutter free environment maintained   nonskid shoes/slippers when out of bed   room organization consistent   safety round/check completed  Taken 10/8/2020 1000 by Pita Florence, RN  Safety Promotion/Fall Prevention:   activity supervised   assistive device/personal items within reach   clutter free environment maintained   nonskid shoes/slippers when out of bed   room organization consistent   safety round/check completed  Taken 10/8/2020 0800 by Pita Florence, RN  Safety Promotion/Fall Prevention:   activity supervised   assistive device/personal items within reach   clutter free environment maintained   nonskid shoes/slippers when out of bed   room organization consistent   safety round/check completed   Goal Outcome Evaluation:

## 2020-10-08 NOTE — PROGRESS NOTES
[facility] Medicine Services  PROGRESS NOTE    Patient Name: Tad Moon  : 1926  MRN: [id number]    Date of Admission: 10/7/2020  Primary Care Physician: Pramod Hyde MD    Subjective   Subjective     CC:  SOA     HPI:  States his breathing seems a little bit better. Anxious to see cardiology. Would like his wound cleaned and dressed.     Review of Systems  Gen- No fevers, chills  CV- No chest pain, palpitations  Resp- No cough, + dyspnea  GI- No N/V/D, abd pain     Objective   Objective     Vital Signs:   Temp:  [96.7 °F (35.9 °C)-98.2 °F (36.8 °C)] 96.7 °F (35.9 °C)  Heart Rate:  [73-95] 73  Resp:  [16-18] 18  BP: (103-142)/(64-85) 103/73        Physical Exam:  Constitutional: No acute distress, awake, alert  HENT: NCAT, mucous membranes moist  Respiratory: normal effort; bibasilar crackles   Cardiovascular: RRR, II/VI murmurs  Gastrointestinal: Positive bowel sounds, soft, nontender, nondistended  Musculoskeletal: trace edema left lower leg  Psychiatric: Appropriate affect, cooperative  Neurologic: Oriented x 3, strength symmetric in all extremities, Cranial Nerves grossly intact to confrontation, speech clear  Skin: No rashes    Results Reviewed:  Results from last 7 days   Lab Units 10/07/20  1550 10/04/20  1047   WBC 10*3/mm3 8.14 9.78   HEMOGLOBIN g/dL 9.5* 9.5*   HEMATOCRIT % 30.7* 30.6*   PLATELETS 10*3/mm3 286 252     Results from last 7 days   Lab Units 10/08/20  0423 10/07/20  1550 10/04/20  1307 10/04/20  1047   SODIUM mmol/L 140 142  --  136   POTASSIUM mmol/L 3.8 3.8  --  4.1   CHLORIDE mmol/L 100 101  --  97*   CO2 mmol/L 28.0 29.0  --  28.0   BUN mg/dL 29* 29*  --  23   CREATININE mg/dL 1.74* 1.77*  --  1.59*   GLUCOSE mg/dL 103* 115*  --  97   CALCIUM mg/dL 8.7 9.0  --  8.8   ALT (SGPT) U/L  --  14  --  16   AST (SGOT) U/L  --  17  --  20   TROPONIN T ng/mL  --  0.028 0.027 0.034*   PROBNP pg/mL  --  10,970.0*  --  16,337.0*     Estimated Creatinine  Clearance: 29.7 mL/min (A) (by C-G formula based on SCr of 1.74 mg/dL (H)).    Microbiology Results Abnormal     None          Imaging Results (Last 24 Hours)     Procedure Component Value Units Date/Time    XR Chest 1 View [[id number]] Collected: 10/07/20 1628     Updated: 10/08/20 1235    Narrative:      EXAMINATION: XR CHEST 1 VW- 10/07/2020     INDICATION: SOA triage protocol      COMPARISON: 10/04/2020     FINDINGS: Portable chest reveals heart to be enlarged. Ill-defined  opacification seen left lung base unchanged. Small left pleural  effusion. Increased pulmonary vascularity bilaterally. The chest is  stable. Degenerative changes seen within the spine. Vascular  calcifications seen within the thoracic aorta.           Impression:      Chest is stable with increased pulmonary vascularity  bilaterally, enlargement of the heart, small left pleural effusion and  mild increased markings at the left lung base.     D:  10/07/2020  E:  10/07/2020     This report was finalized on 10/8/2020 12:32 PM by Dr. Gretchen Matias MD.             Results for orders placed during the hospital encounter of 05/21/20   Transthoracic Echo Complete With Contrast if Necessary Per Protocol    Narrative · Estimated EF = 30%.  · Normal functioning TAVR  · Moderate mitral valve regurgitation is present          I have reviewed the medications:  Scheduled Meds:apixaban, 2.5 mg, Oral, Q12H  aspirin, 81 mg, Oral, Daily  atorvastatin, 40 mg, Oral, Daily  carvedilol, 6.25 mg, Oral, BID With Meals  ferrous sulfate, 325 mg, Oral, Every Other Day  furosemide, 40 mg, Intravenous, Q12H  mupirocin, , Topical, Q12H  pharmacy consult - [facility], , Does not apply, Daily  sacubitril-valsartan, 1 tablet, Oral, BID  sertraline, 50 mg, Oral, Nightly  sodium chloride, 10 mL, Intravenous, Q12H      Continuous Infusions:   PRN Meds:.nitroglycerin  •  sodium chloride  •  sodium chloride    Assessment/Plan   Assessment & Plan     Active Hospital Problems     Diagnosis  POA   • **Congestive heart failure (CMS/[facility]) [I50.9]  Yes   • Non-pressure ulcer of left lower extremity (CMS/[facility]) [L97.929]  Yes   • Peripheral vascular disease (CMS/[facility]) [I73.9]  Yes   • Coronary artery disease [I25.10]  Yes   • Essential hypertension [I10]  Yes   • CKD (chronic kidney disease) stage 3, GFR 30-59 ml/min (CMS/[facility]) [N18.30]  Yes   • Hyperlipidemia LDL goal <100 [E78.5]  Yes      Resolved Hospital Problems   No resolved problems to display.        Brief Hospital Course to date:  Tad Moon is a 89 y.o. male with history of CAD s/p CABG/stents, systolic heart failure, syncope, hypertension, dyslipidemia, GERD, seizure disorder, CLL, presents to the ED with complaints of shortness of breath.  Patient reports that he has had ongoing shortness of breath for the past month.  He was given lasix twice this month at the heart and valve clinic, which did help with his breathing.  On Sunday he was seen in the ED with complaints of shortness of breath and edema, was given a dose of IV Lasix, reported that he felt much better and went home.  Returns to the ED today with worsening dyspnea and edema.      Plan:  A/C systolic HF  CAD s/p CABG/stents  CHB s/p PPM  HTN  HLD  PVD  Remote history of afib  --Chest x-ray shows increased pulmonary vascularity bilaterally, enlargement of the heart, small left pleural effusion, and mild increased markings at the left lung base   - Last ECHO 5/20 with EF 30% history of TAVR, moderate mitral regurg   - Continued diuresis with lasix 40 mg IV BID   - Cardiology consult  --continue entresto, coreg, aspirin, statin, eliquis   - Strict I/Os; heart failure navigator      CKD  --stable  --baseline creatinine 1.5-2.0  -- monitor with diuresis      Ulceration left shin  --surgery with Dr. Wang at [facility] at the end of September  --Dr. Wang managing dressing changes/wound care--instructions at bedside       CODE STATUS:   Code Status and Medical  Interventions:   Ordered at: 10/07/20 2021     Level Of Support Discussed With:    Patient     Code Status:    CPR     Medical Interventions (Level of Support Prior to Arrest):    Full       Eve Cline DO  10/08/20

## 2020-10-08 NOTE — NURSING NOTE
Pt doing well today. No complaints. UAL in room spending most of day in chair. Dressing changed to right shin s/p dermatologic removal of large lesion. Pt given some dressing supplies and will bring bactroban and wound wash at discharge which is anticipated tomorrow AM. Tele shows an underlying AFib rhythm with BBB. No Pacemaker spikes noted throughout last 12 hour period. Nitropaste was removed from patient's chest wall. Adequate I/O. Pt very pleasant, working on crossword puzzles and verbalizes understanding of POC.

## 2020-10-09 NOTE — PLAN OF CARE
VSS, no c/o pain, a-fib on monitor, rested on and off throughout shift, bladder scan r/t no urine output, pt able to void when standing, pt voiced frustration at lack of resting throughout night, will continue to monitor

## 2020-10-09 NOTE — PROGRESS NOTES
"[facility] Cardiology Daily Note       LOS: 0 days   Patient Care Team:  Pramod Hyde MD as PCP - General  Pramod Hyde MD as PCP - Claims Attributed  Lv Cobian MD as Consulting Physician (Cardiology)    Chief Complaint:  A/C CHF, VHD, SSS, CAD, HTN    Subjective     Subjective: no acute events overnight. UOP +400 in last 24 hours but reports good UOP. Renal function stable. Does not feel back to baseline. Unable to sleep last night in bed. Felt sob and anxious.  Patient still feels more short of breath today than he did yesterday has orthopnea and PND today.    Review of Systems:   As above.    Medications:  apixaban, 2.5 mg, Oral, Q12H  aspirin, 81 mg, Oral, Daily  atorvastatin, 40 mg, Oral, Daily  carvedilol, 6.25 mg, Oral, BID With Meals  ferrous sulfate, 325 mg, Oral, Every Other Day  furosemide, 40 mg, Intravenous, BID  mupirocin, , Topical, Q12H  pharmacy consult - [facility], , Does not apply, Daily  sacubitril-valsartan, 1 tablet, Oral, BID  sertraline, 50 mg, Oral, Nightly  sodium chloride, 10 mL, Intravenous, Q12H        Objective     Vital Sign Min/Max for last 24 hours  Temp  Min: 97.4 °F (36.3 °C)  Max: 97.9 °F (36.6 °C)   BP  Min: 104/78  Max: 132/62   Pulse  Min: 63  Max: 75   Resp  Min: 18  Max: 18   SpO2  Min: 95 %  Max: 100 %   Flow (L/min)  Min: 2  Max: 2   Weight  Min: 78.5 kg (173 lb)  Max: 78.5 kg (173 lb 1 oz)      Intake/Output Summary (Last 24 hours) at 10/9/2020 0956  Last data filed at 10/9/2020 0500  Gross per 24 hour   Intake 660 ml   Output 725 ml   Net -65 ml        Flowsheet Rows      First Filed Value   Admission Height  172.7 cm (68\") Documented at 10/07/2020 1534   Admission Weight  77.1 kg (170 lb) Documented at 10/07/2020 1534          Physical Exam:    General: Alert and oriented.   Cardiovascular: Heart has a nondisplaced focal PMI. Regular rate and rhythm without murmur, gallop or rub.  Lungs: Bibasal rales.  Decreased breath sounds left " base. Equal expansion is noted.   Abdomen: Soft, nontender.  Extremities: Show trace edema.   Skin: warm and dry.     Results Review:    I reviewed the patient's new clinical results.  EKG:  Tele: SR    Labs:    Results from last 7 days   Lab Units 10/09/20  0419 10/08/20  0423 10/07/20  1550 10/04/20  1047   SODIUM mmol/L 136 140 142 136   POTASSIUM mmol/L 3.7 3.8 3.8 4.1   CHLORIDE mmol/L 97* 100 101 97*   CO2 mmol/L 27.0 28.0 29.0 28.0   BUN mg/dL 28* 29* 29* 23   CREATININE mg/dL 1.62* 1.74* 1.77* 1.59*   CALCIUM mg/dL 8.8 8.7 9.0 8.8   BILIRUBIN mg/dL  --   --  0.4 0.4   ALK PHOS U/L  --   --  142* 134*   ALT (SGPT) U/L  --   --  14 16   AST (SGOT) U/L  --   --  17 20   GLUCOSE mg/dL 117* 103* 115* 97     Results from last 7 days   Lab Units 10/09/20  0419 10/07/20  1550 10/04/20  1047   WBC 10*3/mm3 8.34 8.14 9.78   HEMOGLOBIN g/dL 9.2* 9.5* 9.5*   HEMATOCRIT % 29.7* 30.7* 30.6*   PLATELETS 10*3/mm3 293 286 252     Lab Results   Component Value Date    TROPONINI 11.987 (C) 10/10/2017    TROPONINI 17.487 (C) 10/09/2017    TROPONINI 8.457 (C) 10/09/2017    TROPONINT 0.028 10/07/2020    TROPONINT 0.027 10/04/2020    TROPONINT 0.034 (C) 10/04/2020     Lab Results   Component Value Date    CHOL 118 10/08/2020    CHOL 196 09/26/2019    CHOL 154 10/10/2017     Lab Results   Component Value Date    TRIG 66 10/08/2020    TRIG 117 09/26/2019    TRIG 79 10/10/2017     Lab Results   Component Value Date    HDL 43 10/08/2020    HDL 54 09/26/2019    HDL 39 (L) 10/10/2017     No components found for: LDLCALC  Lab Results   Component Value Date    INR 1.25 (H) 07/23/2020    INR 1.06 05/26/2020    INR 1.09 04/28/2018    PROTIME 15.4 (H) 07/23/2020    PROTIME 13.5 05/26/2020    PROTIME 11.4 04/28/2018         Ejection Fraction:    1. Acute on chronic systolic congestive heart failure.  2. Valvular heart disease with previous TAVR  3. Sick sinus syndrome with recent leadless pacemaker implant.  4. Coronary artery disease, no  current angina.  5. Hypertension, controlled.  6. Dyslipidemia, on statin.  7. Frequent ectopy  1. Mag 1.7; TSH WNL.  PVC burden has decreased significantly with diuresis        Plan:     1. Keep mag >2 for frequent ectopy.   2. Continue IV diuretics at this time.  Patient still volume overloaded.  But is nearing baseline, increase diuretic.  3. Fluid restrictions and low sodium diet. Daily weights at home and PRN diuretics at time of discharge.   4. Follow renal function, electrolytes.  5. Tentative home over the weekend.          Electronically signed by JUAN Roper, 10/09/20, 9:57 AM EDT.  I have seen and examined the patient, I have reviewed the note, discussed the case with the advance practice clinician, made necessary changes and I agree with the final note.    Lv Cobian MD  10/09/20  15:20 EDT

## 2020-10-09 NOTE — PLAN OF CARE
Problem: Fall Injury Risk  Goal: Absence of Fall and Fall-Related Injury  Outcome: Ongoing, Progressing  Intervention: Identify and Manage Contributors to Fall Injury Risk  Recent Flowsheet Documentation  Taken 10/9/2020 0800 by Jon Cabral, RN  Medication Review/Management: medications reviewed  Intervention: Promote Injury-Free Environment  Recent Flowsheet Documentation  Taken 10/9/2020 1200 by Jon Cabral, RN  Safety Promotion/Fall Prevention:   activity supervised   assistive device/personal items within reach   clutter free environment maintained   fall prevention program maintained   nonskid shoes/slippers when out of bed   room organization consistent   safety round/check completed   toileting scheduled  Taken 10/9/2020 1000 by Jon Cabral, RN  Safety Promotion/Fall Prevention:   activity supervised   assistive device/personal items within reach   clutter free environment maintained   fall prevention program maintained   nonskid shoes/slippers when out of bed   room organization consistent   safety round/check completed   toileting scheduled  Taken 10/9/2020 0800 by Jon Cabral, RN  Safety Promotion/Fall Prevention:   activity supervised   assistive device/personal items within reach   clutter free environment maintained   fall prevention program maintained   nonskid shoes/slippers when out of bed   room organization consistent   safety round/check completed   toileting scheduled   Goal Outcome Evaluation:  Plan of Care Reviewed With: patient  Progress: no change  Outcome Summary: VSS. Pt c/o anxiety today. PRN Zanax given. Pt scratched leg while transferring today. Wound cleaned and dressed. Will continue to monitor.

## 2020-10-09 NOTE — SIGNIFICANT NOTE
Explained the risks of refusing bed/chair alarms and not calling when needing to ambulate. Discussed patient needs and concerns. Patient agreed to move closer to nurses station so staff could assist quicker. Patient agreed to bed alarm while in bed and using the chair alarm. Patient also agreed to call for assistance.     Agusto Roy RN, CCRN   Dir 5F

## 2020-10-09 NOTE — PROGRESS NOTES
[facility] Medicine Services  PROGRESS NOTE    Patient Name: Tad Moon  : 1926  MRN: [id number]    Date of Admission: 10/7/2020  Primary Care Physician: Pramod Hyde MD    Subjective   Subjective     CC:  Dyspnea, acute systolic heart failure    HPI:  Difficulty sleeping overnight. Felt that he had a panic attack. SOA and chest tightness. States he felt restless. Still feeling very restless. Doesn't feel his breathing is as good as yesterday.     Review of Systems  Gen- No fevers, chills  CV- No chest pain, palpitations  Resp- No cough, + dyspnea  GI- No N/V/D, abd pain     Objective   Objective     Vital Signs:   Temp:  [97.4 °F (36.3 °C)-98.3 °F (36.8 °C)] 98.3 °F (36.8 °C)  Heart Rate:  [68-75] 75  Resp:  [18] 18  BP: (104-132)/(62-78) 121/70        Physical Exam:  Constitutional: No acute distress, awake, alert  HENT: NCAT, mucous membranes moist  Respiratory: normal effort; mild bibasilar rales   Cardiovascular: RRR, no murmurs  Gastrointestinal: Positive bowel sounds, soft, nontender, nondistended  Musculoskeletal: +1 bilateral ankle edema  Psychiatric: Appropriate affect, cooperative  Neurologic: Oriented x 3, strength symmetric in all extremities, Cranial Nerves grossly intact to confrontation, speech clear  Skin: No rashes; chronic venous stasis changes     Results Reviewed:  Results from last 7 days   Lab Units 10/09/20  0419 10/07/20  1550 10/04/20  1047   WBC 10*3/mm3 8.34 8.14 9.78   HEMOGLOBIN g/dL 9.2* 9.5* 9.5*   HEMATOCRIT % 29.7* 30.7* 30.6*   PLATELETS 10*3/mm3 293 286 252     Results from last 7 days   Lab Units 10/09/20  0419 10/08/20  0423 10/07/20  1550 10/04/20  1307 10/04/20  1047   SODIUM mmol/L 136 140 142  --  136   POTASSIUM mmol/L 3.7 3.8 3.8  --  4.1   CHLORIDE mmol/L 97* 100 101  --  97*   CO2 mmol/L 27.0 28.0 29.0  --  28.0   BUN mg/dL 28* 29* 29*  --  23   CREATININE mg/dL 1.62* 1.74* 1.77*  --  1.59*   GLUCOSE mg/dL 117* 103* 115*   --  97   CALCIUM mg/dL 8.8 8.7 9.0  --  8.8   ALT (SGPT) U/L  --   --  14  --  16   AST (SGOT) U/L  --   --  17  --  20   TROPONIN T ng/mL  --   --  0.028 0.027 0.034*   PROBNP pg/mL  --   --  10,970.0*  --  16,337.0*     Estimated Creatinine Clearance: 31.6 mL/min (A) (by C-G formula based on SCr of 1.62 mg/dL (H)).    Microbiology Results Abnormal     Procedure Component Value - Date/Time    COVID PRE-OP / PRE-PROCEDURE SCREENING ORDER (NO ISOLATION) - Swab, Nasopharynx [[id number]] Collected: 10/08/20 0010    Lab Status: Final result Specimen: Swab from Nasopharynx Updated: 10/08/20 1944    Narrative:      The following orders were created for panel order COVID PRE-OP / PRE-PROCEDURE SCREENING ORDER (NO ISOLATION) - Swab, Nasopharynx.  Procedure                               Abnormality         Status                     ---------                               -----------         ------                     COVID-19,[facility], NP ...[[id number]]                      Final result                 Please view results for these tests on the individual orders.    COVID-19,[facility], NP SWAB IN [facility] VIRAL TRANSPORT MEDIA 24-30 HR TAT - Swab, Nasopharynx [[id number]] Collected: 10/08/20 0010    Lab Status: Final result Specimen: Swab from Nasopharynx Updated: 10/08/20 1944     SARS-CoV-2 CELESTE Not Detected          Imaging Results (Last 24 Hours)     ** No results found for the last 24 hours. **          Results for orders placed during the hospital encounter of 05/21/20   Transthoracic Echo Complete With Contrast if Necessary Per Protocol    Narrative · Estimated EF = 30%.  · Normal functioning TAVR  · Moderate mitral valve regurgitation is present          I have reviewed the medications:  Scheduled Meds:apixaban, 2.5 mg, Oral, Q12H  aspirin, 81 mg, Oral, Daily  atorvastatin, 40 mg, Oral, Daily  carvedilol, 6.25 mg, Oral, BID With Meals  ferrous sulfate, 325 mg, Oral, Every Other Day  furosemide, 40 mg, Intravenous,  BID  mupirocin, , Topical, Q12H  pharmacy consult - MTM, , Does not apply, Daily  sacubitril-valsartan, 1 tablet, Oral, BID  sertraline, 50 mg, Oral, Nightly  sodium chloride, 10 mL, Intravenous, Q12H      Continuous Infusions:   PRN Meds:.hydrOXYzine  •  magnesium sulfate **OR** magnesium sulfate **OR** magnesium sulfate  •  melatonin  •  nitroglycerin  •  potassium chloride **OR** potassium chloride **OR** potassium chloride  •  prochlorperazine  •  sodium chloride  •  sodium chloride    Assessment/Plan   Assessment & Plan     Active Hospital Problems    Diagnosis  POA   • **Congestive heart failure (CMS/[facility]) [I50.9]  Yes   • Non-pressure ulcer of left lower extremity (CMS/[facility]) [L97.929]  Yes   • Peripheral vascular disease (CMS/[facility]) [I73.9]  Yes   • Coronary artery disease [I25.10]  Yes   • Essential hypertension [I10]  Yes   • CKD (chronic kidney disease) stage 3, GFR 30-59 ml/min (CMS/[facility]) [N18.30]  Yes   • Hyperlipidemia LDL goal <100 [E78.5]  Yes      Resolved Hospital Problems   No resolved problems to display.        Brief Hospital Course to date:  Tad Moon is a 89 y.o. male with history of CAD s/p CABG/stents, systolic heart failure, syncope, hypertension, dyslipidemia, GERD, seizure disorder, CLL, presents to the ED with complaints of shortness of breath.  Patient reports that he has had ongoing shortness of breath for the past month.  He was given lasix twice this month at the heart and valve clinic, which did help with his breathing.  On Sunday he was seen in the ED with complaints of shortness of breath and edema, was given a dose of IV Lasix, reported that he felt much better and went home.  Returns to the ED  with worsening dyspnea and edema.      Plan:  A/C systolic HF  CAD s/p CABG/stents  CHB s/p PPM  HTN  HLD  PVD  Remote history of afib  --Chest x-ray shows increased pulmonary vascularity bilaterally, enlargement of the heart, small left pleural effusion, and mild increased markings at the  left lung base   - Last ECHO 5/20 with EF 30% history of TAVR, moderate mitral regurg   - Cardiology following  - Volume status improved but still overloaded  - Continue lasix 40 mg IV BID   --continue entresto, coreg, aspirin, statin, eliquis   - Strict I/Os; heart failure navigator  - recommending daily weights and PRN diuretics at discharge       CKD  --stable  --baseline creatinine 1.5-2.0  -- monitor with diuresis      Ulceration left shin  --surgery with Dr. Wang at [facility] at the end of September  --Dr. Wang managing dressing changes/wound care--instructions at bedside      Insomnia   - PRN melatonin and atarax ordered     CODE STATUS:   Code Status and Medical Interventions:   Ordered at: 10/07/20 2021     Level Of Support Discussed With:    Patient     Code Status:    CPR     Medical Interventions (Level of Support Prior to Arrest):    Full       Eve Cline DO  10/09/20

## 2020-10-10 NOTE — PLAN OF CARE
VSS, pt rested well this shift, c/o anxiety at beginning of shift, see MAR, will continue to monitor

## 2020-10-10 NOTE — PLAN OF CARE
Problem: Fall Injury Risk  Goal: Absence of Fall and Fall-Related Injury  Outcome: Ongoing, Progressing  Intervention: Identify and Manage Contributors to Fall Injury Risk  Recent Flowsheet Documentation  Taken 10/10/2020 0800 by Jon Cabral, RN  Medication Review/Management: medications reviewed  Intervention: Promote Injury-Free Environment  Recent Flowsheet Documentation  Taken 10/10/2020 1800 by Jon Cabral, RN  Safety Promotion/Fall Prevention:   activity supervised   assistive device/personal items within reach   clutter free environment maintained   fall prevention program maintained   nonskid shoes/slippers when out of bed   room organization consistent   safety round/check completed   toileting scheduled  Taken 10/10/2020 1600 by Jon Cabral, RN  Safety Promotion/Fall Prevention:   activity supervised   assistive device/personal items within reach   clutter free environment maintained   fall prevention program maintained   nonskid shoes/slippers when out of bed   room organization consistent   safety round/check completed   toileting scheduled  Taken 10/10/2020 1400 by Jon Cabral, RN  Safety Promotion/Fall Prevention:   activity supervised   assistive device/personal items within reach   clutter free environment maintained   fall prevention program maintained   nonskid shoes/slippers when out of bed   room organization consistent   safety round/check completed   toileting scheduled  Taken 10/10/2020 1200 by Jon Cabral, RN  Safety Promotion/Fall Prevention:   activity supervised   assistive device/personal items within reach   clutter free environment maintained   fall prevention program maintained   nonskid shoes/slippers when out of bed   room organization consistent   safety round/check completed   toileting scheduled  Taken 10/10/2020 1000 by Jon Cabral, RN  Safety Promotion/Fall Prevention:   activity supervised   assistive device/personal items within reach   clutter free environment  maintained   fall prevention program maintained   nonskid shoes/slippers when out of bed   room organization consistent   safety round/check completed   toileting scheduled  Taken 10/10/2020 0800 by Jon Cabral RN  Safety Promotion/Fall Prevention:   activity supervised   assistive device/personal items within reach   clutter free environment maintained   fall prevention program maintained   nonskid shoes/slippers when out of bed   room organization consistent   safety round/check completed   toileting scheduled   Goal Outcome Evaluation:  Plan of Care Reviewed With: patient  Progress: no change  Outcome Summary: VSS> Pt given Bumex x2 today. No c/o pain. No PRN pain meds given. Pt states he is breather better than the tia of the shift. Will continue to monitor.

## 2020-10-10 NOTE — PROGRESS NOTES
[facility] Medicine Services  PROGRESS NOTE    Patient Name: Tad Moon  : 1926  MRN: [id number]    Date of Admission: 10/7/2020  Primary Care Physician: Pramod Hyde MD    Subjective   Subjective     CC:  SOA     HPI:  Difficulty overnight with anxiety as well as shortness of breath.  Today states he is not doing well and that he feels on edge and short of breath.  States he takes his wife's Xanax maybe 1-2 times per week.  States he does drink 1 to 2 glasses of wine per night on occasion he will have a toddy before dinner.    Review of Systems  Gen- No fevers, chills  CV- No chest pain, palpitations  Resp- No cough, + dyspnea  GI- No N/V/D, abd pain     Objective   Objective     Vital Signs:   Temp:  [96.2 °F (35.7 °C)-98.3 °F (36.8 °C)] 98.2 °F (36.8 °C)  Heart Rate:  [69-86] 69  Resp:  [18-20] 18  BP: (104-127)/(70-80) 119/78        Physical Exam:  Constitutional: No acute distress, awake, alert  HENT: NCAT, mucous membranes moist  Respiratory: bibasilar crackles   Cardiovascular: RRR, no murmurs  Gastrointestinal: Positive bowel sounds, soft, nontender, nondistended  Musculoskeletal: trace bilateral ankle edema  Psychiatric: Appropriate affect, cooperative  Neurologic: Oriented x 3, strength symmetric in all extremities, Cranial Nerves grossly intact to confrontation, speech clear  Skin: No rashes    Results Reviewed:  Results from last 7 days   Lab Units 10/09/20  0419 10/07/20  1550 10/04/20  1047   WBC 10*3/mm3 8.34 8.14 9.78   HEMOGLOBIN g/dL 9.2* 9.5* 9.5*   HEMATOCRIT % 29.7* 30.7* 30.6*   PLATELETS 10*3/mm3 293 286 252     Results from last 7 days   Lab Units 10/10/20  0440 10/09/20  0419 10/08/20  0423 10/07/20  1550 10/04/20  1307 10/04/20  1047   SODIUM mmol/L 135* 136 140 142  --  136   POTASSIUM mmol/L 3.6 3.7 3.8 3.8  --  4.1   CHLORIDE mmol/L 95* 97* 100 101  --  97*   CO2 mmol/L 27.0 27.0 28.0 29.0  --  28.0   BUN mg/dL 29* 28* 29* 29*  --  23      CREATININE mg/dL 1.81* 1.62* 1.74* 1.77*  --  1.59*   GLUCOSE mg/dL 102* 117* 103* 115*  --  97   CALCIUM mg/dL 9.0 8.8 8.7 9.0  --  8.8   ALT (SGPT) U/L  --   --   --  14  --  16   AST (SGOT) U/L  --   --   --  17  --  20   TROPONIN T ng/mL  --   --   --  0.028 0.027 0.034*   PROBNP pg/mL 17,533.0*  --   --  10,970.0*  --  16,337.0*     Estimated Creatinine Clearance: 28.3 mL/min (A) (by C-G formula based on SCr of 1.81 mg/dL (H)).    Microbiology Results Abnormal     Procedure Component Value - Date/Time    COVID PRE-OP / PRE-PROCEDURE SCREENING ORDER (NO ISOLATION) - Swab, Nasopharynx [[id number]] Collected: 10/08/20 0010    Lab Status: Final result Specimen: Swab from Nasopharynx Updated: 10/08/20 1944    Narrative:      The following orders were created for panel order COVID PRE-OP / PRE-PROCEDURE SCREENING ORDER (NO ISOLATION) - Swab, Nasopharynx.  Procedure                               Abnormality         Status                     ---------                               -----------         ------                     COVID-19,[facility], NP ...[[id number]]                      Final result                 Please view results for these tests on the individual orders.    COVID-19,[facility], NP SWAB IN LEXAR VIRAL TRANSPORT MEDIA 24-30 HR TAT - Swab, Nasopharynx [[id number]] Collected: 10/08/20 0010    Lab Status: Final result Specimen: Swab from Nasopharynx Updated: 10/08/20 1944     SARS-CoV-2 CELESTE Not Detected          Imaging Results (Last 24 Hours)     Procedure Component Value Units Date/Time    XR Chest 1 View [[id number]] Resulted: 10/10/20 1012     Updated: 10/10/20 1013          Results for orders placed during the hospital encounter of 05/21/20   Transthoracic Echo Complete With Contrast if Necessary Per Protocol    Narrative · Estimated EF = 30%.  · Normal functioning TAVR  · Moderate mitral valve regurgitation is present          I have reviewed the medications:  Scheduled Meds:apixaban, 2.5 mg,  Oral, Q12H  aspirin, 81 mg, Oral, Daily  atorvastatin, 40 mg, Oral, Daily  bumetanide, 1 mg, Intravenous, Once  carvedilol, 6.25 mg, Oral, BID With Meals  ferrous sulfate, 325 mg, Oral, Every Other Day  mupirocin, , Topical, Q12H  pharmacy consult - [facility], , Does not apply, Daily  sacubitril-valsartan, 1 tablet, Oral, BID  sertraline, 50 mg, Oral, Nightly  sodium chloride, 10 mL, Intravenous, Q12H      Continuous Infusions:   PRN Meds:.•  ALPRAZolam  •  hydrOXYzine  •  magnesium sulfate **OR** magnesium sulfate in D5W 1g/100mL (PREMIX) **OR** magnesium sulfate  •  melatonin  •  nitroglycerin  •  prochlorperazine  •  sodium chloride  •  sodium chloride    Assessment/Plan   Assessment & Plan     Active Hospital Problems    Diagnosis  POA   • **Congestive heart failure (CMS/HCC) [I50.9]  Yes   • Non-pressure ulcer of left lower extremity (CMS/HCC) [L97.929]  Yes   • Peripheral vascular disease (CMS/HCC) [I73.9]  Yes   • Coronary artery disease [I25.10]  Yes   • Essential hypertension [I10]  Yes   • CKD (chronic kidney disease) stage 3, GFR 30-59 ml/min (CMS/HCC) [N18.30]  Yes   • Hyperlipidemia LDL goal <100 [E78.5]  Yes      Resolved Hospital Problems   No resolved problems to display.        Brief Hospital Course to date:  Tad Moon is a 89 y.o. male with history of CAD s/p CABG/stents, systolic heart failure, syncope, hypertension, dyslipidemia, GERD, seizure disorder, CLL, presents to the ED with complaints of shortness of breath.  Patient reports that he has had ongoing shortness of breath for the past month.  He was given lasix twice this month at the heart [facility] valve clinic, which did help with his breathing.  On Sunday he was seen in the ED with complaints of shortness of breath and edema, was given a dose of IV Lasix, reported that he felt much better and went home.  Returns to the ED  with worsening dyspnea and edema.      Plan:  A/C systolic HF  CAD s/p CABG/stents  CHB s/p PPM  HTN  HLD  PVD  Remote  history of afib  --Chest x-ray shows increased pulmonary vascularity bilaterally, enlargement of the heart, small left pleural effusion, and mild increased markings at the left lung base on admission  - Last ECHO 5/20 with EF 30% history of TAVR, moderate mitral regurg   - Cardiology following  - Increase in SOA and increase in BNP  - STAT CXR pending  - Bumex 1 mg IV x 1   --continue entresto, coreg, aspirin, statin, eliquis   - Strict I/Os; heart failure navigator     CKD  --stable  --baseline creatinine 1.5-2.0  -- monitor with diuresis      Ulceration left shin  --surgery with Dr. Wang at [facility] at the end of September  --Dr. Wang managing dressing changes/wound care--instructions at bedside      Insomnia   Anxiety   - Takes his wife's xanax at home - unsure of the extent of this with varying answers from the patient; also drinks daily with varying answers from patient   - PRN xanax   - CIWA      CODE STATUS:   Code Status and Medical Interventions:   Ordered at: 10/07/20 2021     Level Of Support Discussed With:    Patient     Code Status:    CPR     Medical Interventions (Level of Support Prior to Arrest):    Full       Eve Cline,   10/10/20

## 2020-10-10 NOTE — PROGRESS NOTES
"[facility] Cardiology Daily Note       LOS: 1 day   Patient Care Team:  Pramod Hyde MD as PCP - General  Pramod Hyde MD as PCP - Claims Attributed  Lv Cobian MD as Consulting Physician (Cardiology)    Chief Complaint:  A/C CHF, VHD, SSS, CAD, HTN    Subjective     Subjective:   Patient up resting in bed.  Reports ongoing dyspnea this morning.  Has urinated once since receiving IV diuresis.  Denies chest pain, palpitations, or lower extremity edema.    Review of Systems:   Negative for exertional chest pain, dyspnea with exertion, orthopnea, PND, lower extremity edema, palpitations, lightheadedness, syncope.    Medications:  apixaban, 2.5 mg, Oral, Q12H  aspirin, 81 mg, Oral, Daily  atorvastatin, 40 mg, Oral, Daily  bumetanide, 1 mg, Intravenous, Once  carvedilol, 6.25 mg, Oral, BID With Meals  ferrous sulfate, 325 mg, Oral, Every Other Day  mupirocin, , Topical, Q12H  pharmacy consult - [facility], , Does not apply, Daily  sacubitril-valsartan, 1 tablet, Oral, BID  sertraline, 50 mg, Oral, Nightly  sodium chloride, 10 mL, Intravenous, Q12H        Objective     Vital Sign Min/Max for last 24 hours  Temp  Min: 96.2 °F (35.7 °C)  Max: 98.3 °F (36.8 °C)   BP  Min: 104/79  Max: 127/79   Pulse  Min: 69  Max: 86   Resp  Min: 18  Max: 20   SpO2  Min: 90 %  Max: 99 %   Flow (L/min)  Min: 2  Max: 2   Weight  Min: 78.5 kg (173 lb 1 oz)  Max: 78.5 kg (173 lb 1 oz)      Intake/Output Summary (Last 24 hours) at 10/10/2020 1059  Last data filed at 10/9/2020 2024  Gross per 24 hour   Intake --   Output 200 ml   Net -200 ml        Flowsheet Rows        First Filed Value   Admission Height  172.7 cm (68\") Documented at 10/07/2020 1534   Admission Weight  77.1 kg (170 lb) Documented at 10/07/2020 1534            Physical Exam:  CONSTITUTIONAL: No acute distress  RESPIRATORY: Normal effort.  Rales noted bilaterally.  CARDIOVASCULAR: Regular rate and rhythm with normal S1 and S2. Without murmur, " gallop or rub.  PERIPHERAL VASCULAR: There is trace lower extremity edema bilaterally.  PSYCH: Normal affect and mood    Results Review:    I reviewed the patient's new clinical results.    Tele: Normal sinus rhythm    Labs:    Results from last 7 days   Lab Units 10/10/20  0440 10/09/20  0419 10/08/20  0423 10/07/20  1550 10/04/20  1047   SODIUM mmol/L 135* 136 140 142 136   POTASSIUM mmol/L 3.6 3.7 3.8 3.8 4.1   CHLORIDE mmol/L 95* 97* 100 101 97*   CO2 mmol/L 27.0 27.0 28.0 29.0 28.0   BUN mg/dL 29* 28* 29* 29* 23   CREATININE mg/dL 1.81* 1.62* 1.74* 1.77* 1.59*   CALCIUM mg/dL 9.0 8.8 8.7 9.0 8.8   BILIRUBIN mg/dL  --   --   --  0.4 0.4   ALK PHOS U/L  --   --   --  142* 134*   ALT (SGPT) U/L  --   --   --  14 16   AST (SGOT) U/L  --   --   --  17 20   GLUCOSE mg/dL 102* 117* 103* 115* 97     Results from last 7 days   Lab Units 10/09/20  0419 10/07/20  1550 10/04/20  1047   WBC 10*3/mm3 8.34 8.14 9.78   HEMOGLOBIN g/dL 9.2* 9.5* 9.5*   HEMATOCRIT % 29.7* 30.7* 30.6*   PLATELETS 10*3/mm3 293 286 252     Lab Results   Component Value Date    TROPONINI 11.987 (C) 10/10/2017    TROPONINI 17.487 (C) 10/09/2017    TROPONINI 8.457 (C) 10/09/2017    TROPONINT 0.028 10/07/2020    TROPONINT 0.027 10/04/2020    TROPONINT 0.034 (C) 10/04/2020     Lab Results   Component Value Date    CHOL 118 10/08/2020    CHOL 196 09/26/2019    CHOL 154 10/10/2017     Lab Results   Component Value Date    TRIG 66 10/08/2020    TRIG 117 09/26/2019    TRIG 79 10/10/2017     Lab Results   Component Value Date    HDL 43 10/08/2020    HDL 54 09/26/2019    HDL 39 (L) 10/10/2017     No components found for: LDLCALC  Lab Results   Component Value Date    INR 1.25 (H) 07/23/2020    INR 1.06 05/26/2020    INR 1.09 04/28/2018    PROTIME 15.4 (H) 07/23/2020    PROTIME 13.5 05/26/2020    PROTIME 11.4 04/28/2018            Assessment  Acute on chronic systolic congestive heart failure.  EF 30% on 5/22/2020.  Valvular heart disease   previous  TAVR  Moderate MR per TTE 5/2020  Sick sinus syndrome with recent leadless pacemaker implant.  Coronary artery disease, no current angina.  Hypertension, controlled.  Dyslipidemia, on statin.  Frequent ectopy  Mag 1.7; TSH WNL.  PVC burden has decreased significantly with diuresis       Plan:  Continue IV diuresis.  Close monitoring of BUN/CR.  Continue strict I's and O's and daily weights.  Continue Entresto, Coreg, aspirin, statin    Tiffanie Moreno, APRN  10/10/20 11:55 EDT

## 2020-10-11 NOTE — PLAN OF CARE
Patient complains of exertional SOA otherwise no complaints. 2L NC at start of shift, weaned down to 1L throughout shift. Urinating throughout shift, a couple of unmeasured occurences due to BMs. Fluid restriction maintained. No other complaints at this time. Will continue to monitor.

## 2020-10-11 NOTE — THERAPY EVALUATION
Patient Name: Tad Moon  : 1926    MRN: [id number]                              Today's Date: 10/11/2020       Admit Date: 10/7/2020    Visit Dx:     ICD-10-CM ICD-9-CM   1. Congestive heart failure, unspecified HF chronicity, unspecified heart failure type (CMS/[facility])  I50.9 428.0   2. Failure of outpatient treatment  Z78.9 V49.89   3. Referred by primary care physician  Z76.89 V68.89     Patient Active Problem List   Diagnosis   • Coronary artery disease involving coronary bypass graft of native heart without angina pectoris   • Hyperlipidemia LDL goal <100   • History of tobacco abuse   • CLL (chronic lymphocytic leukemia) (CMS/[facility])   • GERD (gastroesophageal reflux disease)   • Nonrheumatic aortic valve stenosis s/p TAVR   • Chronic systolic congestive heart failure (CMS/[facility])   • CKD (chronic kidney disease) stage 3, GFR 30-59 ml/min (CMS/[facility])   • Aortic valve stenosis   • Fatigue   • Ischemic cardiomyopathy   • Chronic combined systolic and diastolic CHF (congestive heart failure) (CMS/[facility])   • Angina pectoris (CMS/[facility])   • Coronary artery disease   • Essential hypertension   • Heart failure, chronic, with acute decompensation (CMS/[facility])   • Generalized ischemic myocardial dysfunction   • Peripheral vascular disease (CMS/[facility])   • Nonrheumatic mitral valve regurgitation   • Major depressive disorder with single episode, in partial remission (CMS/[facility])   • Syncope   • NEDRA (acute kidney injury) (CMS/[facility])   • Normocytic anemia   • Alcohol use   • Closed fracture of ninth thoracic vertebra (CMS/[facility])   • Complete heart block (CMS/[facility])   • Congestive heart failure (CMS/[facility])   • Non-pressure ulcer of left lower extremity (CMS/[facility])     Past Medical History:   Diagnosis Date   • Anemia    • Aortic stenosis    • Arthritis    • BOOP (bronchiolitis obliterans with organizing pneumonia) (CMS/[facility])    • Chest pain 2007    angioplasty to RCA   • CHF (congestive heart failure) (CMS/[facility])    • CLL (chronic  lymphocytic leukemia) (CMS/[facility])     15 years    • Coronary artery disease    • GERD (gastroesophageal reflux disease)    • Herpes zoster 2012   • History of tobacco abuse    • Hyperlipidemia    • Hypertension    • Low kidney function     very recently and did kidney function test and said decreased function so being watched to get all fluid off    • MI (myocardial infarction) (CMS/[facility])     mid 90s very mild - few years after bypass surgery    • Non-STEMI (non-ST elevated myocardial infarction) (CMS/[facility]) 10/17/2017    stenting of occluded grafts to left circumflex and RCA   • Skin cancer     basal and squamous from various location    • SOBOE (shortness of breath on exertion)    • Status post coronary artery bypass grafting    • Syncopal episodes    • Uses hearing aid     bilat ears      Past Surgical History:   Procedure Laterality Date   • ABDOMINAL HERNIA REPAIR      x 2   • AORTIC VALVE REPAIR/REPLACEMENT N/A 4/26/2018    Procedure: Transcatheter Aortic Valve Replacement, LANDY;  Surgeon: Lv Purdy MD;  Location:  [facility];  Service: Cardiothoracic   • AORTIC VALVE REPAIR/REPLACEMENT N/A 4/26/2018    Procedure: Transcatheter Aortic Valve Replacement;  Surgeon: Juan M Sood MD;  Location:  [facility];  Service: Cardiology   • APPENDECTOMY     • CARDIAC CATHETERIZATION N/A 10/10/2017    Procedure: Left Heart Cath;  Surgeon: Juan M Sood MD;  Location:  [facility];  Service:    • CARDIAC CATHETERIZATION N/A 4/6/2018    Procedure: Left Heart Cath;  Surgeon: Lv Cobian MD;  Location:  [facility];  Service: Cardiovascular   • CARDIAC ELECTROPHYSIOLOGY PROCEDURE N/A 7/23/2020    Procedure: Pacemaker SC new;  Surgeon: Gilbert Colin DO;  Location:  [facility];  Service: Cardiology;  Laterality: N/A;   • CARDIAC ELECTROPHYSIOLOGY PROCEDURE N/A 7/23/2020    Procedure: TEMPORARY PACEMAKER;  Surgeon: Lv Cobian MD;  Location:  [facility];  Service: Cardiovascular;  Laterality: N/A;   • CARDIAC PACEMAKER PLACEMENT     • CHOLECYSTECTOMY     • COLONOSCOPY     • CORONARY ANGIOPLASTY WITH STENT PLACEMENT      07 one placed,  and in 2017 had another stent placed and one repaired -   so pt thinks 3 stents in place    • CORONARY ARTERY BYPASS GRAFT      4 bypass in 92    • REPLACEMENT TOTAL KNEE Left 2014   • WISDOM TOOTH EXTRACTION       General Information     Row Name 10/11/20 1033          Physical Therapy Time and Intention    Document Type  evaluation  -LM     Mode of Treatment  individual therapy;physical therapy  -LM     Row Name 10/11/20 1033          General Information    Patient Profile Reviewed  yes  -LM     Prior Level of Function  independent:;all household mobility;gait;ADL's  -LM     Existing Precautions/Restrictions  fall;oxygen therapy device and L/min  -LM     Barriers to Rehab  none identified  -LM     Row Name 10/11/20 1033          Living Environment    Lives With  spouse  -LM     Row Name 10/11/20 1033          Home Main Entrance    Number of Stairs, Main Entrance  none  -LM     Row Name 10/11/20 1033          Stairs Within Home, Primary    Stairs, Within Home, Primary  Has a two level home, but has a stair lift to use.  -LM     Row Name 10/11/20 1033          Cognition    Orientation Status (Cognition)  oriented x 4  -LM     Row Name 10/11/20 1033          Safety Issues, Functional Mobility    Safety Issues Affecting Function (Mobility)  safety precaution awareness;safety precautions follow-through/compliance  -LM     Impairments Affecting Function (Mobility)  balance;endurance/activity tolerance;shortness of breath  -LM       User Key  (r) = Recorded By, (t) = Taken By, (c) = Cosigned By    Initials Name Provider Type    LM Kiera Mathur, PT Physical Therapist        Mobility     Row Name 10/11/20 1033          Bed Mobility    Comment (Bed Mobility)  Pt sitting up in chair at initial and end of eval.  -LM     Row  Name 10/11/20 1033          Transfers    Comment (Transfers)  Good hand placement noted.  -LM     Row Name 10/11/20 1033          Sit-Stand Transfer    Sit-Stand [place] (Transfers)  contact guard;1 person assist;verbal cues  -LM     Assistive Device (Sit-Stand Transfers)  walker, front-wheeled  -LM     Row Name 10/11/20 1033          Gait/Stairs (Locomotion)    [place] Level (Gait)  contact guard;1 person assist;verbal cues  -LM     Assistive Device (Gait)  walker, front-wheeled  -LM     Distance in Feet (Gait)  300 feet  -LM     Comment (Gait/Stairs)  Pt ambulates at a quick pace.  Vc's for upright posture.  Pt SOA with gait - O2 on 2L noted to be 85% post gait, but recovered quickly with PLB and sitting rest.  -LM       User Key  (r) = Recorded By, (t) = Taken By, (c) = Cosigned By    Initials Name Provider Type    Kiera Gil PT Physical Therapist        Obj/Interventions     Row Name 10/11/20 1033          Range of Motion Comprehensive    General Range of Motion  bilateral lower extremity ROM WFL  -LM     Row Name 10/11/20 1033          Strength Comprehensive (MMT)    General Manual Muscle Testing (MMT) Assessment  no strength deficits identified BLEs  -LM     Row Name 10/11/20 1033          Balance    Balance Assessment  sitting static balance;standing static balance;standing dynamic balance  -LM     Static Sitting Balance  WFL  -LM     Static Standing Balance  WFL  -LM     Dynamic Standing Balance  mild impairment  -LM     Row Name 10/11/20 1033          Sensory Assessment (Somatosensory)    Sensory Assessment (Somatosensory)  LE sensation intact  -LM       User Key  (r) = Recorded By, (t) = Taken By, (c) = Cosigned By    Initials Name Provider Type    Kiera Gil PT Physical Therapist        Goals/Plan     Row Name 10/11/20 1033          Bed Mobility Goal 1 (PT)    Activity/Assistive Device (Bed Mobility Goal 1, PT)  sit to supine/supine to sit  -LM     [place] Level/Cues Needed  (Bed Mobility Goal 1, PT)  independent  -LM     Time Frame (Bed Mobility Goal 1, PT)  long term goal (LTG);1 week  -LM     Row Name 10/11/20 1033          Transfer Goal 1 (PT)    Activity/Assistive Device (Transfer Goal 1, PT)  bed-to-chair/chair-to-bed  -LM     [place] Level/Cues Needed (Transfer Goal 1, PT)  modified independence  -LM     Time Frame (Transfer Goal 1, PT)  long term goal (LTG);1 week  -LM     Row Name 10/11/20 1033          Gait Training Goal 1 (PT)    Activity/Assistive Device (Gait Training Goal 1, PT)  gait (walking locomotion);assistive device use  -LM     [place] Level (Gait Training Goal 1, PT)  modified independence  -LM     Distance (Gait Training Goal 1, PT)  300 feet  -LM     Time Frame (Gait Training Goal 1, PT)  long term goal (LTG);1 week  -LM       User Key  (r) = Recorded By, (t) = Taken By, (c) = Cosigned By    Initials Name Provider Type    LM Kiera Mathur, PT Physical Therapist        Clinical Impression     Row Name 10/11/20 1033          Pain    Additional Documentation  Pain Scale: Numbers Pre/Post-Treatment (Group)  -LM     Row Name 10/11/20 1033          Pain Scale: Numbers Pre/Post-Treatment    Pretreatment Pain Rating  0/10 - no pain  -LM     Posttreatment Pain Rating  0/10 - no pain  -LM     Row Name 10/11/20 1033          Plan of Care Review    Plan of Care Reviewed With  patient  -LM     Outcome Summary  PT evaluation completed.  Pt transferred sit<-->stand with CGAx1 and ambulated 300 feet using rw with CGAx1.  No unsteadiness noted - however, pt SOA post gait.  O2 on 2L noted to be 85% post gait, but pt recovered quickly with sitting rest break and PLB.  Skilled PT services warranted to improve mobility, endurance, and safety.  Recommend home at d/c.  -     Row Name 10/11/20 1033          Therapy Assessment/Plan (PT)    Rehab Potential (PT)  good, to achieve stated therapy goals  -LM     Criteria for Skilled Interventions Met (PT)  yes;meets  criteria;skilled treatment is necessary  -LM     Row Name 10/11/20 1033          Vital Signs    Pre Systolic BP Rehab  138  -LM     Pre Treatment Diastolic BP  79  -LM     Pretreatment Heart Rate (beats/min)  67  -LM     Posttreatment Heart Rate (beats/min)  70  -LM     Pre SpO2 (%)  98  -LM     O2 Delivery Pre Treatment  supplemental O2  -LM     Post SpO2 (%)  96  -LM     O2 Delivery Post Treatment  supplemental O2  -LM     Pre Patient Position  Sitting  -LM     Post Patient Position  Sitting  -LM     Row Name 10/11/20 1033          Positioning and Restraints    Pre-Treatment Position  sitting in chair/recliner  -LM     Post Treatment Position  chair  -LM     In Chair  reclined;call light within reach;encouraged to call for assist;exit alarm on;notified nsg  -LM       User Key  (r) = Recorded By, (t) = Taken By, (c) = Cosigned By    Initials Name Provider Type    Kiera Gil PT Physical Therapist        Outcome Measures     Row Name 10/11/20 1033          How much help from another person do you currently need...    Turning from your back to your side while in flat bed without using bedrails?  4  -LM     Moving from lying on back to sitting on the side of a flat bed without bedrails?  4  -LM     Moving to and from a bed to a chair (including a wheelchair)?  3  -LM     Standing up from a chair using your arms (e.g., wheelchair, bedside chair)?  3  -LM     Climbing 3-5 steps with a railing?  3  -LM     To walk in hospital room?  3  -LM     AM-PAC 6 Clicks Score (PT)  20  -LM     Row Name 10/11/20 1033          Functional Assessment    Outcome Measure Options  AM-PAC 6 Clicks Basic Mobility (PT)  -LM       User Key  (r) = Recorded By, (t) = Taken By, (c) = Cosigned By    Initials Name Provider Type    Kiera Gil PT Physical Therapist        Physical Therapy Education                 Title: PT OT SLP Therapies (In Progress)     Topic: Physical Therapy (In Progress)     Point: Mobility training (Done)      Learning Progress Summary           Patient Acceptance, E, VU by  at 10/11/2020 1149                   Point: Home exercise program (Not Started)     Learner Progress:  Not documented in this visit.          Point: Precautions (Done)     Learning Progress Summary           Patient Acceptance, E, VU by  at 10/11/2020 1149                               User Key     Initials Effective Dates Name Provider Type Discipline     07/24/19 -  Kiera Mathur, PT Physical Therapist PT              PT Recommendation and Plan  Planned Therapy Interventions (PT): balance training, bed mobility training, gait training, home exercise program, motor coordination training, neuromuscular re-education, patient/family education, postural re-education, ROM (range of motion), strengthening, stretching, transfer training  Plan of Care Reviewed With: patient  Outcome Summary: PT evaluation completed.  Pt transferred sit<-->stand with CGAx1 and ambulated 300 feet using rw with CGAx1.  No unsteadiness noted - however, pt SOA post gait.  O2 on 2L noted to be 85% post gait, but pt recovered quickly with sitting rest break and PLB.  Skilled PT services warranted to improve mobility, endurance, and safety.  Recommend home at d/c.     Time Calculation:   PT Charges     Row Name 10/11/20 1033             Time Calculation    Start Time  1033  -LM      PT Received On  10/11/20  -      PT Goal Re-Cert Due Date  10/21/20  -        User Key  (r) = Recorded By, (t) = Taken By, (c) = Cosigned By    Initials Name Provider Type     Kiera Mathur, SHAHAB Physical Therapist        Therapy Charges for Today     Code Description Service Date Service Provider Modifiers Qty    57811025516 HC PT EVAL LOW COMPLEXITY 4 10/11/2020 Kiera Mathur, PT GP 1          PT G-Codes  Outcome Measure Options: AM-PAC 6 Clicks Basic Mobility (PT)  AM-PAC 6 Clicks Score (PT): 20    Kiera Mathur PT  10/11/2020

## 2020-10-11 NOTE — DISCHARGE INSTRUCTIONS
Take two tabs of Torsemide daily (40 mg total daily) through Wednesday 10/14/20, then resume 20 mg once daily.

## 2020-10-11 NOTE — PROGRESS NOTES
"[facility] Cardiology Daily Note       LOS: 2 days   Patient Care Team:  Pramod Hyde MD as PCP - General  Pramod Hyde MD as PCP - Claims Attributed  Lv Cobian MD as Consulting Physician (Cardiology)    Chief Complaint:  A/C CHF, VHD, SSS, CAD, HTN    Subjective     Subjective:   Patient up resting on edge of bed. Oxygen is being weaned off.  Reports occasional dyspnea, but it is improved.    Review of Systems:   Positive dyspnea.  Negative for exertional chest pain, orthopnea, PND, lower extremity edema, palpitations, lightheadedness, syncope.    Medications:  apixaban, 2.5 mg, Oral, Q12H  aspirin, 81 mg, Oral, Daily  atorvastatin, 40 mg, Oral, Daily  carvedilol, 6.25 mg, Oral, BID With Meals  ferrous sulfate, 325 mg, Oral, Every Other Day  mupirocin, , Topical, Q12H  pharmacy consult - [place], , Does not apply, Daily  potassium chloride, 40 mEq, Oral, Once  sacubitril-valsartan, 1 tablet, Oral, BID  sertraline, 50 mg, Oral, Nightly  sodium chloride, 10 mL, Intravenous, Q12H        Objective     Vital Sign Min/Max for last 24 hours  Temp  Min: 97.5 °F (36.4 °C)  Max: 98.8 °F (37.1 °C)   BP  Min: 111/65  Max: 138/79   Pulse  Min: 64  Max: 82   Resp  Min: 18  Max: 18   SpO2  Min: 89 %  Max: 100 %   Flow (L/min)  Min: 1  Max: 2   Weight  Min: 76.6 kg (168 lb 12.8 oz)  Max: 87.3 kg (192 lb 6.4 oz)      Intake/Output Summary (Last 24 hours) at 10/11/2020 0817  Last data filed at 10/10/2020 2135  Gross per 24 hour   Intake --   Output 1175 ml   Net -1175 ml        Flowsheet Rows        First Filed Value   Admission Height  172.7 cm (68\") Documented at 10/07/2020 1534   Admission Weight  77.1 kg (170 lb) Documented at 10/07/2020 1534            Physical Exam:  CONSTITUTIONAL: No acute distress  RESPIRATORY: Normal effort on room air. Mild rales LLL, otherwise clear.  CARDIOVASCULAR: Regular rate and rhythm with normal S1 and S2. Without murmur, gallop or rub.  PERIPHERAL " VASCULAR: Normal radial pulse. There is no lower extremity edema bilaterally.  PSYCH: Normal affect and mood      Results Review:    I reviewed the patient's new clinical results.    Tele: Normal sinus rhythm    Labs:    Results from last 7 days   Lab Units 10/11/20  0343 10/10/20  0440 10/09/20  0419  10/07/20  1550 10/04/20  1047   SODIUM mmol/L 141 135* 136   < > 142 136   POTASSIUM mmol/L 3.2* 3.6 3.7   < > 3.8 4.1   CHLORIDE mmol/L 100 95* 97*   < > 101 97*   CO2 mmol/L 29.0 27.0 27.0   < > 29.0 28.0   BUN mg/dL 27* 29* 28*   < > 29* 23   CREATININE mg/dL 1.56* 1.81* 1.62*   < > 1.77* 1.59*   CALCIUM mg/dL 8.6 9.0 8.8   < > 9.0 8.8   BILIRUBIN mg/dL  --   --   --   --  0.4 0.4   ALK PHOS U/L  --   --   --   --  142* 134*   ALT (SGPT) U/L  --   --   --   --  14 16   AST (SGOT) U/L  --   --   --   --  17 20   GLUCOSE mg/dL 121* 102* 117*   < > 115* 97    < > = values in this interval not displayed.     Results from last 7 days   Lab Units 10/11/20  0343 10/09/20  0419 10/07/20  1550   WBC 10*3/mm3 8.76 8.34 8.14   HEMOGLOBIN g/dL 9.0* 9.2* 9.5*   HEMATOCRIT % 29.4* 29.7* 30.7*   PLATELETS 10*3/mm3 267 293 286     Lab Results   Component Value Date    TROPONINI 11.987 (C) 10/10/2017    TROPONINI 17.487 (C) 10/09/2017    TROPONINI 8.457 (C) 10/09/2017    TROPONINT 0.028 10/07/2020    TROPONINT 0.027 10/04/2020    TROPONINT 0.034 (C) 10/04/2020     Lab Results   Component Value Date    CHOL 118 10/08/2020    CHOL 196 09/26/2019    CHOL 154 10/10/2017     Lab Results   Component Value Date    TRIG 66 10/08/2020    TRIG 117 09/26/2019    TRIG 79 10/10/2017     Lab Results   Component Value Date    HDL 43 10/08/2020    HDL 54 09/26/2019    HDL 39 (L) 10/10/2017     No components found for: LDLCALC  Lab Results   Component Value Date    INR 1.25 (H) 07/23/2020    INR 1.06 05/26/2020    INR 1.09 04/28/2018    PROTIME 15.4 (H) 07/23/2020    PROTIME 13.5 05/26/2020    PROTIME 11.4 04/28/2018            Assessment  1. Acute  on chronic systolic congestive heart failure.  1. EF 30% on 5/22/2020.  2. Valvular heart disease   1. previous TAVR  2. Moderate MR per TTE 5/2020  3. Sick sinus syndrome with recent leadless pacemaker implant.  4. Coronary artery disease, no current angina.  5. Hypertension, controlled.  6. Dyslipidemia, on statin.  7. Frequent ectopy  1. Mag 1.7; TSH WNL.  PVC burden has decreased significantly with diuresis       Plan:  1. Continue Entresto, coreg, ASA, statin, and Eliquis.  2. Transition to home oral diuretic dosing.  May take an additional dose as needed at home.  3. Continue daily weights, fluid restrictions, and low sodium diet at home.   4. Possible discharge later today (discussed with Dr. Smith) if continues to do well.   5. Upon discharge follow-up in the heart and valve clinic in 1 week. Keep scheduled follow-up with Dr. Cobian on 12/14/2020.    JUAN Morocho  10/11/20 09:56 EDT

## 2020-10-11 NOTE — THERAPY EVALUATION
Patient Name: Tad Moon  : 1926    MRN: [id number]                              Today's Date: 10/11/2020       Admit Date: 10/7/2020    Visit Dx:     ICD-10-CM ICD-9-CM   1. Congestive heart failure, unspecified HF chronicity, unspecified heart failure type (CMS/[facility])  I50.9 428.0   2. Failure of outpatient treatment  Z78.9 V49.89   3. Referred by primary care physician  Z76.89 V68.89     Patient Active Problem List   Diagnosis   • Coronary artery disease involving coronary bypass graft of native heart without angina pectoris   • Hyperlipidemia LDL goal <100   • History of tobacco abuse   • CLL (chronic lymphocytic leukemia) (CMS/[facility])   • GERD (gastroesophageal reflux disease)   • Nonrheumatic aortic valve stenosis s/p TAVR   • Chronic systolic congestive heart failure (CMS/[facility])   • CKD (chronic kidney disease) stage 3, GFR 30-59 ml/min (CMS/[facility])   • Aortic valve stenosis   • Fatigue   • Ischemic cardiomyopathy   • Chronic combined systolic and diastolic CHF (congestive heart failure) (CMS/[facility])   • Angina pectoris (CMS/[facility])   • Coronary artery disease   • Essential hypertension   • Heart failure, chronic, with acute decompensation (CMS/[facility])   • Generalized ischemic myocardial dysfunction   • Peripheral vascular disease (CMS/[facility])   • Nonrheumatic mitral valve regurgitation   • Major depressive disorder with single episode, in partial remission (CMS/[facility])   • Syncope   • NEDRA (acute kidney injury) (CMS/[facility])   • Normocytic anemia   • Alcohol use   • Closed fracture of ninth thoracic vertebra (CMS/[facility])   • Complete heart block (CMS/[facility])   • Congestive heart failure (CMS/[facility])   • Non-pressure ulcer of left lower extremity (CMS/[facility])     Past Medical History:   Diagnosis Date   • Anemia    • Aortic stenosis    • Arthritis    • BOOP (bronchiolitis obliterans with organizing pneumonia) (CMS/[facility])    • Chest pain 2007    angioplasty to RCA   • CHF (congestive heart failure) (CMS/[facility])    • CLL (chronic  lymphocytic leukemia) (CMS/[facility])     15 years    • Coronary artery disease    • GERD (gastroesophageal reflux disease)    • Herpes zoster 2012   • History of tobacco abuse    • Hyperlipidemia    • Hypertension    • Low kidney function     very recently and did kidney function test and said decreased function so being watched to get all fluid off    • MI (myocardial infarction) (CMS/[facility])     mid 90s very mild - few years after bypass surgery    • Non-STEMI (non-ST elevated myocardial infarction) (CMS/[facility]) 10/17/2017    stenting of occluded grafts to left circumflex and RCA   • Skin cancer     basal and squamous from various location    • SOBOE (shortness of breath on exertion)    • Status post coronary artery bypass grafting    • Syncopal episodes    • Uses hearing aid     bilat ears      Past Surgical History:   Procedure Laterality Date   • ABDOMINAL HERNIA REPAIR      x 2   • AORTIC VALVE REPAIR/REPLACEMENT N/A 4/26/2018    Procedure: Transcatheter Aortic Valve Replacement, LANDY;  Surgeon: Lv Purdy MD;  Location:  [facility];  Service: Cardiothoracic   • AORTIC VALVE REPAIR/REPLACEMENT N/A 4/26/2018    Procedure: Transcatheter Aortic Valve Replacement;  Surgeon: Juan M Sood MD;  Location:  [facility];  Service: Cardiology   • APPENDECTOMY     • CARDIAC CATHETERIZATION N/A 10/10/2017    Procedure: Left Heart Cath;  Surgeon: Juan M Sood MD;  Location:  [facility];  Service:    • CARDIAC CATHETERIZATION N/A 4/6/2018    Procedure: Left Heart Cath;  Surgeon: Lv Cobian MD;  Location:  [facility] IN[facility];  Service: Cardiovascular   • CARDIAC ELECTROPHYSIOLOGY PROCEDURE N/A 7/23/2020    Procedure: Pacemaker SC new;  Surgeon: Gilbert Colin DO;  Location:  [facility] INVAS[facility] LOC[facility];  Service: Cardiology;  Laterality: N/A;   • CARDIAC ELECTROPHYSIOLOGY PROCEDURE N/A 7/23/2020    Procedure: TEMPORARY PACEMAKER;  Surgeon: Lv Cobian MD;  Location:  [facility] CATH  INVASIVE LOCATION;  Service: Cardiovascular;  Laterality: N/A;   • CARDIAC PACEMAKER PLACEMENT     • CHOLECYSTECTOMY     • COLONOSCOPY     • CORONARY ANGIOPLASTY WITH STENT PLACEMENT      07 one placed,  and in 2017 had another stent placed and one repaired -   so pt thinks 3 stents in place    • CORONARY ARTERY BYPASS GRAFT      4 bypass in 92    • REPLACEMENT TOTAL KNEE Left 2014   • WISDOM TOOTH EXTRACTION       General Information     Row Name 10/11/20 [facility]          OT Time and Intention    Document Type  evaluation  -KF     Mode of Treatment  occupational therapy  -KF     Row Name 10/11/20 [facility]          General Information    Patient Profile Reviewed  yes  -KF     Prior Level of Function  independent:;all household mobility;gait;transfer;bed mobility;ADL's  -KF     Existing Precautions/Restrictions  fall tolerated RA well with ADLs and in room mobility  -KF     Barriers to Rehab  none identified  -KF     Row Name 10/11/20 [facility]          Living Environment    Lives With  spouse  -     Row Name 10/11/20 [facility]          Home Main Entrance    Number of Stairs, Main Entrance  one  -KF     Stair Railings, Main Entrance  none  -KF     Row Name 10/11/20 [facility]          Stairs Within Home, Primary    Stairs, Within Home, Primary  2level but uses stair lift  -KF     Row Name 10/11/20 [facility]          Cognition    Orientation Status (Cognition)  oriented x 4  -KF     Row Name 10/11/20 [facility]          Safety Issues, Functional Mobility    Safety Issues Affecting Function (Mobility)  safety precaution awareness;insight into deficits/self-awareness  -KF     Impairments Affecting Function (Mobility)  endurance/activity tolerance;shortness of breath  -KF     Comment, Safety Issues/Impairments (Mobility)  requires seated rest break post 48 ft on RA recovers underneath 1 min with PLB and in sitting  -KF       User Key  (r) = Recorded By, (t) = Taken By, (c) = Cosigned By    Initials Name Provider Type    KF Karyn Hobson, OT  Occupational Therapist        Mobility/ADL's     Row Name 10/11/20 [facility]          Bed Mobility    Comment (Bed Mobility)  UIC  -     Row Name 10/11/20 [facility]          Transfers    Transfers  sit-stand transfer;toilet transfer  -     Comment (Transfers)  good safety, without AD only SBA no LOB throughout very safe and takes time, only c/o SOA requiring seated rest break no LOB against perturbations  -KF     Sit-Stand [place] (Transfers)  supervision  -     [place] Level (Toilet Transfer)  standby assist  -KF     Assistive Device (Toilet Transfer)  commode  -KF     Row Name 10/11/20 [facility]          Toilet Transfer    Type (Toilet Transfer)  stand pivot/stand step  -     Row Name 10/11/20 [facility]          Functional Mobility    Functional Mobility- Ind. Level  supervision required;verbal cues required  -     Functional Mobility- Device  -- no AD, no HHA required SBA throughout for safety  -KF     Functional Mobility-Distance (Feet)  48  -KF     Functional Mobility- Safety Issues  supplemental O2  -KF     Functional Mobility- Comment  89% post mob recovery 30 sec with PLB RA throughout  -     Row Name 10/11/20 [facility]          Activities of Daily Living    BADL Assessment/Intervention  upper body dressing;lower body dressing;toileting  -[facility] Name 10/11/20 [facility]          Upper Body Dressing Assessment/Training    [place] Level (Upper Body Dressing)  don;pajama/robe;standby assist  -KF     Position (Upper Body Dressing)  unsupported standing  -[facility] Name 10/11/20 [facility]          Lower Body Dressing Assessment/Training    [place] Level (Lower Body Dressing)  don;doff;socks;supervision;verbal cues cues to cross LEs and incorporate PLB  -KF     Position (Lower Body Dressing)  unsupported sitting  -     Row Name 10/11/20 [facility]          Toileting Assessment/Training    [place] Level (Toileting)  toileting skills;adjust/manage clothing;modified independence  -     Assistive Devices  (Toileting)  commode;urinal  -KF     Position (Toileting)  unsupported sitting;unsupported standing  -KF     Comment (Toileting)  used urinal at commode simulating ADL task at home, good completion demo mod I  -KF       User Key  (r) = Recorded By, (t) = Taken By, (c) = Cosigned By    Initials Name Provider Type    Karyn Hollingsworth OT Occupational Therapist        Obj/Interventions     Row Name 10/11/20 1401          Sensory Assessment (Somatosensory)    Sensory Assessment (Somatosensory)  UE sensation intact  -KF     Row Name 10/11/20 1401          Vision Assessment/Intervention    Visual Impairment/Limitations  WFL  -KF     Row Name 10/11/20 1401          Range of Motion Comprehensive    General Range of Motion  bilateral upper extremity ROM WFL  -KF     Row Name 10/11/20 1401          Strength Comprehensive (MMT)    General Manual Muscle Testing (MMT) Assessment  no strength deficits identified  -KF     Comment, General Manual Muscle Testing (MMT) Assessment  BUE grossly 4+/5  -KF     Row Name 10/11/20 1401          Balance    Balance Assessment  sitting static balance;sitting dynamic balance;standing static balance;standing dynamic balance  -KF     Static Sitting Balance  WNL;sitting in chair  -KF     Dynamic Sitting Balance  WNL;sitting in chair  -KF     Static Standing Balance  WFL;unsupported;standing  -KF     Dynamic Standing Balance  unsupported;standing;mild impairment  -KF     Balance Interventions  occupation based/functional task;manual resistance applied during activity  -KF     Comment, Balance  no LOB against perturbations in standing  -KF       User Key  (r) = Recorded By, (t) = Taken By, (c) = Cosigned By    Initials Name Provider Type    Karyn Hollingsworth OT Occupational Therapist        Goals/Plan     Row Name 10/11/20 1408          Transfer Goal 1 (OT)    Activity/Assistive Device (Transfer Goal 1, OT)  rollator;commode  -KF     [place] Level/Cues Needed (Transfer Goal 1, OT)   modified independence  -KF     Time Frame (Transfer Goal 1, OT)  long term goal (LTG);by discharge  -KF     Strategies/Barriers (Transfers Goal 1, OT)  with appropriate safety and positioning of rollator  -KF     Progress/Outcome (Transfer Goal 1, OT)  goal ongoing  -KF     Row Name 10/11/20 4677          Grooming Goal 1 (OT)    Activity/Device (Grooming Goal 1, OT)  hair care;oral care;wash face, hands standing sink side with rollator in proper positioning to assist for breaks PRN  -KF     Door (Grooming Goal 1, OT)  modified independence  -KF     Time Frame (Grooming Goal 1, OT)  long term goal (LTG);by discharge  -KF     Progress/Outcome (Grooming Goal 1, OT)  goal ongoing  -KF       User Key  (r) = Recorded By, (t) = Taken By, (c) = Cosigned By    Initials Name Provider Type    Karyn Hollingsworth, OT Occupational Therapist        Clinical Impression     Row Name 10/11/20 1408          Pain Assessment    Additional Documentation  Pain Scale: Numbers Pre/Post-Treatment (Group)  -KF     Row Name 10/11/20 1402          Pain Scale: Numbers Pre/Post-Treatment    Pretreatment Pain Rating  0/10 - no pain  -KF     Posttreatment Pain Rating  0/10 - no pain  -KF     Pain Intervention(s)  Ambulation/increased activity;Repositioned  -KF     Row Name 10/11/20 1406          Plan of Care Review    Plan of Care Reviewed With  patient  -KF     Progress  improving  -KF     Outcome Summary  OT eval completed Pt completed STS with SUP, no LOB throughout even against perturbations in standing, SBA functional mob 48ft desats on RA to 89% then recovers under 1 min with seated rest break to 98%, demo I toileting tasks in standing at commode, washed hands sink side SUP, recom home with assist and rollator  -KF     Row Name 10/11/20 1405          Therapy Assessment/Plan (OT)    Rehab Potential (OT)  good, to achieve stated therapy goals  -KF     Criteria for Skilled Therapeutic Interventions Met (OT)  yes;meets criteria;skilled  treatment is necessary  -KF     Therapy Frequency (OT)  daily  -KF     Row Name 10/11/20 1402          Therapy Plan Review/Discharge Plan (OT)    Equipment Needs Upon Discharge (OT)  other (see comments) rollator  -KF     Anticipated Discharge Disposition (OT)  home with assist  -KF     Row Name 10/11/20 1402          Vital Signs    Pre Systolic BP Rehab  -- RN cleared VSS no dizzines throughout  -KF     Pre SpO2 (%)  98  -KF     O2 Delivery Pre Treatment  room air  -KF     Intra SpO2 (%)  89  -KF     O2 Delivery Intra Treatment  room air  -KF     Post SpO2 (%)  98  -KF     O2 Delivery Post Treatment  room air  -KF     Pre Patient Position  Sitting  -KF     Intra Patient Position  Standing  -KF     Post Patient Position  Sitting  -KF     Rest Breaks   1  -KF     Row Name 10/11/20 1402          Positioning and Restraints    Pre-Treatment Position  sitting in chair/recliner  -KF     Post Treatment Position  chair  -KF     In Chair  notified nsg;sitting;call light within reach;encouraged to call for assist;exit alarm on;with family/caregiver  -KF       User Key  (r) = Recorded By, (t) = Taken By, (c) = Cosigned By    Initials Name Provider Type    KF Karyn Hobson, OT Occupational Therapist        Outcome Measures     Row Name 10/11/20 1409          How much help from another is currently needed...    Putting on and taking off regular lower body clothing?  4  -KF     Bathing (including washing, rinsing, and drying)  3  -KF     Toileting (which includes using toilet bed pan or urinal)  3  -KF     Putting on and taking off regular upper body clothing  4  -KF     Taking care of personal grooming (such as brushing teeth)  4  -KF     Eating meals  4  -KF     AM-PAC 6 Clicks Score (OT)  22  -KF     Row Name 10/11/20 1409          Functional Assessment    Outcome Measure Options  AM-PAC 6 Clicks Daily Activity (OT)  -KF       User Key  (r) = Recorded By, (t) = Taken By, (c) = Cosigned By    Initials Name Provider Type     Karyn Hollingsworth OT Occupational Therapist        Occupational Therapy Education                 Title: PT OT SLP Therapies (In Progress)     Topic: Occupational Therapy (Done)     Point: ADL training (Done)     Description:   Instruct learner(s) on proper safety adaptation and remediation techniques during self care or transfers.   Instruct in proper use of assistive devices.              Learning Progress Summary           Patient Acceptance, E,TB,D, VU,DU,NR by  at 10/11/2020 1409   Family Acceptance, E,TB,D, VU,DU,NR by  at 10/11/2020 1409                   Point: Home exercise program (Done)     Description:   Instruct learner(s) on appropriate technique for monitoring, assisting and/or progressing therapeutic exercises/activities.              Learning Progress Summary           Patient Acceptance, E,TB,D, VU,DU,NR by  at 10/11/2020 1409   Family Acceptance, E,TB,D, VU,DU,NR by  at 10/11/2020 1409                   Point: Precautions (Done)     Description:   Instruct learner(s) on prescribed precautions during self-care and functional transfers.              Learning Progress Summary           Patient Acceptance, E,TB,D, VU,DU,NR by  at 10/11/2020 1409   Family Acceptance, E,TB,D, VU,DU,NR by  at 10/11/2020 1409                   Point: Body mechanics (Done)     Description:   Instruct learner(s) on proper positioning and spine alignment during self-care, functional mobility activities and/or exercises.              Learning Progress Summary           Patient Acceptance, E,TB,D, VU,DU,NR by  at 10/11/2020 1409   Family Acceptance, E,TB,D, VU,DU,NR by  at 10/11/2020 1409                               User Key     Initials Effective Dates Name Provider Type Discipline     04/03/18 -  Karyn Hobson OT Occupational Therapist OT              OT Recommendation and Plan  Retired Outcome Summary/Treatment Plan (OT)  Anticipated Discharge Disposition (OT): home with assist  Therapy  Frequency (OT): daily  Plan of Care Review  Plan of Care Reviewed With: patient  Progress: improving  Outcome Summary: OT eval completed Pt completed STS with SUP, no LOB throughout even against perturbations in standing, SBA functional mob 48ft desats on RA to 89% then recovers under 1 min with seated rest break to 98%, demo I toileting tasks in standing at commode, washed hands sink side SUP, recom home with assist and rollator  Plan of Care Reviewed With: patient  Outcome Summary: OT eval completed Pt completed STS with SUP, no LOB throughout even against perturbations in standing, SBA functional mob 48ft desats on RA to 89% then recovers under 1 min with seated rest break to 98%, demo I toileting tasks in standing at commode, washed hands sink side SUP, recom home with assist and rollator     Time Calculation:   Time Calculation- OT     Row Name 10/11/20 1307             Time Calculation- OT    OT Start Time  1307  -KF      OT Received On  10/11/20  -KF      OT Goal Re-Cert Due Date  10/21/20  -KF         Timed Charges    10143 - OT Self Care/Mgmt Minutes  10  -KF        User Key  (r) = Recorded By, (t) = Taken By, (c) = Cosigned By    Initials Name Provider Type    KF Karyn Hobson OT Occupational Therapist        Therapy Charges for Today     Code Description Service Date Service Provider Modifiers Qty    [id number]  OT SELF CARE/MGMT/TRAIN EA 15 MIN 10/11/2020 Karyn Hobson OT GO 1    [id number]  OT EVAL LOW COMPLEXITY 4 10/11/2020 Karyn Hobson OT GO 1               Karyn Hobson OT  10/11/2020

## 2020-10-11 NOTE — DISCHARGE SUMMARY
[facility] Medicine Services  DISCHARGE SUMMARY    Patient Name: Tad Moon  : 1926  MRN: [id number]    Date of Admission: 10/7/2020  3:58 PM  Date of Discharge:  10/11/20  Primary Care Physician: Pramod Hyde MD    Consults     Date and Time Order Name Status Description    10/8/2020 0033 Inpatient Cardiology Consult Completed           Hospital Course     Presenting Problem:   Heart failure (CMS/[facility]) [I50.9]  Congestive heart failure, unspecified HF chronicity, unspecified heart failure type (CMS/HCC) [I50.9]  Congestive heart failure, unspecified HF chronicity, unspecified heart failure type (CMS/HCC) [I50.9]    Active Hospital Problems    Diagnosis  POA   • **Congestive heart failure (CMS/[facility]) [I50.9]  Yes   • Non-pressure ulcer of left lower extremity (CMS/[facility]) [L97.929]  Yes   • Peripheral vascular disease (CMS/[facility]) [I73.9]  Yes   • Coronary artery disease [I25.10]  Yes   • Essential hypertension [I10]  Yes   • CKD (chronic kidney disease) stage 3, GFR 30-59 ml/min (CMS/[facility]) [N18.30]  Yes   • Hyperlipidemia LDL goal <100 [E78.5]  Yes      Resolved Hospital Problems   No resolved problems to display.          Hospital Course:  Tad Moon is a 89 y.o. male with hx of CAD s/p CABG and stents, TAVR, chronic systolic CHF/ischemic cardiomyopathy, complete heart block s/p PPM, CKD 3, HTN, HLD, GERD, and CLL who presents due to SOA. He was given IV Lasix twice this month at the heart and valve clinic. CXR showed increased pulmonary vascularity and small left pleural effusion. Initially on room air but then desaturated into the upper 80's requiring 2 liters. proBNP elevated at 10,970, Cr 1.77. Cardiology was consulted. He has been diuresed with improvement.      Hypoxia  Acute on chronic systolic CHF  Ischemic cardiomyopathy  --Last Echo in May 2020 with EF 30%.  --Cardiology has seen the patient. Patient diuresed with IV Bumex with good response.  --Weaned off  oxygen, only dropped to 89% when working with PT.  --Continue Coreg, Entresto, statin.  --Resume home Torsemide and will ask him to take 40 mg daily through Wednesday, then resume his 20 mg daily dose.  --Plan for follow up in heart and valve clinic in 1 week.     HTN  HLD  --Continue Coreg.  --Continue statin.     Remote hx of Afib  --Continue Coreg.  --Continue Eliquis.     CKD 3  --Baseline Cr 1.5-2.0.  --Stable with diuresis.     Ulceration of left shin  --Had surgery with Dr. Wang (Dermatology) end of September 2020.  --Continue dressing changes/wound care.     Insomnia  Anxiety  Daily alcohol use  --Patient reports taking his wife's Xanax at home and also drinks daily.  --PRN Xanax. Only used a couple doses while in the hospital.  --CIWA protocol, did not show any signs of withdrawal while in the hospital.      Discharge Follow Up Recommendations for outpatient labs/diagnostics:  F/U with PCP in 1 week  F/U with Heart and Valve clinic in 1 week  F/U with Dr. Cobian as scheduled in Dec 2020    Day of Discharge     HPI:   Patient seen this morning. On 1 liter of oxygen. Says he still can't take a good deep breath but overall feels better and wants to go home soon.    Review of Systems  Gen-no fevers, no chills  CV-no chest pain, no palpitations  Resp-no cough, overall improved dyspnea  GI-no N/V/D, no abd pain     All other systems reviewed and negative except any additional pertinent positives and negatives as discussed in HPI.    Vital Signs:   Temp:  [97.7 °F (36.5 °C)-98.8 °F (37.1 °C)] 97.7 °F (36.5 °C)  Heart Rate:  [62-86] 62  Resp:  [18] 18  BP: (111-138)/(65-79) 120/65     Physical Exam:  Gen-no acute distress  HENT-NCAT, mucous membranes moist  CV-RRR, S1 S2 normal, no m/r/g  Resp-mild rales at the bases, no wheezes, on 1 liter, nonlabored  Abd-soft, NT, ND, +BS  Ext-trace BLE edema  Neuro-A&Ox3, no focal deficits  Skin-no rashes  Psych-appropriate mood    Pertinent  and/or Most Recent Results          Results from last 7 days   Lab Units 10/11/20  0343 10/10/20  0440 10/09/20  0419 10/08/20  0423 10/07/20  1550   WBC 10*3/mm3 8.76  --  8.34  --  8.14   HEMOGLOBIN g/dL 9.0*  --  9.2*  --  9.5*   HEMATOCRIT % 29.4*  --  29.7*  --  30.7*   PLATELETS 10*3/mm3 267  --  293  --  286   SODIUM mmol/L 141 135* 136 140 142   POTASSIUM mmol/L 3.2* 3.6 3.7 3.8 3.8   CHLORIDE mmol/L 100 95* 97* 100 101   CO2 mmol/L 29.0 27.0 27.0 28.0 29.0   BUN mg/dL 27* 29* 28* 29* 29*   CREATININE mg/dL 1.56* 1.81* 1.62* 1.74* 1.77*   GLUCOSE mg/dL 121* 102* 117* 103* 115*   CALCIUM mg/dL 8.6 9.0 8.8 8.7 9.0     Results from last 7 days   Lab Units 10/07/20  1550   BILIRUBIN mg/dL 0.4   ALK PHOS U/L 142*   ALT (SGPT) U/L 14   AST (SGOT) U/L 17     Results from last 7 days   Lab Units 10/08/20  0423   CHOLESTEROL mg/dL 118   TRIGLYCERIDES mg/dL 66   HDL CHOL mg/dL 43     Results from last 7 days   Lab Units 10/10/20  0440 10/08/20  0423 10/07/20  1550   TSH uIU/mL  --  3.820  --    PROBNP pg/mL 17,533.0*  --  10,970.0*   TROPONIN T ng/mL  --   --  0.028       Brief Urine Lab Results  (Last result in the past 365 days)      Color   Clarity   Blood   Leuk Est   Nitrite   Protein   CREAT   Urine HCG        10/07/20 2034 Yellow Clear Negative Negative Negative Negative               Microbiology Results Abnormal     Procedure Component Value - Date/Time    COVID PRE-OP / PRE-PROCEDURE SCREENING ORDER (NO ISOLATION) - Swab, Nasopharynx [[id number]] Collected: 10/08/20 0010    Lab Status: Final result Specimen: Swab from Nasopharynx Updated: 10/08/20 1944    Narrative:      The following orders were created for panel order COVID PRE-OP / PRE-PROCEDURE SCREENING ORDER (NO ISOLATION) - Swab, Nasopharynx.  Procedure                               Abnormality         Status                     ---------                               -----------         ------                     COVID-19,HALIE WHITTAKER, NP ...[[id number]]                      Final  result                 Please view results for these tests on the individual orders.    COVID-19,[facility], NP SWAB IN LEXAR VIRAL TRANSPORT MEDIA 24-30 HR TAT - Swab, Nasopharynx [[id number]] Collected: 10/08/20 0010    Lab Status: Final result Specimen: Swab from Nasopharynx Updated: 10/08/20 1944     SARS-CoV-2 CELESTE Not Detected          Imaging Results (All)     Procedure Component Value Units Date/Time    XR Chest 1 View [[id number]] Collected: 10/11/20 0813     Updated: 10/11/20 1137    Narrative:         EXAMINATION: XR CHEST 1 VW - 10/11/2020     INDICATION: I50.9-Heart failure, unspecified; Z78.9-Other specified  health status; Z76.89-Persons encountering health services in other  specified circumstances.      COMPARISON: Chest x-ray 10/10/2020.     FINDINGS: Cardiomegaly similar to prior with mild increase in central  bony vascularity and interstitial lung markings similar to prior. No  pneumothorax with probable trace to small left pleural effusion as there  are blunted margins at the  left lung base similar to prior.           Impression:      No interval change.     DICTATED:   10/11/2020  EDITED/ls :   10/11/2020           XR Chest 1 View [[id number]] Collected: 10/10/20 1019     Updated: 10/11/20 1025    Narrative:         EXAMINATION: XR CHEST 1 VW - 10/10/2020     INDICATION:  I50.9-Heart failure, unspecified; Z78.9-Other specified  health status; Z76.89-Persons encountering health services in other  specified circumstances. Shortness of air.      COMPARISON: Chest x-ray 10/07/2020.     FINDINGS: Cardiomegaly with increased central pulmonary vascularity and  postsurgical changes again noted similar to prior with probable small  left pleural effusion and adjacent atelectasis. Increasing trace to  small right pleural effusion with adjacent opacities of potential  atelectasis or airspace disease.           Impression:      Cardiomegaly with increased central pulmonary vascularity  and postsurgical changes  again noted similar to prior with probable  small left pleural effusion and adjacent atelectasis. Increasing trace  to small right pleural effusion with adjacent opacities of potential  atelectasis or airspace disease.     DICTATED:   10/10/2020  EDITED/ls :   10/10/2020      This report was finalized on 10/11/2020 10:22 AM by Dr. Derrick Odonnell.       XR Chest 1 View [[id number]] Collected: 10/07/20 1628     Updated: 10/08/20 1235    Narrative:      EXAMINATION: XR CHEST 1 VW- 10/07/2020     INDICATION: SOA triage protocol      COMPARISON: 10/04/2020     FINDINGS: Portable chest reveals heart to be enlarged. Ill-defined  opacification seen left lung base unchanged. Small left pleural  effusion. Increased pulmonary vascularity bilaterally. The chest is  stable. Degenerative changes seen within the spine. Vascular  calcifications seen within the thoracic aorta.           Impression:      Chest is stable with increased pulmonary vascularity  bilaterally, enlargement of the heart, small left pleural effusion and  mild increased markings at the left lung base.     D:  10/07/2020  E:  10/07/2020     This report was finalized on 10/8/2020 12:32 PM by Dr. Gretchen Matias MD.             Results for orders placed during the hospital encounter of 03/26/18   Duplex Carotid Ultrasound CAR    Narrative · Right internal carotid artery stenosis of 0-49%.  · Left internal carotid artery stenosis of 0-49%.          Results for orders placed during the hospital encounter of 03/26/18   Duplex Carotid Ultrasound CAR    Narrative · Right internal carotid artery stenosis of 0-49%.  · Left internal carotid artery stenosis of 0-49%.          Results for orders placed during the hospital encounter of 05/21/20   Transthoracic Echo Complete With Contrast if Necessary Per Protocol    Narrative · Estimated EF = 30%.  · Normal functioning TAVR  · Moderate mitral valve regurgitation is present            Discharge Details        Discharge  Medications      Changes to Medications      Instructions Start Date   sertraline 100 MG tablet  Commonly known as: ZOLOFT  What changed:   · how much to take  · how to take this  · when to take this  · additional instructions   Take 1/2 a day      torsemide 20 MG tablet  Commonly known as: DEMADEX  What changed: additional instructions   40 mg, Oral, Daily, Until Wednesday 10/14/20, then resume 1 tab (20 mg) once daily         Continue These Medications      Instructions Start Date   apixaban 2.5 MG tablet tablet  Commonly known as: ELIQUIS   2.5 mg, Oral, Every 12 Hours Scheduled      aspirin 81 MG tablet   81 mg, Oral, Daily      atorvastatin 80 MG tablet  Commonly known as: LIPITOR   40 mg, Oral, Daily      carvedilol 6.25 MG tablet  Commonly known as: COREG   6.25 mg, Oral, 2 Times Daily With Meals      famotidine 20 MG tablet  Commonly known as: PEPCID   20 mg, Oral, Daily PRN      ferrous sulfate 325 (65 FE) MG tablet   325 mg, Oral, Every Other Day      nitroglycerin 0.4 MG SL tablet  Commonly known as: Nitrostat   0.4 mg, Sublingual, Every 5 Minutes PRN      sacubitril-valsartan 49-51 MG tablet  Commonly known as: ENTRESTO   1 tablet, Oral, 2 Times Daily, For heart             Allergies   Allergen Reactions   • Hctz [Hydrochlorothiazide] Other (See Comments)     hyponatremia         Discharge Disposition:  Home or Self Care    Diet:  Hospital:  Diet Order   Procedures   • Diet Regular; Renal, Cardiac       Activity:  Activity Instructions     Activity as Tolerated            CODE STATUS:    Code Status and Medical Interventions:   Ordered at: 10/07/20 2021     Level Of Support Discussed With:    Patient     Code Status:    CPR     Medical Interventions (Level of Support Prior to Arrest):    Full       Future Appointments   Date Time Provider Department Center   10/26/2020  2:30 PM Gilbert Colin DO [facility] None   11/5/2020 12:45 PM Pramod Hyde MD [facility]   12/14/2020  3:30 PM  Lv Cobian MD [facility] None       Additional Instructions for the Follow-ups that You Need to Schedule     Ambulatory Referral to [facility] [place] [facility] [place]   As directed      Please schedule patient for 1 week follow up (being discharged from Located within [facility] 10/11/20).    Service Requested: Heart Failure Clinic         Discharge Follow-up with PCP   As directed       Currently Documented PCP:    Pramod Hyde MD    PCP Phone Number:    [phone]     Follow Up Details: 1 week                     Radha Smith MD  10/11/20      Time Spent on Discharge:  I spent  35 minutes on this discharge activity which included: face-to-face encounter with the patient, reviewing the data in the system, coordination of the care with the nursing staff as well as consultants, documentation, and entering orders.

## 2020-10-11 NOTE — PLAN OF CARE
Problem: Adult Inpatient Plan of Care  Goal: Plan of Care Review  Recent Flowsheet Documentation  Taken 10/11/2020 1402 by Karyn Hobson, OT  Progress: improving  Plan of Care Reviewed With: patient  Outcome Summary: OT eval completed Pt completed STS with SUP, no LOB throughout even against perturbations in standing, SBA functional mob 48ft desats on RA to 89% then recovers under 1 min with seated rest break to 98%, demo I toileting tasks in standing at commode, washed hands sink side SUP, recom home with assist and rollator

## 2020-10-11 NOTE — OUTREACH NOTE
Prep Survey      Responses   [facility] [place] patient discharged from?  [place]   Is LACE score < 7 ?  No   Eligibility  [facility]   Date of Admission  10/07/20   Date of Discharge  10/11/20   Discharge Disposition  Home or Self Care   Discharge diagnosis  hypoxia, A/C CHF, cardiomyopathy   Does the patient have one of the following disease processes/diagnoses(primary or secondary)?  CHF   Does the patient have Home health ordered?  No   Is there a DME ordered?  No   Prep survey completed?  Yes          Hoa Webb RN

## 2020-10-11 NOTE — PLAN OF CARE
Problem: Adult Inpatient Plan of Care  Goal: Plan of Care Review  Recent Flowsheet Documentation  Taken 10/11/2020 1033 by Kiera Mathur, SHAHAB  Plan of Care Reviewed With: patient  Outcome Summary: PT evaluation completed.  Pt transferred sit<-->stand with CGAx1 and ambulated 300 feet using rw with CGAx1.  No unsteadiness noted - however, pt SOA post gait.  O2 on 2L noted to be 85% post gait, but pt recovered quickly with sitting rest break and PLB.  Skilled PT services warranted to improve mobility, endurance, and safety.  Recommend home at d/c.

## 2020-10-13 NOTE — PROGRESS NOTES
Pt. Referred for Phase II Cardiac Rehab. Staff discussed benefits of exercise, program protocol, and educational material provided. Teach back verified.  Patient states that he's just not quite ready to participate yet. Patient states that he will call staff to schedule an appointment when he is ready.

## 2020-10-21 NOTE — PROGRESS NOTES
[facility]  Heart and Valve Center  HF Clinic    Encounter Date:10/21/2020     Tad YANEZ [id number]  [unfilled]    11/30/1926    Pramod Hyde MD    Tad Moon is a 89 y.o. male.      Subjective:     Chief Complaint:  Follow-up and Congestive Heart Failure       HPI :  Mr. Moon follows up from recent hospital stay related to SHF exacerbation.  He states he has begun to feel SOA again with fluid retention (weight up from dry weight of 165# to 171# according to his home scale).  He also states he was feeling pretty good after DC on 10/11/20 until he reduced the torsemide back down to 20 mg daily as instructed on Monday of this week.  Since then, he feels progressively more SOA each day.      Past Medical History:   Diagnosis Date   • Anemia    • Aortic stenosis    • Arthritis    • BOOP (bronchiolitis obliterans with organizing pneumonia) (CMS/[facility]) 2014   • Chest pain 2007    angioplasty to RCA   • CHF (congestive heart failure) (CMS/[facility])    • CLL (chronic lymphocytic leukemia) (CMS/[facility])     15 years    • Coronary artery disease    • GERD (gastroesophageal reflux disease)    • Herpes zoster 2012   • History of tobacco abuse    • Hyperlipidemia    • Hypertension    • Low kidney function     very recently and did kidney function test and said decreased function so being watched to get all fluid off    • MI (myocardial infarction) (CMS/[facility])     mid 90s very mild - few years after bypass surgery    • Non-STEMI (non-ST elevated myocardial infarction) (CMS/[facility]) 10/17/2017    stenting of occluded grafts to left circumflex and RCA   • Skin cancer     basal and squamous from various location    • SOBOE (shortness of breath on exertion)    • Status post coronary artery bypass grafting    • Syncopal episodes    • Uses hearing aid     bilat ears      Past Surgical History:   Procedure Laterality Date   • ABDOMINAL HERNIA REPAIR      x 2   • AORTIC VALVE  REPAIR/REPLACEMENT N/A 4/26/2018    Procedure: Transcatheter Aortic Valve Replacement, LANDY;  Surgeon: Lv Purdy MD;  Location:  [facility]BR[facility];  Service: Cardiothoracic   • AORTIC VALVE REPAIR/REPLACEMENT N/A 4/26/2018    Procedure: Transcatheter Aortic Valve Replacement;  Surgeon: Juan M Sood MD;  Location:  [facility]BR[facility] OR 15;  Service: Cardiology   • APPENDECTOMY     • CARDIAC CATHETERIZATION N/A 10/10/2017    Procedure: Left Heart Cath;  Surgeon: Juan M Sood MD;  Location:  [facility] CATH INVASIVE LOCATION;  Service:    • CARDIAC CATHETERIZATION N/A 4/6/2018    Procedure: Left Heart Cath;  Surgeon: Lv Cobian MD;  Location:  [facility] CATH INVASIVE LOCATION;  Service: Cardiovascular   • CARDIAC ELECTROPHYSIOLOGY PROCEDURE N/A 7/23/2020    Procedure: Pacemaker SC new;  Surgeon: Gilbert Colin DO;  Location:  [facility] EP INVASIVE LOCATION;  Service: Cardiology;  Laterality: N/A;   • CARDIAC ELECTROPHYSIOLOGY PROCEDURE N/A 7/23/2020    Procedure: TEMPORARY PACEMAKER;  Surgeon: Lv Cobian MD;  Location:  [facility] CATH INVASIVE LOCATION;  Service: Cardiovascular;  Laterality: N/A;   • CARDIAC PACEMAKER PLACEMENT     • CHOLECYSTECTOMY     • COLONOSCOPY     • CORONARY ANGIOPLASTY WITH STENT PLACEMENT      07 one placed,  and in 2017 had another stent placed and one repaired -   so pt thinks 3 stents in place    • CORONARY ARTERY BYPASS GRAFT      4 bypass in 92    • REPLACEMENT TOTAL KNEE Left 2014   • WISDOM TOOTH EXTRACTION         Allergies   Allergen Reactions   • Hctz [Hydrochlorothiazide] Other (See Comments)     hyponatremia         Current Outpatient Medications:   •  apixaban (ELIQUIS) 2.5 MG tablet tablet, Take 1 tablet by mouth Every 12 (Twelve) Hours., Disp: 60 tablet, Rfl: 11  •  aspirin 81 MG tablet, Take 1 tablet by mouth daily., Disp: 90 tablet, Rfl: 3  •  atorvastatin (LIPITOR) 80 MG tablet, Take 0.5 tablets by mouth Daily., Disp: 90 tablet, Rfl: 3  •  carvedilol (COREG) 6.25 MG tablet,  Take 6.25 mg by mouth 2 (Two) Times a Day With Meals., Disp: , Rfl:   •  famotidine (PEPCID) 20 MG tablet, Take 20 mg by mouth Daily As Needed., Disp: , Rfl:   •  nitroglycerin (NITROSTAT) 0.4 MG SL tablet, Place 1 tablet under the tongue Every 5 (Five) Minutes As Needed for Chest Pain., Disp: 25 tablet, Rfl: 6  •  sacubitril-valsartan (ENTRESTO) 49-51 MG tablet, Take 1 tablet by mouth 2 (Two) Times a Day. For heart, Disp: 60 tablet, Rfl: 3  •  sertraline (ZOLOFT) 100 MG tablet, Take 1/2 a day (Patient taking differently: Take 50 mg by mouth Every Night.), Disp: , Rfl:   •  ferrous sulfate 325 (65 FE) MG tablet, Take 325 mg by mouth Every Other Day., Disp: , Rfl:   •  torsemide (DEMADEX) 20 MG tablet, Take 2 tablets by mouth Daily., Disp: 60 tablet, Rfl: 1    Current Facility-Administered Medications:   •  furosemide (LASIX) injection 60 mg, 60 mg, Intravenous, Once, Parvin Quintana APRN    The following portions of the patient's history were reviewed and updated as appropriate in Epic:  Problem list, allergies, current medications, past medical and surgical history, past social and family history.     Review of Systems   Constitution: Positive for malaise/fatigue and weight gain.   HENT: Positive for congestion.    Eyes: Negative.    Cardiovascular: Positive for dyspnea on exertion, leg swelling and orthopnea. Negative for chest pain, irregular heartbeat, near-syncope, palpitations and syncope.   Respiratory: Positive for shortness of breath and sleep disturbances due to breathing.    Hematologic/Lymphatic: Bruises/bleeds easily.   Skin: Positive for skin cancer.        Recent MOHS surgery to anterior left lower leg   Musculoskeletal: Positive for arthritis.   Gastrointestinal: Positive for bloating.   Genitourinary: Positive for nocturia.   Neurological: Negative for dizziness.   Psychiatric/Behavioral: The patient has insomnia.         Falls asleep OK but awakens/ cannot go back to sleep   Allergic/Immunologic:  "Negative.        Objective:     Vitals:    10/21/20 1147   BP: 116/66   BP Location: Left arm   Patient Position: Sitting   Cuff Size: Adult   Pulse: 84   Resp: 16   Temp: 97.2 °F (36.2 °C)   TempSrc: Temporal   SpO2: 97%   Weight: 79.9 kg (176 lb 2 oz)   Height: 172.7 cm (68\")         Constitutional:       Appearance: Not in distress. Frail.   Eyes:      Conjunctiva/sclera: Conjunctivae normal.   Neck:      Thyroid: No thyromegaly.      Vascular: JVD normal.      Lymphadenopathy: No cervical adenopathy.   Pulmonary:      Effort: Pulmonary effort is normal.      Breath sounds: Bibasilar Rales present.   Cardiovascular:      Normal rate. Regular rhythm.      Murmurs: There is no murmur.   Pulses:     Decreased pulses.   Edema:     Peripheral edema present.     Pretibial: bilateral 1+ edema of the pretibial area.     Ankle: bilateral 1+ edema of the ankle.     Feet: bilateral 1+ edema of the feet.  Abdominal:      General: There is distension.      Palpations: Abdomen is soft.   Musculoskeletal:         General: No deformity.      Extremities: No clubbing present.  Skin:     General: Skin is warm and dry.      Comments: Dressing LLE   Neurological:      Mental Status: Alert and oriented to person, place and time.         Lab and Diagnostic Review: inpatient cardiology notes, labs    Assessment and Plan:     1. Chronic combined systolic and diastolic CHF (congestive heart failure) (CMS/[facility])  - NYHA class III-IV  - resume torsemide 40 mg daily with careful renal function monitoring  - furosemide (LASIX) injection 60 mg IV today in clinic. Patient was monitored for hemodynamic stability afterwards and remained stable @ DC from clinic  - Basic Metabolic Panel; tomorrow  - LVEF 30% as of 5/22/20  - HF clinic follow up 3-4 weeks  - continue Entresto, coreg, torsemide    2. Stage 3b chronic kidney disease  - Basic Metabolic Panel tomorrow prior to IM visit    3. Coronary artery disease involving coronary bypass graft of " native heart without angina pectoris  -  asa, statin, bb  - no angina presently    4. Nonrheumatic aortic valve stenosis s/p TAVR  - also has moderate MR

## 2020-10-22 NOTE — PROGRESS NOTES
Transitional Care Follow Up Visit  Subjective     Tad Moon is a 89 y.o. male who presents for a transitional care management visit.    Within 48 business hours after discharge our office contacted him via telephone to coordinate his care and needs.      I reviewed and discussed the details of that call along with the discharge summary, hospital problems, inpatient lab results, inpatient diagnostic studies, and consultation reports with Tad.     Current outpatient and discharge medications have been reconciled for the patient.  Reviewed by: Pramod Hyde MD      Date of TCM Phone Call 10/11/2020   [facility]   Date of Admission 10/7/2020   Date of Discharge 10/11/2020   Discharge Disposition Home or Self Care     Risk for Readmission (LACE) Score: 16 (10/11/2020  6:00 AM)      History of Present Illness   Course During Hospital Stay: Mr. Moon was seen in the ED on October 4 with congestive heart failure, treated and sent home.  He returned feeling worse on the seventh and was admitted.  He was treated aggressively with intravenous furosemide and oxygen and discharged on 40 mg of torsemide.  His dyspnea has persisted.  He was seen in the heart valve clinic yesterday and was feeling worse and was given another dose of intravenous Lasix and then told to increase his torsemide.  Today his weight is the same and he feels about the same.     The following portions of the patient's history were reviewed and updated as appropriate: allergies, current medications, past family history, past medical history, past social history, past surgical history and problem list.    Review of Systems   Constitutional: Negative for chills, fatigue and fever.   HENT: Negative for congestion, ear pain and sinus pressure.    Respiratory: Positive for shortness of breath and wheezing. Negative for cough and chest tightness.    Cardiovascular: Negative for chest pain and palpitations.   Gastrointestinal: Negative for  abdominal pain, blood in stool and constipation.   Skin: Negative for color change.   Allergic/Immunologic: Negative for environmental allergies.   Neurological: Negative for dizziness, speech difficulty and headaches.   Psychiatric/Behavioral: Negative for confusion. The patient is not nervous/anxious.        Objective   Physical Exam  Vitals signs reviewed.   Constitutional:       Appearance: He is well-developed.   HENT:      Head: Normocephalic and atraumatic.   Cardiovascular:      Rate and Rhythm: Normal rate and regular rhythm.      Heart sounds: Normal heart sounds. No murmur.   Pulmonary:      Effort: Pulmonary effort is normal.      Breath sounds: Examination of the right-lower field reveals rales. Examination of the left-lower field reveals rales. Rales present.   Musculoskeletal:      Right lower le+ Edema present.      Left lower le+ Edema present.   Neurological:      Mental Status: He is alert and oriented to person, place, and time.         Assessment/Plan   Diagnoses and all orders for this visit:    1. Chronic combined systolic and diastolic CHF (congestive heart failure) (CMS/[facility]) (Primary)    2. Stage 3b chronic kidney disease    Other orders  -     traZODone (DESYREL) 100 MG tablet; Take 1 tablet by mouth Every Night.  Dispense: 30 tablet; Refill: 2  -     Fluad Quad >65 years    Plan    Persistent, severe congestive heart failure that is responded minimally to oral and intravenous loop diuretics.  His last GFR was 42 when he left the hospital but yesterday was back down to 37.  I had initially intended to increase his torsemide to 40 mg in the morning and 20 in the evening but I have asked him to continue the 20 mg twice a day until told otherwise by cardiology.  Per his request he is given trazodone to help him sleep.  He has taken his wife's milligrams trazodone and has tolerated it well with good results.

## 2020-10-22 NOTE — OUTREACH NOTE
CHF Week 2 Survey      Responses   [facility] patient discharged from?  [place]   Does the patient have one of the following disease processes/diagnoses(primary or secondary)?  CHF   Week 2 attempt successful?  Yes   Call start time  1654   Call end time  1656   Discharge diagnosis  hypoxia, A/C CHF, cardiomyopathy   Person spoke with today (if not patient) and relationship  Julia, wife   Meds reviewed with patient/caregiver?  Yes   Is the patient having any side effects they believe may be caused by any medication additions or changes?  No   Does the patient have all medications ordered at discharge?  Yes   Is the patient taking all medications as directed (includes completed medication regime)?  Yes   Does the patient have a primary care provider?   Yes   Does the patient have an appointment with their PCP within 7 days of discharge?  Greater than 7 days   Has the patient kept scheduled appointments due by today?  Yes   Comments  had IV Lasix 10/21/20, fluid comes off and returns frequently   Did the patient receive a copy of their discharge instructions?  Yes   Nursing interventions  Reviewed instructions with patient   What is the patient's perception of their health status since discharge?  Improving   Nursing interventions  Nurse provided patient education   Is the patient weighing daily?  Yes   Does the patient have scales?  Yes   Is the patient able to teach back Heart Failure diet management?  Yes   Is the patient able to teach back Heart Failure Zones?  Yes   Is the patient able to teach back signs and symptoms of worsening condition? (i.e. weight gain, shortness of air, etc.)  Yes   Is the patient/caregiver able to teach back the hierarchy of who to call/visit for symptoms/problems? PCP, Specialist, Home health nurse, Urgent Care, ED, 911  Yes   CHF Week 2 call completed?  Yes          Ashleigh Appiah RN

## 2020-10-24 NOTE — OUTREACH NOTE
CHF Week 3 Survey      Responses   [facility] patient discharged from?  [place]   Does the patient have one of the following disease processes/diagnoses(primary or secondary)?  CHF   Week 3 attempt successful?  No   Revoke  Readmitted          Cristina Sandy RN

## 2020-10-28 NOTE — OUTREACH NOTE
Prep Survey      Responses   [facility] patient discharged from?  [place]   Is LACE score < 7 ?  No   Eligibility  [facility]   Date of Admission  10/24/20   Date of Discharge  10/28/20   Discharge Disposition  Home or Self Care   Discharge diagnosis  acute on chronic systolic heart failure   Does the patient have one of the following disease processes/diagnoses(primary or secondary)?  CHF   Does the patient have Home health ordered?  Yes   What is the Home health agency?   [facility]    Is there a DME ordered?  Yes   What DME was ordered?  [facility]   Prep survey completed?  Yes          Shira Meadows RN

## 2020-10-29 NOTE — OUTREACH NOTE
Call Center TCM Note      Responses   [facility] patient discharged from?  [place]   Does the patient have one of the following disease processes/diagnoses(primary or secondary)?  CHF   TCM attempt successful?  Yes   Call end time  1116   Discharge diagnosis  acute on chronic systolic heart failure   Meds reviewed with patient/caregiver?  Yes   Is the patient having any side effects they believe may be caused by any medication additions or changes?  No   Does the patient have all medications ordered at discharge?  Yes   Does the patient have a primary care provider?   Yes   Does the patient have an appointment with their PCP within 7 days of discharge?  Greater than 7 days   Comments regarding PCP  f/u with Dr. Hyde/Carlie on 11/5 has hospital followup    What is preventing the patient from scheduling follow up appointments within 7 days of discharge?  Earlier appointment not available   Nursing Interventions  Verified appointment date/time/provider   Has the patient kept scheduled appointments due by today?  N/A   What is the Home health agency?   [facility]    Has home health visited the patient within 72 hours of discharge?  Call prior to 72 hours   Home health comments  HH is coming at 2 pm.    What DME was ordered?  [facility]   Has all DME been delivered?  No   DME interventions  Other   DME comments  Ritat reports he is recieving 02 today. [facility] just called and are on the way with it.    Psychosocial issues?  No   Did the patient receive a copy of their discharge instructions?  Yes   Nursing interventions  Reviewed instructions with patient   What is the patient's perception of their health status since discharge?  Improving   Nursing interventions  Nurse provided patient education   Is the patient weighing daily?  Yes   Does the patient have scales?  Yes   Daily weight interventions  Education provided on importance of daily weight   Is the patient able to teach back Heart  Failure diet management?  Yes   Is the patient able to teach back Heart Failure Zones?  Yes   Is the patient able to teach back signs and symptoms of worsening condition? (i.e. weight gain, shortness of air, etc.)  Yes   If the patient is a current smoker, are they able to teach back resources for cessation?  Not a smoker   Is the patient/caregiver able to teach back the hierarchy of who to call/visit for symptoms/problems? PCP, Specialist, Home health nurse, Urgent Care, ED, 911  Yes   TCM call completed?  Yes   Wrap up additional comments  Patient says he is doing well, he is following his discharge instructions closely and is monitoring his weight daily.           Landon Martinez RN    10/29/2020, 11:16 EDT

## 2020-10-29 NOTE — TELEPHONE ENCOUNTER
Anai with [facility] called to let you know the admitted pt . He was discharged from hospital 10/28/2020. And the he has multiple medication warnings trazadone and zoloft. And multiple Torsemide and Metolazone  Her call back number [phone]

## 2020-10-30 NOTE — TELEPHONE ENCOUNTER
Returned call to Anai. She just wanted to make sure Dr. Hyde knew pt is on 2 antidepressants and 2 diuretics

## 2020-11-03 NOTE — TELEPHONE ENCOUNTER
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   • Hyperlipidemia LDL goal <100   • History of tobacco abuse   • CLL (chronic lymphocytic leukemia) (CMS/[facility])   • GERD (gastroesophageal reflux disease)   • Nonrheumatic aortic valve stenosis s/p TAVR   • Chronic systolic congestive heart failure (CMS/[facility])   • Acute on chronic systolic congestive heart failure (CMS/[facility])   • CKD (chronic kidney disease) stage 3, GFR 30-59 ml/min (CMS/[facility])   • Aortic valve stenosis   • Fatigue   • Ischemic cardiomyopathy   • Chronic combined systolic and diastolic CHF (congestive heart failure) (CMS/[facility])   • Angina pectoris (CMS/[facility])   • Coronary artery disease   • Essential hypertension   • Heart failure, chronic, with acute decompensation (CMS/[facility])   • Generalized ischemic myocardial dysfunction   • Peripheral vascular disease (CMS/[facility])   • Nonrheumatic mitral valve regurgitation   • Major depressive disorder with single episode, in partial remission (CMS/[facility])   • Syncope   • NEDRA (acute kidney injury) (CMS/[facility])   • Normocytic anemia   • Alcohol use   • Closed fracture of ninth thoracic vertebra (CMS/[facility])   • Complete heart block (CMS/[facility])   • Congestive heart failure (CMS/[facility])   • Non-pressure ulcer of left lower extremity (CMS/[facility])         Consult received while patient was inpatient. Patient called to evaluate symptoms post discharge.    Pt taking shower.  Spoke to wife.    No c/o of CP, pressure, worsening dyspnea, weight gain or worsening edema.      Pt initially had f/u scheduled 20, but patient cancelled/moved f/u to 20.    Reviewed s/s HF worsening and when to call.  Encouraged pt to call sooner then scheduled appointment if changes is noted.     Education provided.   Medications management and adherance, Signs / symptoms, Daily weight monitoring, Importance  of keeping follow up office visits and Role of Heart and Valve Center and when to call    Education time 10 min.  Patient scheduled to follow up in clinic 11/19/20

## 2020-11-04 NOTE — TELEPHONE ENCOUNTER
Josey wanted Dr. Hyde to know that when she see's patient on Friday she will discharge him from  HH per patients request.

## 2020-11-06 NOTE — OUTREACH NOTE
CHF Week 2 Survey      Responses   [facility] patient discharged from?  [place]   Does the patient have one of the following disease processes/diagnoses(primary or secondary)?  CHF   Week 2 attempt successful?  Yes   Call start time  1509   Call end time  1514   Discharge diagnosis  acute on chronic systolic heart failure   Meds reviewed with patient/caregiver?  Yes   Is the patient taking all medications as directed (includes completed medication regime)?  Yes   Has the patient kept scheduled appointments due by today?  No   What is preventing the patient from keeping their appointments?  Doesn't understand importance   Nursing Interventions  Advised to reschedule appointment   Comments  Pt canceled his PCP appt.    What is the Home health agency?   [facility]    Has home health visited the patient within 72 hours of discharge?  Yes   Home health comments  Pt plans to dismiss HH today   What DME was ordered?  [facility]   Pulse Ox monitoring  Intermittent   Psychosocial issues?  No   Comments  Pt has improved. He denies LE edema. SOA still remains though it has improved.    What is the patient's perception of their health status since discharge?  Improving   Is the patient weighing daily?  Yes   Is the patient able to teach back Heart Failure diet management?  Yes   Is the patient/caregiver able to teach back the hierarchy of who to call/visit for symptoms/problems? PCP, Specialist, Home health nurse, Urgent Care, ED, 911  Yes   CHF Week 2 call completed?  Yes          Shasta Walker RN

## 2020-11-12 NOTE — DISCHARGE SUMMARY
[facility]  DISCHARGE SUMMARY    Patient Name: Tad Moon  : 1926  MRN: [id number]    Date of Admission: 10/24/2020  6:38 AM  Date of Discharge:  10/28/2020 (Wednesday)  Primary Care Physician: Pramod Hyde MD    Consults     Date and Time Order Name Status Description    10/24/2020 1143 Inpatient Cardiology Consult Completed     10/8/2020 0033 Inpatient Cardiology Consult Completed         Laron Mcmillan MD - Cardiology  Lv Cobian MD - Cardiology  Tad Sheehan MD - Cardiology    Hospital Course     Presenting Problem:   Acute on chronic systolic congestive heart failure (CMS/HCC) [I50.23]  Acute on chronic systolic congestive heart failure (CMS/HCC) [I50.23]  Acute on chronic systolic congestive heart failure (CMS/HCC) [I50.23]    Active Hospital Problems    Diagnosis  POA   • **Acute on chronic systolic congestive heart failure (CMS/HCC) [I50.23]  Yes   • Congestive heart failure (CMS/HCC) [I50.9]  Yes   • Alcohol use [Z72.89]  Yes   • Peripheral vascular disease (CMS/[facility]) [I73.9]  Yes   • Coronary artery disease [I25.10]  Yes   • Essential hypertension [I10]  Yes   • CKD (chronic kidney disease) stage 3, GFR 30-59 ml/min (CMS/[facility]) [N18.30]  Yes   • GERD (gastroesophageal reflux disease) [K21.9]  Yes   • CLL (chronic lymphocytic leukemia) (CMS/[facility]) [C91.10]  Yes   • Coronary artery disease involving coronary bypass graft of native heart without angina pectoris [I25.810]  Yes   • Hyperlipidemia LDL goal <100 [E78.5]  Yes      Resolved Hospital Problems   No resolved problems to display.      Severe Multivessel Coronary Artery Disease  Valvular Heart Disease  Acute on Chronic Systolic Heart Failure (worsening EF)    Hospital Course:  Tad Moon is a 89 y.o. male with known severe and possibly end stage multivessel coronary artery disease with Valvular heart disease who presented to the ER with shortness of breath.  He has had  bypass in the past and had other cardiac interventions     Ischemic cardiomyopathy  -Seen by cardiology  -No plan for ischemic evaluation  -We will treat medically at this point  -Diuresed as his blood pressure would tolerate, but ultimately had to seen home with oxygen  Acute on chronic systolic heart failure  -Worsening of ejection fraction compared to May 2020 exam  -Continue diuresis  -May need increased diuresis based on exam and lack of weight loss  -Coreg and Entresto  -Cardiology saw daily in hospital ([facility])  Severe Multivessel coronary artery disease  -History of CABG  Complete heart block  -Status post pacemaker  Severe mitral valve disease  -Cardiology evaluation  Aortic stenosis  -Status post TAVR  Sleep disorder  -He is taking several medications including trazodone, melatonin, and Xanax with very little effect.  He still sleeps poorly  -It sounds like he sleeps in a chair  -May need outpatient sleep study  Atrial fibrillation  -Continue with Coreg and Eliquis  CLL     Back off blood pressure medication in effort to improve diuresis  He appears to have end-stage Ischemic Cardiomyopathy and it appears medical mgmt is failing as he keeps getting fluid overloaded     Discussed case with Cardiology prior to discharge.  No much options     May need to go home on oxygen for palliative measures as it is unclear if we can remove enough volume to prevent a diffusion barrier in lungs.    Discharge Follow Up Recommendations for outpatient labs/diagnostics:   follow up in heart and valve clinic in 2 weeks    Day of Discharge     HPI:    Really wants to go home badly    Review of Systems    Gen- No fevers, chills  CV- No chest pain, palpitations  Resp- No cough, dyspnea  GI- No N/V/D, abd pain    Vital Signs:         Physical Exam:    Constitutional: No acute distress, awake, alert  HENT: NCAT, mucous membranes moist  Respiratory: Poor inspiratory effort, crackles bilaterally  Cardiovascular: RRR,  S1-S2, murmur, Mid-Sternotomy scar  Gastrointestinal: Positive bowel sounds, soft, nontender, nondistended  Musculoskeletal: Pitting edema in both feet  Psychiatric: Appropriate affect, cooperative  Neurologic: Oriented x 3, strength symmetric in all extremities, Cranial Nerves grossly intact to confrontation, speech clear  Skin: No rashes    Pertinent  and/or Most Recent Results               Invalid input(s): PROT, LABALBU        Invalid input(s): TG, LDLCALC, LDLREALC        Brief Urine Lab Results  (Last result in the past 365 days)      Color   Clarity   Blood   Leuk Est   Nitrite   Protein   CREAT   Urine HCG        10/07/20 2034 Yellow Clear Negative Negative Negative Negative               Microbiology Results Abnormal     Procedure Component Value - Date/Time    COVID PRE-OP / PRE-PROCEDURE SCREENING ORDER (NO ISOLATION) - Swab, Nasopharynx [[id number]]  (Normal) Collected: 10/24/20 1312    Lab Status: Final result Specimen: Swab from Nasopharynx Updated: 10/24/20 1421    Narrative:      The following orders were created for panel order COVID PRE-OP / PRE-PROCEDURE SCREENING ORDER (NO ISOLATION) - Swab, Nasopharynx.  Procedure                               Abnormality         Status                     ---------                               -----------         ------                     Respiratory Panel PCR w/...[[id number]]  Normal              Final result                 Please view results for these tests on the individual orders.    Respiratory Panel PCR w/COVID-19(SARS-CoV-2) RAULITO/ANJEL/FRANCISCO/PAD/COR/MAD/KATHERINE In-House, NP Swab in UTM/VTM, 3-4 HR TAT - Swab, Nasopharynx [[id number]]  (Normal) Collected: 10/24/20 1312    Lab Status: Final result Specimen: Swab from Nasopharynx Updated: 10/24/20 1421     ADENOVIRUS, PCR Not Detected     Coronavirus 229E Not Detected     Coronavirus HKU1 Not Detected     Coronavirus NL63 Not Detected     Coronavirus OC43 Not Detected     COVID19 Not Detected     Human  Metapneumovirus Not Detected     Human Rhinovirus/Enterovirus Not Detected     Influenza A PCR Not Detected     Influenza A H1 Not Detected     Influenza A H1 2009 PCR Not Detected     Influenza A H3 Not Detected     Influenza B PCR Not Detected     Parainfluenza Virus 1 Not Detected     Parainfluenza Virus 2 Not Detected     Parainfluenza Virus 3 Not Detected     Parainfluenza Virus 4 Not Detected     RSV, PCR Not Detected     Bordetella pertussis pcr Not Detected     Bordetella parapertussis PCR Not Detected     Chlamydophila pneumoniae PCR Not Detected     Mycoplasma pneumo by PCR Not Detected    Narrative:      Fact sheet for providers: https://docs.[employer]/wp-content/uploads/XQO5566-6245-QM6.1-EUA-Provider-Fact-Sheet-3.pdf    Fact sheet for patients: https://docs.[employer]/wp-content/uploads/HPG4598-4889-BM5.1-EUA-Patient-Fact-Sheet-1.pdf          Imaging Results (All)     Procedure Component Value Units Date/Time    XR Chest 1 View [[id number]] Collected: 10/24/20 0744     Updated: 10/24/20 0746    Narrative:      CR Chest 1 Vw    INDICATION:   Shortness of breath protocol     COMPARISON:    October 11, 2020    FINDINGS:  Single portable AP view(s) of the chest.  Heart size remains enlarged. Atelectatic/infiltrative change in left lung base. Prominence of the bronchovascular interstitium bilaterally. No pneumothorax. Small left-sided pleural effusion. No acute or  aggressive bone lesions.      Impression:      1.  Cardiomegaly.    2.  Atelectatic/infiltrative changes in left lung base with small left-sided pleural effusion.    3.  Prominence of the bronchovascular interstitium bilaterally.    4.  No significant interval change from 10/11/2020.    Signer Name: Hema Bauman MD   Signed: 10/24/2020 7:44 AM   Workstation Name: [id number]    [facility]          Results for orders placed during the hospital encounter of 03/26/18   Duplex Carotid Ultrasound CAR    Narrative ·  Right internal carotid artery stenosis of 0-49%.  · Left internal carotid artery stenosis of 0-49%.          Results for orders placed during the hospital encounter of 03/26/18   Duplex Carotid Ultrasound CAR    Narrative · Right internal carotid artery stenosis of 0-49%.  · Left internal carotid artery stenosis of 0-49%.          Results for orders placed during the hospital encounter of 10/24/20   Adult Transthoracic Echo Complete W/ Cont if Necessary Per Protocol    Narrative · Estimated left ventricular EF = 20%  · Normal function of bioprosthetic TAVR valve  · Moderate to severe mitral valve regurgitation is present.  · Estimated right ventricular systolic pressure from tricuspid   regurgitation is 53 mmHg.          Discharge Details        Discharge Medications      New Medications      Instructions Start Date   metOLazone 2.5 MG tablet  Commonly known as: ZAROXOLYN   2.5 mg, Oral, Daily PRN         Changes to Medications      Instructions Start Date   sertraline 100 MG tablet  Commonly known as: ZOLOFT  What changed:   · how much to take  · how to take this  · when to take this  · additional instructions   Take 1/2 a day         Continue These Medications      Instructions Start Date   apixaban 2.5 MG tablet tablet  Commonly known as: ELIQUIS   2.5 mg, Oral, Every 12 Hours Scheduled      aspirin 81 MG tablet   81 mg, Oral, Daily      atorvastatin 80 MG tablet  Commonly known as: LIPITOR   40 mg, Oral, Daily      carvedilol 6.25 MG tablet  Commonly known as: COREG   3.125 mg, Oral, 2 Times Daily With Meals      ferrous sulfate 325 (65 FE) MG tablet   325 mg, Oral, Every Other Day      nitroglycerin 0.4 MG SL tablet  Commonly known as: Nitrostat   0.4 mg, Sublingual, Every 5 Minutes PRN      sacubitril-valsartan 49-51 MG tablet  Commonly known as: ENTRESTO   1 tablet, Oral, 2 Times Daily, For heart      torsemide 20 MG tablet  Commonly known as: DEMADEX   40 mg, Oral, Daily         Stop These Medications     famotidine 20 MG tablet  Commonly known as: PEPCID     traZODone 100 MG tablet  Commonly known as: DESYREL            Allergies   Allergen Reactions   • Hctz [Hydrochlorothiazide] Other (See Comments)     hyponatremia     Discharge Disposition:  Home-Health Care Svc    Diet:  Hospital:No active diet order    Activity:   As tolerated    Restrictions or Other Recommendations:  May have untreated sleep apnea complicating how his cardiopulmonary circuit performs at night       CODE STATUS:    Code Status and Medical Interventions:   Ordered at: 10/24/20 1116     Level Of Support Discussed With:    Patient     Code Status:    CPR     Medical Interventions (Level of Support Prior to Arrest):    Full       Future Appointments   Date Time Provider Department Center   11/19/2020 11:15 AM Parvin Quintana APRN [facility]   12/9/2020  3:45 PM Pramod Hoffman MD [facility]   12/14/2020  3:30 PM Lv Cobian MD [facility] None       Additional Instructions for the Follow-ups that You Need to Schedule     Ambulatory Referral to Home Health   As directed      Face to Face Visit Date: 10/27/2020    Follow-up provider for Plan of Care?: I treated the patient in an acute care facility and will not continue treatment after discharge.    Follow-up provider: PRAMOD HOFFMAN [[id number]]    Reason/Clinical Findings: congestive heart failure, multiple hospital admissions    Describe mobility limitations that make leaving home difficult: impaired physical mobility and gait endurance, chronic oxygen use    Nursing/Therapeutic Services Requested: Skilled Nursing    Skilled nursing orders: Medication education CHF management O2 instruction    Frequency: 1 Week 1         Discharge Follow-up with PCP   As directed       Currently Documented PCP:    Pramod Hoffman MD    PCP Phone Number:    [phone]     Follow Up Details: Primary Care Doctor - 1 week         Discharge Follow-up with Specialty: Cardiology -  Dr. Cobian; 1 Month   As directed      Specialty: Cardiology - Dr. Cobian    Follow Up: 1 Month    Follow Up Details: may need progressive adjustment of medications         Discharge Follow-up with Specialty: Heart Failure Clinic; 2 Weeks   As directed      Specialty: Heart Failure Clinic    Follow Up: 2 Weeks    Follow Up Details: end Stage Cardiomyopathy             Discussed with Cardiology - medical mgmt - follow up in Heart and Valve Clinic as he has severe heart disease with little good medical mgmt    High risk for re-admission    Saurabh Beltran MD  11/11/20      Time Spent on Discharge:  I spent  54  minutes on this discharge activity which included: face-to-face encounter with the patient, reviewing the data in the system, coordination of the care with the nursing staff as well as consultants, documentation, and entering orders.

## 2020-11-13 NOTE — OUTREACH NOTE
CHF Week 3 Survey      Responses   [facility] patient discharged from?  [place]   Does the patient have one of the following disease processes/diagnoses(primary or secondary)?  CHF   Week 3 attempt successful?  Yes   Call start time  1208   Call end time  1211   Meds reviewed with patient/caregiver?  Yes   Is the patient taking all medications as directed (includes completed medication regime)?  Yes   Comments regarding appointments  Pt has kept all appointments.   CHF Week 3 call completed?  Yes   Wrap up additional comments  Pt's wife reports he is doing well. Pt is waering his 02 as needed.          Anai Ledbetter RN

## 2020-11-24 NOTE — OUTREACH NOTE
CHF Week 4 Survey      Responses   [facility] patient discharged from?  [place]   Does the patient have one of the following disease processes/diagnoses(primary or secondary)?  CHF   Week 4 attempt successful?  Yes   Call start time  1302   Call end time  1308   Discharge diagnosis  acute on chronic systolic heart failure   Meds reviewed with patient/caregiver?  Yes   Is the patient taking all medications as directed (includes completed medication regime)?  Yes   Has the patient kept scheduled appointments due by today?  Yes   Is the patient still receiving Home Health Services?  No   Pulse Ox monitoring  Intermittent   What is the patient's perception of their health status since discharge?  Improving   Is the patient weighing daily?  Yes   Is the patient able to teach back Heart Failure Zones?  Yes   Week 4 Call Completed?  Yes   Would the patient like one additional call?  No   Graduated  Yes   Did the patient feel the follow up calls were helpful during their recovery period?  No   Wrap up additional comments  Appreciated calls but can't say they were helpful.          Mayelin Krueger RN

## 2020-12-09 NOTE — PROGRESS NOTES
QUICK REFERENCE INFORMATION:  The ABCs of the Annual Wellness Visit    Subsequent Medicare Wellness Visit    HEALTH RISK ASSESSMENT    1926    Recent Hospitalizations:  No hospitalization(s) within the last year..        Current Medical Providers:  Patient Care Team:  Pramod Hyde MD as PCP - General  Lv Cobian MD as Consulting Physician (Cardiology)        Smoking Status:  Social History     Tobacco Use   Smoking Status Former Smoker   • Packs/day: 0.25   • Years: 30.00   • Pack years: 7.50   • Types: Cigarettes   • Quit date:    • Years since quittin.9   Smokeless Tobacco Never Used   Tobacco Comment    quit 45 years ago- smoked since 19 yo        Alcohol Consumption:  Social History     Substance and Sexual Activity   Alcohol Use Yes   • Alcohol/week: 1.0 - 2.0 standard drinks   • Types: 1 - 2 Glasses of wine per week    Comment: occas       Depression Screen:   PHQ-2/PHQ-9 Depression Screening 2020   Little interest or pleasure in doing things 0   Feeling down, depressed, or hopeless 0   Total Score 0       Health Habits and Functional and Cognitive Screening:  Functional & Cognitive Status 2020   Do you have difficulty preparing food and eating? No   Do you have difficulty bathing yourself, getting dressed or grooming yourself? No   Do you have difficulty using the toilet? No   Do you have difficulty moving around from place to place? No   Do you have trouble with steps or getting out of a bed or a chair? Yes   Current Diet Well Balanced Diet   Dental Exam Not up to date   Eye Exam Not up to date   Exercise (times per week) 0 times per week   Current Exercise Activities Include None   Do you need help using the phone?  No   Are you deaf or do you have serious difficulty hearing?  Yes   Do you need help with transportation? No   Do you need help shopping? No   Do you need help preparing meals?  Yes   Do you need help with housework?  Yes   Do you need help with laundry?  No   Do you need help taking your medications? No   Do you need help managing money? No   Do you ever drive or ride in a car without wearing a seat belt? No   Have you felt unusual stress, anger or loneliness in the last month? No   Who do you live with? Spouse   If you need help, do you have trouble finding someone available to you? No   Have you been bothered in the last four weeks by sexual problems? No   Do you have difficulty concentrating, remembering or making decisions? No       Fall Risk Screen:  STEADI Fall Risk Assessment was completed, and patient is at LOW risk for falls.Assessment completed on:12/9/2020    ACE III MINI        Does the patient have evidence of cognitive impairment? Yes    Aspirin use counseling: Taking ASA appropriately as indicated    Recent Lab Results:  CMP:  Lab Results   Component Value Date    BUN 30 (H) 10/28/2020    CREATININE 1.78 (H) 10/28/2020    EGFRIFNONA 36 (L) 10/28/2020    BCR 16.9 10/28/2020     10/28/2020    K 4.0 10/28/2020    CO2 28.0 10/28/2020    CALCIUM 9.2 10/28/2020    ALBUMIN 3.80 10/24/2020    BILITOT 0.6 10/24/2020    ALKPHOS 150 (H) 10/24/2020    AST 24 10/24/2020    ALT 21 10/24/2020     HbA1c:  Lab Results   Component Value Date    HGBA1C 6.20 (H) 04/25/2018    HGBA1C 6.10 (H) 04/06/2018     Microalbumin:  No results found for: MICROALBUR, POCMALB, POCCREAT  Lipid Panel  Lab Results   Component Value Date    CHOL 118 10/08/2020    TRIG 66 10/08/2020    HDL 43 10/08/2020    LDL 62 10/08/2020    AST 24 10/24/2020    ALT 21 10/24/2020       Visual Acuity:  No exam data present    Age-appropriate Screening Schedule:  Refer to the list below for future screening recommendations based on patient's age, sex and/or medical conditions. Orders for these recommended tests are listed in the plan section. The patient has been provided with a written plan.    Health Maintenance   Topic Date Due   • TDAP/TD VACCINES (1 - Tdap) 11/30/1945   • ZOSTER VACCINE (1 of 2)  11/30/1976   • LIPID PANEL  10/08/2021   • INFLUENZA VACCINE  Completed        Subjective   History of Present Illness    Tad Moon is a 89 y.o. male who presents for a Subsequent Wellness Visit.    CHRONIC CONDITIONS    The following portions of the patient's history were reviewed and updated as appropriate: allergies, current medications, past family history, past medical history, past social history, past surgical history and problem list.    Outpatient Medications Prior to Visit   Medication Sig Dispense Refill   • apixaban (ELIQUIS) 2.5 MG tablet tablet Take 1 tablet by mouth Every 12 (Twelve) Hours. 60 tablet 11   • aspirin 81 MG tablet Take 1 tablet by mouth daily. 90 tablet 3   • atorvastatin (LIPITOR) 80 MG tablet Take 0.5 tablets by mouth Daily. 90 tablet 3   • carvedilol (COREG) 6.25 MG tablet Take 3.125 mg by mouth 2 (Two) Times a Day With Meals.     • ferrous sulfate 325 (65 FE) MG tablet Take 325 mg by mouth Every Other Day.     • nitroglycerin (NITROSTAT) 0.4 MG SL tablet Place 1 tablet under the tongue Every 5 (Five) Minutes As Needed for Chest Pain. 25 tablet 6   • O2 (OXYGEN) Inhale 2 L/min 1 (One) Time. Uses some during the day and at night     • sacubitril-valsartan (ENTRESTO) 49-51 MG tablet Take 1 tablet by mouth 2 (Two) Times a Day. For heart 60 tablet 3   • sertraline (ZOLOFT) 100 MG tablet Take 1/2 a day (Patient taking differently: Take 50 mg by mouth Every Night.)     • torsemide (DEMADEX) 20 MG tablet Take 2 tablets by mouth Daily. 60 tablet 1   • metOLazone (ZAROXOLYN) 2.5 MG tablet Take 1 tablet by mouth Daily As Needed (as needed per Cardiology) for up to 30 days. 30 tablet 0     No facility-administered medications prior to visit.        Patient Active Problem List   Diagnosis   • Coronary artery disease involving coronary bypass graft of native heart without angina pectoris   • Hyperlipidemia LDL goal <100   • History of tobacco abuse   • CLL (chronic lymphocytic leukemia)  (CMS/[facility])   • GERD (gastroesophageal reflux disease)   • Nonrheumatic aortic valve stenosis s/p TAVR   • Chronic systolic congestive heart failure (CMS/[facility])   • Acute on chronic systolic congestive heart failure (CMS/[facility])   • CKD (chronic kidney disease) stage 3, GFR 30-59 ml/min (CMS/[facility])   • Aortic valve stenosis   • Fatigue   • Ischemic cardiomyopathy   • Chronic combined systolic and diastolic CHF (congestive heart failure) (CMS/[facility])   • Angina pectoris (CMS/[facility])   • Coronary artery disease   • Essential hypertension   • Heart failure, chronic, with acute decompensation (CMS/[facility])   • Generalized ischemic myocardial dysfunction   • Peripheral vascular disease (CMS/[facility])   • Nonrheumatic mitral valve regurgitation   • Major depressive disorder with single episode, in partial remission (CMS/[facility])   • Syncope   • NEDRA (acute kidney injury) (CMS/[facility])   • Normocytic anemia   • Alcohol use   • Closed fracture of ninth thoracic vertebra (CMS/[facility])   • Complete heart block (CMS/[facility])   • Congestive heart failure (CMS/[facility])   • Non-pressure ulcer of left lower extremity (CMS/[facility])       Advance Care Planning:  ACP discussion was held with the patient during this visit. Patient has an advance directive in EMR which is still valid.     Identification of Risk Factors:  Risk factors include: Advance Directive Discussion.    Review of Systems   Constitutional: Positive for chills and fatigue. Negative for fever.   HENT: Positive for congestion. Negative for ear pain and sinus pressure.    Respiratory: Positive for shortness of breath. Negative for cough, chest tightness and wheezing.    Cardiovascular: Negative for chest pain and palpitations.   Gastrointestinal: Negative for abdominal pain, blood in stool and constipation.   Skin: Negative for color change.   Allergic/Immunologic: Negative for environmental allergies.   Neurological: Negative for dizziness, speech difficulty and headaches.   Psychiatric/Behavioral: Negative for  "confusion. The patient is not nervous/anxious.        Compared to one year ago, the patient feels his physical health is the same.  Compared to one year ago, the patient feels his mental health is the same.    Objective     Physical Exam  Vitals signs reviewed.   Constitutional:       Appearance: He is well-developed.   HENT:      Head: Normocephalic and atraumatic.   Cardiovascular:      Rate and Rhythm: Normal rate and regular rhythm.      Heart sounds: Normal heart sounds. No murmur.   Pulmonary:      Effort: Pulmonary effort is normal.      Breath sounds: Normal breath sounds.   Musculoskeletal:      Right lower le+ Pitting Edema present.      Left lower le+ Pitting Edema present.   Skin:     Comments: Left mid tibia there is a bandaged ulceration due to Mohs procedure, no evidence of infection, appears to be healing.   Neurological:      Mental Status: He is alert and oriented to person, place, and time.          Procedures     Vitals:    20 1604   BP: 132/60   BP Location: Left arm   Patient Position: Sitting   Cuff Size: Adult   Pulse: 72   Temp: 97.1 °F (36.2 °C)   SpO2: 95%   Weight: 78.9 kg (174 lb)   Height: 172.7 cm (67.99\")   PainSc: 0-No pain       Patient's Body mass index is 26.46 kg/m². BMI is within normal parameters. No follow-up required..      Assessment/Plan   Problem List Items Addressed This Visit        Cardiovascular and Mediastinum    Chronic combined systolic and diastolic CHF (congestive heart failure) (CMS/[facility])    Relevant Medications    nitroglycerin (NITROSTAT) 0.4 MG SL tablet    carvedilol (COREG) 6.25 MG tablet    sacubitril-valsartan (ENTRESTO) 49-51 MG tablet    Coronary artery disease    Relevant Medications    nitroglycerin (NITROSTAT) 0.4 MG SL tablet    carvedilol (COREG) 6.25 MG tablet    sacubitril-valsartan (ENTRESTO) 49-51 MG tablet    Essential hypertension    Relevant Medications    carvedilol (COREG) 6.25 MG tablet    torsemide (DEMADEX) 20 MG tablet    " Peripheral vascular disease (CMS/[facility])       Genitourinary    CKD (chronic kidney disease) stage 3, GFR 30-59 ml/min (CMS/[facility])    Relevant Medications    torsemide (DEMADEX) 20 MG tablet       Hematopoietic and Hemostatic    CLL (chronic lymphocytic leukemia) (CMS/[facility])       Other    Major depressive disorder with single episode, in partial remission (CMS/[facility])    Relevant Medications    sertraline (ZOLOFT) 100 MG tablet    traZODone (DESYREL) 100 MG tablet    Non-pressure ulcer of left lower extremity (CMS/[facility])      Other Visit Diagnoses     Preventative health care    -  Primary        Patient Self-Management and Personalized Health Advice  The patient has been provided with information about: diet, exercise and fall prevention and preventive services including:   · Annual Wellness Visit (AWV).    Outpatient Encounter Medications as of 12/9/2020   Medication Sig Dispense Refill   • apixaban (ELIQUIS) 2.5 MG tablet tablet Take 1 tablet by mouth Every 12 (Twelve) Hours. 60 tablet 11   • aspirin 81 MG tablet Take 1 tablet by mouth daily. 90 tablet 3   • atorvastatin (LIPITOR) 80 MG tablet Take 0.5 tablets by mouth Daily. 90 tablet 3   • carvedilol (COREG) 6.25 MG tablet Take 3.125 mg by mouth 2 (Two) Times a Day With Meals.     • ferrous sulfate 325 (65 FE) MG tablet Take 325 mg by mouth Every Other Day.     • nitroglycerin (NITROSTAT) 0.4 MG SL tablet Place 1 tablet under the tongue Every 5 (Five) Minutes As Needed for Chest Pain. 25 tablet 6   • O2 (OXYGEN) Inhale 2 L/min 1 (One) Time. Uses some during the day and at night     • sacubitril-valsartan (ENTRESTO) 49-51 MG tablet Take 1 tablet by mouth 2 (Two) Times a Day. For heart 60 tablet 3   • sertraline (ZOLOFT) 100 MG tablet Take 1/2 a day (Patient taking differently: Take 50 mg by mouth Every Night.)     • torsemide (DEMADEX) 20 MG tablet Take 2 tablets by mouth Daily. 60 tablet 1   • metOLazone (ZAROXOLYN) 2.5 MG tablet Take 1 tablet by mouth Daily As Needed (as  needed per Cardiology) for up to 30 days. 30 tablet 0   • traZODone (DESYREL) 100 MG tablet Take 1 tablet by mouth Every Night. 30 tablet 5     No facility-administered encounter medications on file as of 12/9/2020.      Plan    At age 89 with fairly precarious heart disease, he is currently doing very well.  In addition of home oxygen seems to have made a difference.  He is up-to-date on immunizations including this years flu shot.    PAD and cardiovascular disease are stable on aspirin and atorvastatin.    CLL is stable.    He will follow-up with dermatology for the skin ulcer.    Heart failure appears to be well compensated on oxygen, Eliquis, aspirin, carvedilol, metolazone, torsemide and Entresto.    Blood pressures well controlled on carvedilol and Entresto.    His mood is currently well compensated on 50 mg of sertraline daily and trazodone at bedtime.  We will discuss further weaning of sertraline on return.    GFR stable.    Reviewed use of high risk medication in the elderly: yes  Reviewed for potential of harmful drug interactions in the elderly: yes    Follow Up:  Return in about 4 months (around 4/9/2021) for follow up fsting.     There are no Patient Instructions on file for this visit.    An After Visit Summary and PPPS with all of these plans were given to the patient.

## 2020-12-14 NOTE — PROGRESS NOTES
[facility] Cardiology  Subjective:     Encounter Date: 12/14/2020      Patient ID: Tad Moon is a 89 y.o. male.    Chief Complaint: Coronary artery disease involving coronary bypass graft of n      PROBLEM LIST:  1. Coronary artery disease:  a. CABG in 1992, [place], [place]: LIMA to LAD, SVG to PDA, SVG to distal RCA, SVG to OM1.   b. NSTEMI, 2007 with 3.5 x 16 Taxus to SVG to RCA (Dr. Sheikh).   c. [facility], 2012, medical management, incomplete database.   d. [facility], 08/15/2014, functional class III angina/unstable: EF 40% with inferior wall akinesis. Severe distal left main and proximal LAD stenosis with a patent LIMA graft to LAD. An 80% proximal LCx heavily calcified stenosis tortuous segment with occluded vein graft. Chronically occluded RCA and SVG to RCA with 3+ collaterals.    e. [facility], 10/10/2017: 3-vessel CAD of the native arteries. Patent LIMA graft to the LAD, occluded vein grafts to the LCx and the RCA. Successful PTCA/stenting of the distal left main, proximal LCx and mid LCx with placement of overlapping 2.5 x 38 and 3.0 x 23 mm ESTER's reducing severe multiple 80% stenosis to no significant residual disease. EF 25-30%. 21 mm gradient across the aortic valve consistent with known AS.  f. [facility], 04/06/2018, pre-TAVR: Severe proximal LAD stenosis with widely patent LIMA. 50% hazy ISR of distal LM/ostial LCx with normal IFR. Occluded RCA and vein graft.  2. Chronic systolic heart failure  a. EF 40% by LV gram, 08/15/2014.  b. Echo, 10/06/2016: EF 35%.   c. Echo, 03/26/2018: EF 20%.   d. [facility], 04/06/2018: EF 20%.   e. Echocardiogram, 06/04/2018: EF 20%.Aortic stenosis:  f. Echo, 10/06/2016: EF 35%. Mod-severe AS, mean gradient 25 mmHg, MARII 0.9 cm2. Mild AI. Moderate MR, mild TR.  g. Echo, 03/26/2018: EF 20%. Severe AS with mean gradient 29 mmHg, max 47 mmHg, MARII 0.91 cm2. Moderate MR.  h. Stress echo, 03/28/2018: Resting mean gradient 25 mmHg, post dobutamine peak aortic valve  velocity 419 cm/s, peak gradient 70 mmHg and mean gradient 38 mmHg consistent with severe aortic stenosis.   i. [facility], 04/06/2018: EF 20%. Severe AS, mean PG 26.1, max 60 mmHg, MARII 0.97 cm2. Mild-to-mod AI. Mod-to-severe MR.  j. TAVR, 04/26/2018: 26 mm Pozo Lydia 3 tissue valve.  k. Echocardiogram, 06/04/2018: EF 20%. Mild MR. TAVR valve present with normal function.  l. Echocardiogram, 05/30/2019: EF 15%. Normal functioning TAVR. Severe MR.  m. Echocardiogram, 01/27/2020: EF 20%. Mild AI. Moderate MR.  n. Echocardiogram, 10/26/2020: EF 20%. Normal bioprosthetic TAVR valve. Moderate to severe MR. TR with RVSP 53 mmHg.   3. Valvular heart disease, s/p TAVR 4/26/2018  4. Persistent atrial fibrillation  a. CHADSVASC=4 (Age>75, HTN, CHF)  b. On Eliquis.  5. Syncope with CHB  a. 7/23/2020 admission.   b. 7/23/2020 Leadless PPM implant Dr. Colin.  6. Hypertension.   7. Dyslipidemia.   8. History of tobacco use.   9. GERD.  10. ETOH use.   11. Seizure disorder.  12. Chronic lymphocytic leukemia.  13. Surgical history:   a. Left total knee replacement.   b. Remote cholecystectomy.   c. Appendectomy.   d. Abdominal hernia repair x2      History of Present Illness  Tad Moon returns today for 3 month follow up with a history of CAD, CHF, valvular heart disease, and cardiac risk factors. Since last visit, he was admitted for acute on chronic systolic CHF. He was diuresed in the hospital and eventually discharged home after a 4 day admission. Since discharge, he has been feeling well overall from a cardiovascular standpoint. He continues to have a mild amount of edema. He is on torsemide. He reports he felt the IV Lasix was more effectively diuresing him. He also complains of mild shortness of breath. Patient denies chest pain, palpitations, dizziness, and syncope.     Allergies   Allergen Reactions   • Hctz [Hydrochlorothiazide] Other (See Comments)     hyponatremia         Current Outpatient Medications:   •  apixaban  (ELIQUIS) 2.5 MG tablet tablet, Take 1 tablet by mouth Every 12 (Twelve) Hours., Disp: 60 tablet, Rfl: 11  •  aspirin 81 MG tablet, Take 1 tablet by mouth daily., Disp: 90 tablet, Rfl: 3  •  atorvastatin (LIPITOR) 80 MG tablet, Take 0.5 tablets by mouth Daily., Disp: 90 tablet, Rfl: 3  •  carvedilol (COREG) 6.25 MG tablet, Take 3.125 mg by mouth 2 (Two) Times a Day With Meals., Disp: , Rfl:   •  famotidine (PEPCID) 20 MG tablet, Take 20 mg by mouth Daily As Needed for Heartburn., Disp: , Rfl:   •  nitroglycerin (NITROSTAT) 0.4 MG SL tablet, Place 1 tablet under the tongue Every 5 (Five) Minutes As Needed for Chest Pain., Disp: 25 tablet, Rfl: 6  •  O2 (OXYGEN), Inhale 2 L/min 1 (One) Time. Uses some during the day and at night, Disp: , Rfl:   •  sacubitril-valsartan (ENTRESTO) 49-51 MG tablet, Take 1 tablet by mouth 2 (Two) Times a Day. For heart, Disp: 60 tablet, Rfl: 3  •  sertraline (ZOLOFT) 100 MG tablet, Take 1/2 a day (Patient taking differently: Take 50 mg by mouth Every Night.), Disp: , Rfl:   •  torsemide (DEMADEX) 20 MG tablet, Take 2 tablets by mouth Daily., Disp: 60 tablet, Rfl: 1  •  traZODone (DESYREL) 100 MG tablet, Take 1 tablet by mouth Every Night., Disp: 30 tablet, Rfl: 5  •  ferrous sulfate 325 (65 FE) MG tablet, Take 325 mg by mouth Every Other Day., Disp: , Rfl:   •  metOLazone (ZAROXOLYN) 2.5 MG tablet, Take 1 tablet by mouth Daily As Needed (as needed per Cardiology) for up to 30 days., Disp: 30 tablet, Rfl: 0    The following portions of the patient's history were reviewed and updated as appropriate: allergies, current medications, past family history, past medical history, past social history, past surgical history and problem list.    Review of Systems   Constitution: Negative.   Cardiovascular: Positive for leg swelling. Negative for chest pain, dyspnea on exertion, palpitations and syncope.   Respiratory: Positive for shortness of breath.    Hematologic/Lymphatic: Negative for bleeding  "problem. Does not bruise/bleed easily.   Skin: Negative for rash.   Musculoskeletal: Negative for muscle weakness and myalgias.   Gastrointestinal: Negative for heartburn, nausea and vomiting.   Neurological: Negative for dizziness, light-headedness, loss of balance and numbness.          Objective:     Vitals:    12/14/20 1535   BP: 118/56   BP Location: Left arm   Patient Position: Sitting   Cuff Size: Adult   Pulse: 72   Weight: 77.1 kg (170 lb)   Height: 172.7 cm (68\")         Constitutional:       Appearance: Well-developed.   Neck:      Thyroid: No thyromegaly.      Vascular: No carotid bruit or JVD.   Pulmonary:      Breath sounds: Examination of the right-lower field reveals rales. Examination of the left-lower field reveals rales. Rales present.   Cardiovascular:      Regular rhythm.      No gallop. No S3 and S4 gallop.   Edema:     Peripheral edema present.     Ankle: bilateral 2+ edema of the ankle.  Abdominal:      General: Bowel sounds are normal.      Palpations: Abdomen is soft. There is no abdominal mass.      Tenderness: There is no abdominal tenderness.   Skin:     General: Skin is warm and dry.      Findings: No rash.   Neurological:      Mental Status: Alert and oriented to person, place, and time.         Lab Review:  Lab Results   Component Value Date    GLUCOSE 162 (H) 10/28/2020    BUN 30 (H) 10/28/2020    CREATININE 1.78 (H) 10/28/2020    EGFRIFNONA 36 (L) 10/28/2020    BCR 16.9 10/28/2020    K 4.0 10/28/2020    CO2 28.0 10/28/2020    CALCIUM 9.2 10/28/2020    ALBUMIN 3.80 10/24/2020    ALKPHOS 150 (H) 10/24/2020    AST 24 10/24/2020    ALT 21 10/24/2020     Lab Results   Component Value Date    CHOL 118 10/08/2020    TRIG 66 10/08/2020    HDL 43 10/08/2020    LDL 62 10/08/2020      Lab Results   Component Value Date    WBC 7.36 10/26/2020    RBC 2.92 (L) 10/26/2020    HGB 8.4 (L) 10/26/2020    HCT 28.2 (L) 10/26/2020    MCV 96.6 10/26/2020     10/26/2020     Lab Results   Component " Value Date    TSH 3.820 10/08/2020     Lab Results   Component Value Date    HGBA1C 6.20 (H) 04/25/2018        Procedures        Assessment:   Diagnoses and all orders for this visit:    1. Coronary artery disease involving coronary bypass graft of native heart without angina pectoris (Primary)    2. Chronic systolic congestive heart failure (CMS/HCC)    3. Essential hypertension    4. Mixed hyperlipidemia        Impression:  1. CAD: Stable and without angina on aspirin and NTG as needed for CP.   2. CHF: Currently functional class III-4 diuresis initiated for fluid retention. Otherwise stable and asymptomatic on Entresto.  3. Nonischemic cardiomyopathy last LVEF 20%  4. 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 Status post TAVR, normal functioning  6. Hypertension: Well controlled on Coreg.  7. Hyperlipidemia: Well controlled on atorvastatin.    Plan:  1. Patient thinks he has better response from furosemide, therefore stop the torsemide initiate furosemide 40 mg twice daily.  2. Metolazone 5 mg for 3 days and then as needed  3. Discussed need to limit water intake, 2 L fluid restriction.  4. For symptom relief, which is our main objective given his age and comorbidities, may need to sacrifice a little GFR.  5. Continue current medications.  6. Revisit in 1 MO with [employer] check, or sooner as needed.    Scribed for Lv Cobian MD by Samir Moulton. 12/14/2020  16:59 EST       Lv Cobian MD      Please note that portions of this note may have been completed with a voice recognition program. Efforts were made to edit the dictations, but occasionally words are mistranscribed.

## 2021-01-01 ENCOUNTER — READMISSION MANAGEMENT (OUTPATIENT)
Dept: CALL CENTER | Facility: HOSPITAL | Age: 86
End: 2021-01-01

## 2021-01-01 ENCOUNTER — TELEPHONE (OUTPATIENT)
Dept: CARDIOLOGY | Facility: CLINIC | Age: 86
End: 2021-01-01

## 2021-01-01 ENCOUNTER — TELEPHONE (OUTPATIENT)
Dept: CARDIOLOGY | Facility: HOSPITAL | Age: 86
End: 2021-01-01

## 2021-01-01 ENCOUNTER — APPOINTMENT (OUTPATIENT)
Dept: GENERAL RADIOLOGY | Facility: HOSPITAL | Age: 86
End: 2021-01-01

## 2021-01-01 ENCOUNTER — TELEPHONE (OUTPATIENT)
Dept: INTERNAL MEDICINE | Facility: CLINIC | Age: 86
End: 2021-01-01

## 2021-01-01 ENCOUNTER — OFFICE VISIT (OUTPATIENT)
Dept: CARDIOLOGY | Facility: HOSPITAL | Age: 86
End: 2021-01-01

## 2021-01-01 ENCOUNTER — TRANSITIONAL CARE MANAGEMENT TELEPHONE ENCOUNTER (OUTPATIENT)
Dept: CALL CENTER | Facility: HOSPITAL | Age: 86
End: 2021-01-01

## 2021-01-01 ENCOUNTER — HOSPITAL ENCOUNTER (INPATIENT)
Facility: HOSPITAL | Age: 86
LOS: 2 days | Discharge: HOME | End: 2021-01-27
Attending: EMERGENCY MEDICINE | Admitting: INTERNAL MEDICINE

## 2021-01-01 VITALS
BODY MASS INDEX: 24.98 KG/M2 | DIASTOLIC BLOOD PRESSURE: 87 MMHG | TEMPERATURE: 97.7 F | WEIGHT: 168.65 LBS | RESPIRATION RATE: 18 BRPM | SYSTOLIC BLOOD PRESSURE: 123 MMHG | OXYGEN SATURATION: 88 % | HEIGHT: 69 IN | HEART RATE: 55 BPM

## 2021-01-01 VITALS
DIASTOLIC BLOOD PRESSURE: 75 MMHG | RESPIRATION RATE: 18 BRPM | TEMPERATURE: 97.5 F | WEIGHT: 168.5 LBS | HEIGHT: 69 IN | BODY MASS INDEX: 24.96 KG/M2 | OXYGEN SATURATION: 97 % | HEART RATE: 72 BPM | SYSTOLIC BLOOD PRESSURE: 117 MMHG

## 2021-01-01 DIAGNOSIS — I25.5 ISCHEMIC CARDIOMYOPATHY: ICD-10-CM

## 2021-01-01 DIAGNOSIS — Z71.89 GOALS OF CARE, COUNSELING/DISCUSSION: ICD-10-CM

## 2021-01-01 DIAGNOSIS — N18.32 STAGE 3B CHRONIC KIDNEY DISEASE (HCC): ICD-10-CM

## 2021-01-01 DIAGNOSIS — I50.22 CHRONIC SYSTOLIC CONGESTIVE HEART FAILURE (HCC): Primary | ICD-10-CM

## 2021-01-01 DIAGNOSIS — J96.21 ACUTE ON CHRONIC RESPIRATORY FAILURE WITH HYPOXIA (HCC): ICD-10-CM

## 2021-01-01 DIAGNOSIS — I38 VHD (VALVULAR HEART DISEASE): ICD-10-CM

## 2021-01-01 DIAGNOSIS — I50.9 ACUTE ON CHRONIC CONGESTIVE HEART FAILURE, UNSPECIFIED HEART FAILURE TYPE (HCC): Primary | ICD-10-CM

## 2021-01-01 DIAGNOSIS — I48.20 CHRONIC ATRIAL FIBRILLATION (HCC): ICD-10-CM

## 2021-01-01 DIAGNOSIS — I10 ESSENTIAL HYPERTENSION: ICD-10-CM

## 2021-01-01 LAB
ALBUMIN SERPL-MCNC: 3.7 G/DL (ref 3.5–5.2)
ALBUMIN/GLOB SERPL: 1.1 G/DL
ALP SERPL-CCNC: 125 U/L (ref 39–117)
ALT SERPL W P-5'-P-CCNC: 18 U/L (ref 1–41)
ANION GAP SERPL CALCULATED.3IONS-SCNC: 11 MMOL/L (ref 5–15)
ANION GAP SERPL CALCULATED.3IONS-SCNC: 12 MMOL/L (ref 5–15)
ANION GAP SERPL CALCULATED.3IONS-SCNC: 14 MMOL/L (ref 5–15)
ARTERIAL PATENCY WRIST A: ABNORMAL
AST SERPL-CCNC: 23 U/L (ref 1–40)
ATMOSPHERIC PRESS: ABNORMAL MM[HG]
BASE EXCESS BLDA CALC-SCNC: 2.8 MMOL/L (ref 0–2)
BASOPHILS # BLD AUTO: 0.08 10*3/MM3 (ref 0–0.2)
BASOPHILS NFR BLD AUTO: 1 % (ref 0–1.5)
BDY SITE: ABNORMAL
BILIRUB SERPL-MCNC: 0.6 MG/DL (ref 0–1.2)
BODY TEMPERATURE: 37 C
BUN SERPL-MCNC: 36 MG/DL (ref 8–23)
BUN SERPL-MCNC: 38 MG/DL (ref 8–23)
BUN SERPL-MCNC: 38 MG/DL (ref 8–23)
BUN/CREAT SERPL: 20.4 (ref 7–25)
BUN/CREAT SERPL: 21.5 (ref 7–25)
BUN/CREAT SERPL: 21.6 (ref 7–25)
CALCIUM SPEC-SCNC: 8.9 MG/DL (ref 8.2–9.6)
CALCIUM SPEC-SCNC: 9 MG/DL (ref 8.2–9.6)
CALCIUM SPEC-SCNC: 9.1 MG/DL (ref 8.2–9.6)
CHLORIDE SERPL-SCNC: 96 MMOL/L (ref 98–107)
CHLORIDE SERPL-SCNC: 96 MMOL/L (ref 98–107)
CHLORIDE SERPL-SCNC: 97 MMOL/L (ref 98–107)
CO2 BLDA-SCNC: 28.8 MMOL/L (ref 22–33)
CO2 SERPL-SCNC: 28 MMOL/L (ref 22–29)
CO2 SERPL-SCNC: 28 MMOL/L (ref 22–29)
CO2 SERPL-SCNC: 32 MMOL/L (ref 22–29)
COHGB MFR BLD: 1 % (ref 0–2)
CREAT SERPL-MCNC: 1.67 MG/DL (ref 0.76–1.27)
CREAT SERPL-MCNC: 1.77 MG/DL (ref 0.76–1.27)
CREAT SERPL-MCNC: 1.86 MG/DL (ref 0.76–1.27)
DEPRECATED RDW RBC AUTO: 51.9 FL (ref 37–54)
DEPRECATED RDW RBC AUTO: 51.9 FL (ref 37–54)
EOSINOPHIL # BLD AUTO: 0.16 10*3/MM3 (ref 0–0.4)
EOSINOPHIL NFR BLD AUTO: 2 % (ref 0.3–6.2)
ERYTHROCYTE [DISTWIDTH] IN BLOOD BY AUTOMATED COUNT: 15.4 % (ref 12.3–15.4)
ERYTHROCYTE [DISTWIDTH] IN BLOOD BY AUTOMATED COUNT: 15.5 % (ref 12.3–15.4)
FERRITIN SERPL-MCNC: 142.3 NG/ML (ref 30–400)
GFR SERPL CREATININE-BSD FRML MDRD: 34 ML/MIN/1.73
GFR SERPL CREATININE-BSD FRML MDRD: 36 ML/MIN/1.73
GFR SERPL CREATININE-BSD FRML MDRD: 39 ML/MIN/1.73
GLOBULIN UR ELPH-MCNC: 3.4 GM/DL
GLUCOSE SERPL-MCNC: 89 MG/DL (ref 65–99)
GLUCOSE SERPL-MCNC: 97 MG/DL (ref 65–99)
GLUCOSE SERPL-MCNC: 98 MG/DL (ref 65–99)
HCO3 BLDA-SCNC: 27.5 MMOL/L (ref 20–26)
HCT VFR BLD AUTO: 28.2 % (ref 37.5–51)
HCT VFR BLD AUTO: 30.2 % (ref 37.5–51)
HCT VFR BLD CALC: 26.8 %
HGB BLD-MCNC: 8.3 G/DL (ref 13–17.7)
HGB BLD-MCNC: 9.1 G/DL (ref 13–17.7)
HGB BLDA-MCNC: 8.7 G/DL (ref 13.5–17.5)
HOLD SPECIMEN: NORMAL
HOLD SPECIMEN: NORMAL
IMM GRANULOCYTES # BLD AUTO: 0.02 10*3/MM3 (ref 0–0.05)
IMM GRANULOCYTES NFR BLD AUTO: 0.2 % (ref 0–0.5)
INHALED O2 CONCENTRATION: 80 %
IRON 24H UR-MRATE: 36 MCG/DL (ref 59–158)
IRON SATN MFR SERPL: 12 % (ref 20–50)
LYMPHOCYTES # BLD AUTO: 2.46 10*3/MM3 (ref 0.7–3.1)
LYMPHOCYTES NFR BLD AUTO: 30.6 % (ref 19.6–45.3)
MAGNESIUM SERPL-MCNC: 1.7 MG/DL (ref 1.7–2.3)
MCH RBC QN AUTO: 27.1 PG (ref 26.6–33)
MCH RBC QN AUTO: 27.7 PG (ref 26.6–33)
MCHC RBC AUTO-ENTMCNC: 29.4 G/DL (ref 31.5–35.7)
MCHC RBC AUTO-ENTMCNC: 30.1 G/DL (ref 31.5–35.7)
MCV RBC AUTO: 91.8 FL (ref 79–97)
MCV RBC AUTO: 92.2 FL (ref 79–97)
METHGB BLD QL: 0.3 % (ref 0–1.5)
MODALITY: ABNORMAL
MONOCYTES # BLD AUTO: 0.45 10*3/MM3 (ref 0.1–0.9)
MONOCYTES NFR BLD AUTO: 5.6 % (ref 5–12)
NEUTROPHILS NFR BLD AUTO: 4.87 10*3/MM3 (ref 1.7–7)
NEUTROPHILS NFR BLD AUTO: 60.6 % (ref 42.7–76)
NOTE: ABNORMAL
NRBC BLD AUTO-RTO: 0 /100 WBC (ref 0–0.2)
NT-PROBNP SERPL-MCNC: ABNORMAL PG/ML (ref 0–1800)
OXYHGB MFR BLDV: 99 % (ref 94–99)
PCO2 BLDA: 42.1 MM HG (ref 35–45)
PCO2 TEMP ADJ BLD: 42.1 MM HG (ref 35–48)
PH BLDA: 7.42 PH UNITS (ref 7.35–7.45)
PH, TEMP CORRECTED: 7.42 PH UNITS
PLATELET # BLD AUTO: 283 10*3/MM3 (ref 140–450)
PLATELET # BLD AUTO: 295 10*3/MM3 (ref 140–450)
PMV BLD AUTO: 10.8 FL (ref 6–12)
PMV BLD AUTO: 11 FL (ref 6–12)
PO2 BLDA: 240 MM HG (ref 83–108)
PO2 TEMP ADJ BLD: 240 MM HG (ref 83–108)
POTASSIUM SERPL-SCNC: 3.9 MMOL/L (ref 3.5–5.2)
POTASSIUM SERPL-SCNC: 4.5 MMOL/L (ref 3.5–5.2)
POTASSIUM SERPL-SCNC: 4.5 MMOL/L (ref 3.5–5.2)
PROT SERPL-MCNC: 7.1 G/DL (ref 6–8.5)
QT INTERVAL: 490 MS
QTC INTERVAL: 509 MS
RBC # BLD AUTO: 3.06 10*6/MM3 (ref 4.14–5.8)
RBC # BLD AUTO: 3.29 10*6/MM3 (ref 4.14–5.8)
SARS-COV-2 RDRP RESP QL NAA+PROBE: NORMAL
SODIUM SERPL-SCNC: 136 MMOL/L (ref 136–145)
SODIUM SERPL-SCNC: 139 MMOL/L (ref 136–145)
SODIUM SERPL-SCNC: 139 MMOL/L (ref 136–145)
TIBC SERPL-MCNC: 308 MCG/DL (ref 298–536)
TRANSFERRIN SERPL-MCNC: 207 MG/DL (ref 200–360)
TROPONIN T SERPL-MCNC: 0.03 NG/ML (ref 0–0.03)
TSH SERPL DL<=0.05 MIU/L-ACNC: 2.92 UIU/ML (ref 0.27–4.2)
VENTILATOR MODE: ABNORMAL
VIT B12 BLD-MCNC: 575 PG/ML (ref 211–946)
WBC # BLD AUTO: 7.76 10*3/MM3 (ref 3.4–10.8)
WBC # BLD AUTO: 8.04 10*3/MM3 (ref 3.4–10.8)
WHOLE BLOOD HOLD SPECIMEN: NORMAL
WHOLE BLOOD HOLD SPECIMEN: NORMAL

## 2021-01-01 PROCEDURE — 82805 BLOOD GASES W/O2 SATURATION: CPT

## 2021-01-01 PROCEDURE — 93005 ELECTROCARDIOGRAM TRACING: CPT | Performed by: EMERGENCY MEDICINE

## 2021-01-01 PROCEDURE — 80048 BASIC METABOLIC PNL TOTAL CA: CPT | Performed by: PHYSICIAN ASSISTANT

## 2021-01-01 PROCEDURE — 82607 VITAMIN B-12: CPT | Performed by: PHYSICIAN ASSISTANT

## 2021-01-01 PROCEDURE — 97161 PT EVAL LOW COMPLEX 20 MIN: CPT

## 2021-01-01 PROCEDURE — 87635 SARS-COV-2 COVID-19 AMP PRB: CPT | Performed by: EMERGENCY MEDICINE

## 2021-01-01 PROCEDURE — 99222 1ST HOSP IP/OBS MODERATE 55: CPT | Performed by: INTERNAL MEDICINE

## 2021-01-01 PROCEDURE — 83050 HGB METHEMOGLOBIN QUAN: CPT

## 2021-01-01 PROCEDURE — 71045 X-RAY EXAM CHEST 1 VIEW: CPT

## 2021-01-01 PROCEDURE — 84443 ASSAY THYROID STIM HORMONE: CPT | Performed by: PHYSICIAN ASSISTANT

## 2021-01-01 PROCEDURE — 83540 ASSAY OF IRON: CPT | Performed by: PHYSICIAN ASSISTANT

## 2021-01-01 PROCEDURE — 83735 ASSAY OF MAGNESIUM: CPT | Performed by: PHYSICIAN ASSISTANT

## 2021-01-01 PROCEDURE — 25010000002 FUROSEMIDE PER 20 MG: Performed by: PHYSICIAN ASSISTANT

## 2021-01-01 PROCEDURE — 84466 ASSAY OF TRANSFERRIN: CPT | Performed by: PHYSICIAN ASSISTANT

## 2021-01-01 PROCEDURE — 80053 COMPREHEN METABOLIC PANEL: CPT | Performed by: EMERGENCY MEDICINE

## 2021-01-01 PROCEDURE — 25010000002 FUROSEMIDE PER 20 MG: Performed by: EMERGENCY MEDICINE

## 2021-01-01 PROCEDURE — 85027 COMPLETE CBC AUTOMATED: CPT | Performed by: PHYSICIAN ASSISTANT

## 2021-01-01 PROCEDURE — 83880 ASSAY OF NATRIURETIC PEPTIDE: CPT | Performed by: EMERGENCY MEDICINE

## 2021-01-01 PROCEDURE — 82728 ASSAY OF FERRITIN: CPT | Performed by: PHYSICIAN ASSISTANT

## 2021-01-01 PROCEDURE — 85025 COMPLETE CBC W/AUTO DIFF WBC: CPT | Performed by: EMERGENCY MEDICINE

## 2021-01-01 PROCEDURE — 99285 EMERGENCY DEPT VISIT HI MDM: CPT

## 2021-01-01 PROCEDURE — 36600 WITHDRAWAL OF ARTERIAL BLOOD: CPT

## 2021-01-01 PROCEDURE — 99238 HOSP IP/OBS DSCHRG MGMT 30/<: CPT | Performed by: INTERNAL MEDICINE

## 2021-01-01 PROCEDURE — 82375 ASSAY CARBOXYHB QUANT: CPT

## 2021-01-01 PROCEDURE — 99232 SBSQ HOSP IP/OBS MODERATE 35: CPT | Performed by: NURSE PRACTITIONER

## 2021-01-01 PROCEDURE — 99214 OFFICE O/P EST MOD 30 MIN: CPT | Performed by: NURSE PRACTITIONER

## 2021-01-01 PROCEDURE — 84484 ASSAY OF TROPONIN QUANT: CPT | Performed by: EMERGENCY MEDICINE

## 2021-01-01 RX ORDER — NITROGLYCERIN 0.4 MG/1
0.4 TABLET SUBLINGUAL
Status: DISCONTINUED | OUTPATIENT
Start: 2021-01-01 | End: 2021-01-01 | Stop reason: HOSPADM

## 2021-01-01 RX ORDER — FAMOTIDINE 20 MG/1
20 TABLET, FILM COATED ORAL DAILY
Status: DISCONTINUED | OUTPATIENT
Start: 2021-01-01 | End: 2021-01-01 | Stop reason: HOSPADM

## 2021-01-01 RX ORDER — FERROUS SULFATE 325(65) MG
325 TABLET ORAL EVERY OTHER DAY
Status: DISCONTINUED | OUTPATIENT
Start: 2021-01-01 | End: 2021-01-01 | Stop reason: HOSPADM

## 2021-01-01 RX ORDER — TORSEMIDE 20 MG/1
40 TABLET ORAL DAILY
Status: DISCONTINUED | OUTPATIENT
Start: 2021-01-01 | End: 2021-01-01 | Stop reason: HOSPADM

## 2021-01-01 RX ORDER — SODIUM CHLORIDE 0.9 % (FLUSH) 0.9 %
10 SYRINGE (ML) INJECTION EVERY 12 HOURS SCHEDULED
Status: DISCONTINUED | OUTPATIENT
Start: 2021-01-01 | End: 2021-01-01 | Stop reason: HOSPADM

## 2021-01-01 RX ORDER — FUROSEMIDE 10 MG/ML
80 INJECTION INTRAMUSCULAR; INTRAVENOUS
Status: DISCONTINUED | OUTPATIENT
Start: 2021-01-01 | End: 2021-01-01

## 2021-01-01 RX ORDER — TORSEMIDE 20 MG/1
40 TABLET ORAL DAILY
Qty: 120 TABLET | Refills: 5 | Status: SHIPPED | OUTPATIENT
Start: 2021-01-01

## 2021-01-01 RX ORDER — ASPIRIN 81 MG/1
81 TABLET ORAL DAILY
Status: DISCONTINUED | OUTPATIENT
Start: 2021-01-01 | End: 2021-01-01 | Stop reason: HOSPADM

## 2021-01-01 RX ORDER — SODIUM CHLORIDE 0.9 % (FLUSH) 0.9 %
10 SYRINGE (ML) INJECTION AS NEEDED
Status: DISCONTINUED | OUTPATIENT
Start: 2021-01-01 | End: 2021-01-01 | Stop reason: HOSPADM

## 2021-01-01 RX ORDER — ATORVASTATIN CALCIUM 40 MG/1
40 TABLET, FILM COATED ORAL NIGHTLY
Status: DISCONTINUED | OUTPATIENT
Start: 2021-01-01 | End: 2021-01-01 | Stop reason: HOSPADM

## 2021-01-01 RX ORDER — TRAZODONE HYDROCHLORIDE 100 MG/1
100 TABLET ORAL NIGHTLY
Status: DISCONTINUED | OUTPATIENT
Start: 2021-01-01 | End: 2021-01-01 | Stop reason: HOSPADM

## 2021-01-01 RX ORDER — CARVEDILOL 3.12 MG/1
3.12 TABLET ORAL 2 TIMES DAILY WITH MEALS
Status: DISCONTINUED | OUTPATIENT
Start: 2021-01-01 | End: 2021-01-01 | Stop reason: HOSPADM

## 2021-01-01 RX ORDER — FUROSEMIDE 10 MG/ML
80 INJECTION INTRAMUSCULAR; INTRAVENOUS ONCE
Status: COMPLETED | OUTPATIENT
Start: 2021-01-01 | End: 2021-01-01

## 2021-01-01 RX ADMIN — CARVEDILOL 3.12 MG: 6.25 TABLET, FILM COATED ORAL at 18:34

## 2021-01-01 RX ADMIN — ATORVASTATIN CALCIUM 40 MG: 40 TABLET, FILM COATED ORAL at 22:28

## 2021-01-01 RX ADMIN — FAMOTIDINE 20 MG: 20 TABLET, FILM COATED ORAL at 08:00

## 2021-01-01 RX ADMIN — SERTRALINE HYDROCHLORIDE 50 MG: 50 TABLET ORAL at 21:21

## 2021-01-01 RX ADMIN — CARVEDILOL 3.12 MG: 6.25 TABLET, FILM COATED ORAL at 19:09

## 2021-01-01 RX ADMIN — APIXABAN 2.5 MG: 2.5 TABLET, FILM COATED ORAL at 21:22

## 2021-01-01 RX ADMIN — APIXABAN 2.5 MG: 2.5 TABLET, FILM COATED ORAL at 08:50

## 2021-01-01 RX ADMIN — FUROSEMIDE 80 MG: 10 INJECTION INTRAMUSCULAR; INTRAVENOUS at 11:39

## 2021-01-01 RX ADMIN — CARVEDILOL 3.12 MG: 6.25 TABLET, FILM COATED ORAL at 08:51

## 2021-01-01 RX ADMIN — FAMOTIDINE 20 MG: 20 TABLET, FILM COATED ORAL at 08:50

## 2021-01-01 RX ADMIN — APIXABAN 2.5 MG: 2.5 TABLET, FILM COATED ORAL at 08:00

## 2021-01-01 RX ADMIN — FUROSEMIDE 80 MG: 10 INJECTION INTRAMUSCULAR; INTRAVENOUS at 18:34

## 2021-01-01 RX ADMIN — APIXABAN 2.5 MG: 2.5 TABLET, FILM COATED ORAL at 22:28

## 2021-01-01 RX ADMIN — TRAZODONE HYDROCHLORIDE 100 MG: 100 TABLET ORAL at 21:22

## 2021-01-01 RX ADMIN — SACUBITRIL AND VALSARTAN 1 TABLET: 49; 51 TABLET, FILM COATED ORAL at 22:28

## 2021-01-01 RX ADMIN — SODIUM CHLORIDE, PRESERVATIVE FREE 10 ML: 5 INJECTION INTRAVENOUS at 08:51

## 2021-01-01 RX ADMIN — SODIUM CHLORIDE, PRESERVATIVE FREE 10 ML: 5 INJECTION INTRAVENOUS at 21:22

## 2021-01-01 RX ADMIN — ASPIRIN 81 MG: 81 TABLET, COATED ORAL at 08:00

## 2021-01-01 RX ADMIN — CARVEDILOL 3.12 MG: 6.25 TABLET, FILM COATED ORAL at 07:59

## 2021-01-01 RX ADMIN — SODIUM CHLORIDE, PRESERVATIVE FREE 10 ML: 5 INJECTION INTRAVENOUS at 22:29

## 2021-01-01 RX ADMIN — FERROUS SULFATE TAB 325 MG (65 MG ELEMENTAL FE) 325 MG: 325 (65 FE) TAB at 08:50

## 2021-01-01 RX ADMIN — FUROSEMIDE 80 MG: 10 INJECTION INTRAMUSCULAR; INTRAVENOUS at 08:00

## 2021-01-01 RX ADMIN — TRAZODONE HYDROCHLORIDE 100 MG: 100 TABLET ORAL at 22:28

## 2021-01-01 RX ADMIN — SACUBITRIL AND VALSARTAN 1 TABLET: 49; 51 TABLET, FILM COATED ORAL at 08:00

## 2021-01-01 RX ADMIN — FUROSEMIDE 80 MG: 10 INJECTION INTRAMUSCULAR; INTRAVENOUS at 08:50

## 2021-01-01 RX ADMIN — ATORVASTATIN CALCIUM 40 MG: 40 TABLET, FILM COATED ORAL at 21:21

## 2021-01-01 RX ADMIN — SACUBITRIL AND VALSARTAN 1 TABLET: 49; 51 TABLET, FILM COATED ORAL at 21:22

## 2021-01-01 RX ADMIN — ASPIRIN 81 MG: 81 TABLET, COATED ORAL at 08:50

## 2021-01-01 RX ADMIN — FUROSEMIDE 80 MG: 10 INJECTION INTRAMUSCULAR; INTRAVENOUS at 19:09

## 2021-01-01 RX ADMIN — SACUBITRIL AND VALSARTAN 1 TABLET: 49; 51 TABLET, FILM COATED ORAL at 08:50

## 2021-01-25 NOTE — TELEPHONE ENCOUNTER
Wife called to report she has been up all night with patient, every time he attempts to stand up he passes out.  Advised if she does not have assistance to get him to wheel chair and to ER for evaluation, then to call EMS to transport patient for evaluation.  Understanding verbalized.

## 2021-01-27 NOTE — OUTREACH NOTE
Prep Survey      Responses   [facility] facility patient discharged from?  [facility]   Is LACE score < 7 ?  No   Emergency Room discharge w/ pulse ox?  No   Eligibility  [facility]   Hospital  [place]   Date of Admission  01/25/21   Date of Discharge  01/20/21   Discharge Disposition  Home or Self Care   Discharge diagnosis  CHF   Does the patient have one of the following disease processes/diagnoses(primary or secondary)?  CHF   Does the patient have Home health ordered?  No   Is there a DME ordered?  No   Prep survey completed?  Yes          Carlene Colunga RN

## 2021-01-28 NOTE — TELEPHONE ENCOUNTER
[facility]    Patient Name:  Tad Moon  :  1926  DOS:  21    Patient Active Problem List   Diagnosis   • Coronary artery disease involving coronary bypass graft of native heart without angina pectoris   • Hyperlipidemia LDL goal <100   • History of tobacco abuse   • CLL (chronic lymphocytic leukemia) (CMS/[facility])   • GERD (gastroesophageal reflux disease)   • Nonrheumatic aortic valve stenosis s/p TAVR   • Chronic systolic congestive heart failure (CMS/[facility])   • Acute on chronic systolic congestive heart failure (CMS/[facility])   • CKD (chronic kidney disease) stage 3, GFR 30-59 ml/min (CMS/[facility])   • Aortic valve stenosis   • Fatigue   • Ischemic cardiomyopathy   • Chronic combined systolic and diastolic CHF (congestive heart failure) (CMS/[facility])   • Angina pectoris (CMS/[facility])   • Coronary artery disease   • Essential hypertension   • Heart failure, chronic, with acute decompensation (CMS/[facility])   • Generalized ischemic myocardial dysfunction   • Peripheral vascular disease (CMS/[facility])   • Nonrheumatic mitral valve regurgitation   • Major depressive disorder with single episode, in partial remission (CMS/[facility])   • Syncope   • NEDRA (acute kidney injury) (CMS/[facility])   • Normocytic anemia   • Alcohol use   • Closed fracture of ninth thoracic vertebra (CMS/[facility])   • Complete heart block (CMS/[facility])   • Congestive heart failure (CMS/[facility])   • Non-pressure ulcer of left lower extremity (CMS/[facility])         Consult received while patient was inpatient. Patient called to evaluate symptoms post discharge.    Pt states dyspnea has improve slightly, CP, dizziness, syncope, palps, edema.      C/o weakness.  Sleep well last night.     Education provided.   Medications management and adherance, Low Na diet, Signs / symptoms, Daily weight monitoring, Importance of keeping follow up office visits and Role of Heart and Valve Center and when to call    Education time 10 min.  Patient scheduled to follow up in clinic 21.  Pt  is aware and able to teach back instructions.

## 2021-01-28 NOTE — OUTREACH NOTE
Call Center TCM Note      Responses   [facility] patient discharged from?  [place]   Does the patient have one of the following disease processes/diagnoses(primary or secondary)?  CHF   TCM attempt successful?  Yes   Call start time  1313   Call end time  1315   Discharge diagnosis  CHF   Is patient permission given to speak with other caregiver?  Yes   Person spoke with today (if not patient) and relationship  Julia Alonsos reviewed with patient/caregiver?  Yes   Is the patient having any side effects they believe may be caused by any medication additions or changes?  No   Does the patient have all medications ordered at discharge?  Yes   Is the patient taking all medications as directed (includes completed medication regime)?  Yes   Does the patient have an appointment with their PCP within 7 days of discharge?  Yes   Comments regarding PCP  Hospital followup  with Pina Colunga (Dr Hyde's PA)on 2/2/21   Has the patient kept scheduled appointments due by today?  N/A   Psychosocial issues?  No   Did the patient receive a copy of their discharge instructions?  Yes   Nursing interventions  Reviewed instructions with patient   What is the patient's perception of their health status since discharge?  Improving   Nursing interventions  Nurse provided patient education   Is the patient weighing daily?  Yes   Does the patient have scales?  Yes   Daily weight interventions  Education provided on importance of daily weight   Is the patient able to teach back Heart Failure diet management?  Yes   Is the patient able to teach back Heart Failure Zones?  Yes   Is the patient able to teach back signs and symptoms of worsening condition? (i.e. weight gain, shortness of air, etc.)  Yes   If the patient is a current smoker, are they able to teach back resources for cessation?  Not a smoker   Is the patient/caregiver able to teach back the hierarchy of who to call/visit for symptoms/problems? PCP, Specialist, Home  health nurse, Urgent Care, ED, 911  Yes   TCM call completed?  Yes           Landon Martinez RN    1/28/2021, 13:15 EST

## 2021-02-02 NOTE — PROGRESS NOTES
"[facility]    Chief Complaint  Congestive Heart Failure and Follow-up    Subjective    History of Present Illness {  Problem List  Visit  Diagnosis   Encounters  Notes  Medications  Labs  Result Review Imaging  Media :23}     Tad Moon presents to [facility] for   History of Present Illness     89-year-old male admitted to [facility] on 1/25/2021 with acute on chronic systolic heart failure.  History of CAD, valvular heart disease, chronic atrial fibrillation, hypertension, hyperlipidemia.  Followed by Dr. Lv Cobian.  History of TAVR.    Echocardiogram 10/26/2020: EF 21 to 25%, mild aortic regurgitation, moderate to severe MR, mild TR with RVSP 45 to 55 mmHg    Pt is a DNR/AND.  Does not want aggressive interventions.      Pt reports dyspnea has improved.  Not worsened since d/c.  No worsening edema. No palpitations, CP, pressure, dizziness, syncope.  Fatigue is moderate but feels that he is at usual state of health and physical activity tolerance.  Dresses self.  Takes care of personal ADLs.  Has help in the home.      Objective     Vital Signs:   Vitals:    02/02/21 1051   BP: 117/75   BP Location: Left arm   Patient Position: Sitting   Cuff Size: Adult   Pulse: 72   Resp: 18   Temp: 97.5 °F (36.4 °C)   TempSrc: Temporal   SpO2: 97%   Weight: 76.4 kg (168 lb 8 oz)   Height: 175.3 cm (69\")     Body mass index is 24.88 kg/m².  Physical Exam  Vitals signs reviewed.   Constitutional:       General: He is not in acute distress.  Cardiovascular:      Rate and Rhythm: Normal rate and regular rhythm.      Pulses:           Radial pulses are 2+ on the right side.        Dorsalis pedis pulses are 2+ on the right side.        Posterior tibial pulses are 2+ on the right side.      Heart sounds: Murmur present.   Pulmonary:      Effort: Pulmonary effort is normal.      Breath sounds: Rhonchi " present.   Abdominal:      Palpations: Abdomen is soft.      Tenderness: There is no abdominal tenderness.   Musculoskeletal:         General: Deformity (hand joint nodules and clubbing) present.      Right lower leg: No edema.      Left lower leg: No edema.   Skin:     General: Skin is warm and dry.      Capillary Refill: Capillary refill takes 2 to 3 seconds.      Comments: Loose turgor.    Finger tips cyanotic initially (hx of Raynaud's), pink after a few minutes with ability to get O2 sats.        Neurological:      Mental Status: He is alert and oriented to person, place, and time.   Psychiatric:         Mood and Affect: Mood normal.         Behavior: Behavior is cooperative.              Result Review  Data Reviewed:{ Labs  Result Review  Imaging  Med Tab  Media :23}     Lab Results   Component Value Date    WBC 7.76 01/26/2021    HGB 8.3 (L) 01/26/2021    HCT 28.2 (L) 01/26/2021    MCV 92.2 01/26/2021     01/26/2021     Lab Results   Component Value Date    GLUCOSE 97 01/27/2021    CALCIUM 8.9 01/27/2021     01/27/2021    K 3.9 01/27/2021    CO2 32.0 (H) 01/27/2021    CL 96 (L) 01/27/2021    BUN 36 (H) 01/27/2021    CREATININE 1.67 (H) 01/27/2021    EGFRIFNONA 39 (L) 01/27/2021    BCR 21.6 01/27/2021    ANIONGAP 11.0 01/27/2021     Lab Results   Component Value Date    TSH 2.920 01/26/2021     Lab Results   Component Value Date    CHOL 118 10/08/2020    CHOL 196 09/26/2019    CHOL 154 10/10/2017     Lab Results   Component Value Date    TRIG 66 10/08/2020    TRIG 117 09/26/2019    TRIG 79 10/10/2017     Lab Results   Component Value Date    HDL 43 10/08/2020    HDL 54 09/26/2019    HDL 39 (L) 10/10/2017     Lab Results   Component Value Date    LDL 62 10/08/2020     (H) 09/26/2019     10/10/2017       Assessment and Plan {CC Problem List  Visit Diagnosis  ROS  Review (Popup)  Health Maintenance  Quality  BestPractice  Medications  SmartSets  SnapShot Encounters   Media :23}   1. Chronic systolic congestive heart failure (CMS/[facility])    Heart failure education discussed: What his heart failure, causes, signs and symptoms, medication management, daily weight monitoring, low-sodium diet of less than 2 g per day, daily exercise, role the heart failure center.    EF 20%, 10/26/20  Coreg, enstresto, torsemide, metolazone  Stable     - Basic Metabolic Panel; Future    2. VHD (valvular heart disease)  S/p TAVR with   Moderate-severe MR    3. Ischemic cardiomyopathy  Asa, statin    4. Chronic atrial fibrillation (CMS/[facility])  HR controlled  Eliquis    5. Essential hypertension  controlled    6. Stage 3b chronic kidney disease (CMS/[facility])    - Basic Metabolic Panel; 1 week with PCP    Patient currently DNR/AND.  Advance care planning discussion and goals of care reviewed chronic illness and prognosis.  Patient is aware and acknowledges his disease process.  Discussed options of palliative and hospice care in the home.  Daughter is present.  Family has used hospice services in the past.  Aware of their resources.  Daughter feels that services would be good for her father.  Father would like to consider but is not interested in referral at this time.  Reviewed the role of the heart valve center and treatment options including clinic IV diuresis.        I spent 25 minutes caring for Tad on this date of service. This time includes time spent by me in the following activities:preparing for the visit, reviewing tests, performing a medically appropriate examination and/or evaluation , counseling and educating the patient/family/caregiver, ordering medications, tests, or procedures and documenting information in the medical record      Patient has follow-up with Dr. Cobian.  Patient encouraged to keep that appointment.  Call the heart valve center sooner if needed.  Follow Up {Instructions Charge Capture  Follow-up Communications :23}   Return in about 3 months (around 5/2/2021) for HF, Office  visit.    Patient was given instructions and counseling regarding his condition or for health maintenance advice. Please see specific information pulled into the AVS if appropriate.  Patient was instructed to call the Heart [facility] Center with any questions, concerns, or worsening symptoms.    *Please note that portions of this note were completed with a voice recognition program. Efforts were made to edit the dictations, but occasionally words are mistranscribed.

## 2021-02-05 NOTE — OUTREACH NOTE
CHF Week 2 Survey      Responses   [facility] patient discharged from?  [place]   Does the patient have one of the following disease processes/diagnoses(primary or secondary)?  CHF   Week 2 attempt successful?  Yes   Call start time  1545   Call end time  1549   Discharge diagnosis  CHF   Is patient permission given to speak with other caregiver?  Yes   Meds reviewed with patient/caregiver?  Yes   Is the patient having any side effects they believe may be caused by any medication additions or changes?  No   Does the patient have all medications ordered at discharge?  Yes   Is the patient taking all medications as directed (includes completed medication regime)?  Yes   Does the patient have a primary care provider?   Yes   Does the patient have an appointment with their PCP within 7 days of discharge?  Yes   Has the patient kept scheduled appointments due by today?  Yes   Has home health visited the patient within 72 hours of discharge?  Yes   Has all DME been delivered?  No   Psychosocial issues?  No   Did the patient receive a copy of their discharge instructions?  Yes   Nursing interventions  Reviewed instructions with patient   What is the patient's perception of their health status since discharge?  Improving   Nursing interventions  Nurse provided patient education   Is the patient weighing daily?  Yes   Does the patient have scales?  Yes   Daily weight interventions  Education provided on importance of daily weight   Is the patient able to teach back Heart Failure diet management?  Yes   Is the patient able to teach back Heart Failure Zones?  Yes   Is the patient able to teach back signs and symptoms of worsening condition? (i.e. weight gain, shortness of air, etc.)  Yes   Is the patient/caregiver able to teach back the hierarchy of who to call/visit for symptoms/problems? PCP, Specialist, Home health nurse, Urgent Care, ED, 911  Yes   Additional teach back comments  HF clinic, he is doing well. No med  changes.   CHF Week 2 call completed?  Yes   Wrap up additional comments  Will call and gather information about palliative care.          Nieves Gamino RN

## 2021-02-10 NOTE — TELEPHONE ENCOUNTER
Patient called and stated he wanted to know if his referral has been sent to palliative care. Advised patient his referral has been completed. Patient verbalized understanding.

## 2021-02-11 NOTE — OUTREACH NOTE
CHF Week 3 Survey      Responses   [facility] patient discharged from?  [place]   Does the patient have one of the following disease processes/diagnoses(primary or secondary)?  CHF   Week 3 attempt successful?  No   Unsuccessful attempts  Attempt 1          Nieves Gamino RN

## 2021-02-16 NOTE — OUTREACH NOTE
"CHF Week 3 Survey      Responses   [facility] patient discharged from?  [place]   Does the patient have one of the following disease processes/diagnoses(primary or secondary)?  CHF   Week 3 attempt successful?  Yes   Call start time  1449   Call end time  1454   Discharge diagnosis  CHF   Is the patient taking all medications as directed (includes completed medication regime)?  Yes   Does the patient have a primary care provider?   Yes   Has the patient kept scheduled appointments due by today?  Yes   Pulse Ox monitoring  Intermittent   Pulse Ox device source  Patient   O2 Sat: education provided  Sat levels, Monitoring frequency, When to seek care   Psychosocial issues?  No   What is the patient's perception of their health status since discharge?  Improving   Nursing interventions  Nurse provided patient education   Is the patient weighing daily?  Yes   Does the patient have scales?  Yes   Is the patient able to teach back Heart Failure diet management?  Yes   Is the patient able to teach back Heart Failure Zones?  Yes   Is the patient able to teach back signs and symptoms of worsening condition? (i.e. weight gain, shortness of air, etc.)  Yes   Is the patient/caregiver able to teach back the hierarchy of who to call/visit for symptoms/problems? PCP, Specialist, Home health nurse, Urgent Care, ED, 911  Yes   Additional teach back comments  States he is doing \"pretty good\".  Using 3L of oxygen.  Weighs self daily with no weight gain.  States appetite is not good but is eating.  He went to the [facility] and they talked to him abut Pallative Care and was hoping someone would contact him regarding this.  Will message case management to see if they could help with this.   CHF Week 3 call completed?  Yes   Wrap up additional comments  Will message case management regarding information on pallative care.          Verónica Casper, DAVE  "

## 2021-02-19 NOTE — TELEPHONE ENCOUNTER
Parul with [facility] requested a call back. Patient is being assessed by [facility] on 2/19/21. Parul stated the patient was referred by his cardiologist. Parul would like to verify if Dr. Hyde would follow the patient if he is admitted to hospice care.    Please call and advise. Parul's call back [phone]

## 2021-04-05 NOTE — TELEPHONE ENCOUNTER
Called and talked to Nelly. She said cardiology referred pt to Hospice in Feb (phone note in chart). He is now on continuous O2. He is sched for an appt here later this week and was to also have blood work. Do they need to keep appt? She was concerned about whether ins would cover if he is in Hospice? Advised her to check with Hospice, since referral was from cardiology. Advised Dr. Hyde is more than happy to continue to care for him if needed

## 2021-04-05 NOTE — TELEPHONE ENCOUNTER
He doesn't need to keep appt, although even under hospice, we could see him if there was an acute issue.  Hospice will keep us informed.

## 2021-04-05 NOTE — TELEPHONE ENCOUNTER
Caller: JOLANTA    Relationship: [facility]    Best call back number: [phone]    What is the best time to reach you: ASAP    Who are you requesting to speak with (clinical staff, provider,  specific staff member): PROVIDER    Do you know the name of the person who called:     What was the call regarding: SHE STATE DPT HAS REGISTERED WITH HOSPICE AND WOULD LIKE TO KNOW IF PT CAN STILL BE SEEN BY PCP    Do you require a callback: YES

## 2021-06-03 ENCOUNTER — TELEPHONE (OUTPATIENT)
Dept: INTERNAL MEDICINE | Facility: CLINIC | Age: 86
End: 2021-06-03

## 2021-06-03 NOTE — TELEPHONE ENCOUNTER
JOLANTA IS CALLING TO LET DIEGO AND DOCTOR EBONY  KNOW THAT THE PATIENT PASSED AWAY ON 2021 AND THAT THE  WILL BE THIS         SHMUEL CALL BACK NUMBNER [phone]

## 2022-01-27 NOTE — PROGRESS NOTES
2022    TELEHEALTH EVALUATION -- Audio/Visual (During [id number]-76 public health emergency)    HPI:    Jenny Solis (:  1999) has requested an audio/video evaluation for the following concern(s):    Pt scheduled a VV to discuss getting referrals to specialists. She c/o stomach cramping after eating and loose stools for 2 months. She also c/o acid reflux that is worse in the mornings. She has not tried anything b/c she is breastfeeding. She is requesting a referral to GI. H/o psoriasis. Needs refill on steroid cream.      Requesting referral to new OB/Gyn. Review of Systems   Constitutional: Negative. Respiratory: Negative. Cardiovascular: Negative. Gastrointestinal: Negative for abdominal distention, abdominal pain, anal bleeding, blood in stool, constipation, diarrhea, nausea, rectal pain and vomiting. \"loose stools after eating\"   Skin: Positive for rash. Neurological: Negative. Prior to Visit Medications    Medication Sig Taking?  Authorizing Provider   triamcinolone (KENALOG) 0.1 % cream Apply small amount 2 times daily Yes ANDREWS Paul - CNP       Social History     Tobacco Use    Smoking status: Former Smoker    Smokeless tobacco: Never Used   Vaping Use    Vaping Use: Never used   Substance Use Topics    Alcohol use: Not Currently    Drug use: Yes     Frequency: 7.0 times per week     Types: Marijuana (Weed)        Allergies   Allergen Reactions    Latex     Adhesive Tape     Amitriptyline    ,   Past Medical History:   Diagnosis Date    ADHD     Anxiety     Borderline personality disorder (HCC)     Depression     Fibromyalgia     Mental disorder     Migraines    ,   Past Surgical History:   Procedure Laterality Date     SECTION N/A 10/2/2021     SECTION performed by Hilda Beasley MD at [facility]    CHOLECYSTECTOMY      TONSILLECTOMY     ,   Social History     Tobacco Use    Smoking status: Former Smoker Continued Stay Note  [facility]     Patient Name: Tad Moon  MRN: [id number]  Today's Date: 10/11/2020    Admit Date: 10/7/2020    Discharge Plan     Row Name 10/11/20 1448       Plan    Plan  Home    Patient/Family in Agreement with Plan  yes    Plan Comments  Met with patient in the room to discuss getting a walker for home. CM consulted to get a walker for patient, and OT called to recommend a rollator. Patient states he has three rolling walkers at home. He has declined the offer of a rollator and states he wants to think about it. He will ask his PCP to order a rollator if he decides he wants one. Patient denies other discharge needs. CM will continue to follow.    Final Discharge Disposition Code  01 - home or self-care        Discharge Codes    No documentation.       Expected Discharge Date and Time     Expected Discharge Date Expected Discharge Time    Oct 11, 2020             Sylvia De La Cruz RN      Smokeless tobacco: Never Used   Vaping Use    Vaping Use: Never used   Substance Use Topics    Alcohol use: Not Currently    Drug use: Yes     Frequency: 7.0 times per week     Types: Marijuana Ardia Randee)   ,   Family History   Problem Relation Age of Onset    Depression Mother     Arthritis Maternal Grandmother     Depression Maternal Grandmother     Diabetes Maternal Grandfather     High Blood Pressure Maternal Grandfather     Diabetes Paternal Grandmother     Stroke Paternal Grandmother    ,   Immunization History   Administered Date(s) Administered    DTaP (Infanrix) 1999, 1999, 1999, 09/08/2000, 03/05/2003    HPV Quadrivalent (Gardasil) 03/03/2008, 05/13/2008, 09/12/2008    Hepatitis A Ped/Adol (Havrix, Vaqta) 05/04/2016    Hepatitis B vaccine 1999, 1999, 1999    Hib vaccine 1999, 1999, 1999, 03/28/2000    Influenza A (E6Z8-04) Vaccine IM 11/17/2009    Influenza Virus Vaccine 10/09/2015    MMR 03/28/2000, 03/05/2003    Meningococcal MCV4P (Menactra) 05/04/2016    Polio IPV (IPOL) 1999, 1999, 03/28/2000, 03/05/2003, 05/04/2016    Tdap (Boostrix, Adacel) 03/09/2010, 11/02/2021    Varicella (Varivax) 03/13/2008, 05/04/2016   ,   Health Maintenance   Topic Date Due    Hepatitis C screen  Never done    COVID-19 Vaccine (1) Never done    HIV screen  Never done    Chlamydia screen  Never done    Hepatitis A vaccine (2 of 2 - 2-dose series) 11/04/2016    Pap smear  Never done    Flu vaccine (1) 09/01/2021    Depression Screen  11/02/2022    DTaP/Tdap/Td vaccine (8 - Td or Tdap) 11/02/2031    Hepatitis B vaccine  Completed    Hib vaccine  Completed    HPV vaccine  Completed    Varicella vaccine  Completed    Meningococcal (ACWY) vaccine  Completed    Pneumococcal 0-64 years Vaccine  Aged Out       PHYSICAL EXAMINATION:  [ INSTRUCTIONS:  \"[x]\" Indicates a positive item  \"[]\" Indicates a negative item  -- DELETE ALL ITEMS NOT EXAMINED]  Vital Signs: (As obtained by patient/caregiver or practitioner observation)    Blood pressure-  Heart rate-    Respiratory rate-    Temperature-  Pulse oximetry-     Constitutional: [x] Appears well-developed and well-nourished [x] No apparent distress      [] Abnormal-   Mental status  [x] Alert and awake  [x] Oriented to person/place/time [x]Able to follow commands      Eyes:  EOM    []  Normal  [] Abnormal-  Sclera  []  Normal  [] Abnormal -         Discharge []  None visible  [] Abnormal -    HENT:   [] Normocephalic, atraumatic. [] Abnormal   [] Mouth/Throat: Mucous membranes are moist.     External Ears [] Normal  [] Abnormal-     Neck: [] No visualized mass     Pulmonary/Chest: [x] Respiratory effort normal.  [x] No visualized signs of difficulty breathing or respiratory distress        [] Abnormal-      Musculoskeletal:   [] Normal gait with no signs of ataxia         [] Normal range of motion of neck        [] Abnormal-       Neurological:        [x] No Facial Asymmetry (Cranial nerve 7 motor function) (limited exam to video visit)          [] No gaze palsy        [] Abnormal-         Skin:        [] No significant exanthematous lesions or discoloration noted on facial skin         [x] Abnormal-  Pt states that she has psoriasis to the back of her neck, elbows and palmar aspects of hands. Unable to visualize d/t poor camera quality. Psychiatric:       [x] Normal Affect [x] No Hallucinations        [] Abnormal-     Other pertinent observable physical exam findings-     ASSESSMENT/PLAN:  1. Healthcare maintenance  Pt requests new referral to [address] [place] [address] [place], [place], , Obstetrics, [place]    2. Gastroesophageal reflux disease, unspecified whether esophagitis present  Unable to do complete exam d/t VV. Will refer to GI for further evaluation.  - Ratna Smith MD, Gastroenterology, [facility]    3.  Psoriasis  Pt states that she could not find a dermatologist to accept her insurance. Will prescribe triamcinolone cream to see if that helps with her symptoms. Advised pt to f/u with me for an OV if symptoms persist or worsen. - triamcinolone (KENALOG) 0.1 % cream; Apply small amount 2 times daily  Dispense: 15 g; Refill: 0      Return if symptoms worsen or fail to improve. [place], was evaluated through a synchronous (real-time) audio-video encounter. The patient (or guardian if applicable) is aware that this is a billable service, which includes applicable co-pays. This Virtual Visit was conducted with patient's (and/or legal guardian's) consent. The visit was conducted pursuant to the emergency declaration under the [address] [place] [address], [address] [place] [address] waiver authority and the [employer] and [employer] General Act. Patient identification was verified, and a caregiver was present when appropriate. The patient was located at home in a state where the provider was licensed to provide care. Total time spent on this encounter: 20 min    --ANDREWS Gaines CNP on 1/27/2022 at 3:58 PM    An electronic signature was used to authenticate this note.

## 2022-05-24 NOTE — OUTREACH NOTE
Call Center TCM Note      Responses   [facility] patient discharged from?  [facility]   Does the patient have one of the following disease processes/diagnoses(primary or secondary)?  CHF   TCM attempt successful?  Yes   Call start time  1353   Call end time  1359   Discharge diagnosis  hypoxia, A/C CHF, cardiomyopathy   Meds reviewed with patient/caregiver?  Yes   Is the patient having any side effects they believe may be caused by any medication additions or changes?  No   Does the patient have all medications ordered at discharge?  Yes   Is the patient taking all medications as directed (includes completed medication regime)?  Yes   Comments regarding appointments  appt with JUAN Ghotra on 10/15   Does the patient have a primary care provider?   Yes   Does the patient have an appointment with their PCP within 7 days of discharge?  Greater than 7 days   Comments regarding PCP  f/u with Dr. Hyde on 11/5   Nursing Interventions  Verified appointment date/time/provider   Has the patient kept scheduled appointments due by today?  N/A   Psychosocial issues?  No   Did the patient receive a copy of their discharge instructions?  Yes   Nursing interventions  Reviewed instructions with patient   What is the patient's perception of their health status since discharge?  Improving   Nursing interventions  Nurse provided patient education   Is the patient weighing daily?  Yes   Does the patient have scales?  Yes   Daily weight interventions  Education provided on importance of daily weight   Is the patient able to teach back signs and symptoms of worsening condition? (i.e. weight gain, shortness of air, etc.)  Yes   Is the patient/caregiver able to teach back the hierarchy of who to call/visit for symptoms/problems? PCP, Specialist, Home health nurse, Urgent Care, ED, 911  Yes   TCM call completed?  Yes   Wrap up additional comments  Patient says he is doing well, he is following his discharge instructions  closely and is monitoring his weight daily.           Yumiko Ambriz RN    10/12/2020, 13:59 EDT       Yes

## 2023-02-02 NOTE — TELEPHONE ENCOUNTER
----- Message from CHRIS Pablo sent at 2/8/2021  4:32 PM EST -----  Regarding: Pallative care  Patient would like a referral to Pallative Care. He thought it over and thinks it would benefit him. Thanks    
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    Patient with current AMD/DNR.  On last office visit 2/2/2022 goals of care discussion completed.  Patient and daughter went home with plan to discuss hospice and palliative care services.  Patient contacted heart valve center for further review and would like a referral for hospice/palliative care.  Referral completed.  
yes

## 2024-02-08 NOTE — TELEPHONE ENCOUNTER
ERROR   Occupational Therapy Evaluation    Visit Type: Initial Evaluation  Visit: 1  Referring Provider: LARISA Corona  Medical Diagnosis (from order): T82.898A - Steal syndrome as complication of dialysis access, initial encounter (CMD)   Treatment Diagnosis: left hand - impaired posture, impaired range of motion, impaired activity tolerance and impaired sensory integration.  Onset  - Date of onset: Following recent surgery.  - Date of Surgery:  1/2/2024  - Procedure: Left Upper Extremity Av Graft Ligation - Left  Chart reviewed at time of initial evaluation (relevant co-morbidities, allergies, tests and medications listed):  Reviewed  CKD      SUBJECTIVE                                                                                                               Significant health history reviewed.      Pain / Symptoms  - Pain rating (out of 10): Current: 0 ; Best: 0; Worst: 0  - Location: No pain. 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    Prior treatment  - no therapies  - Discharged from hospital, home health, or skilled nursing facility in last 30 days: no  Home Environment   - Patient lives with: family available to help if needed  - Assistance available: as needed  - Feel safe at home / work / school: yes     OBJECTIVE                                                                                                                    Observation   Clawlike deformity to ulnar digits  Muscular waisting  in first web inter metacarpal. Intrinsics develping tightness    Strength  (out of 5 unless noted, standard test position unless noted)   : (lbs)    - Neutral:         Left: Trial(s): 5, 5, 0, Average: 3.33         Right: Trial(s): 30, 32, 35, Average: 32.33  Pinch: (lbs)    - Lateral:          Left: Trial(s): 13         Right: Trial(s): 22    - Tip:          Left: Trial(s): 9         Right: Trial(s): 19  Comments / Details: Decreased strength noted ulnar hand extrinsics on MMT                  Range of Motion (ROM) (hand specific)  (degrees unless noted; active unless noted; norms in ( ); negative=lacking to 0, positive=beyond 0)  Ring Finger:    - MCP Flexion (90):         Left: 90    - MCP Extension (20):         Left: 10    - PIP Joint Flexion (100):         Left: 100    - PIP Joint Extension (0):         Left: -30    - DIP Flexion (70-90):         Left: 60    - DIP Extension (0):         Left: -20    - Total Arc of Motion Left: 210.    Small Finger:    - MCP Flexion (90):         Left: 90    - MCP Extension (20):         Left: 0    - PIP Joint Flexion (100):         Left: 100    - PIP Joint Extension (0):         Left:  0    - DIP Flexion (70-90):         Left: 60    - DIP Extension (0):         Left: -5    -Total Arc of Motion Left: 245      Outcome/Assessments  Outcome Measures:   Quick Disabilities of the Arm, Shoulder and Hand (% disability): QuickDash Total Score (Score will not calculate if more then 2 questions are left blank): 79.55   (scored 0-100; a higher score indicates greater disability) see flowsheet for additional documentation       Treatment     Therapeutic Exercise  Instruct in motor deficiencies and related to UE structure/function.  Instruct and performs HEP.  Independent HEP    Skilled input: verbal instruction/cues and tactile instruction/cues  for hand placement and palpation for techniques as described above and how they are pertinent to the patient's plan of care.  Home Exercise  Program  Finger abd, with / without resist, finger add with resist, table top with joint  blocks        ASSESSMENT                                                                                                          69 year old patient has reported functional limitations listed above impacted by signs and symptoms consistent with treatment diagnosis below.  Treatment Diagnosis:   - Involved: left hand.  - Symptoms/impairments: impaired posture, impaired range of motion, impaired activity tolerance and impaired sensory integration.    Presents with mixed median/ulnar nerve issues affectiong motor to L hand.  Since, there has been improvement since surger, anticipate cont progress, but end of healing likely 6-9 months ahead. Pt will be educated as to appropriate HEP to complete post dc formal therapy.  Pain/symptoms after session (out of 10): 0    Prognosis: Patient will benefit from skilled therapy.  Clinical decision making: Moderate - Patient has several limitations (3-5), comorbidities and/or complexities, as noted in detailed assessment above, that impact their occupational profile.  Resulting in several treatment options and minimal to moderate task modification consistent with moderate clinical decision making complexity.  Education:   - Present and ready to learn: patient  - Results of above outlined education: Verbalizes understanding and Demonstrates understanding    PLAN                                                                                                                         The following skilled interventions to be implemented to achieve goals listed below:  Neuromuscular Re-Education (56677)  Therapeutic Activity (99805)  Therapeutic Exercise (99488)  Manual Therapy (95672)  Paraffin (56776)    Frequency / Duration  1 times per week tapering as patient progresses for 10 weeks for an estimated total of 4 visits    Patient involved in and agreed to plan of care and goals.  Patient given  attendance policy at time of initial evaluation.    Suggestions for next session as indicated: Progress per plan of care    Goals  Long Term Goals: to be met by end of plan of care   1. L hand RF PIP improve to -10 degrees as for improved fine motor coordination.  2. L  improve to 10lbs for safe lift of 5-10lb for IADL.  3. Report 50% improved ability to handle utensils over 6-8 weeks   4. Patient will be independent with progressed and modified home exercise program.      Therapy procedure time and total treatment time can be found documented on the Time Entry flowsheet

## 2024-02-08 NOTE — PROGRESS NOTES
[facility]     Tad Moon  11/30/1926   [id number]      Patient Care Team:  Pramod Hyde MD as PCP - General  Pramod Hyde MD as PCP - [facility]  Lv Cobian MD as Consulting Physician (Cardiology)    Chief Complaint::   Chief Complaint   Patient presents with   • Hypertension      You have chosen to receive care through a telephone visit. Do you consent to use a telephone visit for your medical care today? Yes    HPI  Mr. Moon is seen today via video visit for follow-up of his hypertension, coronary artery disease, congestive heart failure and chronic kidney disease.  He is actually doing very well.  Compared to the end of last year when he had lower respiratory infection 4weeks he is felt well.  [facility]  He admits he is not that active but is able to take walks up to 1/2 mile at a time.  He has stable baseline dyspnea, no chest pain or edema.  No orthopnea.  No fever, cough.  His weight today was 163, blood pressure 133/69, pulse 67, oxygen saturation on room air was 95%.    Chronic Conditions:      Patient Active Problem List   Diagnosis   • Coronary artery disease involving coronary bypass graft of native heart without angina pectoris   • Hyperlipidemia LDL goal <100   • History of tobacco abuse   • CLL (chronic lymphocytic leukemia) (CMS/HCC)   • GERD (gastroesophageal reflux disease)   • Nonrheumatic aortic valve stenosis s/p TAVR   • Chronic systolic congestive heart failure (CMS/HCC)   • CKD (chronic kidney disease) stage 3, GFR 30-59 ml/min (CMS/HCC)   • Aortic valve stenosis   • Fatigue   • Ischemic cardiomyopathy   • Chronic combined systolic and diastolic CHF (congestive heart failure) (CMS/HCC)   • Angina pectoris (CMS/HCC)   • Atherosclerosis of coronary artery   • Essential hypertension   • Heart failure, chronic, with acute decompensation (CMS/HCC)   • Generalized ischemic myocardial dysfunction   • Peripheral vascular disease  (CMS/[facility])   • Nonrheumatic mitral valve regurgitation   • Major depressive disorder with single episode, in partial remission (CMS/[facility])        Past Medical History:   Diagnosis Date   • Aortic stenosis    • Arthritis    • BOOP (bronchiolitis obliterans with organizing pneumonia) (CMS/[facility]) 2014   • Chest pain 2007    angioplasty to RCA   • CHF (congestive heart failure) (CMS/[facility])    • CLL (chronic lymphocytic leukemia) (CMS/[facility])     15 years    • Coronary artery disease    • GERD (gastroesophageal reflux disease)    • Herpes zoster 2012   • History of tobacco abuse    • Hyperlipidemia    • Hypertension    • Low kidney function     very recently and did kidney function test and said decreased function so being watched to get all fluid off    • MI (myocardial infarction) (CMS/[facility])     mid 90s very mild - few years after bypass surgery    • Non-STEMI (non-ST elevated myocardial infarction) (CMS/[facility]) 10/17/2017    stenting of occluded grafts to left circumflex and RCA   • Skin cancer     basal and squamous from various location    • SOBOE (shortness of breath on exertion)    • Uses hearing aid     bilat ears        Past Surgical History:   Procedure Laterality Date   • ABDOMINAL HERNIA REPAIR      x 2   • AORTIC VALVE REPAIR/REPLACEMENT N/A 4/26/2018    Procedure: Transcatheter Aortic Valve Replacement, LANDY;  Surgeon: Lv Purdy MD;  Location:  [facility];  Service: Cardiothoracic   • AORTIC VALVE REPAIR/REPLACEMENT N/A 4/26/2018    Procedure: Transcatheter Aortic Valve Replacement;  Surgeon: Juan M Sood MD;  Location:  [facility];  Service: Cardiology   • APPENDECTOMY     • CARDIAC CATHETERIZATION N/A 10/10/2017    Procedure: Left Heart Cath;  Surgeon: Juan M Sood MD;  Location:  [facility] CATH INVASIVE LOCATION;  Service:    • CARDIAC CATHETERIZATION N/A 4/6/2018    Procedure: Left Heart Cath;  Surgeon: Lv Cobian MD;  Location:  [facility] CATH INVASIVE LOCATION;  Service: Cardiovascular   •  CHOLECYSTECTOMY     • COLONOSCOPY     • CORONARY ANGIOPLASTY WITH STENT PLACEMENT      07 one placed,  and in 2017 had another stent placed and one repaired -   so pt thinks 3 stents in place    • CORONARY ARTERY BYPASS GRAFT      4 bypass in 92    • REPLACEMENT TOTAL KNEE Left    • WISDOM TOOTH EXTRACTION         Family History   Problem Relation Age of Onset   • Stroke Mother    • Heart disease Father    • Heart disease Brother    • Coronary artery disease Brother        Social History     Socioeconomic History   • Marital status:      Spouse name: Not on file   • Number of children: 3   • Years of education: Not on file   • Highest education level: Not on file   Occupational History   • Occupation: Retired      Comment: insurance agency    Tobacco Use   • Smoking status: Former Smoker     Packs/day: 0.25     Types: Cigarettes     Last attempt to quit: 1975     Years since quittin.3   • Smokeless tobacco: Never Used   • Tobacco comment: quit 45 years ago- smoked since 19 yo    Substance and Sexual Activity   • Alcohol use: Yes     Alcohol/week: 1.0 - 2.0 standard drinks     Types: 1 - 2 Glasses of wine per week     Comment: occas   • Drug use: No   • Sexual activity: Defer   Social History Narrative    Lives with Wife Julia in [place], [place]     Caffeine:  3 servings per day       Allergies   Allergen Reactions   • Hctz [Hydrochlorothiazide] Other (See Comments)     hyponatremia         Current Outpatient Medications:   •  aspirin 81 MG tablet, Take 1 tablet by mouth daily., Disp: 90 tablet, Rfl: 3  •  atorvastatin (LIPITOR) 80 MG tablet, Take 0.5 tablets by mouth Daily., Disp: 90 tablet, Rfl: 3  •  carvedilol (COREG) 3.125 MG tablet, Take 1 tablet by mouth Every 12 (Twelve) Hours. (Patient taking differently: Take 3.125 mg by mouth Every 12 (Twelve) Hours. Hold for SBP < 110, HR < 60), Disp: 60 tablet, Rfl: 11  •  cholecalciferol (VITAMIN D3) 1000 UNITS tablet, Take 1,000 Units by mouth Daily.,  Disp: , Rfl:   •  clopidogrel (PLAVIX) 75 MG tablet, Take 1 tablet by mouth daily., Disp: 90 tablet, Rfl: 3  •  famotidine (PEPCID) 20 MG tablet, Take 20 mg by mouth Daily., Disp: , Rfl:   •  nitroglycerin (NITROSTAT) 0.4 MG SL tablet, Place 1 tablet under the tongue Every 5 (Five) Minutes As Needed for Chest Pain., Disp: 25 tablet, Rfl: 6  •  sacubitril-valsartan (ENTRESTO) 24-26 MG tablet, Take 1 tablet by mouth 2 (Two) Times a Day., Disp: 60 tablet, Rfl: 1  •  sertraline (ZOLOFT) 100 MG tablet, Take 1/2 a day, Disp: , Rfl:   •  torsemide (DEMADEX) 20 MG tablet, Take 1 tablet by mouth Every Other Day., Disp: 15 tablet, Rfl: 1    Review of Systems   Constitutional: Negative.  Negative for fever.   Respiratory: Negative.  Negative for chest tightness and shortness of breath.    Cardiovascular: Negative.  Negative for chest pain.   Gastrointestinal: Negative for abdominal pain, blood in stool, constipation and diarrhea.        Vital Signs  There were no vitals filed for this visit.    Physical Exam   Constitutional: He is oriented to person, place, and time. No distress.   Pulmonary/Chest: No respiratory distress.   Neurological: He is alert and oriented to person, place, and time.   Psychiatric: He has a normal mood and affect.      Procedures    ACE III MINI             Assessment/Plan:    Tad was seen today for hypertension.    Diagnoses and all orders for this visit:    Essential hypertension    Chronic systolic congestive heart failure (CMS/HCC)    Coronary artery disease involving coronary bypass graft of native heart without angina pectoris    CKD (chronic kidney disease) stage 3, GFR 30-59 ml/min (CMS/HCC)    Major depressive disorder with single episode, in partial remission (CMS/HCC)    Other orders  -     sertraline (ZOLOFT) 100 MG tablet; Take 1/2 a day    Plan    Blood pressure is well controlled on carvedilol.    Congestive heart failure is compensated on carvedilol, Entresto and  torsemide.    Coronary artery disease is asymptomatic on aspirin, atorvastatin, carvedilol and clopidogrel.    GFR stable, treatment remains control of blood pressure and avoidance of NSAIDs.    Depression is in remission on 50 mg of sertraline a day.    This visit has been rescheduled as a phone visit to comply with patient safety concerns in accordance with CDC recommendations. Total time of discussion was 15 minutes.        Plan of care reviewed with patient at the conclusion of today's visit. Education was provided regarding diagnosis, management, and any prescribed or recommended OTC medications.Patient verbalizes understanding of and agreement with management plan.         Pramod Hyde MD            No

## (undated) DEVICE — GW J TP FIX CORE .035 150

## (undated) DEVICE — ST INF PRI SMRTSTE 20DRP 2VLV 24ML 117

## (undated) DEVICE — CANNULA,ADULT,SOFT-TOUCH,7'TUBE,UC: Brand: PENDING

## (undated) DEVICE — BOWL UTIL STRL 32OZ

## (undated) DEVICE — DRSNG WND GZ PAD BORDERED 4X8IN STRL

## (undated) DEVICE — DIL VESL .038 16F 19CM

## (undated) DEVICE — LN INJ CONTRST FLXCIL HP F/M LL 1200PSI48

## (undated) DEVICE — PINNACLE INTRODUCER SHEATH: Brand: PINNACLE

## (undated) DEVICE — PRESSURE MONITORING SET: Brand: TRUWAVE, VAMP

## (undated) DEVICE — SUT PROLN 7/0 BV1 24IN 4PK M8702

## (undated) DEVICE — CATH DIAG EXPO .056 AL1 6F 100CM

## (undated) DEVICE — CABL PACE ATRIAL PT BLU

## (undated) DEVICE — SKIN PREP TRAY W/CHG: Brand: MEDLINE INDUSTRIES, INC.

## (undated) DEVICE — TRAP,MUCUS SPECIMEN,40CC: Brand: MEDLINE

## (undated) DEVICE — SLV REPOSTNG CATH STRL 60CM

## (undated) DEVICE — Device: Brand: MEDEX

## (undated) DEVICE — DEV INFL MONARCH 25W

## (undated) DEVICE — CATH PACE PACEL BIPOL 5F110CM

## (undated) DEVICE — DIL VESL .038 14F 19CM

## (undated) DEVICE — SKIN AFFIX SURG ADHESIVE 72/CS 0.55ML: Brand: MEDLINE

## (undated) DEVICE — DRSNG SURESITE WNDW 4X4.5

## (undated) DEVICE — SHEATH INTRO MICRA HC 23F 55.7CM

## (undated) DEVICE — CANN NASL CO2 DIVIDED A/

## (undated) DEVICE — RADIFOCUS GLIDEWIRE: Brand: GLIDEWIRE

## (undated) DEVICE — CONNECTOR PH 6-IN-1 Y ST: Brand: CARDINAL HEALTH

## (undated) DEVICE — NC TREK CORONARY DILATATION CATHETER 3.0 MM X 15 MM / RAPID-EXCHANGE: Brand: NC TREK

## (undated) DEVICE — GW SAFARI2 PRESH XSM CRV .035IN 3.2X2.9X275CM

## (undated) DEVICE — PK CATH CARD 10

## (undated) DEVICE — GUIDE CATHETER: Brand: MACH1™

## (undated) DEVICE — SHEET, DRAPE, SPLIT, STERILE: Brand: MEDLINE

## (undated) DEVICE — SOL NACL 0.9PCT 1000ML

## (undated) DEVICE — GUIDELINER CATHETERS ARE INTENDED TO BE USED IN CONJUNCTION WITH GUIDE CATHETERS TO ACCESS DISCRETE REGIONS OF THE CORONARY AND/OR PERIPHERAL VASCULATURE, AND TO FACILITATE PLACEMENT OF INTERVENTIONAL DEVICES.: Brand: GUIDELINER® V3 CATHETER

## (undated) DEVICE — MODEL BT2000 P/N 700287-012KIT CONTENTS: MANIFOLD WITH SALINE AND CONTRAST PORTS, SALINE TUBING WITH SPIKE AND HAND SYRINGE, TRANSDUCER: Brand: BT2000 AUTOMATED MANIFOLD KIT

## (undated) DEVICE — ENCORE® LATEX MICRO SIZE 6.5, STERILE LATEX POWDER-FREE SURGICAL GLOVE: Brand: ENCORE

## (undated) DEVICE — LEX ELECTRO PHYSIOLOGY: Brand: MEDLINE INDUSTRIES, INC.

## (undated) DEVICE — SAFETY SCALPEL: Brand: DEROYAL

## (undated) DEVICE — PRESSURE MONITORING ACCESSORY: Brand: TRUWAVE

## (undated) DEVICE — DIL VESL .038 12F 19CM

## (undated) DEVICE — PERCLOSE PROGLIDE™ SUTURE-MEDIATED CLOSURE SYSTEM: Brand: PERCLOSE PROGLIDE™

## (undated) DEVICE — CATH DIAG EXPO M/ PK 6FR FL4/FR4 PIG 3PK

## (undated) DEVICE — A2000 MULTI-USE SYRINGE KIT, P/N 701277-003KIT CONTENTS: 100ML CONTRAST RESERVOIR AND TUBING WITH CONTRAST SPIKE AND CLAMP: Brand: A2000 MULTI-USE SYRINGE KIT

## (undated) DEVICE — GW LUGE .014 182 CM

## (undated) DEVICE — ANTIBACTERIAL UNDYED BRAIDED (POLYGLACTIN 910), SYNTHETIC ABSORBABLE SUTURE: Brand: COATED VICRYL

## (undated) DEVICE — PRESSURE MONITORING SET: Brand: TRUWAVE

## (undated) DEVICE — SUCTION CANISTER, 2500CC, RIGID: Brand: DEROYAL

## (undated) DEVICE — LIMB HOLDER, WRIST/ANKLE: Brand: DEROYAL

## (undated) DEVICE — SUT PROLN 6/0 C1 D/A 30IN 8706H

## (undated) DEVICE — GW AMPLTZ SUPERSTIFF STR .035IN 180CM

## (undated) DEVICE — TREK CORONARY DILATATION CATHETER 2.50 MM X 12 MM / RAPID-EXCHANGE: Brand: TREK

## (undated) DEVICE — SYR LUERLOK 30CC

## (undated) DEVICE — DRAPE,TOP,102X53,STERILE: Brand: MEDLINE

## (undated) DEVICE — ANGIO-SEAL EVOLUTION VASCULAR CLOSURE DEVICE: Brand: ANGIO-SEAL

## (undated) DEVICE — KT MANIFOLD CATHLAB CUST

## (undated) DEVICE — ST EXT IV SMARTSITE 2VLV SP M LL 5ML IV1

## (undated) DEVICE — SUT PROLN 4/0 BB D/A 36IN 8581H

## (undated) DEVICE — SET PRIMARY GRVTY 10DP MALE LL 104IN

## (undated) DEVICE — MEDI-VAC NON-CONDUCTIVE SUCTION TUBING: Brand: CARDINAL HEALTH

## (undated) DEVICE — DECANT BG O JET

## (undated) DEVICE — GW CERBRL FC PTFE STD/STR .035 260CM

## (undated) DEVICE — SUT SILK 4/0 TIES 18IN A183H

## (undated) DEVICE — MODEL AT P54, P/N 700608-035KIT CONTENTS: HAND CONTROLLER, 3-WAY HIGH-PRESSURE STOPCOCK WITH ROTATING END AND PREMIUM HIGH-PRESSURE TUBING: Brand: ANGIOTOUCH® KIT

## (undated) DEVICE — CLTH CLENS READYCLEANSE PERI CARE PK/5

## (undated) DEVICE — SOL NS 500ML

## (undated) DEVICE — PK HEART OPN 10

## (undated) DEVICE — DEBRIDEMENT KIT: Brand: MEDLINE INDUSTRIES, INC.

## (undated) DEVICE — CATH4F INF PIG 145Â° MOD 110CM: Brand: INFINITI

## (undated) DEVICE — Device

## (undated) DEVICE — SUT MONOCRYL PLS ANTIB UND 3/0  PS1 27IN

## (undated) DEVICE — 3M™ TEGADERM™ CHG DRESSING 25/CARTON 4 CARTONS/CASE 1658: Brand: TEGADERM™

## (undated) DEVICE — ELECTRD BLD 1P STD SS 3/32X2.44IN

## (undated) DEVICE — SUT SILK 3/0 TIES 18IN A184H

## (undated) DEVICE — GW PRESS VERRATA STR 185CM